# Patient Record
Sex: MALE | Race: WHITE
[De-identification: names, ages, dates, MRNs, and addresses within clinical notes are randomized per-mention and may not be internally consistent; named-entity substitution may affect disease eponyms.]

---

## 2017-08-03 ENCOUNTER — TRANSCRIPTION ENCOUNTER (OUTPATIENT)
Age: 77
End: 2017-08-03

## 2017-08-07 ENCOUNTER — TRANSCRIPTION ENCOUNTER (OUTPATIENT)
Age: 77
End: 2017-08-07

## 2017-10-02 ENCOUNTER — TRANSCRIPTION ENCOUNTER (OUTPATIENT)
Age: 77
End: 2017-10-02

## 2017-10-02 ENCOUNTER — INPATIENT (INPATIENT)
Facility: HOSPITAL | Age: 77
LOS: 0 days | Discharge: HOME | End: 2017-10-02
Attending: INTERNAL MEDICINE | Admitting: INTERNAL MEDICINE
Payer: MEDICARE

## 2017-10-02 VITALS — HEIGHT: 68 IN | WEIGHT: 169.98 LBS

## 2017-10-02 VITALS
TEMPERATURE: 98 F | OXYGEN SATURATION: 97 % | HEART RATE: 75 BPM | SYSTOLIC BLOOD PRESSURE: 140 MMHG | DIASTOLIC BLOOD PRESSURE: 76 MMHG | RESPIRATION RATE: 16 BRPM

## 2017-10-02 DIAGNOSIS — R10.13 EPIGASTRIC PAIN: ICD-10-CM

## 2017-10-02 DIAGNOSIS — Z90.49 ACQUIRED ABSENCE OF OTHER SPECIFIED PARTS OF DIGESTIVE TRACT: Chronic | ICD-10-CM

## 2017-10-02 LAB
ALBUMIN SERPL ELPH-MCNC: 4.2 G/DL — SIGNIFICANT CHANGE UP (ref 3.3–5)
ALP SERPL-CCNC: 65 U/L — SIGNIFICANT CHANGE UP (ref 40–120)
ALT FLD-CCNC: 47 U/L — SIGNIFICANT CHANGE UP (ref 12–78)
ANION GAP SERPL CALC-SCNC: 8 MMOL/L — SIGNIFICANT CHANGE UP (ref 5–17)
APPEARANCE UR: CLEAR — SIGNIFICANT CHANGE UP
APTT BLD: 27.3 SEC — LOW (ref 27.5–37.4)
AST SERPL-CCNC: 33 U/L — SIGNIFICANT CHANGE UP (ref 15–37)
BASOPHILS # BLD AUTO: 0 K/UL — SIGNIFICANT CHANGE UP (ref 0–0.2)
BASOPHILS NFR BLD AUTO: 0.4 % — SIGNIFICANT CHANGE UP (ref 0–2)
BILIRUB SERPL-MCNC: 0.8 MG/DL — SIGNIFICANT CHANGE UP (ref 0.2–1.2)
BILIRUB UR-MCNC: NEGATIVE — SIGNIFICANT CHANGE UP
BLD GP AB SCN SERPL QL: SIGNIFICANT CHANGE UP
BUN SERPL-MCNC: 15 MG/DL — SIGNIFICANT CHANGE UP (ref 7–23)
CALCIUM SERPL-MCNC: 9.3 MG/DL — SIGNIFICANT CHANGE UP (ref 8.5–10.1)
CHLORIDE SERPL-SCNC: 101 MMOL/L — SIGNIFICANT CHANGE UP (ref 96–108)
CO2 SERPL-SCNC: 26 MMOL/L — SIGNIFICANT CHANGE UP (ref 22–31)
COLOR SPEC: YELLOW — SIGNIFICANT CHANGE UP
CREAT SERPL-MCNC: 1.07 MG/DL — SIGNIFICANT CHANGE UP (ref 0.5–1.3)
DIFF PNL FLD: NEGATIVE — SIGNIFICANT CHANGE UP
EOSINOPHIL # BLD AUTO: 0 K/UL — SIGNIFICANT CHANGE UP (ref 0–0.5)
EOSINOPHIL NFR BLD AUTO: 0.1 % — SIGNIFICANT CHANGE UP (ref 0–6)
GLUCOSE SERPL-MCNC: 132 MG/DL — HIGH (ref 70–99)
GLUCOSE UR QL: NEGATIVE MG/DL — SIGNIFICANT CHANGE UP
HCT VFR BLD CALC: 44.8 % — SIGNIFICANT CHANGE UP (ref 39–50)
HGB BLD-MCNC: 14.8 G/DL — SIGNIFICANT CHANGE UP (ref 13–17)
INR BLD: 1.07 RATIO — SIGNIFICANT CHANGE UP (ref 0.88–1.16)
KETONES UR-MCNC: NEGATIVE — SIGNIFICANT CHANGE UP
LEUKOCYTE ESTERASE UR-ACNC: NEGATIVE — SIGNIFICANT CHANGE UP
LIDOCAIN IGE QN: 118 U/L — SIGNIFICANT CHANGE UP (ref 73–393)
LYMPHOCYTES # BLD AUTO: 1.2 K/UL — SIGNIFICANT CHANGE UP (ref 1–3.3)
LYMPHOCYTES # BLD AUTO: 11.7 % — LOW (ref 13–44)
MCHC RBC-ENTMCNC: 30 PG — SIGNIFICANT CHANGE UP (ref 27–34)
MCHC RBC-ENTMCNC: 33 GM/DL — SIGNIFICANT CHANGE UP (ref 32–36)
MCV RBC AUTO: 91 FL — SIGNIFICANT CHANGE UP (ref 80–100)
MONOCYTES # BLD AUTO: 0.9 K/UL — SIGNIFICANT CHANGE UP (ref 0–0.9)
MONOCYTES NFR BLD AUTO: 8.5 % — SIGNIFICANT CHANGE UP (ref 2–14)
NEUTROPHILS # BLD AUTO: 8.1 K/UL — HIGH (ref 1.8–7.4)
NEUTROPHILS NFR BLD AUTO: 79.2 % — HIGH (ref 43–77)
NITRITE UR-MCNC: NEGATIVE — SIGNIFICANT CHANGE UP
PH UR: 6 — SIGNIFICANT CHANGE UP (ref 5–8)
PLATELET # BLD AUTO: 278 K/UL — SIGNIFICANT CHANGE UP (ref 150–400)
POTASSIUM SERPL-MCNC: 3.8 MMOL/L — SIGNIFICANT CHANGE UP (ref 3.5–5.3)
POTASSIUM SERPL-SCNC: 3.8 MMOL/L — SIGNIFICANT CHANGE UP (ref 3.5–5.3)
PROT SERPL-MCNC: 8.8 GM/DL — HIGH (ref 6–8.3)
PROT UR-MCNC: NEGATIVE MG/DL — SIGNIFICANT CHANGE UP
PROTHROM AB SERPL-ACNC: 11.6 SEC — SIGNIFICANT CHANGE UP (ref 9.8–12.7)
RBC # BLD: 4.92 M/UL — SIGNIFICANT CHANGE UP (ref 4.2–5.8)
RBC # FLD: 13.4 % — SIGNIFICANT CHANGE UP (ref 10.3–14.5)
SODIUM SERPL-SCNC: 135 MMOL/L — SIGNIFICANT CHANGE UP (ref 135–145)
SP GR SPEC: 1.01 — SIGNIFICANT CHANGE UP (ref 1.01–1.02)
TYPE + AB SCN PNL BLD: SIGNIFICANT CHANGE UP
UROBILINOGEN FLD QL: NEGATIVE MG/DL — SIGNIFICANT CHANGE UP
WBC # BLD: 10.2 K/UL — SIGNIFICANT CHANGE UP (ref 3.8–10.5)
WBC # FLD AUTO: 10.2 K/UL — SIGNIFICANT CHANGE UP (ref 3.8–10.5)

## 2017-10-02 PROCEDURE — 76705 ECHO EXAM OF ABDOMEN: CPT | Mod: 26

## 2017-10-02 PROCEDURE — 93010 ELECTROCARDIOGRAM REPORT: CPT

## 2017-10-02 PROCEDURE — 71010: CPT | Mod: 26

## 2017-10-02 PROCEDURE — 99285 EMERGENCY DEPT VISIT HI MDM: CPT

## 2017-10-02 RX ORDER — SENNA PLUS 8.6 MG/1
2 TABLET ORAL AT BEDTIME
Qty: 0 | Refills: 0 | Status: DISCONTINUED | OUTPATIENT
Start: 2017-10-02 | End: 2017-10-02

## 2017-10-02 RX ORDER — ENOXAPARIN SODIUM 100 MG/ML
40 INJECTION SUBCUTANEOUS EVERY 24 HOURS
Qty: 0 | Refills: 0 | Status: DISCONTINUED | OUTPATIENT
Start: 2017-10-02 | End: 2017-10-02

## 2017-10-02 RX ORDER — MORPHINE SULFATE 50 MG/1
2 CAPSULE, EXTENDED RELEASE ORAL ONCE
Qty: 0 | Refills: 0 | Status: DISCONTINUED | OUTPATIENT
Start: 2017-10-02 | End: 2017-10-02

## 2017-10-02 RX ORDER — FAMOTIDINE 10 MG/ML
20 INJECTION INTRAVENOUS ONCE
Qty: 0 | Refills: 0 | Status: COMPLETED | OUTPATIENT
Start: 2017-10-02 | End: 2017-10-02

## 2017-10-02 RX ORDER — ISOSORBIDE MONONITRATE 60 MG/1
60 TABLET, EXTENDED RELEASE ORAL DAILY
Qty: 0 | Refills: 0 | Status: DISCONTINUED | OUTPATIENT
Start: 2017-10-02 | End: 2017-10-02

## 2017-10-02 RX ORDER — SODIUM CHLORIDE 9 MG/ML
1000 INJECTION INTRAMUSCULAR; INTRAVENOUS; SUBCUTANEOUS ONCE
Qty: 0 | Refills: 0 | Status: COMPLETED | OUTPATIENT
Start: 2017-10-02 | End: 2017-10-02

## 2017-10-02 RX ORDER — ACETAMINOPHEN 500 MG
650 TABLET ORAL EVERY 6 HOURS
Qty: 0 | Refills: 0 | Status: DISCONTINUED | OUTPATIENT
Start: 2017-10-02 | End: 2017-10-02

## 2017-10-02 RX ORDER — ATORVASTATIN CALCIUM 80 MG/1
10 TABLET, FILM COATED ORAL AT BEDTIME
Qty: 0 | Refills: 0 | Status: DISCONTINUED | OUTPATIENT
Start: 2017-10-02 | End: 2017-10-02

## 2017-10-02 RX ORDER — SODIUM CHLORIDE 9 MG/ML
3 INJECTION INTRAMUSCULAR; INTRAVENOUS; SUBCUTANEOUS ONCE
Qty: 0 | Refills: 0 | Status: COMPLETED | OUTPATIENT
Start: 2017-10-02 | End: 2017-10-02

## 2017-10-02 RX ORDER — PANTOPRAZOLE SODIUM 20 MG/1
40 TABLET, DELAYED RELEASE ORAL
Qty: 0 | Refills: 0 | Status: DISCONTINUED | OUTPATIENT
Start: 2017-10-02 | End: 2017-10-02

## 2017-10-02 RX ORDER — ONDANSETRON 8 MG/1
4 TABLET, FILM COATED ORAL ONCE
Qty: 0 | Refills: 0 | Status: COMPLETED | OUTPATIENT
Start: 2017-10-02 | End: 2017-10-02

## 2017-10-02 RX ORDER — CLOPIDOGREL BISULFATE 75 MG/1
75 TABLET, FILM COATED ORAL DAILY
Qty: 0 | Refills: 0 | Status: DISCONTINUED | OUTPATIENT
Start: 2017-10-02 | End: 2017-10-02

## 2017-10-02 RX ORDER — LABETALOL HCL 100 MG
400 TABLET ORAL
Qty: 0 | Refills: 0 | Status: DISCONTINUED | OUTPATIENT
Start: 2017-10-02 | End: 2017-10-02

## 2017-10-02 RX ORDER — MORPHINE SULFATE 50 MG/1
2 CAPSULE, EXTENDED RELEASE ORAL EVERY 4 HOURS
Qty: 0 | Refills: 0 | Status: DISCONTINUED | OUTPATIENT
Start: 2017-10-02 | End: 2017-10-02

## 2017-10-02 RX ORDER — CALCIUM CARBONATE 500(1250)
3 TABLET ORAL EVERY 6 HOURS
Qty: 0 | Refills: 0 | Status: DISCONTINUED | OUTPATIENT
Start: 2017-10-02 | End: 2017-10-02

## 2017-10-02 RX ORDER — ONDANSETRON 8 MG/1
4 TABLET, FILM COATED ORAL EVERY 6 HOURS
Qty: 0 | Refills: 0 | Status: DISCONTINUED | OUTPATIENT
Start: 2017-10-02 | End: 2017-10-02

## 2017-10-02 RX ORDER — SODIUM CHLORIDE 9 MG/ML
1000 INJECTION INTRAMUSCULAR; INTRAVENOUS; SUBCUTANEOUS
Qty: 0 | Refills: 0 | Status: DISCONTINUED | OUTPATIENT
Start: 2017-10-02 | End: 2017-10-02

## 2017-10-02 RX ADMIN — MORPHINE SULFATE 2 MILLIGRAM(S): 50 CAPSULE, EXTENDED RELEASE ORAL at 05:20

## 2017-10-02 RX ADMIN — PANTOPRAZOLE SODIUM 40 MILLIGRAM(S): 20 TABLET, DELAYED RELEASE ORAL at 11:39

## 2017-10-02 RX ADMIN — SODIUM CHLORIDE 3 MILLILITER(S): 9 INJECTION INTRAMUSCULAR; INTRAVENOUS; SUBCUTANEOUS at 02:21

## 2017-10-02 RX ADMIN — ISOSORBIDE MONONITRATE 60 MILLIGRAM(S): 60 TABLET, EXTENDED RELEASE ORAL at 11:23

## 2017-10-02 RX ADMIN — FAMOTIDINE 20 MILLIGRAM(S): 10 INJECTION INTRAVENOUS at 01:45

## 2017-10-02 RX ADMIN — Medication 30 MILLILITER(S): at 05:06

## 2017-10-02 RX ADMIN — SODIUM CHLORIDE 1000 MILLILITER(S): 9 INJECTION INTRAMUSCULAR; INTRAVENOUS; SUBCUTANEOUS at 01:25

## 2017-10-02 RX ADMIN — MORPHINE SULFATE 2 MILLIGRAM(S): 50 CAPSULE, EXTENDED RELEASE ORAL at 05:06

## 2017-10-02 RX ADMIN — SODIUM CHLORIDE 1000 MILLILITER(S): 9 INJECTION INTRAMUSCULAR; INTRAVENOUS; SUBCUTANEOUS at 05:06

## 2017-10-02 RX ADMIN — CLOPIDOGREL BISULFATE 75 MILLIGRAM(S): 75 TABLET, FILM COATED ORAL at 11:23

## 2017-10-02 RX ADMIN — Medication 1 TABLET(S): at 11:23

## 2017-10-02 RX ADMIN — ONDANSETRON 4 MILLIGRAM(S): 8 TABLET, FILM COATED ORAL at 01:45

## 2017-10-02 RX ADMIN — ONDANSETRON 4 MILLIGRAM(S): 8 TABLET, FILM COATED ORAL at 04:16

## 2017-10-02 NOTE — H&P ADULT - PMH
Coronary artery disease involving native heart without angina pectoris, unspecified vessel or lesion type    Diverticulitis    Essential hypertension    Gastritis

## 2017-10-02 NOTE — H&P ADULT - NSHPLABSRESULTS_GEN_ALL_CORE
14.8   10.2  )-----------( 278      ( 02 Oct 2017 01:35 )             44.8     10-02    135  |  101  |  15  ----------------------------<  132<H>  3.8   |  26  |  1.07    Ca    9.3      02 Oct 2017 01:35    TPro  8.8<H>  /  Alb  4.2  /  TBili  0.8  /  DBili  x   /  AST  33  /  ALT  47  /  AlkPhos  65  10-02    CAPILLARY BLOOD GLUCOSE        LIVER FUNCTIONS - ( 02 Oct 2017 01:35 )  Alb: 4.2 g/dL / Pro: 8.8 gm/dL / ALK PHOS: 65 U/L / ALT: 47 U/L / AST: 33 U/L / GGT: x           PT/INR - ( 02 Oct 2017 01:35 )   PT: 11.6 sec;   INR: 1.07 ratio         PTT - ( 02 Oct 2017 01:35 )  PTT:27.3 sec  Urinalysis Basic - ( 02 Oct 2017 04:02 )    Color: Yellow / Appearance: Clear / S.015 / pH: x  Gluc: x / Ketone: Negative  / Bili: Negative / Urobili: Negative mg/dL   Blood: x / Protein: Negative mg/dL / Nitrite: Negative   Leuk Esterase: Negative / RBC: x / WBC x   Sq Epi: x / Non Sq Epi: x / Bacteria: x      < from: US Abdomen Limited (10.02.17 @ 04:00) >    IMPRESSION:     Multiple small gallstones which appear mobile. No gallbladder wall   thickening. Sonographic Merchant sign was negative.    If further evaluation is clinically indicated, consider HIDA scan.    < end of copied text >

## 2017-10-02 NOTE — H&P ADULT - ASSESSMENT
77 year old man with abd pain    Abd pain, likely related to cholelithiasis   -abd pain resolved  -s/p IVFs and morphine for supportive care  -no leukocytosis  -HD stable  -ultrasound shows gallstones without inflammation  -hold off on HIDA given dye reaction in the past.  -GI and surgical consult pending  -c/w protonix    CAD s/p stent, HTN:  -c/w BB, imdur, statin, plavix    DVT ppx:  -lovenox

## 2017-10-02 NOTE — ED ADULT NURSE NOTE - OBJECTIVE STATEMENT
Pt c/o increasing abd pain x 1 day - pt c/o nausea, no vomiting - states normal BM today.  Pt with h/o gastritis

## 2017-10-02 NOTE — ED PROVIDER NOTE - MEDICAL DECISION MAKING DETAILS
Biliary colic from gallstones without evidence of obstruction or cholecystitis.  Pt. with persistent nausea.  Prefers to follow up in the office with his GI, Dr. Esquivel. Biliary colic from gallstones without evidence of obstruction or cholecystitis.  Pt. with persistent nausea.  Prefers to follow up in the office with his GI, Dr. Esquivel, but still unable to tolerate PO.  GI recommends HIDA scan and admission if pt. having persistent symptoms.

## 2017-10-02 NOTE — H&P ADULT - HISTORY OF PRESENT ILLNESS
77 year old man with HTN, CAD s/p stent, diverticulitis s/p colon resection, gastritis p/w abd pain. States he was [facility] and after eating a meal had pain in epigastric area, sharp, sudden.  Pt with also nausea but no vomiting. Pt was worried about dehydration.    In ER: Given pain meds. Ultrasound showed gallstones without cholecystitis.      At bedside pt completely asymptomatic and feeling well.

## 2017-10-02 NOTE — ED ADULT NURSE REASSESSMENT NOTE - COMFORT CARE
plan of care explained/wait time explained
side rails up/wait time explained/po fluids offered
warm blanket provided/darkened lights/wait time explained/side rails up
wait time explained

## 2017-10-02 NOTE — DISCHARGE NOTE ADULT - PATIENT PORTAL LINK FT
“You can access the [facility] Patient Portal, offered by [facility], by registering with the following website: [email or web address]”

## 2017-10-02 NOTE — ED ADULT NURSE REASSESSMENT NOTE - NS ED NURSE REASSESS COMMENT FT1
Received patient from ROHIT Machado @ 0715 am - Pt. is resting in bed- Pt. states to feel much better at this time. Pt. pending cinsult from hospitalist for admission to unit- Family at the bedside, will cont to monitor patient closely- Safety maintained
Patient remains as previously assessed. Denies pain/discomfort at this time. Safety & comfort measures in place. Will continue to monitor.
Patient status unchanged from previous assessment. VSS, denies pain/discomfort at this time. Awaiting discharge orders. IV removed. Safety & comfort measures in place.
Patient refusing morphine, denies pain/discomfort at this time. MD at bedside. Patient currently resting comfortably. Safety & comfort measures in place. Will continue to monitor.
Patient A&Ox4, VSS.  Patient reports intermittent abdominal pain, cramping in nature 7/10. Abdomen soft, nondistended, nontender to palpation. Denies nausea, no vomiting. BM this am, pt denies constipation, denies diarrhea. Safety & comfort measures maintained. Will continue to monitor.

## 2017-10-02 NOTE — ED PROVIDER NOTE - PROGRESS NOTE DETAILS
US showing gallstones, pt. with nausea and persistent pain.  GI Dr. Alvin short. CHENGG:  Spoke to Dr. Decker on call for GI who thinks HIDA should be ordered and surgery consulted if HIDA is abnormal.  Since pt. needing pain meds and nausea meds and may need to be prepped prior to procedure for reaction to IV dye during nuclear stress test, pt. will be admitted to medicine for biliary colic.

## 2017-10-02 NOTE — ED PROVIDER NOTE - OBJECTIVE STATEMENT
76 y/o male with PMHx of diverticulitis s/p colon resection, gastritis presents to the ED c/o pain in upper abdomen.  (+) VI, (+) nausea.   Denies fever.  BM 4 hours ago with mild constipation.  Came to ED today due to pain and worry of dehydration from reduced intake of fluids.   Gastroenterologist Dr. Esquivel.

## 2017-10-02 NOTE — DISCHARGE NOTE ADULT - CARE PLAN
Principal Discharge DX:	Calculus of gallbladder without cholecystitis without obstruction  Goal:	follow up with Dr. Esquivel as outpatient.  Instructions for follow-up, activity and diet:	follow up with Dr. Esquivel as outpatient.

## 2017-10-02 NOTE — ED ADULT NURSE REASSESSMENT NOTE - SYMPTOMS
Patient reports intermittent abdominal cramping/abdominal pain
denies pain at this time/none
none
none/denies abdominal pain at this time

## 2017-10-02 NOTE — DISCHARGE NOTE ADULT - MEDICATION SUMMARY - MEDICATIONS TO TAKE
I will START or STAY ON the medications listed below when I get home from the hospital:    isosorbide mononitrate 60 mg oral tablet, extended release  -- 1 tab(s) by mouth once a day (in the morning)  -- Indication: For Cad    Lipitor 10 mg oral tablet  -- 1 tab(s) by mouth once a day  -- Indication: For Cad    Plavix 75 mg oral tablet  -- 1 tab(s) by mouth once a day  -- Indication: For Cad    labetalol  -- 400 milligram(s) by mouth 2 times a day  -- Indication: For htn    Protonix 40 mg oral delayed release tablet  -- 1 tab(s) by mouth once a day  -- Indication: For Gerd    Multiple Vitamins oral tablet  -- 1 tab(s) by mouth once a day  -- Indication: For vitamins

## 2017-10-02 NOTE — DISCHARGE NOTE ADULT - CARE PROVIDER_API CALL
Irma Esquivel), Gastroenterology  [address]  [place] [address]  Phone: [phone]  Fax: [phone]

## 2017-10-02 NOTE — ED PROVIDER NOTE - CARE PLAN
Principal Discharge DX:	Nausea alone  Secondary Diagnosis:	Calculus of gallbladder without cholecystitis without obstruction Principal Discharge DX:	Biliary colic  Secondary Diagnosis:	Calculus of gallbladder without cholecystitis without obstruction  Secondary Diagnosis:	Nausea alone

## 2017-10-02 NOTE — H&P ADULT - NSHPPHYSICALEXAM_GEN_ALL_CORE
Vital Signs Last 24 Hrs  T(C): 37 (02 Oct 2017 08:48), Max: 37.3 (02 Oct 2017 00:56)  T(F): 98.6 (02 Oct 2017 08:48), Max: 99.1 (02 Oct 2017 00:56)  HR: 71 (02 Oct 2017 08:48) (68 - 79)  BP: 159/79 (02 Oct 2017 08:48) (126/77 - 159/79)  BP(mean): --  RR: 16 (02 Oct 2017 08:48) (16 - 18)  SpO2: 96% (02 Oct 2017 08:48) (96% - 100%)    PHYSICAL EXAM:    Constitutional: NAD, awake and alert, well-developed  HEENT: PERR, EOMI, Normal Hearing, MMM  Neck: Soft and supple  Respiratory: Breath sounds are clear bilaterally, No wheezing, rales or rhonchi  Cardiovascular: S1 and S2, regular rate and rhythm, no Murmurs, gallops or rubs  Gastrointestinal: Bowel Sounds present, soft, nontender, nondistended, no guarding, no rebound  Extremities: No peripheral edema  Neurological: A/O x 3, no focal deficits  Skin: No rashes

## 2017-10-02 NOTE — DISCHARGE NOTE ADULT - HOSPITAL COURSE
77 year old man presents with abdominal pain.  He was given supportive care with morphine and IVFs.  Labs were largely unremarkable with no leukocytosis.  Imaging revealed cholelithiasis without any inflammation or concern for infection or sepsis. Pt is HD stable and feeling well.  He can follow up as outpatient non-emergently with his GI doc or PCP for consideration of gallbladder removal surgery.  In meantime he can continue his current medical regimen.

## 2017-10-02 NOTE — ED ADULT NURSE REASSESSMENT NOTE - GENERAL PATIENT STATE
comfortable appearance/cooperative/smiling/interactive
comfortable appearance/smiling/interactive/cooperative
family/SO at bedside/smiling/interactive/comfortable appearance/cooperative
resting/sleeping/comfortable appearance/cooperative

## 2017-10-02 NOTE — CONSULT NOTE ADULT - SUBJECTIVE AND OBJECTIVE BOX
HPI:  77 year old man with HTN, CAD s/p stent, diverticulitis s/p colon resection, gastritis p/w abd pain. States he was [facility] and after eating a meal had pain in epigastric area, sharp, sudden.  Pt with also nausea but no vomiting. Pt was worried about dehydration.    In ER: Given pain meds. Ultrasound showed gallstones without cholecystitis.      At bedside pt completely asymptomatic and feeling well. (02 Oct 2017 09:28)      PAST MEDICAL & SURGICAL HISTORY:  Essential hypertension  Coronary artery disease involving native heart without angina pectoris, unspecified vessel or lesion type  Gastritis  Diverticulitis  S/P colon resection      MEDICATIONS  (STANDING):  enoxaparin Injectable 40 milliGRAM(s) SubCutaneous every 24 hours  isosorbide   mononitrate ER Tablet (IMDUR) 60 milliGRAM(s) Oral daily  clopidogrel Tablet 75 milliGRAM(s) Oral daily  labetalol 400 milliGRAM(s) Oral two times a day  pantoprazole    Tablet 40 milliGRAM(s) Oral before breakfast  multivitamin 1 Tablet(s) Oral daily  atorvastatin 10 milliGRAM(s) Oral at bedtime    MEDICATIONS  (PRN):  acetaminophen   Tablet. 650 milliGRAM(s) Oral every 6 hours PRN Mild Pain (1 - 3)  morphine  - Injectable 2 milliGRAM(s) IV Push every 4 hours PRN Severe Pain (7 - 10)  ondansetron Injectable 4 milliGRAM(s) IV Push every 6 hours PRN Nausea  calcium carbonate 500 mG (Tums) Chewable 3 Tablet(s) Chew every 6 hours PRN Dyspepsia  senna 2 Tablet(s) Oral at bedtime PRN Constipation      Allergies    contrast (Unknown)  No Known Drug Allergies  Originally Entered as [Unknown] reaction to [hair dye] (Unknown)    Intolerances        SOCIAL HISTORY:    FAMILY HISTORY:  No pertinent family history in first degree relatives   Non-contributory    REVIEW OF SYSTEMS      General:	    Respiratory and Thorax:  	  Cardiovascular:	    Gastrointestinal:	    Musculoskeletal:	   Vital Signs Last 24 Hrs  T(C): 37 (02 Oct 2017 11:18), Max: 37.3 (02 Oct 2017 00:56)  T(F): 98.6 (02 Oct 2017 11:18), Max: 99.1 (02 Oct 2017 00:56)  HR: 72 (02 Oct 2017 12:38) (68 - 79)  BP: 137/62 (02 Oct 2017 12:38) (126/77 - 162/81)  BP(mean): --  RR: 18 (02 Oct 2017 11:18) (16 - 18)  SpO2: 96% (02 Oct 2017 11:18) (96% - 100%)    HEENT :No Pallor.No icterus. EOMI,PERLAA  Chest : Clear to Auscultation  CVS : S1S2 Normal.No murmurs.  Abdomen: Soft.Non tender .Normal bowel sounds.No Organomegaly.  CNS: Alert.Oriented to Time,Place and Person.No focal deficit.  EXT: Normal Range of motion.No pitting edema.    LABS:                        14.8   10.2  )-----------( 278      ( 02 Oct 2017 01:35 )             44.8     10-02    135  |  101  |  15  ----------------------------<  132<H>  3.8   |  26  |  1.07    Ca    9.3      02 Oct 2017 01:35    TPro  8.8<H>  /  Alb  4.2  /  TBili  0.8  /  DBili  x   /  AST  33  /  ALT  47  /  AlkPhos  65  10-02    PT/INR - ( 02 Oct 2017 01:35 )   PT: 11.6 sec;   INR: 1.07 ratio         PTT - ( 02 Oct 2017 01:35 )  PTT:27.3 sec  LIVER FUNCTIONS - ( 02 Oct 2017 01:35 )  Alb: 4.2 g/dL / Pro: 8.8 gm/dL / ALK PHOS: 65 U/L / ALT: 47 U/L / AST: 33 U/L / GGT: x             RADIOLOGY & ADDITIONAL STUDIES:

## 2017-10-04 DIAGNOSIS — I25.10 ATHEROSCLEROTIC HEART DISEASE OF NATIVE CORONARY ARTERY WITHOUT ANGINA PECTORIS: ICD-10-CM

## 2017-10-04 DIAGNOSIS — K80.20 CALCULUS OF GALLBLADDER WITHOUT CHOLECYSTITIS WITHOUT OBSTRUCTION: ICD-10-CM

## 2017-10-04 DIAGNOSIS — I10 ESSENTIAL (PRIMARY) HYPERTENSION: ICD-10-CM

## 2017-10-07 ENCOUNTER — INPATIENT (INPATIENT)
Facility: HOSPITAL | Age: 77
LOS: 3 days | Discharge: HOME | End: 2017-10-11
Attending: SURGERY | Admitting: SURGERY
Payer: MEDICARE

## 2017-10-07 VITALS — WEIGHT: 171.96 LBS | HEIGHT: 60 IN

## 2017-10-07 DIAGNOSIS — I10 ESSENTIAL (PRIMARY) HYPERTENSION: ICD-10-CM

## 2017-10-07 DIAGNOSIS — Z90.49 ACQUIRED ABSENCE OF OTHER SPECIFIED PARTS OF DIGESTIVE TRACT: Chronic | ICD-10-CM

## 2017-10-07 DIAGNOSIS — K80.10 CALCULUS OF GALLBLADDER WITH CHRONIC CHOLECYSTITIS WITHOUT OBSTRUCTION: ICD-10-CM

## 2017-10-07 PROBLEM — K29.70 GASTRITIS, UNSPECIFIED, WITHOUT BLEEDING: Chronic | Status: ACTIVE | Noted: 2017-10-02

## 2017-10-07 PROBLEM — K57.92 DIVERTICULITIS OF INTESTINE, PART UNSPECIFIED, WITHOUT PERFORATION OR ABSCESS WITHOUT BLEEDING: Chronic | Status: ACTIVE | Noted: 2017-10-02

## 2017-10-07 LAB
ALBUMIN SERPL ELPH-MCNC: 4 G/DL — SIGNIFICANT CHANGE UP (ref 3.3–5)
ALP SERPL-CCNC: 67 U/L — SIGNIFICANT CHANGE UP (ref 40–120)
ALT FLD-CCNC: 41 U/L — SIGNIFICANT CHANGE UP (ref 12–78)
ANION GAP SERPL CALC-SCNC: 8 MMOL/L — SIGNIFICANT CHANGE UP (ref 5–17)
AST SERPL-CCNC: 28 U/L — SIGNIFICANT CHANGE UP (ref 15–37)
BASOPHILS # BLD AUTO: 0.1 K/UL — SIGNIFICANT CHANGE UP (ref 0–0.2)
BASOPHILS NFR BLD AUTO: 0.5 % — SIGNIFICANT CHANGE UP (ref 0–2)
BILIRUB SERPL-MCNC: 0.5 MG/DL — SIGNIFICANT CHANGE UP (ref 0.2–1.2)
BUN SERPL-MCNC: 18 MG/DL — SIGNIFICANT CHANGE UP (ref 7–23)
CALCIUM SERPL-MCNC: 9.2 MG/DL — SIGNIFICANT CHANGE UP (ref 8.5–10.1)
CHLORIDE SERPL-SCNC: 104 MMOL/L — SIGNIFICANT CHANGE UP (ref 96–108)
CO2 SERPL-SCNC: 25 MMOL/L — SIGNIFICANT CHANGE UP (ref 22–31)
CREAT SERPL-MCNC: 1.11 MG/DL — SIGNIFICANT CHANGE UP (ref 0.5–1.3)
EOSINOPHIL # BLD AUTO: 0.1 K/UL — SIGNIFICANT CHANGE UP (ref 0–0.5)
EOSINOPHIL NFR BLD AUTO: 0.5 % — SIGNIFICANT CHANGE UP (ref 0–6)
GLUCOSE SERPL-MCNC: 127 MG/DL — HIGH (ref 70–99)
HCT VFR BLD CALC: 43 % — SIGNIFICANT CHANGE UP (ref 39–50)
HGB BLD-MCNC: 14.5 G/DL — SIGNIFICANT CHANGE UP (ref 13–17)
LIDOCAIN IGE QN: 139 U/L — SIGNIFICANT CHANGE UP (ref 73–393)
LYMPHOCYTES # BLD AUTO: 1.1 K/UL — SIGNIFICANT CHANGE UP (ref 1–3.3)
LYMPHOCYTES # BLD AUTO: 10.8 % — LOW (ref 13–44)
MAGNESIUM SERPL-MCNC: 2.1 MG/DL — SIGNIFICANT CHANGE UP (ref 1.6–2.6)
MCHC RBC-ENTMCNC: 30.7 PG — SIGNIFICANT CHANGE UP (ref 27–34)
MCHC RBC-ENTMCNC: 33.8 GM/DL — SIGNIFICANT CHANGE UP (ref 32–36)
MCV RBC AUTO: 90.9 FL — SIGNIFICANT CHANGE UP (ref 80–100)
MONOCYTES # BLD AUTO: 0.9 K/UL — SIGNIFICANT CHANGE UP (ref 0–0.9)
MONOCYTES NFR BLD AUTO: 8.7 % — SIGNIFICANT CHANGE UP (ref 2–14)
NEUTROPHILS # BLD AUTO: 8 K/UL — HIGH (ref 1.8–7.4)
NEUTROPHILS NFR BLD AUTO: 79.4 % — HIGH (ref 43–77)
PLATELET # BLD AUTO: 264 K/UL — SIGNIFICANT CHANGE UP (ref 150–400)
POTASSIUM SERPL-MCNC: 3.7 MMOL/L — SIGNIFICANT CHANGE UP (ref 3.5–5.3)
POTASSIUM SERPL-SCNC: 3.7 MMOL/L — SIGNIFICANT CHANGE UP (ref 3.5–5.3)
PROT SERPL-MCNC: 8.2 GM/DL — SIGNIFICANT CHANGE UP (ref 6–8.3)
RBC # BLD: 4.72 M/UL — SIGNIFICANT CHANGE UP (ref 4.2–5.8)
RBC # FLD: 13.3 % — SIGNIFICANT CHANGE UP (ref 10.3–14.5)
SODIUM SERPL-SCNC: 137 MMOL/L — SIGNIFICANT CHANGE UP (ref 135–145)
WBC # BLD: 10 K/UL — SIGNIFICANT CHANGE UP (ref 3.8–10.5)
WBC # FLD AUTO: 10 K/UL — SIGNIFICANT CHANGE UP (ref 3.8–10.5)

## 2017-10-07 PROCEDURE — 76705 ECHO EXAM OF ABDOMEN: CPT | Mod: 26

## 2017-10-07 PROCEDURE — 99285 EMERGENCY DEPT VISIT HI MDM: CPT

## 2017-10-07 RX ORDER — MORPHINE SULFATE 50 MG/1
4 CAPSULE, EXTENDED RELEASE ORAL ONCE
Qty: 0 | Refills: 0 | Status: DISCONTINUED | OUTPATIENT
Start: 2017-10-07 | End: 2017-10-07

## 2017-10-07 RX ORDER — LABETALOL HCL 100 MG
400 TABLET ORAL EVERY 12 HOURS
Qty: 0 | Refills: 0 | Status: DISCONTINUED | OUTPATIENT
Start: 2017-10-07 | End: 2017-10-11

## 2017-10-07 RX ORDER — LANOLIN ALCOHOL/MO/W.PET/CERES
5 CREAM (GRAM) TOPICAL AT BEDTIME
Qty: 0 | Refills: 0 | Status: DISCONTINUED | OUTPATIENT
Start: 2017-10-07 | End: 2017-10-07

## 2017-10-07 RX ORDER — PANTOPRAZOLE SODIUM 20 MG/1
40 TABLET, DELAYED RELEASE ORAL DAILY
Qty: 0 | Refills: 0 | Status: DISCONTINUED | OUTPATIENT
Start: 2017-10-07 | End: 2017-10-08

## 2017-10-07 RX ORDER — SODIUM CHLORIDE 9 MG/ML
1000 INJECTION INTRAMUSCULAR; INTRAVENOUS; SUBCUTANEOUS ONCE
Qty: 0 | Refills: 0 | Status: COMPLETED | OUTPATIENT
Start: 2017-10-07 | End: 2017-10-07

## 2017-10-07 RX ORDER — LANOLIN ALCOHOL/MO/W.PET/CERES
3 CREAM (GRAM) TOPICAL AT BEDTIME
Qty: 0 | Refills: 0 | Status: DISCONTINUED | OUTPATIENT
Start: 2017-10-07 | End: 2017-10-11

## 2017-10-07 RX ORDER — ONDANSETRON 8 MG/1
4 TABLET, FILM COATED ORAL ONCE
Qty: 0 | Refills: 0 | Status: COMPLETED | OUTPATIENT
Start: 2017-10-07 | End: 2017-10-07

## 2017-10-07 RX ORDER — HEPARIN SODIUM 5000 [USP'U]/ML
5000 INJECTION INTRAVENOUS; SUBCUTANEOUS EVERY 12 HOURS
Qty: 0 | Refills: 0 | Status: DISCONTINUED | OUTPATIENT
Start: 2017-10-07 | End: 2017-10-11

## 2017-10-07 RX ORDER — ISOSORBIDE MONONITRATE 60 MG/1
60 TABLET, EXTENDED RELEASE ORAL DAILY
Qty: 0 | Refills: 0 | Status: DISCONTINUED | OUTPATIENT
Start: 2017-10-07 | End: 2017-10-11

## 2017-10-07 RX ORDER — HYDROMORPHONE HYDROCHLORIDE 2 MG/ML
1 INJECTION INTRAMUSCULAR; INTRAVENOUS; SUBCUTANEOUS ONCE
Qty: 0 | Refills: 0 | Status: DISCONTINUED | OUTPATIENT
Start: 2017-10-07 | End: 2017-10-07

## 2017-10-07 RX ORDER — DEXTROSE MONOHYDRATE, SODIUM CHLORIDE, AND POTASSIUM CHLORIDE 50; .745; 4.5 G/1000ML; G/1000ML; G/1000ML
1000 INJECTION, SOLUTION INTRAVENOUS
Qty: 0 | Refills: 0 | Status: DISCONTINUED | OUTPATIENT
Start: 2017-10-07 | End: 2017-10-08

## 2017-10-07 RX ADMIN — MORPHINE SULFATE 4 MILLIGRAM(S): 50 CAPSULE, EXTENDED RELEASE ORAL at 07:56

## 2017-10-07 RX ADMIN — ISOSORBIDE MONONITRATE 60 MILLIGRAM(S): 60 TABLET, EXTENDED RELEASE ORAL at 15:54

## 2017-10-07 RX ADMIN — Medication 400 MILLIGRAM(S): at 18:19

## 2017-10-07 RX ADMIN — ONDANSETRON 4 MILLIGRAM(S): 8 TABLET, FILM COATED ORAL at 07:30

## 2017-10-07 RX ADMIN — SODIUM CHLORIDE 1000 MILLILITER(S): 9 INJECTION INTRAMUSCULAR; INTRAVENOUS; SUBCUTANEOUS at 07:43

## 2017-10-07 RX ADMIN — PANTOPRAZOLE SODIUM 40 MILLIGRAM(S): 20 TABLET, DELAYED RELEASE ORAL at 11:23

## 2017-10-07 RX ADMIN — HYDROMORPHONE HYDROCHLORIDE 1 MILLIGRAM(S): 2 INJECTION INTRAMUSCULAR; INTRAVENOUS; SUBCUTANEOUS at 07:30

## 2017-10-07 RX ADMIN — HEPARIN SODIUM 5000 UNIT(S): 5000 INJECTION INTRAVENOUS; SUBCUTANEOUS at 18:16

## 2017-10-07 RX ADMIN — ONDANSETRON 4 MILLIGRAM(S): 8 TABLET, FILM COATED ORAL at 11:24

## 2017-10-07 NOTE — ED PROVIDER NOTE - OBJECTIVE STATEMENT
77 year old male with HTN, CAD s/p stent, diverticulitis s/p colon resection, gastritis p/w abd pain.  Pt was admitted on 10/2/17 for abdominal pain and found to have gallstones w/o e/o cholecystitis. 77 year old male with HTN, CAD s/p stent, diverticulitis s/p colon resection, gastritis p/w abd pain.  Pt was admitted on 10/2/17 for abdominal pain and found to have gallstones w/o e/o cholecystitis. He left within 24 hours when his pain resolved and had an outpatient HIDA scan 2 days ago at [facility] in [place]  via Dr. Esquivel but hasn't received the results.  His pain came back severely last night after eating fried chicken. No CP or SOB.  No melena or hematochezia.  No f/c/r.

## 2017-10-07 NOTE — H&P ADULT - NSHPSOCIALHISTORY_GEN_ALL_CORE
denies smoking history, occassional alcohol use (very infrequent), no illicit drug use denies smoking history, occasional alcohol use (very infrequent), no illicit drug use

## 2017-10-07 NOTE — H&P ADULT - PROBLEM SELECTOR PLAN 1
admit, NPO, IVF, plan for ? cholecystectomy, will obtain medical clearance pre-op admit, NPO, IVF, plan for cholecystectomy, will obtain medical clearance pre-op

## 2017-10-07 NOTE — ED ADULT TRIAGE NOTE - CHIEF COMPLAINT QUOTE
abd pain few days p/w pmd  awaiting test result but pain started  last night with nausea vomiting no fever possible gallbladder's problem

## 2017-10-07 NOTE — CONSULT NOTE ADULT - ASSESSMENT
PHYSICAL EXAM:      Constitutional:    Eyes:    ENMT:    Neck:    Breasts:    Back:    Respiratory:    Cardiovascular:    Gastrointestinal:    Genitourinary:    PHYSICAL EXAM:    GENERAL: NAD, well-groomed, well-developed  HEAD:  NC/AT  EYES: EOMI, PERRLA, no scleral icterus  HEENT: Moist mucous membranes,clear throat, ears intact ,no sinus tenderness  NECK: Supple, No JVD, no bruit, no thyroid nodule  CNS:  Alert & Oriented X3, Motor Strength 5/5 B/L upper and lower extremities; DTRs 2+ intact   LUNG: Clear to auscultation bilaterally; No rales, rhonchi, wheezing, or rubs  HEART: RRR; No murmurs, rubs, or gallops  ABDOMEN: +BS, ST/ND/NT  GENITOURINARY- Voiding, Bladder not distended  EXTREMITIES:  2+ Peripheral Pulses, No clubbing, cyanosis, or edema  MUSCULOSKELTAL- Joints normal ROM, no Muscle or joint tenderness  VAGINAL: deferred  RECTAL: deferred, not indicated  BREAST: deferred PHYSICAL EXAM:    GENERAL: NAD, well-groomed, well-developed  HEAD:  NC/AT  EYES: EOMI, PERRLA, no scleral icterus  HEENT: Moist mucous membranes,clear throat, ears intact ,no sinus tenderness  NECK: Supple, No JVD, no bruit, no thyroid nodule  CNS:  Alert & Oriented X3, Motor Strength 5/5 B/L upper and lower extremities; DTRs 2+ intact   LUNG: Clear to auscultation bilaterally; No rales, rhonchi, wheezing, or rubs  HEART: RRR; No murmurs, rubs, or gallops  ABDOMEN: +BS, ST/ND/NT infraumbilical L paramedian old surgical scar seen  GENITOURINARY- Voiding, Bladder not distended  EXTREMITIES:  2+ Peripheral Pulses, No clubbing, cyanosis, or edema  MUSCULOSKELETAL- Joints normal ROM, no Muscle or joint tenderness  BREAST, DETAILED / RECTAL: deferred, not indicated 1) RUQ pain and nausea c/w cholelithiasis  A) 1st episode after chinese food w biliary colic; resolved and he left   B) 2nd episode w marked nausea  C) pt reported outpt scan at [facility] showed GB did not contract  1. NPO except meds  2. management per surg for pain and nausea meds      2) CAD w stents and nl LVEF  A) unable to take aspirin due to extensive GI Bleed requiring transfusions  B) exercise tolerance:  walks 1 hour daily in mall; mows grass, builds decks, and maintains property  1. medically stable for cholcystectomy  able to exercise > 4 mets  is on nitrates  normal LVEF  stent placement was not recent  2. restart Plavix post op  3. restart statin  4. continue nitrate    3) HTN  1. labetalol ordered  2. monitor BP    4) VTE  sq heparin    We will follow w you

## 2017-10-07 NOTE — H&P ADULT - NSHPLABSRESULTS_GEN_ALL_CORE
14.5   10.0  )-----------( 264                  43.0     137  |  104  |  18  ----------------------------<  127<H>  3.7   |  25  |  1.11    Ca    9.2        Mg     2.1         TPro  8.2  /  Alb  4.0  /  TBili  0.5  /  DBili  x   /  AST  28  /  ALT  41  /  AlkPhos  67  10-07

## 2017-10-07 NOTE — H&P ADULT - NSHPPHYSICALEXAM_GEN_ALL_CORE
CONSTITUTIONAL: NAD, well-groomed, well-developed  HEAD:  Atraumatic, Normocephalic  EYES: EOMI, PERRLA, conjunctiva and sclera clear  ENMT: No tonsillar erythema, exudates, or enlargement; Moist mucous membranes, Good dentition, No lesions  NECK: Supple, No JVD, Normal thyroid  NERVOUS SYSTEM:  Alert & Oriented X3, Good concentration; Motor Strength 5/5 B/L upper and lower extremities; DTRs 2+ intact and symmetric  CHEST/LUNG: Clear to percussion bilaterally; No rales, rhonchi, wheezing, or rubs  HEART: Regular rate and rhythm; No murmurs, rubs, or gallops  ABDOMEN: midline scar (s/p ex lap, colectomy) +BS, Soft, Nondistended, no guarding or [place] sign (pt given pain meds), mild tenderness at the RUQ, no rebound  EXTREMITIES:  2+ Peripheral Pulses, No clubbing, cyanosis, or edema  LYMPH: No lymphadenopathy noted  SKIN: No rashes or lesions CONSTITUTIONAL: NAD, well-groomed, well-developed  HEAD:  Atraumatic, Normocephalic  EYES: EOMI, PERRLA, conjunctiva and sclera clear  ENMT: No tonsillar erythema, exudates, or enlargement; Moist mucous membranes, Good dentition, No lesions  NECK: Supple, No JVD, Normal thyroid  NERVOUS SYSTEM:  Alert & Oriented X3, Good concentration; Motor Strength 5/5 B/L upper and lower extremities; DTRs 2+ intact and symmetric  CHEST/LUNG: Clear to percussion bilaterally; No rales, rhonchi, wheezing, or rubs  HEART: Regular rate and rhythm; No murmurs, rubs, or gallops  ABDOMEN: midline scar (s/p ex lap, colectomy) +BS, Soft, Nondistended, no guarding or Merchant's sign (pt given pain meds), mild tenderness at the RUQ, no rebound  EXTREMITIES:  2+ Peripheral Pulses, No clubbing, cyanosis, or edema  LYMPH: No lymphadenopathy noted  SKIN: No rashes or lesions

## 2017-10-07 NOTE — H&P ADULT - HISTORY OF PRESENT ILLNESS
78yo male with PMH HTN, CAD s/p stents, diverticulitis s/p colectomy (10yrs ago) presents to the ED c/o RUQ pain for the past 12 hours with nausea and vomiting.  Pt explains that he was admitted to [facility] 5 days ago with similar symptoms however they subsided and he went home and was feeling fine.  Last night after dinner (chicken, mashed potatoes, vegetables) he has excruciating epigastric and RUQ pain.  He started to have vomiting and extreme nausea that only subsided when he came to the ED.  Pt denies diarrhea, constipation, fever, chills, SOB and/or palpitations.

## 2017-10-07 NOTE — CONSULT NOTE ADULT - ATTENDING COMMENTS
hx, exam and data reviewed w ANI Ibarra.    Patient interviewed and examined by me this am at 5:10 AM  hx, exam and A/P as outlined/edited    as per A/P

## 2017-10-07 NOTE — CONSULT NOTE ADULT - SUBJECTIVE AND OBJECTIVE BOX
76y/o m present in the  c/o 2 day h/o  RUQ pain nausea,  vomitting no radiation no fever or chills , as per the patient he was in the Ed few days ago with the same symptoms but  the pain resolved and was sent back home.  He states the pain was getting progressively worst after he went out to lunch  with his wife, later had chicken for dinner hrs later the pain became escrutiating.  In the ED the CT of abd/pelvic 76y/o m present in the  c/o 2 day h/o  RUQ pain nausea,  vomitting no radiation no fever or chills , as per the patient he was in the Ed few days ago with the same symptoms but  the pain resolved and was sent back home.  He states the pain was getting progressively worst after he went out to lunch  with his wife, later had chicken for dinner which include potatoes and veges, hrs later the pain became excruciating and  he decide  on  returning  to  the ED.  while in the  ER the CT of abd/pelvic  was performed  and it was noted has  acute  cholecystitis, surgery  called seen by Dr. Alberts and he is schedule for cholecystectomy.  The hospitalist severe called for medical mangement.    PMD/cardiology- Dr EFE Box  Gastroenterology- Dr Garcia   PMHX- htn, cad/stent x 2, osteoarthritis, h/o prostate ca 10yrs ago, h/o GI bleed 12/2016    PSHX- diverticulitis s/p colectomy 10yrs, seed implant 12yrs,     Vital Signs Last 24 Hrs  T(C): 36.7 (07 Oct 2017 17:13), Max: 36.8 (07 Oct 2017 11:24)  T(F): 98 (07 Oct 2017 17:13), Max: 98.2 (07 Oct 2017 11:24)  HR: 67 (07 Oct 2017 17:13) (64 - 77)  BP: 153/81 (07 Oct 2017 17:13) (150/96 - 167/87)  BP(mean): --  RR: 18 (07 Oct 2017 17:13) (16 - 18)  SpO2: 97% (07 Oct 2017 17:13) (97% - 99%) 78y/o m present in the  c/o 2 day h/o  RUQ pain nausea,  vomitting no radiation no fever or chills , as per the patient he was in the Ed few days ago with the same symptoms but  the pain resolved and was sent back home.  He states the pain was getting progressively worst after he went out to lunch  with his wife, later had chicken for dinner which include potatoes and veges, hrs later the pain became excruciating and  he decide  on  returning  to  the ED.  while in the  ER the CT of abd/pelvic  was performed  and it was noted has  acute  cholecystitis, surgery  called seen by Dr. Alberts and he is schedule for cholecystectomy.  The hospitalist severe called for medical mangement.    PMD/cardiology- Dr EFE Box  Gastroenterology- Dr Garcia   PMHX- htn, cad/stent x 2, osteoarthritis, h/o prostate ca 10yrs ago, h/o GI bleed 12/2016    PSHX- diverticulitis s/p colectomy 10yrs, seed implant 12yrs,     Vital Signs Last 24 Hrs  T(C): 36.7 (07 Oct 2017 17:13), Max: 36.8 (07 Oct 2017 11:24)  T(F): 98 (07 Oct 2017 17:13), Max: 98.2 (07 Oct 2017 11:24)  HR: 67 (07 Oct 2017 17:13) (64 - 77)  BP: 153/81 (07 Oct 2017 17:13) (150/96 - 167/87)  BP(mean): --  RR: 18 (07 Oct 2017 17:13) (16 - 18)  SpO2: 97% (07 Oct 2017 17:13) (97% - 99%)    REVIEW OF SYSTEMS:    CONSTITUTIONAL: No weakness, fevers or chills, fatigue  EYES/ENT: No visual changes;  No vertigo sore throat  , eye pain , sinus congestion , ,nose bleed  NECK: No pain or stiffness, no rigidity  RESPIRATORY: no cough, no  wheezing, no  hemoptysis; No shortness of breath  CARDIOVASCULAR: No chest pain or palpitations,  GASTROINTESTINAL: RUQ abdominal  pain with  epigastric pain. (+) Merchant sign, no RLQ pain  nausea, vomiting, no hematemesis; No diarrhea or constipation. No melena or hematochezia.  GENITOURINARY: No dysuria, frequency or hematuria  NEUROLOGICAL: No numbness or weakness, no paresthesi, no dizziness  SKIN: No itching, burning, rashes, or lesions   All other review of systems is negative unless indicated above.  PSYCHIATRY : no SI, no depression no anxiety, no delusion, no hallucination 78y/o m present in the  c/o 2 day h/o  RUQ pain nausea,  vomitting no radiation no fever or chills , as per the patient he was in the ED a few days ago with the same symptoms but  the pain resolved and was sent back home.  He states the pain was getting progressively worse after he went out to lunch  with his wife, later had chicken for dinner which include potatoes and veges, hrs later the pain became excruciating and  he decide  on  returning  to  the ED.  while in the  ER the CT of abd/pelvic  was performed  and it was noted has acute  cholecystitis, surgery  called seen by Dr. Alberts and he is scheduled for cholecystectomy.  The hospitalist severe called for medical mangement.    PMD/cardiology- Dr EFE Box  Gastroenterology- Dr Esquivel   PMHX- htn, cad/stent x 2, osteoarthritis, h/o prostate ca 10yrs ago, h/o GI bleed 12/2016    PSHX- diverticulitis s/p colectomy 10yrs ago at [facility]; seed implant 12yrs,     Vital Signs Last 24 Hrs  T(C): 36.7 (07 Oct 2017 17:13), Max: 36.8 (07 Oct 2017 11:24)  T(F): 98 (07 Oct 2017 17:13), Max: 98.2 (07 Oct 2017 11:24)  HR: 67 (07 Oct 2017 17:13) (64 - 77)  BP: 153/81 (07 Oct 2017 17:13) (150/96 - 167/87)  BP(mean): --  RR: 18 (07 Oct 2017 17:13) (16 - 18)  SpO2: 97% (07 Oct 2017 17:13) (97% - 99%)    REVIEW OF SYSTEMS:    CONSTITUTIONAL: No weakness, fevers or chills, fatigue  EYES/ENT: No visual changes;  No vertigo sore throat  , eye pain , sinus congestion , ,nose bleed  NECK: No pain or stiffness, no rigidity  RESPIRATORY: no cough, no  wheezing, no  hemoptysis; No shortness of breath  CARDIOVASCULAR: No chest pain or palpitations,  GASTROINTESTINAL: RUQ abdominal  pain with  epigastric pain. (+) Merchant sign, no RLQ pain  nausea, vomiting, no hematemesis; No diarrhea or constipation. No melena or hematochezia.  GENITOURINARY: No dysuria, frequency or hematuria  NEUROLOGICAL: No numbness or weakness, no paresthesia, no dizziness  SKIN: No itching, burning, rashes, or lesions   All other review of systems is negative unless indicated above.  PSYCHIATRY : no SI, no depression no anxiety, no delusion, no hallucination 76y/o m present in the  c/o 2 day h/o  RUQ pain nausea,  vomitting no radiation no fever or chills , as per the patient he was in the ED a few days ago with the same symptoms but  the pain resolved and was sent back home.  He states the pain was getting progressively worse after he went out to lunch  with his wife, later had chicken for dinner which include potatoes and veges, hrs later the pain became excruciating and  he decide  on  returning  to  the ED.  While in the  ER, CT of abd/pelvic  was performed  and it was noted has acute cholecystitis, surgery  called seen by Dr. Alberts and he is scheduled for cholecystectomy.  The hospitalist service called for medical management.       Patient reported having an outpt scan in [facility] that Dr. Esquivel sent him for ([facility]?)    PMD/cardiology- Dr EFE Box  Gastroenterology- Dr Esquivel   PMHX-   1) htn,   2) cad/stent x 2, denied angina; no limitations of exercise tolerance  cardiac cath  nl LVEF  LMCA normal;  patent stent to prox LAD; 1st diag old occlusion; LCx diffuse irreg; patent stent to mid RCA  3) osteoarthritis efrain spine and hands  4) sp h/o prostate ca 10yrs ago,   5) h/o GI bleed 12/2016 while on aspirin, required transfusion of 9 units    PSHX-   1) diverticulitis s/p colectomy 10yrs ago at [place]  2) seed implant 12yrs,  3) cardiac stents       REVIEW OF SYSTEMS:    CONSTITUTIONAL: No weakness, fevers or chills, fatigue  EYES/ENT: No visual changes;  No vertigo sore throat  , eye pain , sinus congestion , ,nose bleed  NECK: No pain or stiffness, no rigidity  RESPIRATORY: no cough, no  wheezing, no  hemoptysis; No shortness of breath  CARDIOVASCULAR: No chest pain or palpitations,  GASTROINTESTINAL: RUQ abdominal  pain with epigastric pain. (+) Merchant sign, no RLQ pain  nausea, vomiting, no hematemesis; No diarrhea or constipation. No melena or hematochezia.  GENITOURINARY: No dysuria, frequency or hematuria  NEUROLOGICAL: No numbness or weakness, no paresthesia, no dizziness  SKIN: No itching, burning, rashes, or lesions   All other review of systems is negative unless indicated above.  PSYCHIATRY : no SI, no depression no anxiety, no delusion, no hallucination      PHYSICAL EXAM:    Vital Signs Last 24 Hrs  T(C): 36.7 (07 Oct 2017 17:13), Max: 36.8 (07 Oct 2017 11:24)  T(F): 98 (07 Oct 2017 17:13), Max: 98.2 (07 Oct 2017 11:24)  HR: 67 (07 Oct 2017 17:13) (64 - 77)  BP: 153/81 (07 Oct 2017 17:13) (150/96 - 167/87)  BP(mean): --  RR: 18 (07 Oct 2017 17:13) (16 - 18)  SpO2: 97% (07 Oct 2017 17:13) (97% - 99%)    GENERAL: NAD, well-groomed, well-developed  HEAD:  NC/AT  EYES: EOMI, PERRLA, no scleral icterus  HEENT: Moist mucous membranes,clear throat, ears intact ,no sinus tenderness  NECK: Supple, No JVD, no bruit, no thyroid nodule  CNS:  Alert & Oriented X3, Motor Strength 5/5 B/L upper and lower extremities; DTRs 2+ intact   LUNG: Clear to auscultation bilaterally; No rales, rhonchi, wheezing, or rubs  HEART: RRR; No murmurs, rubs, or gallops  ABDOMEN: +BS, ST/ ND/ NT infraumbilical  vertical L paramedian old surgical scar seen  GENITOURINARY- Voiding, Bladder not distended  EXTREMITIES:  2+ Peripheral Pulses, No clubbing, cyanosis, or edema  MUSCULOSKELETAL- Joints normal ROM, no Muscle or joint tenderness  BREAST, DETAILED / RECTAL: deferred, not indicated          DATA                   14.5   10.0  )-----------( 264      ( 07 Oct 2017 07:23 )             43.0     07 Oct 2017 07:23    137    |  104    |  18     ----------------------------<  127    3.7     |  25     |  1.11     Ca    9.2        07 Oct 2017 07:23  Mg     2.1       07 Oct 2017 07:23    TPro  8.2    /  Alb  4.0    /  TBili  0.5    /  DBili  x      /  AST  28     /  ALT  41     /  AlkPhos  67     07 Oct 2017 07:23      CAPILLARY BLOOD GLUCOSE    LIVER FUNCTIONS - ( 07 Oct 2017 07:23 )  Alb: 4.0 g/dL / Pro: 8.2 gm/dL / ALK PHOS: 67 U/L / ALT: 41 U/L / AST: 28 U/L / GGT: x          US:  FINDINGS:   10/2/17 available for review.    The liver demonstrates echogenic echotexture without focal lesion.    Hepatic size is upper limits of normal and contours are maintained.    Hepatic and portal veins appear patent and are not displaced.  No   intrahepatic or ductal dilatation is found.  The common duct is not   dilated, measuring 0.3 cm.  The gallbladder is significant for multiple   mobile gallstones within the gallbladder. There is no wall thickening or   pericholecystic fluid. Trace perihepatic ascites.    The right kidney measures 11.8 cm in length.  It demonstrates no mass,   calculus or hydronephrosis.  The abdominal aorta and IVC appear intact.      IMPRESSION:   Cholelithiasis without evidence of cholecystitis. Fatty   liver.  Trace ascites.      EKG 10-   NSR 70 q III,, no sign change when compared to 12-

## 2017-10-08 LAB
ANION GAP SERPL CALC-SCNC: 5 MMOL/L — SIGNIFICANT CHANGE UP (ref 5–17)
BASOPHILS # BLD AUTO: 0 K/UL — SIGNIFICANT CHANGE UP (ref 0–0.2)
BASOPHILS NFR BLD AUTO: 0.7 % — SIGNIFICANT CHANGE UP (ref 0–2)
BUN SERPL-MCNC: 13 MG/DL — SIGNIFICANT CHANGE UP (ref 7–23)
CALCIUM SERPL-MCNC: 8.2 MG/DL — LOW (ref 8.5–10.1)
CHLORIDE SERPL-SCNC: 110 MMOL/L — HIGH (ref 96–108)
CO2 SERPL-SCNC: 26 MMOL/L — SIGNIFICANT CHANGE UP (ref 22–31)
CREAT SERPL-MCNC: 1.05 MG/DL — SIGNIFICANT CHANGE UP (ref 0.5–1.3)
EOSINOPHIL # BLD AUTO: 0.1 K/UL — SIGNIFICANT CHANGE UP (ref 0–0.5)
EOSINOPHIL NFR BLD AUTO: 1.4 % — SIGNIFICANT CHANGE UP (ref 0–6)
GLUCOSE SERPL-MCNC: 108 MG/DL — HIGH (ref 70–99)
HCT VFR BLD CALC: 36.1 % — LOW (ref 39–50)
HGB BLD-MCNC: 12.1 G/DL — LOW (ref 13–17)
LYMPHOCYTES # BLD AUTO: 1.5 K/UL — SIGNIFICANT CHANGE UP (ref 1–3.3)
LYMPHOCYTES # BLD AUTO: 30.1 % — SIGNIFICANT CHANGE UP (ref 13–44)
MCHC RBC-ENTMCNC: 30.6 PG — SIGNIFICANT CHANGE UP (ref 27–34)
MCHC RBC-ENTMCNC: 33.5 GM/DL — SIGNIFICANT CHANGE UP (ref 32–36)
MCV RBC AUTO: 91.5 FL — SIGNIFICANT CHANGE UP (ref 80–100)
MONOCYTES # BLD AUTO: 0.8 K/UL — SIGNIFICANT CHANGE UP (ref 0–0.9)
MONOCYTES NFR BLD AUTO: 15.6 % — HIGH (ref 2–14)
NEUTROPHILS # BLD AUTO: 2.5 K/UL — SIGNIFICANT CHANGE UP (ref 1.8–7.4)
NEUTROPHILS NFR BLD AUTO: 52.1 % — SIGNIFICANT CHANGE UP (ref 43–77)
PLATELET # BLD AUTO: 219 K/UL — SIGNIFICANT CHANGE UP (ref 150–400)
POTASSIUM SERPL-MCNC: 4.3 MMOL/L — SIGNIFICANT CHANGE UP (ref 3.5–5.3)
POTASSIUM SERPL-SCNC: 4.3 MMOL/L — SIGNIFICANT CHANGE UP (ref 3.5–5.3)
RBC # BLD: 3.95 M/UL — LOW (ref 4.2–5.8)
RBC # FLD: 13.6 % — SIGNIFICANT CHANGE UP (ref 10.3–14.5)
SODIUM SERPL-SCNC: 141 MMOL/L — SIGNIFICANT CHANGE UP (ref 135–145)
WBC # BLD: 4.9 K/UL — SIGNIFICANT CHANGE UP (ref 3.8–10.5)
WBC # FLD AUTO: 4.9 K/UL — SIGNIFICANT CHANGE UP (ref 3.8–10.5)

## 2017-10-08 PROCEDURE — 93010 ELECTROCARDIOGRAM REPORT: CPT

## 2017-10-08 RX ORDER — PANTOPRAZOLE SODIUM 20 MG/1
40 TABLET, DELAYED RELEASE ORAL
Qty: 0 | Refills: 0 | Status: DISCONTINUED | OUTPATIENT
Start: 2017-10-08 | End: 2017-10-11

## 2017-10-08 RX ADMIN — DEXTROSE MONOHYDRATE, SODIUM CHLORIDE, AND POTASSIUM CHLORIDE 125 MILLILITER(S): 50; .745; 4.5 INJECTION, SOLUTION INTRAVENOUS at 06:03

## 2017-10-08 RX ADMIN — PANTOPRAZOLE SODIUM 40 MILLIGRAM(S): 20 TABLET, DELAYED RELEASE ORAL at 13:08

## 2017-10-08 RX ADMIN — HEPARIN SODIUM 5000 UNIT(S): 5000 INJECTION INTRAVENOUS; SUBCUTANEOUS at 18:00

## 2017-10-08 RX ADMIN — HEPARIN SODIUM 5000 UNIT(S): 5000 INJECTION INTRAVENOUS; SUBCUTANEOUS at 06:02

## 2017-10-08 RX ADMIN — Medication 400 MILLIGRAM(S): at 18:00

## 2017-10-08 RX ADMIN — ISOSORBIDE MONONITRATE 60 MILLIGRAM(S): 60 TABLET, EXTENDED RELEASE ORAL at 13:09

## 2017-10-08 NOTE — PROGRESS NOTE ADULT - SUBJECTIVE AND OBJECTIVE BOX
MARLEY EDDY  MRN-[id number]  Male      HPI:  76yo male with PMH HTN, CAD s/p stents, diverticulitis s/p colectomy (10yrs ago) presents to the ED c/o RUQ pain for the past 12 hours with nausea and vomiting.  Pt explains that he was admitted to [facility] 5 days ago with similar symptoms however they subsided and he went home and was feeling fine.  Last night after dinner (chicken, mashed potatoes, vegetables) he has excruciating epigastric and RUQ pain.  He started to have vomiting and extreme nausea that only subsided when he came to the ED.  Pt denies diarrhea, constipation, fever, chills, SOB and/or palpitations. (07 Oct 2017 10:21)  No further abd pain      Daily     Daily Weight in k.5 (07 Oct 2017 12:40)    Physical Exam:    Pt is AAOx3  General: Well developed, in no acute distress.   Chest: Lungs clear, no rales, no rhonchi, no wheezes.   Heart: RR, no murmurs, no rubs, no gallops.   Abdomen: Soft, no tenderness, no masses, BS normal.    Back: Normal curvature, no tenderness.   Neuro: Physiological, no localizing findings.   Skin: Normal, no rashes, no lesions noted.   Extremities: Warm, well perfused, no edema, Pulses intact    I&O's Summary                            12.1   4.9   )-----------( 219      ( 08 Oct 2017 07:41 )             36.1       10-    141  |  110<H>  |  13  ----------------------------<  108<H>  4.3   |  26  |  1.05    Ca    8.2<L>      08 Oct 2017 07:41  Mg     2.1     10-    TPro  8.2  /  Alb  4.0  /  TBili  0.5  /  DBili  x   /  AST  28  /  ALT  41  /  AlkPhos  67  10-        HEALTH ISSUES - PROBLEM Dx:  Essential hypertension: Essential hypertension  Calculus of gallbladder with chronic cholecystitis without obstruction: Calculus of gallbladder with chronic cholecystitis without obstruction

## 2017-10-08 NOTE — PROGRESS NOTE ADULT - ASSESSMENT
Cholelithiasis and colic    Plan  Cardiology eval    Cholecystectomy laparoscopic poss open  All material risks and benefits to surgery and no surgery were discussed with patient.  These include, but not exclusive to,  infection, hematoma, bleeding, abscess; organ/nerve/vascular injury; scarring, deformity and/or death.  All questions were answered.

## 2017-10-09 RX ORDER — DEXTROSE MONOHYDRATE, SODIUM CHLORIDE, AND POTASSIUM CHLORIDE 50; .745; 4.5 G/1000ML; G/1000ML; G/1000ML
1000 INJECTION, SOLUTION INTRAVENOUS
Qty: 0 | Refills: 0 | Status: DISCONTINUED | OUTPATIENT
Start: 2017-10-10 | End: 2017-10-11

## 2017-10-09 RX ORDER — CLOPIDOGREL BISULFATE 75 MG/1
75 TABLET, FILM COATED ORAL DAILY
Qty: 0 | Refills: 0 | Status: DISCONTINUED | OUTPATIENT
Start: 2017-10-09 | End: 2017-10-10

## 2017-10-09 RX ADMIN — HEPARIN SODIUM 5000 UNIT(S): 5000 INJECTION INTRAVENOUS; SUBCUTANEOUS at 06:14

## 2017-10-09 RX ADMIN — PANTOPRAZOLE SODIUM 40 MILLIGRAM(S): 20 TABLET, DELAYED RELEASE ORAL at 06:14

## 2017-10-09 RX ADMIN — Medication 400 MILLIGRAM(S): at 06:15

## 2017-10-09 RX ADMIN — CLOPIDOGREL BISULFATE 75 MILLIGRAM(S): 75 TABLET, FILM COATED ORAL at 17:16

## 2017-10-09 RX ADMIN — HEPARIN SODIUM 5000 UNIT(S): 5000 INJECTION INTRAVENOUS; SUBCUTANEOUS at 17:15

## 2017-10-09 RX ADMIN — Medication 400 MILLIGRAM(S): at 17:14

## 2017-10-09 RX ADMIN — ISOSORBIDE MONONITRATE 60 MILLIGRAM(S): 60 TABLET, EXTENDED RELEASE ORAL at 12:26

## 2017-10-09 NOTE — PROGRESS NOTE ADULT - SUBJECTIVE AND OBJECTIVE BOX
78 y/o WM h/o Htn, CAD s/p PCI, s/p negative angiogram in the past 2yrs, Prostate CA, GI bleeding, OA,  presents to the  c/o 2 day of RUQ pain,  nausea and vomiting;  no radiation no fever or chills. As per the patient he was in the ED a few days ago with the same symptoms but  the pain resolved and was sent back home.  He states the pain was getting progressively worse after he went out to lunch  with his wife.    10.9: no distress, tolerated clears            REVIEW OF SYSTEMS:    CONSTITUTIONAL: No weakness, No fevers or chills  ENT: No ear ache, No sorethroat  NECK: No pain, No stiffness  RESPIRATORY: No cough, No wheezing, No hemoptysis; No dyspnea  CARDIOVASCULAR: No chest pain, No palpitations  GASTROINTESTINAL: No abd pain, No nausea, No vomiting, No hematemesis, No diarrhea or constipation. No melena, No hematochezia.  GENITOURINARY: No dysuria, No  hematuria  NEUROLOGICAL: No diplopia, No paresthesia, No motor dysfunction  MUSCULOSKELETAL: No arthralgia, No myalgia  SKIN: No rashes, or lesions   PSYCH: no anxiety, no suicidal ideation    All other review of systems is negative unless indicated above    Vital Signs Last 24 Hrs  T(C): 36.6 (09 Oct 2017 12:11), Max: 37.1 (08 Oct 2017 18:02)  T(F): 97.9 (09 Oct 2017 12:11), Max: 98.8 (08 Oct 2017 18:02)  HR: 68 (09 Oct 2017 12:11) (63 - 79)  BP: 152/68 (09 Oct 2017 12:11) (137/68 - 152/68)  RR: 18 (09 Oct 2017 12:11) (17 - 18)  SpO2: 97% (09 Oct 2017 12:11) (97% - 99%)    PHYSICAL EXAM:    GENERAL: NAD, Well nourished  HEENT:  NC/AT, EOMI, PERRLA, No scleral icterus, Moist mucous membranes  NECK: Supple, No JVD  CNS:  Alert & Oriented X3, Motor Strength 5/5 B/L upper and lower extremities; DTRs 2+ intact   LUNG: Normal Breath sounds, Clear to auscultation bilaterally, No rales, No rhonchi, No wheezing  HEART: RRR; No murmurs, No rubs  ABDOMEN: +BS, ST/ND/NT  GENITOURINARY: Voiding, Bladder not distended  EXTREMITIES:  2+ Peripheral Pulses, No clubbing, No cyanosis, No tibial edema  MUSCULOSKELTAL: Joints normal ROM, No TTP, No effusion  VAGINAL: deferred  SKIN: no rashes  RECTAL: deferred, not indicated  BREAST: deferred                          12.1   4.9   )-----------( 219      ( 08 Oct 2017 07:41 )             36.1     10-08    141  |  110<H>  |  13  ----------------------------<  108<H>  4.3   |  26  |  1.05    Ca    8.2<L>      08 Oct 2017 07:41      MEDICATIONS  (STANDING):  heparin  Injectable 5000 Unit(s) SubCutaneous every 12 hours  isosorbide   mononitrate ER Tablet (IMDUR) 60 milliGRAM(s) Oral daily  labetalol 400 milliGRAM(s) Oral every 12 hours  pantoprazole    Tablet 40 milliGRAM(s) Oral before breakfast    MEDICATIONS  (PRN):  melatonin 3 milliGRAM(s) Oral at bedtime PRN Insomnia      all labs reviewed  all imaging reviewed             US:  FINDINGS:   10/2/17 available for review.    The liver demonstrates echogenic echotexture without focal lesion.    Hepatic size is upper limits of normal and contours are maintained.    Hepatic and portal veins appear patent and are not displaced.  No   intrahepatic or ductal dilatation is found.  The common duct is not   dilated, measuring 0.3 cm.  The gallbladder is significant for multiple   mobile gallstones within the gallbladder. There is no wall thickening or   pericholecystic fluid. Trace perihepatic ascites.    The right kidney measures 11.8 cm in length.  It demonstrates no mass,   calculus or hydronephrosis.  The abdominal aorta and IVC appear intact.      IMPRESSION:   Cholelithiasis without evidence of cholecystitis. Fatty   liver.  Trace ascites.      EKG 10-   NSR 70 q III,, no sign change when compared to 12-      Assessment and Plan:    1. Biliary colic:   no evidence of cholecystitis on US  LFTs normal  HIDA abnormal pe GI  Patient tolerating diet; advance to low fat    Further management per surgical team    2. h/o CAD s/p PCI:  cw BBlockers   not on ASA due GI bleeding  restart Plavix  Cardiology evaluation requested by primary team 78 y/o WM h/o Htn, CAD s/p PCI, s/p negative angiogram in the past 2yrs, Prostate CA, GI bleeding, OA,  presents to the  c/o 2 day of RUQ pain,  nausea and vomiting;  no radiation no fever or chills. As per the patient he was in the ED a few days ago with the same symptoms but  the pain resolved and was sent back home.  He states the pain was getting progressively worse after he went out to lunch  with his wife.    10.9: no distress, tolerated clears            REVIEW OF SYSTEMS:    CONSTITUTIONAL: No weakness, No fevers or chills  ENT: No ear ache, No sorethroat  NECK: No pain, No stiffness  RESPIRATORY: No cough, No wheezing, No hemoptysis; No dyspnea  CARDIOVASCULAR: No chest pain, No palpitations  GASTROINTESTINAL: No abd pain, No nausea, No vomiting, No hematemesis, No diarrhea or constipation. No melena, No hematochezia.  GENITOURINARY: No dysuria, No  hematuria  NEUROLOGICAL: No diplopia, No paresthesia, No motor dysfunction  MUSCULOSKELETAL: No arthralgia, No myalgia  SKIN: No rashes, or lesions   PSYCH: no anxiety, no suicidal ideation    All other review of systems is negative unless indicated above    Vital Signs Last 24 Hrs  T(C): 36.6 (09 Oct 2017 12:11), Max: 37.1 (08 Oct 2017 18:02)  T(F): 97.9 (09 Oct 2017 12:11), Max: 98.8 (08 Oct 2017 18:02)  HR: 68 (09 Oct 2017 12:11) (63 - 79)  BP: 152/68 (09 Oct 2017 12:11) (137/68 - 152/68)  RR: 18 (09 Oct 2017 12:11) (17 - 18)  SpO2: 97% (09 Oct 2017 12:11) (97% - 99%)    PHYSICAL EXAM:    GENERAL: NAD, Well nourished  HEENT:  NC/AT, EOMI, PERRLA, No scleral icterus, Moist mucous membranes  NECK: Supple, No JVD  CNS:  Alert & Oriented X3, Motor Strength 5/5 B/L upper and lower extremities; DTRs 2+ intact   LUNG: Normal Breath sounds, Clear to auscultation bilaterally, No rales, No rhonchi, No wheezing  HEART: RRR; No murmurs, No rubs  ABDOMEN: +BS, ST/ND/NT  GENITOURINARY: Voiding, Bladder not distended  EXTREMITIES:  2+ Peripheral Pulses, No clubbing, No cyanosis, No tibial edema  MUSCULOSKELTAL: Joints normal ROM, No TTP, No effusion  VAGINAL: deferred  SKIN: no rashes  RECTAL: deferred, not indicated  BREAST: deferred                          12.1   4.9   )-----------( 219      ( 08 Oct 2017 07:41 )             36.1     10-08    141  |  110<H>  |  13  ----------------------------<  108<H>  4.3   |  26  |  1.05    Ca    8.2<L>      08 Oct 2017 07:41      MEDICATIONS  (STANDING):  heparin  Injectable 5000 Unit(s) SubCutaneous every 12 hours  isosorbide   mononitrate ER Tablet (IMDUR) 60 milliGRAM(s) Oral daily  labetalol 400 milliGRAM(s) Oral every 12 hours  pantoprazole    Tablet 40 milliGRAM(s) Oral before breakfast    MEDICATIONS  (PRN):  melatonin 3 milliGRAM(s) Oral at bedtime PRN Insomnia      all labs reviewed  all imaging reviewed             US:  FINDINGS:   10/2/17 available for review.    The liver demonstrates echogenic echotexture without focal lesion.    Hepatic size is upper limits of normal and contours are maintained.    Hepatic and portal veins appear patent and are not displaced.  No   intrahepatic or ductal dilatation is found.  The common duct is not   dilated, measuring 0.3 cm.  The gallbladder is significant for multiple   mobile gallstones within the gallbladder. There is no wall thickening or   pericholecystic fluid. Trace perihepatic ascites.    The right kidney measures 11.8 cm in length.  It demonstrates no mass,   calculus or hydronephrosis.  The abdominal aorta and IVC appear intact.      IMPRESSION:   Cholelithiasis without evidence of cholecystitis. Fatty   liver.  Trace ascites.      EKG 10-   NSR 70 q III,, no sign change when compared to 12-      Assessment and Plan:    1. Biliary colic:   no evidence of cholecystitis on US  LFTs normal  HIDA abnormal pe GI  Patient tolerating diet; advance to low fat    Further management per surgical team    2. h/o CAD s/p PCI:  cw BBlockers   not on ASA due GI bleeding  restart Plavix  Patient has good exercise tolerance; he reports a normal angiogram in 2016  Cardiology evaluation requested by primary team    No medical CI to surgery if needed

## 2017-10-09 NOTE — CONSULT NOTE ADULT - SUBJECTIVE AND OBJECTIVE BOX
HPI:  76yo male with PMH HTN, CAD s/p stents, diverticulitis s/p colectomy (10yrs ago) presents to the ED c/o RUQ pain for the past 12 hours with nausea and vomiting.  Pt explains that he was admitted to [facility] 5 days ago with similar symptoms however they subsided and he went home and was feeling fine.  Last night after dinner (chicken, mashed potatoes, vegetables) he has excruciating epigastric and RUQ pain.  He started to have vomiting and extreme nausea that only subsided when he came to the ED.  Pt denies diarrhea, constipation, fever, chills, SOB and/or palpitations. (07 Oct 2017 10:21)      PAST MEDICAL & SURGICAL HISTORY:  Essential hypertension  Coronary artery disease involving native heart without angina pectoris, unspecified vessel or lesion type  Gastritis  Diverticulitis  S/P colon resection      MEDICATIONS  (STANDING):  heparin  Injectable 5000 Unit(s) SubCutaneous every 12 hours  isosorbide   mononitrate ER Tablet (IMDUR) 60 milliGRAM(s) Oral daily  labetalol 400 milliGRAM(s) Oral every 12 hours  pantoprazole    Tablet 40 milliGRAM(s) Oral before breakfast    MEDICATIONS  (PRN):  melatonin 3 milliGRAM(s) Oral at bedtime PRN Insomnia      Allergies    contrast (Unknown)  No Known Drug Allergies  Originally Entered as [Unknown] reaction to [hair dye] (Unknown)    Intolerances        SOCIAL HISTORY:    FAMILY HISTORY:  No pertinent family history in first degree relatives   Non-contributory    REVIEW OF SYSTEMS      General:	    Respiratory and Thorax:  	  Cardiovascular:	    Gastrointestinal:	    Musculoskeletal:	   Vital Signs Last 24 Hrs  T(C): 36.4 (09 Oct 2017 05:07), Max: 37.1 (08 Oct 2017 18:02)  T(F): 97.5 (09 Oct 2017 05:07), Max: 98.8 (08 Oct 2017 18:02)  HR: 79 (09 Oct 2017 05:07) (63 - 79)  BP: 140/84 (09 Oct 2017 05:07) (134/80 - 140/84)  BP(mean): --  RR: 17 (09 Oct 2017 05:07) (17 - 18)  SpO2: 97% (09 Oct 2017 05:07) (97% - 100%)    HEENT :No Pallor.No icterus. EOMI,PERLAA  Chest : Clear to Auscultation  CVS : S1S2 Normal.No murmurs.  Abdomen: Soft.Non tender .Normal bowel sounds.No Organomegaly.  CNS: Alert.Oriented to Time,Place and Person.No focal deficit.  EXT: Normal Range of motion.No pitting edema.    LABS:                        12.1   4.9   )-----------( 219      ( 08 Oct 2017 07:41 )             36.1     10-08    141  |  110<H>  |  13  ----------------------------<  108<H>  4.3   |  26  |  1.05    Ca    8.2<L>      08 Oct 2017 07:41            RADIOLOGY & ADDITIONAL STUDIES:

## 2017-10-09 NOTE — PROGRESS NOTE ADULT - ASSESSMENT
Cholelithiasis and colic    Plan  Cardiology eval  Pt does not want surgery    will adv diet  if ok D/C home

## 2017-10-09 NOTE — PROGRESS NOTE ADULT - ASSESSMENT
1) RUQ pain and nausea c/w cholelithiasis  A) 1st episode after chinese food w biliary colic; resolved and he left   B) 2nd episode w marked nausea  C) pt reported outpt scan at [employer] [facility] showed GB did not contract  1. NPO except meds  2. management per surg for pain and nausea meds      2) CAD w stents and nl LVEF  A) unable to take aspirin due to extensive GI Bleed requiring transfusions  B) exercise tolerance:  walks 1 hour daily in mall; mows grass, builds decks, and maintains property  1. medically stable for cholcystectomy  able to exercise > 4 mets  is on nitrates  normal LVEF  stent placement was not recent  2. restart Plavix post op  3. restart statin  4. continue nitrate    3) HTN  1. labetalol ordered  2. monitor BP    4) VTE  sq heparin    We will follow w you

## 2017-10-09 NOTE — PROGRESS NOTE ADULT - SUBJECTIVE AND OBJECTIVE BOX
MARLEY EDDY  MRN-[id number]  Male      HPI:  76yo male with PMH HTN, CAD s/p stents, diverticulitis s/p colectomy (10yrs ago) presents to the ED c/o RUQ pain for the past 12 hours with nausea and vomiting.  Pt explains that he was admitted to [facility] 5 days ago with similar symptoms however they subsided and he went home and was feeling fine.  Last night after dinner (chicken, mashed potatoes, vegetables) he has excruciating epigastric and RUQ pain.  He started to have vomiting and extreme nausea that only subsided when he came to the ED.  Pt denies diarrhea, constipation, fever, chills, SOB and/or palpitations. (07 Oct 2017 10:21)  No further abd pain      Daily     Daily Weight in k.5 (07 Oct 2017 12:40)    Physical Exam:    Pt is AAOx3  General: Well developed, in no acute distress.   Chest: Lungs clear, no rales, no rhonchi, no wheezes.   Heart: RR, no murmurs, no rubs, no gallops.   Abdomen: Soft, no tenderness, no masses, BS normal.    Back: Normal curvature, no tenderness.   Neuro: Physiological, no localizing findings.   Skin: Normal, no rashes, no lesions noted.   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

## 2017-10-09 NOTE — CONSULT NOTE ADULT - ASSESSMENT
Imp:  1. Cholycystitis  2. CAD s/p stents, last 2011. Last cath 7/2016, no need for pci. at that time. patient has excellent functional capacity without symptoms. stable CAD  3. Mild AS  4. HTN good control    Plan: No contraindication to surgery. as per acc/aha guidelines no further cardiac testing required  will follow

## 2017-10-09 NOTE — CONSULT NOTE ADULT - SUBJECTIVE AND OBJECTIVE BOX
Patient is a 77y old  Male who presents with a chief complaint of Abdominal pain, nausea and vomiting. (07 Oct 2017 10:21)      HPI:  76yo male with PMH HTN, CAD s/p stents, diverticulitis s/p colectomy (10yrs ago) presents to the ED c/o RUQ pain for the past 12 hours with nausea and vomiting.  Pt explains that he was admitted to [facility] 5 days ago with similar symptoms however they subsided and he went home and was feeling fine.  Last night after dinner (chicken, mashed potatoes, vegetables) he has excruciating epigastric and RUQ pain.  He started to have vomiting and extreme nausea that only subsided when he came to the ED.  Pt denies diarrhea, constipation, fever, chills, SOB and/or palpitations. (07 Oct 2017 10:21)    10/9 patient admitted with cholecystitis, past history of CAD with stents in lad and rca. last cath 7/2016 showed patent stents, patent site of POBA in distal rca and occlusion of stent in diag. (no change in prior anatomy). Patient walks vigorously on a daily basis without angina. No recent chest pain, palps or dyspnea.   PAST MEDICAL & SURGICAL HISTORY:  Essential hypertension  Coronary artery disease involving native heart without angina pectoris, unspecified vessel or lesion type  Gastritis  Diverticulitis  S/P colon resection      PREVIOUS DIAGNOSTIC TESTING:      ECHO  FINDINGS:    STRESS  FINDINGS:    CATHETERIZATION  FINDINGS:    MEDICATIONS  (STANDING):  clopidogrel Tablet 75 milliGRAM(s) Oral daily  heparin  Injectable 5000 Unit(s) SubCutaneous every 12 hours  isosorbide   mononitrate ER Tablet (IMDUR) 60 milliGRAM(s) Oral daily  labetalol 400 milliGRAM(s) Oral every 12 hours  pantoprazole    Tablet 40 milliGRAM(s) Oral before breakfast    MEDICATIONS  (PRN):  melatonin 3 milliGRAM(s) Oral at bedtime PRN Insomnia      FAMILY HISTORY:  No pertinent family history in first degree relatives      SOCIAL HISTORY:  ***    REVIEW OF SYSTEM:  Pertinent items are noted in HPI.  Constitutional  Eyes:    Ears, nose, mouth,   throat, and face:    Neck:   Respiratory:   and wheezing  Cardiovascular:    Gastrointestinal:  Genitourinary:     Hematologic/lymphatic:   Musculoskeletal:   Neurological:   Behavioral/Psych:        Vital Signs Last 24 Hrs  T(C): 36.6 (09 Oct 2017 12:11), Max: 37.1 (08 Oct 2017 18:02)  T(F): 97.9 (09 Oct 2017 12:11), Max: 98.8 (08 Oct 2017 18:02)  HR: 68 (09 Oct 2017 12:11) (63 - 79)  BP: 152/68 (09 Oct 2017 12:11) (137/68 - 152/68)  BP(mean): --  RR: 18 (09 Oct 2017 12:11) (17 - 18)  SpO2: 97% (09 Oct 2017 12:11) (97% - 99%)    I&O's Summary    09 Oct 2017 07:01  -  09 Oct 2017 15:53  --------------------------------------------------------  IN: 360 mL / OUT: 0 mL / NET: 360 mL      PHYSICAL EXAM  General Appearance: cooperative, no acute distress,   HEENT: PERRL, conjunctiva clear, EOM's intact    Neck: Supple, , no adenopathy, thyroid: not enlarged, no carotid bruit or JVD  Back: Symmetric, no  tenderness,no soft tissue tenderness  Lungs: Clear to auscultation bilaterally,no adventitious breath sounds, normal   expiratory phase  Heart: Regular rate and rhythm, S1, S2 normal, 2/6 rivas base   Abdomen: Soft, non-tender, bowel sounds active , no hepatosplenomegaly  Extremities: no cyanosis or edema, no joint swelling  Skin: Skin color, texture normal, no rashes   Neurologic: Alert and oriented X3 , cranial nerves intact, sensory and motor normal,        INTERPRETATION OF TELEMETRY:    ECG:        LABS:                          12.1   4.9   )-----------( 219      ( 08 Oct 2017 07:41 )             36.1     10-08    141  |  110<H>  |  13  ----------------------------<  108<H>  4.3   |  26  |  1.05    Ca    8.2<L>      08 Oct 2017 07:41                        RADIOLOGY & ADDITIONAL STUDIES:    IMPRESSION:    PLAN:

## 2017-10-10 ENCOUNTER — RESULT REVIEW (OUTPATIENT)
Age: 77
End: 2017-10-10

## 2017-10-10 LAB
ANION GAP SERPL CALC-SCNC: 6 MMOL/L — SIGNIFICANT CHANGE UP (ref 5–17)
BUN SERPL-MCNC: 12 MG/DL — SIGNIFICANT CHANGE UP (ref 7–23)
CALCIUM SERPL-MCNC: 9.1 MG/DL — SIGNIFICANT CHANGE UP (ref 8.5–10.1)
CHLORIDE SERPL-SCNC: 108 MMOL/L — SIGNIFICANT CHANGE UP (ref 96–108)
CO2 SERPL-SCNC: 25 MMOL/L — SIGNIFICANT CHANGE UP (ref 22–31)
CREAT SERPL-MCNC: 1.12 MG/DL — SIGNIFICANT CHANGE UP (ref 0.5–1.3)
GLUCOSE SERPL-MCNC: 101 MG/DL — HIGH (ref 70–99)
HCT VFR BLD CALC: 38.6 % — LOW (ref 39–50)
HGB BLD-MCNC: 12.8 G/DL — LOW (ref 13–17)
MCHC RBC-ENTMCNC: 30.3 PG — SIGNIFICANT CHANGE UP (ref 27–34)
MCHC RBC-ENTMCNC: 33.2 GM/DL — SIGNIFICANT CHANGE UP (ref 32–36)
MCV RBC AUTO: 91.1 FL — SIGNIFICANT CHANGE UP (ref 80–100)
PLATELET # BLD AUTO: 250 K/UL — SIGNIFICANT CHANGE UP (ref 150–400)
POTASSIUM SERPL-MCNC: 4.3 MMOL/L — SIGNIFICANT CHANGE UP (ref 3.5–5.3)
POTASSIUM SERPL-SCNC: 4.3 MMOL/L — SIGNIFICANT CHANGE UP (ref 3.5–5.3)
RBC # BLD: 4.24 M/UL — SIGNIFICANT CHANGE UP (ref 4.2–5.8)
RBC # FLD: 13.5 % — SIGNIFICANT CHANGE UP (ref 10.3–14.5)
SODIUM SERPL-SCNC: 139 MMOL/L — SIGNIFICANT CHANGE UP (ref 135–145)
TYPE + AB SCN PNL BLD: SIGNIFICANT CHANGE UP
WBC # BLD: 5.4 K/UL — SIGNIFICANT CHANGE UP (ref 3.8–10.5)
WBC # FLD AUTO: 5.4 K/UL — SIGNIFICANT CHANGE UP (ref 3.8–10.5)

## 2017-10-10 PROCEDURE — 88304 TISSUE EXAM BY PATHOLOGIST: CPT | Mod: 26

## 2017-10-10 RX ORDER — SODIUM CHLORIDE 9 MG/ML
1000 INJECTION INTRAMUSCULAR; INTRAVENOUS; SUBCUTANEOUS
Qty: 0 | Refills: 0 | Status: DISCONTINUED | OUTPATIENT
Start: 2017-10-10 | End: 2017-10-11

## 2017-10-10 RX ORDER — FENTANYL CITRATE 50 UG/ML
50 INJECTION INTRAVENOUS
Qty: 0 | Refills: 0 | Status: DISCONTINUED | OUTPATIENT
Start: 2017-10-10 | End: 2017-10-10

## 2017-10-10 RX ORDER — MEPERIDINE HYDROCHLORIDE 50 MG/ML
12.5 INJECTION INTRAMUSCULAR; INTRAVENOUS; SUBCUTANEOUS
Qty: 0 | Refills: 0 | Status: DISCONTINUED | OUTPATIENT
Start: 2017-10-10 | End: 2017-10-10

## 2017-10-10 RX ORDER — MORPHINE SULFATE 50 MG/1
4 CAPSULE, EXTENDED RELEASE ORAL EVERY 4 HOURS
Qty: 0 | Refills: 0 | Status: DISCONTINUED | OUTPATIENT
Start: 2017-10-10 | End: 2017-10-11

## 2017-10-10 RX ORDER — ONDANSETRON 8 MG/1
4 TABLET, FILM COATED ORAL EVERY 6 HOURS
Qty: 0 | Refills: 0 | Status: DISCONTINUED | OUTPATIENT
Start: 2017-10-10 | End: 2017-10-11

## 2017-10-10 RX ORDER — ONDANSETRON 8 MG/1
4 TABLET, FILM COATED ORAL EVERY 6 HOURS
Qty: 0 | Refills: 0 | Status: DISCONTINUED | OUTPATIENT
Start: 2017-10-10 | End: 2017-10-10

## 2017-10-10 RX ORDER — OXYCODONE AND ACETAMINOPHEN 5; 325 MG/1; MG/1
1 TABLET ORAL EVERY 4 HOURS
Qty: 0 | Refills: 0 | Status: DISCONTINUED | OUTPATIENT
Start: 2017-10-10 | End: 2017-10-11

## 2017-10-10 RX ORDER — OXYCODONE HYDROCHLORIDE 5 MG/1
5 TABLET ORAL EVERY 4 HOURS
Qty: 0 | Refills: 0 | Status: DISCONTINUED | OUTPATIENT
Start: 2017-10-10 | End: 2017-10-10

## 2017-10-10 RX ORDER — SODIUM CHLORIDE 9 MG/ML
1000 INJECTION, SOLUTION INTRAVENOUS
Qty: 0 | Refills: 0 | Status: DISCONTINUED | OUTPATIENT
Start: 2017-10-10 | End: 2017-10-10

## 2017-10-10 RX ORDER — CLOPIDOGREL BISULFATE 75 MG/1
75 TABLET, FILM COATED ORAL DAILY
Qty: 0 | Refills: 0 | Status: DISCONTINUED | OUTPATIENT
Start: 2017-10-11 | End: 2017-10-11

## 2017-10-10 RX ADMIN — OXYCODONE AND ACETAMINOPHEN 1 TABLET(S): 5; 325 TABLET ORAL at 17:34

## 2017-10-10 RX ADMIN — OXYCODONE AND ACETAMINOPHEN 1 TABLET(S): 5; 325 TABLET ORAL at 23:08

## 2017-10-10 RX ADMIN — ISOSORBIDE MONONITRATE 60 MILLIGRAM(S): 60 TABLET, EXTENDED RELEASE ORAL at 12:01

## 2017-10-10 RX ADMIN — PANTOPRAZOLE SODIUM 40 MILLIGRAM(S): 20 TABLET, DELAYED RELEASE ORAL at 07:24

## 2017-10-10 RX ADMIN — FENTANYL CITRATE 50 MICROGRAM(S): 50 INJECTION INTRAVENOUS at 14:35

## 2017-10-10 RX ADMIN — DEXTROSE MONOHYDRATE, SODIUM CHLORIDE, AND POTASSIUM CHLORIDE 125 MILLILITER(S): 50; .745; 4.5 INJECTION, SOLUTION INTRAVENOUS at 01:34

## 2017-10-10 RX ADMIN — OXYCODONE HYDROCHLORIDE 5 MILLIGRAM(S): 5 TABLET ORAL at 14:36

## 2017-10-10 RX ADMIN — HEPARIN SODIUM 5000 UNIT(S): 5000 INJECTION INTRAVENOUS; SUBCUTANEOUS at 17:36

## 2017-10-10 RX ADMIN — Medication 400 MILLIGRAM(S): at 17:36

## 2017-10-10 RX ADMIN — SODIUM CHLORIDE 100 MILLILITER(S): 9 INJECTION INTRAMUSCULAR; INTRAVENOUS; SUBCUTANEOUS at 15:57

## 2017-10-10 RX ADMIN — Medication 400 MILLIGRAM(S): at 07:23

## 2017-10-10 NOTE — PROGRESS NOTE ADULT - ASSESSMENT
1. Cholycystitis  2. CAD s/p stents, last 2011. Last cath 7/2016, no need for pci. at that time. patient has excellent functional capacity without symptoms. stable CAD  3. Mild AS  4. HTN good control    Plan: No contraindication to surgery. as per acc/aha guidelines no further cardiac testing required

## 2017-10-10 NOTE — PROGRESS NOTE ADULT - SUBJECTIVE AND OBJECTIVE BOX
Patient is a 77y old  Male who presents with a chief complaint of Abdominal pain, nausea and vomiting. (07 Oct 2017 10:21)      HPI:  78yo male with PMH HTN, CAD s/p stents, diverticulitis s/p colectomy (10yrs ago) presents to the ED c/o RUQ pain for the past 12 hours with nausea and vomiting.  Pt explains that he was admitted to [facility] 5 days ago with similar symptoms however they subsided and he went home and was feeling fine.  Last night after dinner (chicken, mashed potatoes, vegetables) he has excruciating epigastric and RUQ pain.  He started to have vomiting and extreme nausea that only subsided when he came to the ED.  Pt denies diarrhea, constipation, fever, chills, SOB and/or palpitations. (07 Oct 2017 10:21)  10/9 patient admitted with cholecystitis, past history of CAD with stents in lad and rca. last cath 7/2016 showed patent stents, patent site of POBA in distal rca and occlusion of stent in diag. (no change in prior anatomy). Patient walks vigorously on a daily basis without angina. No recent chest pain, palps or dyspnea    10/10 no complaints    PAST MEDICAL & SURGICAL HISTORY:  Essential hypertension  Coronary artery disease involving native heart without angina pectoris, unspecified vessel or lesion type  Gastritis  Diverticulitis  S/P colon resection        MEDICATIONS  (STANDING):  clopidogrel Tablet 75 milliGRAM(s) Oral daily  dextrose 5% + sodium chloride 0.45% with potassium chloride 20 mEq/L 1000 milliLiter(s) (125 mL/Hr) IV Continuous <Continuous>  heparin  Injectable 5000 Unit(s) SubCutaneous every 12 hours  isosorbide   mononitrate ER Tablet (IMDUR) 60 milliGRAM(s) Oral daily  labetalol 400 milliGRAM(s) Oral every 12 hours  pantoprazole    Tablet 40 milliGRAM(s) Oral before breakfast    MEDICATIONS  (PRN):  melatonin 3 milliGRAM(s) Oral at bedtime PRN Insomnia          Vital Signs Last 24 Hrs  T(C): 37.1 (10 Oct 2017 05:18), Max: 37.1 (09 Oct 2017 17:02)  T(F): 98.7 (10 Oct 2017 05:18), Max: 98.7 (09 Oct 2017 17:02)  HR: 61 (10 Oct 2017 07:20) (61 - 73)  BP: 151/88 (10 Oct 2017 07:20) (133/63 - 152/68)  BP(mean): --  RR: 18 (10 Oct 2017 05:18) (18 - 18)  SpO2: 96% (10 Oct 2017 05:18) (96% - 99%)    I&O's Summary    09 Oct 2017 07:01  -  10 Oct 2017 07:00  --------------------------------------------------------  IN: 360 mL / OUT: 0 mL / NET: 360 mL        PHYSICAL EXAM  General Appearance: NAD  HEENT: NCAT  Neck:   Back:   Lungs: clear  Heart: RR s1s2 2/6 rivas base  Abdomen: soft nt  Extremities: no edema  Skin:   Neurologic:       INTERPRETATION OF TELEMETRY:    ECG:        LABS:                              RADIOLOGY & ADDITIONAL STUDIES:

## 2017-10-10 NOTE — PROGRESS NOTE ADULT - SUBJECTIVE AND OBJECTIVE BOX
Chief Complaint/Reason for Admission: Abdominal pain, nausea and vomiting.    78 y/o WM h/o Htn, CAD s/p PCI, s/p negative angiogram in the past 2yrs, Prostate CA, GI bleeding, OA,  presents to the  c/o 2 day of RUQ pain,  nausea and vomiting;  no radiation no fever or chills. As per the patient he was in the ED a few days ago with the same symptoms but  the pain resolved and was sent back home.  He states the pain was getting progressively worse after he went out to lunch  with his wife.  10.9: no distress, tolerated clears    10/10: Above reviewed.     REVIEW OF SYSTEMS:    CONSTITUTIONAL: No weakness, No fevers or chills  ENT: No ear ache, No sorethroat  NECK: No pain, No stiffness  RESPIRATORY: No cough, No wheezing, No hemoptysis; No dyspnea  CARDIOVASCULAR: No chest pain, No palpitations  GASTROINTESTINAL: No abd pain, No nausea, No vomiting, No hematemesis, No diarrhea or constipation. No melena, No hematochezia.  GENITOURINARY: No dysuria, No  hematuria  NEUROLOGICAL: No diplopia, No paresthesia, No motor dysfunction  MUSCULOSKELETAL: No arthralgia, No myalgia  SKIN: No rashes, or lesions   PSYCH: no anxiety, no suicidal ideation    All other review of systems is negative unless indicated above    Vital Signs Last 24 Hrs  T(C): 36.6 (09 Oct 2017 12:11), Max: 37.1 (08 Oct 2017 18:02)  T(F): 97.9 (09 Oct 2017 12:11), Max: 98.8 (08 Oct 2017 18:02)  HR: 68 (09 Oct 2017 12:11) (63 - 79)  BP: 152/68 (09 Oct 2017 12:11) (137/68 - 152/68)  RR: 18 (09 Oct 2017 12:11) (17 - 18)  SpO2: 97% (09 Oct 2017 12:11) (97% - 99%)    PHYSICAL EXAM:    GENERAL: NAD, Well nourished  HEENT:  NC/AT, EOMI, PERRLA, No scleral icterus, Moist mucous membranes  NECK: Supple, No JVD  CNS:  Alert & Oriented X3, Motor Strength 5/5 B/L upper and lower extremities; DTRs 2+ intact   LUNG: Normal Breath sounds, Clear to auscultation bilaterally, No rales, No rhonchi, No wheezing  HEART: RRR; No murmurs, No rubs  ABDOMEN: +BS, ST/ND/NT  GENITOURINARY: Voiding, Bladder not distended  EXTREMITIES:  2+ Peripheral Pulses, No clubbing, No cyanosis, No tibial edema  MUSCULOSKELTAL: Joints normal ROM, No TTP, No effusion  VAGINAL: deferred  SKIN: no rashes  RECTAL: deferred, not indicated  BREAST: deferred                          12.1   4.9   )-----------( 219      ( 08 Oct 2017 07:41 )             36.1     10-08    141  |  110<H>  |  13  ----------------------------<  108<H>  4.3   |  26  |  1.05    Ca    8.2<L>      08 Oct 2017 07:41      MEDICATIONS  (STANDING):  heparin  Injectable 5000 Unit(s) SubCutaneous every 12 hours  isosorbide   mononitrate ER Tablet (IMDUR) 60 milliGRAM(s) Oral daily  labetalol 400 milliGRAM(s) Oral every 12 hours  pantoprazole    Tablet 40 milliGRAM(s) Oral before breakfast    MEDICATIONS  (PRN):  melatonin 3 milliGRAM(s) Oral at bedtime PRN Insomnia      US:  FINDINGS:   10/2/17 available for review.    The liver demonstrates echogenic echotexture without focal lesion.    Hepatic size is upper limits of normal and contours are maintained.    Hepatic and portal veins appear patent and are not displaced.  No   intrahepatic or ductal dilatation is found.  The common duct is not   dilated, measuring 0.3 cm.  The gallbladder is significant for multiple   mobile gallstones within the gallbladder. There is no wall thickening or   pericholecystic fluid. Trace perihepatic ascites.    The right kidney measures 11.8 cm in length.  It demonstrates no mass,   calculus or hydronephrosis.  The abdominal aorta and IVC appear intact.      IMPRESSION:   Cholelithiasis without evidence of cholecystitis. Fatty   liver.  Trace ascites.      EKG 10-   NSR 70 q III,, no sign change when compared to 12-      < from: Xray Chest 1 View AP/PA. (10.02.17 @ 07:09) >      Impression:    No acute disease    No significant interval change as compared to  12/6/2016    < end of copied text > Chief Complaint/Reason for Admission: Abdominal pain, nausea and vomiting.    76 y/o WM h/o Htn, CAD s/p PCI, s/p negative angiogram in the past 2yrs, Prostate CA, GI bleeding, OA,  presents to the  c/o 2 day of RUQ pain,  nausea and vomiting;  no radiation no fever or chills. As per the patient he was in the ED a few days ago with the same symptoms but  the pain resolved and was sent back home.  He states the pain was getting progressively worse after he went out to lunch  with his wife.  10.9: no distress, tolerated clears    10/10: Above reviewed. patient going today for cholecystectomy. No abd pain, CP, DYspnea, N/V    REVIEW OF SYSTEMS:    CONSTITUTIONAL: No weakness, No fevers or chills  ENT: No ear ache, No sorethroat  NECK: No pain, No stiffness  RESPIRATORY: No cough, No wheezing, No hemoptysis; No dyspnea  CARDIOVASCULAR: No chest pain, No palpitations  GASTROINTESTINAL: No abd pain, No nausea, No vomiting, No hematemesis, No diarrhea or constipation. No melena, No hematochezia.  GENITOURINARY: No dysuria, No  hematuria  NEUROLOGICAL: No diplopia, No paresthesia, No motor dysfunction  MUSCULOSKELETAL: No arthralgia, No myalgia  SKIN: No rashes, or lesions   PSYCH: no anxiety, no suicidal ideation    All other review of systems is negative unless indicated above  Vital Signs Last 24 Hrs    T(C): 36.7 (10 Oct 2017 15:03), Max: 37.1 (09 Oct 2017 17:02)  T(F): 98.1 (10 Oct 2017 15:03), Max: 98.7 (09 Oct 2017 17:02)  HR: 53 (10 Oct 2017 15:03) (53 - 73)  BP: 134/67 (10 Oct 2017 15:03) (124/71 - 160/63)  BP(mean): --  RR: 14 (10 Oct 2017 15:03) (12 - 18)  SpO2: 100% (10 Oct 2017 15:03) (96% - 100%)  PHYSICAL EXAM:    GENERAL: NAD, Well nourished  HEENT:  NC/AT, EOMI, PERRLA, No scleral icterus, Moist mucous membranes  NECK: Supple, No JVD  CNS:  Alert & Oriented X3, Motor Strength 5/5 B/L upper and lower extremities; DTRs 2+ intact   LUNG: Normal Breath sounds, Clear to auscultation bilaterally, No rales, No rhonchi, No wheezing  HEART: RRR; No murmurs, No rubs  ABDOMEN: +BS, ST/ND/NT  GENITOURINARY: Voiding, Bladder not distended  EXTREMITIES:  2+ Peripheral Pulses, No clubbing, No cyanosis, No tibial edema  MUSCULOSKELTAL: Joints normal ROM, No TTP, No effusion  VAGINAL: deferred  SKIN: no rashes  RECTAL: deferred, not indicated  BREAST: deferred    MEDICATIONS  (STANDING):  heparin  Injectable 5000 Unit(s) SubCutaneous every 12 hours  isosorbide   mononitrate ER Tablet (IMDUR) 60 milliGRAM(s) Oral daily  labetalol 400 milliGRAM(s) Oral every 12 hours  pantoprazole    Tablet 40 milliGRAM(s) Oral before breakfast    MEDICATIONS  (PRN):  melatonin 3 milliGRAM(s) Oral at bedtime PRN Insomnia    Lab Results:  CBC  CBC Full  -  ( 10 Oct 2017 10:47 )  WBC Count : 5.4 K/uL  Hemoglobin : 12.8 g/dL  Hematocrit : 38.6 %  Platelet Count - Automated : 250 K/uL  Mean Cell Volume : 91.1 fl  Mean Cell Hemoglobin : 30.3 pg  Mean Cell Hemoglobin Concentration : 33.2 gm/dL  Auto Neutrophil # : x  Auto Lymphocyte # : x  Auto Monocyte # : x  Auto Eosinophil # : x  Auto Basophil # : x  Auto Neutrophil % : x  Auto Lymphocyte % : x  Auto Monocyte % : x  Auto Eosinophil % : x  Auto Basophil % : x    .		Differential:	[] Automated		[] Manual  Chemistry                        12.8   5.4   )-----------( 250      ( 10 Oct 2017 10:47 )             38.6     10-10    139  |  108  |  12  ----------------------------<  101<H>  4.3   |  25  |  1.12    Ca    9.1      10 Oct 2017 10:47    US:  FINDINGS:   10/2/17 available for review.    The liver demonstrates echogenic echotexture without focal lesion.    Hepatic size is upper limits of normal and contours are maintained.    Hepatic and portal veins appear patent and are not displaced.  No   intrahepatic or ductal dilatation is found.  The common duct is not   dilated, measuring 0.3 cm.  The gallbladder is significant for multiple   mobile gallstones within the gallbladder. There is no wall thickening or   pericholecystic fluid. Trace perihepatic ascites.    The right kidney measures 11.8 cm in length.  It demonstrates no mass,   calculus or hydronephrosis.  The abdominal aorta and IVC appear intact.      IMPRESSION:   Cholelithiasis without evidence of cholecystitis. Fatty   liver.  Trace ascites.      EKG 10-   NSR 70 q III,, no sign change when compared to 12-      < from: Xray Chest 1 View AP/PA. (10.02.17 @ 07:09) >      Impression:    No acute disease    No significant interval change as compared to  12/6/2016    < end of copied text >

## 2017-10-10 NOTE — PROGRESS NOTE ADULT - SUBJECTIVE AND OBJECTIVE BOX
Interval History:    MEDICATIONS  (STANDING):  clopidogrel Tablet 75 milliGRAM(s) Oral daily  dextrose 5% + sodium chloride 0.45% with potassium chloride 20 mEq/L 1000 milliLiter(s) (125 mL/Hr) IV Continuous <Continuous>  heparin  Injectable 5000 Unit(s) SubCutaneous every 12 hours  isosorbide   mononitrate ER Tablet (IMDUR) 60 milliGRAM(s) Oral daily  labetalol 400 milliGRAM(s) Oral every 12 hours  pantoprazole    Tablet 40 milliGRAM(s) Oral before breakfast    MEDICATIONS  (PRN):  melatonin 3 milliGRAM(s) Oral at bedtime PRN Insomnia      Daily     Daily   BMI: 173.8 (10-07 @ 06:50)  Change in Weight:  Vital Signs Last 24 Hrs  T(C): 37.1 (10 Oct 2017 05:18), Max: 37.1 (09 Oct 2017 17:02)  T(F): 98.7 (10 Oct 2017 05:18), Max: 98.7 (09 Oct 2017 17:02)  HR: 61 (10 Oct 2017 07:20) (61 - 73)  BP: 151/88 (10 Oct 2017 07:20) (133/63 - 152/68)  BP(mean): --  RR: 18 (10 Oct 2017 05:18) (18 - 18)  SpO2: 96% (10 Oct 2017 05:18) (96% - 99%)  I&O's Detail    09 Oct 2017 07:01  -  10 Oct 2017 07:00  --------------------------------------------------------  IN:    Oral Fluid: 360 mL  Total IN: 360 mL    OUT:  Total OUT: 0 mL    Total NET: 360 mL          PHYSICAL EXAM  General:  Well developed, well nourished, alert and active, no pallor, NAD.  HEENT:    Normal appearance of conjunctiva, ears, nose, lips, oropharynx, and oral mucosa, anicteric.  Neck:  No masses, no asymmetry.  Lymph Nodes:  No lymphadenopathy.   Cardiovascular:  RRR normal S1/S2, no murmur.  Respiratory:  CTA B/L, normal respiratory effort.   Abdominal:   soft, no masses or tenderness, normoactive BS, NT/ND, no HSM.  Extremities:   No clubbing or cyanosis, normal capillary refill, no edema.   Skin:   No rash, jaundice, lesions, eczema.   Musculoskeletal:  No joint swelling, erythema or tenderness.   Other:     Lab Results:                  Stool Results:          RADIOLOGY RESULTS:    SURGICAL PATHOLOGY:

## 2017-10-10 NOTE — PROGRESS NOTE ADULT - ASSESSMENT
Assessment and Plan:    1. Biliary colic 2ndry to Cholelithiasis :   Management as per surgery  GI consult appreciated  HIDA abnormal pe GI   no evidence of cholecystitis on US  LFTs normal  Patient tolerating diet; advance to low fat     2. h/o CAD s/p PCI:  cw BBlockers   not on ASA due to GI bleeding  restart Plavix  Patient has good exercise tolerance; he reports a normal angiogram in 2016  Cardiology consult appreciated - cleared for surgery  patient is medically optimized for surgery if needed. 1. Biliary colic 2ndry to Cholecystitis :   Management as per surgery   GI consult appreciated  HIDA abnormal per GI   no evidence of cholecystitis on US  LFTs normal  lap Cheryl today    2. h/o CAD s/p PCI:  cw BBlockers   not on ASA due to GI bleeding  restart Plavix  Patient has good exercise tolerance; he reports a normal angiogram in 2016  Cardiology consult appreciated - cleared for surgery  patient is medically optimized for surgery if needed.

## 2017-10-10 NOTE — BRIEF OPERATIVE NOTE - PROCEDURE
<<-----Click on this checkbox to enter Procedure Cholecystectomy, laparoscopic  10/10/2017    Active  JUAN

## 2017-10-11 ENCOUNTER — TRANSCRIPTION ENCOUNTER (OUTPATIENT)
Age: 77
End: 2017-10-11

## 2017-10-11 VITALS
HEART RATE: 68 BPM | SYSTOLIC BLOOD PRESSURE: 167 MMHG | TEMPERATURE: 98 F | RESPIRATION RATE: 16 BRPM | DIASTOLIC BLOOD PRESSURE: 84 MMHG | OXYGEN SATURATION: 97 %

## 2017-10-11 LAB
ANION GAP SERPL CALC-SCNC: 7 MMOL/L — SIGNIFICANT CHANGE UP (ref 5–17)
BASOPHILS # BLD AUTO: 0.1 K/UL — SIGNIFICANT CHANGE UP (ref 0–0.2)
BASOPHILS NFR BLD AUTO: 0.6 % — SIGNIFICANT CHANGE UP (ref 0–2)
BUN SERPL-MCNC: 12 MG/DL — SIGNIFICANT CHANGE UP (ref 7–23)
CALCIUM SERPL-MCNC: 9.1 MG/DL — SIGNIFICANT CHANGE UP (ref 8.5–10.1)
CHLORIDE SERPL-SCNC: 103 MMOL/L — SIGNIFICANT CHANGE UP (ref 96–108)
CO2 SERPL-SCNC: 27 MMOL/L — SIGNIFICANT CHANGE UP (ref 22–31)
CREAT SERPL-MCNC: 1.09 MG/DL — SIGNIFICANT CHANGE UP (ref 0.5–1.3)
EOSINOPHIL # BLD AUTO: 0 K/UL — SIGNIFICANT CHANGE UP (ref 0–0.5)
EOSINOPHIL NFR BLD AUTO: 0.1 % — SIGNIFICANT CHANGE UP (ref 0–6)
GLUCOSE SERPL-MCNC: 96 MG/DL — SIGNIFICANT CHANGE UP (ref 70–99)
HCT VFR BLD CALC: 39.5 % — SIGNIFICANT CHANGE UP (ref 39–50)
HGB BLD-MCNC: 13 G/DL — SIGNIFICANT CHANGE UP (ref 13–17)
LYMPHOCYTES # BLD AUTO: 1.6 K/UL — SIGNIFICANT CHANGE UP (ref 1–3.3)
LYMPHOCYTES # BLD AUTO: 19.1 % — SIGNIFICANT CHANGE UP (ref 13–44)
MCHC RBC-ENTMCNC: 30.1 PG — SIGNIFICANT CHANGE UP (ref 27–34)
MCHC RBC-ENTMCNC: 33 GM/DL — SIGNIFICANT CHANGE UP (ref 32–36)
MCV RBC AUTO: 91.2 FL — SIGNIFICANT CHANGE UP (ref 80–100)
MONOCYTES # BLD AUTO: 0.8 K/UL — SIGNIFICANT CHANGE UP (ref 0–0.9)
MONOCYTES NFR BLD AUTO: 9 % — SIGNIFICANT CHANGE UP (ref 2–14)
NEUTROPHILS # BLD AUTO: 6.1 K/UL — SIGNIFICANT CHANGE UP (ref 1.8–7.4)
NEUTROPHILS NFR BLD AUTO: 71.2 % — SIGNIFICANT CHANGE UP (ref 43–77)
PLATELET # BLD AUTO: 267 K/UL — SIGNIFICANT CHANGE UP (ref 150–400)
POTASSIUM SERPL-MCNC: 4.1 MMOL/L — SIGNIFICANT CHANGE UP (ref 3.5–5.3)
POTASSIUM SERPL-SCNC: 4.1 MMOL/L — SIGNIFICANT CHANGE UP (ref 3.5–5.3)
RBC # BLD: 4.33 M/UL — SIGNIFICANT CHANGE UP (ref 4.2–5.8)
RBC # FLD: 13.5 % — SIGNIFICANT CHANGE UP (ref 10.3–14.5)
SODIUM SERPL-SCNC: 137 MMOL/L — SIGNIFICANT CHANGE UP (ref 135–145)
WBC # BLD: 8.6 K/UL — SIGNIFICANT CHANGE UP (ref 3.8–10.5)
WBC # FLD AUTO: 8.6 K/UL — SIGNIFICANT CHANGE UP (ref 3.8–10.5)

## 2017-10-11 RX ADMIN — Medication 400 MILLIGRAM(S): at 06:37

## 2017-10-11 RX ADMIN — HEPARIN SODIUM 5000 UNIT(S): 5000 INJECTION INTRAVENOUS; SUBCUTANEOUS at 06:36

## 2017-10-11 RX ADMIN — PANTOPRAZOLE SODIUM 40 MILLIGRAM(S): 20 TABLET, DELAYED RELEASE ORAL at 06:36

## 2017-10-11 NOTE — PROGRESS NOTE ADULT - SUBJECTIVE AND OBJECTIVE BOX
Patient is a 77y old  Male who presents with a chief complaint of Abdominal pain, nausea and vomiting. (11 Oct 2017 09:09)      HPI:  76yo male with PMH HTN, CAD s/p stents, diverticulitis s/p colectomy (10yrs ago) presents to the ED c/o RUQ pain for the past 12 hours with nausea and vomiting.  Pt explains that he was admitted to [facility] 5 days ago with similar symptoms however they subsided and he went home and was feeling fine.  Last night after dinner (chicken, mashed potatoes, vegetables) he has excruciating epigastric and RUQ pain.  He started to have vomiting and extreme nausea that only subsided when he came to the ED.  Pt denies diarrhea, constipation, fever, chills, SOB and/or palpitations. (07 Oct 2017 10:21)    10/9 patient admitted with cholecystitis, past history of CAD with stents in lad and rca. last cath 7/2016 showed patent stents, patent site of POBA in distal rca and occlusion of stent in diag. (no change in prior anatomy). Patient walks vigorously on a daily basis without angina. No recent chest pain, palps or dyspnea    10/10 no complaints  10/11 no complaints  PAST MEDICAL & SURGICAL HISTORY:  Essential hypertension  Coronary artery disease involving native heart without angina pectoris, unspecified vessel or lesion type  Gastritis  Diverticulitis  S/P colon resection        MEDICATIONS  (STANDING):  clopidogrel Tablet 75 milliGRAM(s) Oral daily  dextrose 5% + sodium chloride 0.45% with potassium chloride 20 mEq/L 1000 milliLiter(s) (125 mL/Hr) IV Continuous <Continuous>  heparin  Injectable 5000 Unit(s) SubCutaneous every 12 hours  isosorbide   mononitrate ER Tablet (IMDUR) 60 milliGRAM(s) Oral daily  labetalol 400 milliGRAM(s) Oral every 12 hours  pantoprazole    Tablet 40 milliGRAM(s) Oral before breakfast  sodium chloride 0.9%. 1000 milliLiter(s) (100 mL/Hr) IV Continuous <Continuous>    MEDICATIONS  (PRN):  melatonin 3 milliGRAM(s) Oral at bedtime PRN Insomnia  morphine  - Injectable 4 milliGRAM(s) IV Push every 4 hours PRN Severe Pain  ondansetron Injectable 4 milliGRAM(s) IV Push every 6 hours PRN Nausea  oxyCODONE    5 mG/acetaminophen 325 mG 1 Tablet(s) Oral every 4 hours PRN Moderate Pain          Vital Signs Last 24 Hrs  T(C): 36.5 (11 Oct 2017 05:38), Max: 36.7 (10 Oct 2017 15:03)  T(F): 97.7 (11 Oct 2017 05:38), Max: 98.1 (10 Oct 2017 15:03)  HR: 68 (11 Oct 2017 05:38) (53 - 68)  BP: 167/84 (11 Oct 2017 05:38) (124/71 - 167/84)  BP(mean): --  RR: 16 (11 Oct 2017 05:38) (12 - 18)  SpO2: 97% (11 Oct 2017 05:38) (97% - 100%)    I&O's Summary    10 Oct 2017 07:01  -  11 Oct 2017 07:00  --------------------------------------------------------  IN: 575 mL / OUT: 1375 mL / NET: -800 mL        PHYSICAL EXAM  General Appearance: NAD  HEENT: NCAT  Neck:   Back:   Lungs: clear  Heart: RR s1s2 2/6 rivas base  Abdomen: soft nt  Extremities: no edema  Skin:   Neurologic: alet and oriented        INTERPRETATION OF TELEMETRY:    ECG:        LABS:                          13.0   8.6   )-----------( 267      ( 11 Oct 2017 07:42 )             39.5     10-11    137  |  103  |  12  ----------------------------<  96  4.1   |  27  |  1.09    Ca    9.1      11 Oct 2017 07:42                        RADIOLOGY & ADDITIONAL STUDIES:

## 2017-10-11 NOTE — DISCHARGE NOTE ADULT - PLAN OF CARE
pain control, wound healing Follow up with Dr. Alberts in one week.  No heavy lifting, bending or straining.  Please follow a low fat diet.

## 2017-10-11 NOTE — PROGRESS NOTE ADULT - ASSESSMENT
1. Cholycystitis s/p lap choly  2. CAD s/p stents, last 2011. Last cath 7/2016, no need for pci. at that time. patient has excellent functional capacity without symptoms. stable CAD  3. Mild AS  4. HTN labile but ok    Plan: ok to d./c follow up office

## 2017-10-11 NOTE — PROGRESS NOTE ADULT - SUBJECTIVE AND OBJECTIVE BOX
Chief Complaint/Reason for Admission: Abdominal pain, nausea and vomiting.    76 y/o WM h/o Htn, CAD s/p PCI, s/p negative angiogram in the past 2yrs, Prostate CA, GI bleeding, OA,  presents to the  c/o 2 day of RUQ pain,  nausea and vomiting;  no radiation no fever or chills. As per the patient he was in the ED a few days ago with the same symptoms but  the pain resolved and was sent back home.  He states the pain was getting progressively worse after he went out to lunch  with his wife.  10.9: no distress, tolerated clears    10/10: Above reviewed. patient going today for cholecystectomy. No abd pain, CP, DYspnea, N/V  10/11: Lap jessica yesterday. tolerated diet. Feels good and wants to go home.     REVIEW OF SYSTEMS:    CONSTITUTIONAL: No weakness, No fevers or chills  ENT: No ear ache, No sorethroat  NECK: No pain, No stiffness  RESPIRATORY: No cough, No wheezing, No hemoptysis; No dyspnea  CARDIOVASCULAR: No chest pain, No palpitations  GASTROINTESTINAL: No abd pain, No nausea, No vomiting, No hematemesis, No diarrhea or constipation. No melena, No hematochezia.  GENITOURINARY: No dysuria, No  hematuria  NEUROLOGICAL: No diplopia, No paresthesia, No motor dysfunction  MUSCULOSKELETAL: No arthralgia, No myalgia  SKIN: No rashes, or lesions   PSYCH: no anxiety, no suicidal ideation    All other review of systems is negative unless indicated above  Vital Signs Last 24 Hrs    T(C): 36.7 (10 Oct 2017 15:03), Max: 37.1 (09 Oct 2017 17:02)  T(F): 98.1 (10 Oct 2017 15:03), Max: 98.7 (09 Oct 2017 17:02)  HR: 53 (10 Oct 2017 15:03) (53 - 73)  BP: 134/67 (10 Oct 2017 15:03) (124/71 - 160/63)  BP(mean): --  RR: 14 (10 Oct 2017 15:03) (12 - 18)  SpO2: 100% (10 Oct 2017 15:03) (96% - 100%)  PHYSICAL EXAM:    GENERAL: NAD, Well nourished  HEENT:  NC/AT, EOMI, PERRLA, No scleral icterus, Moist mucous membranes  NECK: Supple, No JVD  CNS:  Alert & Oriented X3, Motor Strength 5/5 B/L upper and lower extremities; DTRs 2+ intact   LUNG: Normal Breath sounds, Clear to auscultation bilaterally, No rales, No rhonchi, No wheezing  HEART: RRR; No murmurs, No rubs  ABDOMEN: +BS, ST/ND/NT; Dressing intact  GENITOURINARY: Voiding, Bladder not distended  EXTREMITIES:  2+ Peripheral Pulses, No clubbing, No cyanosis, No tibial edema  MUSCULOSKELTAL: Joints normal ROM, No TTP, No effusion  VAGINAL: deferred  SKIN: no rashes  RECTAL: deferred, not indicated  BREAST: deferred    MEDICATIONS  (STANDING):  heparin  Injectable 5000 Unit(s) SubCutaneous every 12 hours  isosorbide   mononitrate ER Tablet (IMDUR) 60 milliGRAM(s) Oral daily  labetalol 400 milliGRAM(s) Oral every 12 hours  pantoprazole    Tablet 40 milliGRAM(s) Oral before breakfast    MEDICATIONS  (PRN):  melatonin 3 milliGRAM(s) Oral at bedtime PRN Insomnia    Lab Results:  CBC  CBC Full  -  ( 10 Oct 2017 10:47 )  WBC Count : 5.4 K/uL  Hemoglobin : 12.8 g/dL  Hematocrit : 38.6 %  Platelet Count - Automated : 250 K/uL  Mean Cell Volume : 91.1 fl  Mean Cell Hemoglobin : 30.3 pg  Mean Cell Hemoglobin Concentration : 33.2 gm/dL  Auto Neutrophil # : x  Auto Lymphocyte # : x  Auto Monocyte # : x  Auto Eosinophil # : x  Auto Basophil # : x  Auto Neutrophil % : x  Auto Lymphocyte % : x  Auto Monocyte % : x  Auto Eosinophil % : x  Auto Basophil % : x    .		Differential:	[] Automated		[] Manual  Chemistry                        12.8   5.4   )-----------( 250      ( 10 Oct 2017 10:47 )             38.6     10-10    139  |  108  |  12  ----------------------------<  101<H>  4.3   |  25  |  1.12    Ca    9.1      10 Oct 2017 10:47    US:  FINDINGS:   10/2/17 available for review.    The liver demonstrates echogenic echotexture without focal lesion.    Hepatic size is upper limits of normal and contours are maintained.    Hepatic and portal veins appear patent and are not displaced.  No   intrahepatic or ductal dilatation is found.  The common duct is not   dilated, measuring 0.3 cm.  The gallbladder is significant for multiple   mobile gallstones within the gallbladder. There is no wall thickening or   pericholecystic fluid. Trace perihepatic ascites.    The right kidney measures 11.8 cm in length.  It demonstrates no mass,   calculus or hydronephrosis.  The abdominal aorta and IVC appear intact.      IMPRESSION:   Cholelithiasis without evidence of cholecystitis. Fatty   liver.  Trace ascites.      EKG 10-   NSR 70 q III,, no sign change when compared to 12-      < from: Xray Chest 1 View AP/PA. (10.02.17 @ 07:09) >      Impression:    No acute disease    No significant interval change as compared to  12/6/2016    < end of copied text >

## 2017-10-11 NOTE — PROGRESS NOTE ADULT - ASSESSMENT
1. Biliary colic 2ndry to Cholecystitis :   Management as per surgery   GI consult appreciated  HIDA abnormal per GI   no evidence of cholecystitis on US  LFTs normal  S/P lap Cheryl 10/10  tolerated diet    2. h/o CAD s/p PCI:  cw BBlockers   not on ASA due to GI bleeding  restart Plavix  Patient has good exercise tolerance; he reports a normal angiogram in 2016  Cardiology consult appreciated - cleared for surgery  patient is medically optimized for surgery if needed.

## 2017-10-11 NOTE — PROGRESS NOTE ADULT - SUBJECTIVE AND OBJECTIVE BOX
Interval History:    MEDICATIONS  (STANDING):  clopidogrel Tablet 75 milliGRAM(s) Oral daily  dextrose 5% + sodium chloride 0.45% with potassium chloride 20 mEq/L 1000 milliLiter(s) (125 mL/Hr) IV Continuous <Continuous>  heparin  Injectable 5000 Unit(s) SubCutaneous every 12 hours  isosorbide   mononitrate ER Tablet (IMDUR) 60 milliGRAM(s) Oral daily  labetalol 400 milliGRAM(s) Oral every 12 hours  pantoprazole    Tablet 40 milliGRAM(s) Oral before breakfast  sodium chloride 0.9%. 1000 milliLiter(s) (100 mL/Hr) IV Continuous <Continuous>    MEDICATIONS  (PRN):  melatonin 3 milliGRAM(s) Oral at bedtime PRN Insomnia  morphine  - Injectable 4 milliGRAM(s) IV Push every 4 hours PRN Severe Pain  ondansetron Injectable 4 milliGRAM(s) IV Push every 6 hours PRN Nausea  oxyCODONE    5 mG/acetaminophen 325 mG 1 Tablet(s) Oral every 4 hours PRN Moderate Pain      Daily     Daily   BMI: 173.8 (10-07 @ 06:50)  Change in Weight:  Vital Signs Last 24 Hrs  T(C): 36.5 (11 Oct 2017 05:38), Max: 36.7 (10 Oct 2017 15:03)  T(F): 97.7 (11 Oct 2017 05:38), Max: 98.1 (10 Oct 2017 15:03)  HR: 68 (11 Oct 2017 05:38) (53 - 68)  BP: 167/84 (11 Oct 2017 05:38) (124/71 - 167/84)  BP(mean): --  RR: 16 (11 Oct 2017 05:38) (12 - 18)  SpO2: 97% (11 Oct 2017 05:38) (97% - 100%)  I&O's Detail    10 Oct 2017 07:01  -  11 Oct 2017 07:00  --------------------------------------------------------  IN:    lactated ringers.: 75 mL    Other: 500 mL  Total IN: 575 mL    OUT:    Voided: 1375 mL  Total OUT: 1375 mL    Total NET: -800 mL          PHYSICAL EXAM  General:  Well developed, well nourished, alert and active, no pallor, NAD.  HEENT:    Normal appearance of conjunctiva, ears, nose, lips, oropharynx, and oral mucosa, anicteric.  Neck:  No masses, no asymmetry.  Lymph Nodes:  No lymphadenopathy.   Cardiovascular:  RRR normal S1/S2, no murmur.  Respiratory:  CTA B/L, normal respiratory effort.   Abdominal:   soft, no masses or tenderness, normoactive BS, NT/ND, no HSM.  Extremities:   No clubbing or cyanosis, normal capillary refill, no edema.   Skin:   No rash, jaundice, lesions, eczema.   Musculoskeletal:  No joint swelling, erythema or tenderness.   Other:     Lab Results:                        13.0   8.6   )-----------( 267      ( 11 Oct 2017 07:42 )             39.5     10-11    137  |  103  |  12  ----------------------------<  96  4.1   |  27  |  1.09    Ca    9.1      11 Oct 2017 07:42              Stool Results:          RADIOLOGY RESULTS:    SURGICAL PATHOLOGY:

## 2017-10-11 NOTE — DISCHARGE NOTE ADULT - CARE PROVIDER_API CALL
Brendon Alberts), Surgery; Surgical Critical Care  [address] [place] [address]  Phone: [phone]  Fax: [phone]

## 2017-10-11 NOTE — DISCHARGE NOTE ADULT - HOSPITAL COURSE
76yo male admitted on 10/7 with PMH HTN, CAD s/p stents, diverticulitis s/p colectomy (10yrs ago) presents to the ED c/o RUQ pain for the past 12 hours with nausea and vomiting.  Pt explains that he was admitted to [facility] 5 days ago with similar symptoms however they subsided and he went home and was feeling fine.  Last night after dinner (chicken, mashed potatoes, vegetables) he has excruciating epigastric and RUQ pain.  He started to have vomiting and extreme nausea that only subsided when he came to the ED.  Pt denies diarrhea, constipation, fever, chills, SOB and/or palpitations.    Pt was seen by the medical service to medically optimized him for surgery.  Pt underwent a laparoscopic cholecystectomy on 10/10.  On post-operative day #1 patient was doing well and was discharged home and told to follow up with Dr. Alberts in one week.

## 2017-10-11 NOTE — DISCHARGE NOTE ADULT - CARE PLAN
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 Alberts in one week.  No heavy lifting, bending or straining.  Please follow a low fat diet.  Secondary Diagnosis:	Essential hypertension

## 2017-10-11 NOTE — DISCHARGE NOTE ADULT - PATIENT PORTAL LINK FT
“You can access the [employer] Patient Portal, offered by [facility], by registering with the following website: [email or web address]”

## 2017-10-11 NOTE — PROGRESS NOTE ADULT - SUBJECTIVE AND OBJECTIVE BOX
MARLEY EDDY  [id number]  Male      HPI:  P/O lap jessica  no complaints      Daily     Daily     Physical Exam:    Pt is AAOx3  General: Well developed, in no acute distress.   Chest: Lungs clear, no rales, no rhonchi, no wheezes.   Heart: RR, no murmurs, no rubs, no gallops.   Abdomen: Soft, no tenderness, no masses, BS normal.  Wounds clean  Back: Normal curvature, no tenderness.   Neuro: Physiological, no localizing findings.   Skin: Normal, no rashes, no lesions noted.   Extremities: Warm, well perfused, no edema, Pulses intact    I&O's Summary    10 Oct 2017 07:01  -  11 Oct 2017 07:00  --------------------------------------------------------  IN: 575 mL / OUT: 1375 mL / NET: -800 mL                              13.0   8.6   )-----------( 267      ( 11 Oct 2017 07:42 )             39.5       10-11    137  |  103  |  12  ----------------------------<  96  4.1   |  27  |  1.09    Ca    9.1      11 Oct 2017 07:42          HEALTH ISSUES - PROBLEM Dx:  Essential hypertension: Essential hypertension  Calculus of gallbladder with chronic cholecystitis without obstruction: Calculus of gallbladder with chronic cholecystitis without obstruction

## 2017-10-11 NOTE — DISCHARGE NOTE ADULT - MEDICATION SUMMARY - MEDICATIONS TO TAKE
I will START or STAY ON the medications listed below when I get home from the hospital:    isosorbide mononitrate 60 mg oral tablet, extended release  -- 1 tab(s) by mouth once a day (in the morning)  -- Indication: For Home medication    Lipitor 10 mg oral tablet  -- 1 tab(s) by mouth once a day  -- Indication: For Home medication    Plavix 75 mg oral tablet  -- 1 tab(s) by mouth once a day  -- Indication: For Home medication    labetalol  -- 400 milligram(s) by mouth 2 times a day  -- Indication: For Home medication    Protonix 40 mg oral delayed release tablet  -- 1 tab(s) by mouth once a day  -- Indication: For Home medication    Multiple Vitamins oral tablet  -- 1 tab(s) by mouth once a day  -- Indication: For Home medication

## 2017-10-12 LAB — SURGICAL PATHOLOGY FINAL REPORT - CH: SIGNIFICANT CHANGE UP

## 2017-10-13 DIAGNOSIS — I25.10 ATHEROSCLEROTIC HEART DISEASE OF NATIVE CORONARY ARTERY WITHOUT ANGINA PECTORIS: ICD-10-CM

## 2017-10-13 DIAGNOSIS — I10 ESSENTIAL (PRIMARY) HYPERTENSION: ICD-10-CM

## 2017-10-13 DIAGNOSIS — K80.10 CALCULUS OF GALLBLADDER WITH CHRONIC CHOLECYSTITIS WITHOUT OBSTRUCTION: ICD-10-CM

## 2017-10-13 DIAGNOSIS — Z95.5 PRESENCE OF CORONARY ANGIOPLASTY IMPLANT AND GRAFT: ICD-10-CM

## 2017-10-13 DIAGNOSIS — I35.0 NONRHEUMATIC AORTIC (VALVE) STENOSIS: ICD-10-CM

## 2017-10-13 DIAGNOSIS — M19.90 UNSPECIFIED OSTEOARTHRITIS, UNSPECIFIED SITE: ICD-10-CM

## 2017-10-13 DIAGNOSIS — Z85.46 PERSONAL HISTORY OF MALIGNANT NEOPLASM OF PROSTATE: ICD-10-CM

## 2018-09-25 NOTE — INPATIENT CERTIFICATION FOR MEDICARE PATIENTS - PHYSICIAN CONCUR
I concur with the Admission Order and I certify that services are provided in accordance with Section 42 CFR § 412.3 independent

## 2019-01-27 ENCOUNTER — INPATIENT (INPATIENT)
Facility: HOSPITAL | Age: 79
LOS: 2 days | Discharge: HOME | End: 2019-01-30
Attending: SURGERY | Admitting: SURGERY
Payer: MEDICARE

## 2019-01-27 VITALS
HEART RATE: 90 BPM | TEMPERATURE: 98 F | OXYGEN SATURATION: 98 % | HEIGHT: 67 IN | DIASTOLIC BLOOD PRESSURE: 103 MMHG | SYSTOLIC BLOOD PRESSURE: 173 MMHG | WEIGHT: 169.98 LBS | RESPIRATION RATE: 19 BRPM

## 2019-01-27 DIAGNOSIS — Z90.49 ACQUIRED ABSENCE OF OTHER SPECIFIED PARTS OF DIGESTIVE TRACT: Chronic | ICD-10-CM

## 2019-01-27 PROBLEM — I25.10 ATHEROSCLEROTIC HEART DISEASE OF NATIVE CORONARY ARTERY WITHOUT ANGINA PECTORIS: Chronic | Status: ACTIVE | Noted: 2017-10-02

## 2019-01-27 PROBLEM — I10 ESSENTIAL (PRIMARY) HYPERTENSION: Chronic | Status: ACTIVE | Noted: 2017-10-02

## 2019-01-27 LAB
ALBUMIN SERPL ELPH-MCNC: 4.3 G/DL — SIGNIFICANT CHANGE UP (ref 3.3–5)
ALP SERPL-CCNC: 74 U/L — SIGNIFICANT CHANGE UP (ref 40–120)
ALT FLD-CCNC: 48 U/L — SIGNIFICANT CHANGE UP (ref 12–78)
ANION GAP SERPL CALC-SCNC: 10 MMOL/L — SIGNIFICANT CHANGE UP (ref 5–17)
APTT BLD: 25 SEC — LOW (ref 27.5–36.3)
AST SERPL-CCNC: 42 U/L — HIGH (ref 15–37)
BASOPHILS # BLD AUTO: 0.03 K/UL — SIGNIFICANT CHANGE UP (ref 0–0.2)
BASOPHILS NFR BLD AUTO: 0.2 % — SIGNIFICANT CHANGE UP (ref 0–2)
BILIRUB SERPL-MCNC: 0.5 MG/DL — SIGNIFICANT CHANGE UP (ref 0.2–1.2)
BUN SERPL-MCNC: 17 MG/DL — SIGNIFICANT CHANGE UP (ref 7–23)
CALCIUM SERPL-MCNC: 9.8 MG/DL — SIGNIFICANT CHANGE UP (ref 8.5–10.1)
CHLORIDE SERPL-SCNC: 104 MMOL/L — SIGNIFICANT CHANGE UP (ref 96–108)
CO2 SERPL-SCNC: 23 MMOL/L — SIGNIFICANT CHANGE UP (ref 22–31)
CREAT SERPL-MCNC: 1.02 MG/DL — SIGNIFICANT CHANGE UP (ref 0.5–1.3)
EOSINOPHIL # BLD AUTO: 0.01 K/UL — SIGNIFICANT CHANGE UP (ref 0–0.5)
EOSINOPHIL NFR BLD AUTO: 0.1 % — SIGNIFICANT CHANGE UP (ref 0–6)
GLUCOSE SERPL-MCNC: 139 MG/DL — HIGH (ref 70–99)
HCT VFR BLD CALC: 44.2 % — SIGNIFICANT CHANGE UP (ref 39–50)
HGB BLD-MCNC: 14.7 G/DL — SIGNIFICANT CHANGE UP (ref 13–17)
IMM GRANULOCYTES NFR BLD AUTO: 0.6 % — SIGNIFICANT CHANGE UP (ref 0–1.5)
INR BLD: 1.03 RATIO — SIGNIFICANT CHANGE UP (ref 0.88–1.16)
LIDOCAIN IGE QN: 97 U/L — SIGNIFICANT CHANGE UP (ref 73–393)
LYMPHOCYTES # BLD AUTO: 1.25 K/UL — SIGNIFICANT CHANGE UP (ref 1–3.3)
LYMPHOCYTES # BLD AUTO: 6.7 % — LOW (ref 13–44)
MCHC RBC-ENTMCNC: 31.1 PG — SIGNIFICANT CHANGE UP (ref 27–34)
MCHC RBC-ENTMCNC: 33.3 GM/DL — SIGNIFICANT CHANGE UP (ref 32–36)
MCV RBC AUTO: 93.4 FL — SIGNIFICANT CHANGE UP (ref 80–100)
MONOCYTES # BLD AUTO: 1.5 K/UL — HIGH (ref 0–0.9)
MONOCYTES NFR BLD AUTO: 8.1 % — SIGNIFICANT CHANGE UP (ref 2–14)
NEUTROPHILS # BLD AUTO: 15.72 K/UL — HIGH (ref 1.8–7.4)
NEUTROPHILS NFR BLD AUTO: 84.3 % — HIGH (ref 43–77)
NRBC # BLD: 0 /100 WBCS — SIGNIFICANT CHANGE UP (ref 0–0)
PLATELET # BLD AUTO: 331 K/UL — SIGNIFICANT CHANGE UP (ref 150–400)
POTASSIUM SERPL-MCNC: 4.2 MMOL/L — SIGNIFICANT CHANGE UP (ref 3.5–5.3)
POTASSIUM SERPL-SCNC: 4.2 MMOL/L — SIGNIFICANT CHANGE UP (ref 3.5–5.3)
PROT SERPL-MCNC: 9.6 GM/DL — HIGH (ref 6–8.3)
PROTHROM AB SERPL-ACNC: 11.4 SEC — SIGNIFICANT CHANGE UP (ref 10–12.9)
RBC # BLD: 4.73 M/UL — SIGNIFICANT CHANGE UP (ref 4.2–5.8)
RBC # FLD: 12.9 % — SIGNIFICANT CHANGE UP (ref 10.3–14.5)
SODIUM SERPL-SCNC: 137 MMOL/L — SIGNIFICANT CHANGE UP (ref 135–145)
WBC # BLD: 18.63 K/UL — HIGH (ref 3.8–10.5)
WBC # FLD AUTO: 18.63 K/UL — HIGH (ref 3.8–10.5)

## 2019-01-27 PROCEDURE — 74177 CT ABD & PELVIS W/CONTRAST: CPT | Mod: 26

## 2019-01-27 PROCEDURE — 99285 EMERGENCY DEPT VISIT HI MDM: CPT

## 2019-01-27 PROCEDURE — 93010 ELECTROCARDIOGRAM REPORT: CPT

## 2019-01-27 RX ORDER — HYDROMORPHONE HYDROCHLORIDE 2 MG/ML
0.5 INJECTION INTRAMUSCULAR; INTRAVENOUS; SUBCUTANEOUS
Qty: 0 | Refills: 0 | Status: DISCONTINUED | OUTPATIENT
Start: 2019-01-27 | End: 2019-01-27

## 2019-01-27 RX ORDER — MORPHINE SULFATE 50 MG/1
4 CAPSULE, EXTENDED RELEASE ORAL ONCE
Qty: 0 | Refills: 0 | Status: DISCONTINUED | OUTPATIENT
Start: 2019-01-27 | End: 2019-01-27

## 2019-01-27 RX ORDER — HEPARIN SODIUM 5000 [USP'U]/ML
5000 INJECTION INTRAVENOUS; SUBCUTANEOUS EVERY 12 HOURS
Qty: 0 | Refills: 0 | Status: DISCONTINUED | OUTPATIENT
Start: 2019-01-27 | End: 2019-01-30

## 2019-01-27 RX ORDER — ISOSORBIDE MONONITRATE 60 MG/1
60 TABLET, EXTENDED RELEASE ORAL DAILY
Qty: 0 | Refills: 0 | Status: DISCONTINUED | OUTPATIENT
Start: 2019-01-27 | End: 2019-01-30

## 2019-01-27 RX ORDER — FAMOTIDINE 10 MG/ML
20 INJECTION INTRAVENOUS ONCE
Qty: 0 | Refills: 0 | Status: COMPLETED | OUTPATIENT
Start: 2019-01-27 | End: 2019-01-27

## 2019-01-27 RX ORDER — ONDANSETRON 8 MG/1
4 TABLET, FILM COATED ORAL ONCE
Qty: 0 | Refills: 0 | Status: COMPLETED | OUTPATIENT
Start: 2019-01-27 | End: 2019-01-27

## 2019-01-27 RX ORDER — ATORVASTATIN CALCIUM 80 MG/1
10 TABLET, FILM COATED ORAL AT BEDTIME
Qty: 0 | Refills: 0 | Status: DISCONTINUED | OUTPATIENT
Start: 2019-01-27 | End: 2019-01-30

## 2019-01-27 RX ORDER — SODIUM CHLORIDE 9 MG/ML
1000 INJECTION, SOLUTION INTRAVENOUS
Qty: 0 | Refills: 0 | Status: DISCONTINUED | OUTPATIENT
Start: 2019-01-27 | End: 2019-01-30

## 2019-01-27 RX ORDER — HYDRALAZINE HCL 50 MG
10 TABLET ORAL ONCE
Qty: 0 | Refills: 0 | Status: COMPLETED | OUTPATIENT
Start: 2019-01-27 | End: 2019-01-27

## 2019-01-27 RX ORDER — KETOROLAC TROMETHAMINE 30 MG/ML
15 SYRINGE (ML) INJECTION EVERY 4 HOURS
Qty: 0 | Refills: 0 | Status: DISCONTINUED | OUTPATIENT
Start: 2019-01-27 | End: 2019-01-30

## 2019-01-27 RX ORDER — LABETALOL HCL 100 MG
400 TABLET ORAL
Qty: 0 | Refills: 0 | Status: DISCONTINUED | OUTPATIENT
Start: 2019-01-27 | End: 2019-01-30

## 2019-01-27 RX ORDER — HYDROMORPHONE HYDROCHLORIDE 2 MG/ML
0.5 INJECTION INTRAMUSCULAR; INTRAVENOUS; SUBCUTANEOUS EVERY 4 HOURS
Qty: 0 | Refills: 0 | Status: DISCONTINUED | OUTPATIENT
Start: 2019-01-27 | End: 2019-01-30

## 2019-01-27 RX ORDER — KETOROLAC TROMETHAMINE 30 MG/ML
15 SYRINGE (ML) INJECTION EVERY 6 HOURS
Qty: 0 | Refills: 0 | Status: DISCONTINUED | OUTPATIENT
Start: 2019-01-27 | End: 2019-01-27

## 2019-01-27 RX ORDER — ONDANSETRON 8 MG/1
4 TABLET, FILM COATED ORAL EVERY 6 HOURS
Qty: 0 | Refills: 0 | Status: DISCONTINUED | OUTPATIENT
Start: 2019-01-27 | End: 2019-01-30

## 2019-01-27 RX ORDER — PANTOPRAZOLE SODIUM 20 MG/1
40 TABLET, DELAYED RELEASE ORAL
Qty: 0 | Refills: 0 | Status: DISCONTINUED | OUTPATIENT
Start: 2019-01-27 | End: 2019-01-30

## 2019-01-27 RX ADMIN — MORPHINE SULFATE 4 MILLIGRAM(S): 50 CAPSULE, EXTENDED RELEASE ORAL at 08:10

## 2019-01-27 RX ADMIN — Medication 15 MILLIGRAM(S): at 23:00

## 2019-01-27 RX ADMIN — FAMOTIDINE 20 MILLIGRAM(S): 10 INJECTION INTRAVENOUS at 11:00

## 2019-01-27 RX ADMIN — HEPARIN SODIUM 5000 UNIT(S): 5000 INJECTION INTRAVENOUS; SUBCUTANEOUS at 17:25

## 2019-01-27 RX ADMIN — HYDROMORPHONE HYDROCHLORIDE 0.5 MILLIGRAM(S): 2 INJECTION INTRAMUSCULAR; INTRAVENOUS; SUBCUTANEOUS at 16:10

## 2019-01-27 RX ADMIN — ONDANSETRON 4 MILLIGRAM(S): 8 TABLET, FILM COATED ORAL at 10:32

## 2019-01-27 RX ADMIN — SODIUM CHLORIDE 100 MILLILITER(S): 9 INJECTION, SOLUTION INTRAVENOUS at 15:50

## 2019-01-27 RX ADMIN — ONDANSETRON 4 MILLIGRAM(S): 8 TABLET, FILM COATED ORAL at 22:28

## 2019-01-27 RX ADMIN — ONDANSETRON 4 MILLIGRAM(S): 8 TABLET, FILM COATED ORAL at 15:43

## 2019-01-27 RX ADMIN — MORPHINE SULFATE 4 MILLIGRAM(S): 50 CAPSULE, EXTENDED RELEASE ORAL at 07:34

## 2019-01-27 RX ADMIN — ONDANSETRON 4 MILLIGRAM(S): 8 TABLET, FILM COATED ORAL at 07:34

## 2019-01-27 RX ADMIN — Medication 10 MILLIGRAM(S): at 16:28

## 2019-01-27 RX ADMIN — ISOSORBIDE MONONITRATE 60 MILLIGRAM(S): 60 TABLET, EXTENDED RELEASE ORAL at 16:15

## 2019-01-27 RX ADMIN — Medication 15 MILLIGRAM(S): at 22:29

## 2019-01-27 RX ADMIN — HYDROMORPHONE HYDROCHLORIDE 0.5 MILLIGRAM(S): 2 INJECTION INTRAMUSCULAR; INTRAVENOUS; SUBCUTANEOUS at 15:35

## 2019-01-27 NOTE — H&P ADULT - NSHPPHYSICALEXAM_GEN_ALL_CORE
Gen: NAD, cooperative   HEENT: supple, no JVD, EOMI  Lungs: CTA b/l, aerating well   CV: S1 and S2 present  Abd: TTP, mild distention, no RGR   Ext: FROM, pulse present, no cyanosis, No edema   Skin: warm, dry

## 2019-01-27 NOTE — ED ADULT NURSE NOTE - NSIMPLEMENTINTERV_GEN_ALL_ED
Implemented All Universal Safety Interventions:  [place] to call system. Call bell, personal items and telephone within reach. Instruct patient to call for assistance. Room bathroom lighting operational. Non-slip footwear when patient is off stretcher. Physically safe environment: no spills, clutter or unnecessary equipment. Stretcher in lowest position, wheels locked, appropriate side rails in place.

## 2019-01-27 NOTE — H&P ADULT - NSHPLABSRESULTS_GEN_ALL_CORE
14.7   18.63 )-----------( 331      ( 27 Jan 2019 06:50 )             44.2   01-27    137  |  104  |  17  ----------------------------<  139<H>  4.2   |  23  |  1.02    Ca    9.8      27 Jan 2019 06:50    TPro  9.6<H>  /  Alb  4.3  /  TBili  0.5  /  DBili  x   /  AST  42<H>  /  ALT  48  /  AlkPhos  74  01-27  PT/INR - ( 27 Jan 2019 06:50 )   PT: 11.4 sec;   INR: 1.03 ratio         PTT - ( 27 Jan 2019 06:50 )  PTT:25.0 sec    imaging; pending

## 2019-01-27 NOTE — ED PROVIDER NOTE - OBJECTIVE STATEMENT
78M hx CAD, COPD, HTN, prior colon resection for diverticulosis here with acute onset upper abdominal pain, nausea and vomiting since ~1:30am today.  No fever/chills.  Last BM yesterday.  Unable to hold anything down.  No other complaints.

## 2019-01-27 NOTE — H&P ADULT - ASSESSMENT
79 y/o m presents with nausea and vomiting x1day. Found to have high grade SBO.       Plan:   monitor vitals   pain control   nausea control   Diet: NPO  IVF hydration   encourage IS use  encourage OOB/A  DVT/GI ppx   cardiology and medical evaluation pending  will review imaging but preliminary results state high grade SBO. Will likely need surgical intervention for high grade SBO.      Plan discussed with attending, Dr. Alberts

## 2019-01-27 NOTE — ED ADULT NURSE REASSESSMENT NOTE - NS ED NURSE REASSESS COMMENT FT1
Patient A&Ox4, resting in bed. VSS, denies pain. Reports acid reflux, pepcid administered by ER RN. NPO for possible OR, unable to initiate IVF as LR not available, receiving RN will administer when patient arrives on unit. No s/s of distress present. Hand-off report to ROHIT Rodriguez, transport in progress to 5E. Safety & comfort measures in place. Purposeful active rounding completed.

## 2019-01-27 NOTE — CONSULT NOTE ADULT - SUBJECTIVE AND OBJECTIVE BOX
HPI: "77 y/o m presents with nausea and vomiting x 1 day. Reports diffuse abdominal pain which worsened overnight with associated with bilious non bloody emesis. patient reports last Bm and flatus yesterday. Patient denies fever, chills, diarrhea, SOb and CP.   of note patient had similar SBO episode last year in [place]. Resolved with conservative management. "    PMH: CAD, GERD, diverticulitis, HLD, HTN, COPD   PSH: PCI, sigmoidectomy   Meds: labetalol, protonix, isosorbide mononitrate, lipitor, plavix 75mg   Allx; NKDA     Home Medications:   * No Current Medications as of 11-Oct-2017 09:10 documented in Structured Notes  · 	labetalol: 400 milligram(s) orally 2 times a day  · 	Protonix 40 mg oral delayed release tablet: 1 tab(s) orally once a day  · 	isosorbide mononitrate 60 mg oral tablet, extended release: 1 tab(s) orally once a day (in the morning)  · 	Lipitor 10 mg oral tablet: 1 tab(s) orally once a day  · 	Multiple Vitamins oral tablet: 1 tab(s) orally once a day  · 	Plavix 75 mg oral tablet: 1 tab(s) orally once a day    .     Past Medical History:  Coronary artery disease involving native heart without angina pectoris, unspecified vessel or lesion type    Diverticulitis    Essential hypertension    Gastritis.     Past Surgical History:  S/P colon resection.    Family History:  No pertinent family history in first degree relatives.     Tobacco Screening:  · Core Measure Site	Yes  · Has the patient used tobacco in the past 30 days?	No      REVIEW OF SYSTEMS:    CONSTITUTIONAL: No weakness, fevers or chills  EYES/ENT: No visual changes;  No vertigo or throat pain   NECK: No pain or stiffness  RESPIRATORY: No cough, wheezing, hemoptysis; No shortness of breath  CARDIOVASCULAR: No chest pain or palpitations  GASTROINTESTINAL: No abdominal or epigastric pain. No nausea, vomiting, or hematemesis; No diarrhea or constipation. No melena or hematochezia.  GENITOURINARY: No dysuria, frequency or hematuria  NEUROLOGICAL: No numbness or weakness  SKIN: No itching, burning, rashes, or lesions   All other review of systems is negative unless indicated above    Vital Signs Last 24 Hrs  T(C): 36.8 (27 Jan 2019 12:49), Max: 36.8 (27 Jan 2019 12:49)  T(F): 98.3 (27 Jan 2019 12:49), Max: 98.3 (27 Jan 2019 12:49)  HR: 89 (27 Jan 2019 12:49) (82 - 90)  BP: 143/83 (27 Jan 2019 12:49) (143/83 - 173/103)  BP(mean): --  RR: 17 (27 Jan 2019 12:49) (14 - 19)  SpO2: 95% (27 Jan 2019 12:49) (95% - 99%)    I&O's Summary      CAPILLARY BLOOD GLUCOSE          PHYSICAL EXAM:    Constitutional: NAD, awake and alert, well-developed  HEENT: PERR, EOMI, Normal Hearing, MMM  Neck: Soft and supple, No LAD, No JVD  Respiratory: Breath sounds are clear bilaterally, No wheezing, rales or rhonchi  Cardiovascular: S1 and S2, regular rate and rhythm, no Murmurs, gallops or rubs  Gastrointestinal: Bowel Sounds present, soft, nontender, nondistended, no guarding, no rebound  Extremities: No peripheral edema  Vascular: 2+ peripheral pulses  Neurological: A/O x 3, no focal deficits  Musculoskeletal: 5/5 strength b/l upper and lower extremities  Skin: No rashes    MEDICATIONS:  MEDICATIONS  (STANDING):  atorvastatin Oral Tab/Cap - Peds 10 milliGRAM(s) Oral at bedtime  heparin  Injectable 5000 Unit(s) SubCutaneous every 12 hours  isosorbide   mononitrate ER Tablet (IMDUR) 60 milliGRAM(s) Oral daily  labetalol 400 milliGRAM(s) Oral two times a day  lactated ringers. 1000 milliLiter(s) (100 mL/Hr) IV Continuous <Continuous>  multivitamin 1 Tablet(s) Oral daily  pantoprazole    Tablet 40 milliGRAM(s) Oral before breakfast      LABS: All Labs Reviewed:                        14.7   18.63 )-----------( 331      ( 27 Jan 2019 06:50 )             44.2     01-27    137  |  104  |  17  ----------------------------<  139<H>  4.2   |  23  |  1.02    Ca    9.8      27 Jan 2019 06:50    TPro  9.6<H>  /  Alb  4.3  /  TBili  0.5  /  DBili  x   /  AST  42<H>  /  ALT  48  /  AlkPhos  74  01-27    PT/INR - ( 27 Jan 2019 06:50 )   PT: 11.4 sec;   INR: 1.03 ratio         PTT - ( 27 Jan 2019 06:50 )  PTT:25.0 sec HPI: "79 y/o m presents with nausea and vomiting x 1 day. Reports diffuse abdominal pain which worsened overnight with associated with bilious non bloody emesis. patient reports last Bm and flatus yesterday. Patient denies fever, chills, diarrhea, SOb and CP.   of note patient had similar SBO episode last year in [place]. Resolved with conservative management. "    PMH: CAD, GERD, diverticulitis, HLD, HTN, COPD   PSH: PCI, sigmoidectomy   Meds: labetalol, protonix, isosorbide mononitrate, lipitor, plavix 75mg   Allx; NKDA     Home Medications:   * No Current Medications as of 11-Oct-2017 09:10 documented in Structured Notes  · 	labetalol: 400 milligram(s) orally 2 times a day  · 	Protonix 40 mg oral delayed release tablet: 1 tab(s) orally once a day  · 	isosorbide mononitrate 60 mg oral tablet, extended release: 1 tab(s) orally once a day (in the morning)  · 	Lipitor 10 mg oral tablet: 1 tab(s) orally once a day  · 	Multiple Vitamins oral tablet: 1 tab(s) orally once a day  · 	Plavix 75 mg oral tablet: 1 tab(s) orally once a day    .     Past Medical History:  Coronary artery disease involving native heart without angina pectoris, unspecified vessel or lesion type    Diverticulitis    Essential hypertension    Gastritis.     Past Surgical History:  S/P colon resection.    Family History:  No pertinent family history in first degree relatives.     Tobacco Screening:  · Core Measure Site	Yes  · Has the patient used tobacco in the past 30 days?	No      REVIEW OF SYSTEMS:    CONSTITUTIONAL: No weakness, fevers or chills  EYES/ENT: No visual changes;  No vertigo or throat pain   NECK: No pain or stiffness  RESPIRATORY: No cough, wheezing, hemoptysis; No shortness of breath  CARDIOVASCULAR: No chest pain or palpitations  GASTROINTESTINAL: No abdominal or epigastric pain. No nausea, vomiting, or hematemesis; No diarrhea or constipation. No melena or hematochezia.  GENITOURINARY: No dysuria, frequency or hematuria  NEUROLOGICAL: No numbness or weakness  SKIN: No itching, burning, rashes, or lesions   All other review of systems is negative unless indicated above    Vital Signs Last 24 Hrs  T(C): 36.8 (27 Jan 2019 12:49), Max: 36.8 (27 Jan 2019 12:49)  T(F): 98.3 (27 Jan 2019 12:49), Max: 98.3 (27 Jan 2019 12:49)  HR: 89 (27 Jan 2019 12:49) (82 - 90)  BP: 143/83 (27 Jan 2019 12:49) (143/83 - 173/103)  BP(mean): --  RR: 17 (27 Jan 2019 12:49) (14 - 19)  SpO2: 95% (27 Jan 2019 12:49) (95% - 99%)    I&O's Summary      CAPILLARY BLOOD GLUCOSE          PHYSICAL EXAM:    Constitutional: NAD, awake and alert, well-developed  HEENT: PERR, EOMI, Normal Hearing, MMM  Neck: Soft and supple, No LAD, No JVD  Respiratory: Breath sounds are clear bilaterally, No wheezing, rales or rhonchi  Cardiovascular: S1 and S2, regular rate and rhythm, no Murmurs, gallops or rubs  Gastrointestinal: Bowel Sounds present, soft, nontender, nondistended, no guarding, no rebound  Extremities: No peripheral edema  Vascular: 2+ peripheral pulses  Neurological: A/O x 3, no focal deficits  Musculoskeletal: 5/5 strength b/l upper and lower extremities  Skin: No rashes    MEDICATIONS:  MEDICATIONS  (STANDING):  atorvastatin Oral Tab/Cap - Peds 10 milliGRAM(s) Oral at bedtime  heparin  Injectable 5000 Unit(s) SubCutaneous every 12 hours  isosorbide   mononitrate ER Tablet (IMDUR) 60 milliGRAM(s) Oral daily  labetalol 400 milliGRAM(s) Oral two times a day  lactated ringers. 1000 milliLiter(s) (100 mL/Hr) IV Continuous <Continuous>  multivitamin 1 Tablet(s) Oral daily  pantoprazole    Tablet 40 milliGRAM(s) Oral before breakfast      LABS: All Labs Reviewed:                        14.7   18.63 )-----------( 331      ( 27 Jan 2019 06:50 )             44.2     01-27    137  |  104  |  17  ----------------------------<  139<H>  4.2   |  23  |  1.02    Ca    9.8      27 Jan 2019 06:50    TPro  9.6<H>  /  Alb  4.3  /  TBili  0.5  /  DBili  x   /  AST  42<H>  /  ALT  48  /  AlkPhos  74  01-27    PT/INR - ( 27 Jan 2019 06:50 )   PT: 11.4 sec;   INR: 1.03 ratio         PTT - ( 27 Jan 2019 06:50 )  PTT:25.0 sec        Assessment and Plan:    78 year male w/ PMH of CAD s/p 2 stents last one aprox 5 years ago, Diverticulitis , Essential hypertension , Gastritis, presents under surgical service  for high grade SBO HPI: "77 y/o m presents with nausea and vomiting x 1 day. Reports diffuse abdominal pain which worsened overnight with associated with bilious non bloody emesis. patient reports last Bm and flatus yesterday. Patient denies fever, chills, diarrhea, SOb and CP.   of note patient had similar SBO episode last year in [place]. Resolved with conservative management. "    PMH: CAD, GERD, diverticulitis, HLD, HTN, COPD   PSH: PCI, sigmoidectomy   Meds: labetalol, protonix, isosorbide mononitrate, lipitor, plavix 75mg   Allx; NKDA     Home Medications:   * No Current Medications as of 11-Oct-2017 09:10 documented in Structured Notes  · 	labetalol: 400 milligram(s) orally 2 times a day  · 	Protonix 40 mg oral delayed release tablet: 1 tab(s) orally once a day  · 	isosorbide mononitrate 60 mg oral tablet, extended release: 1 tab(s) orally once a day (in the morning)  · 	Lipitor 10 mg oral tablet: 1 tab(s) orally once a day  · 	Multiple Vitamins oral tablet: 1 tab(s) orally once a day  · 	Plavix 75 mg oral tablet: 1 tab(s) orally once a day    .     Past Medical History:  Coronary artery disease involving native heart without angina pectoris, unspecified vessel or lesion type    Diverticulitis    Essential hypertension    Gastritis.     Past Surgical History:  S/P colon resection.    Family History:  No pertinent family history in first degree relatives.     Tobacco Screening:  · Core Measure Site	Yes  · Has the patient used tobacco in the past 30 days?	No      REVIEW OF SYSTEMS:    CONSTITUTIONAL: No weakness, fevers or chills  EYES/ENT: No visual changes;  No vertigo or throat pain   NECK: No pain or stiffness  RESPIRATORY: No cough, wheezing, hemoptysis; No shortness of breath  CARDIOVASCULAR: No chest pain or palpitations  GASTROINTESTINAL: No abdominal or epigastric pain. No nausea, vomiting, or hematemesis; No diarrhea or constipation. No melena or hematochezia.  GENITOURINARY: No dysuria, frequency or hematuria  NEUROLOGICAL: No numbness or weakness  SKIN: No itching, burning, rashes, or lesions   All other review of systems is negative unless indicated above    Vital Signs Last 24 Hrs  T(C): 36.8 (27 Jan 2019 12:49), Max: 36.8 (27 Jan 2019 12:49)  T(F): 98.3 (27 Jan 2019 12:49), Max: 98.3 (27 Jan 2019 12:49)  HR: 89 (27 Jan 2019 12:49) (82 - 90)  BP: 143/83 (27 Jan 2019 12:49) (143/83 - 173/103)  BP(mean): --  RR: 17 (27 Jan 2019 12:49) (14 - 19)  SpO2: 95% (27 Jan 2019 12:49) (95% - 99%)    I&O's Summary      CAPILLARY BLOOD GLUCOSE          PHYSICAL EXAM:    Constitutional: NAD, awake and alert, well-developed  HEENT: PERR, EOMI, Normal Hearing, MMM  Neck: Soft and supple, No LAD, No JVD  Respiratory: Breath sounds are clear bilaterally, No wheezing, rales or rhonchi  Cardiovascular: S1 and S2, regular rate and rhythm, no Murmurs, gallops or rubs  Gastrointestinal: Bowel Sounds present, soft, nontender, nondistended, no guarding, no rebound  Extremities: No peripheral edema  Vascular: 2+ peripheral pulses  Neurological: A/O x 3, no focal deficits  Musculoskeletal: 5/5 strength b/l upper and lower extremities  Skin: No rashes    MEDICATIONS:  MEDICATIONS  (STANDING):  atorvastatin Oral Tab/Cap - Peds 10 milliGRAM(s) Oral at bedtime  heparin  Injectable 5000 Unit(s) SubCutaneous every 12 hours  isosorbide   mononitrate ER Tablet (IMDUR) 60 milliGRAM(s) Oral daily  labetalol 400 milliGRAM(s) Oral two times a day  lactated ringers. 1000 milliLiter(s) (100 mL/Hr) IV Continuous <Continuous>  multivitamin 1 Tablet(s) Oral daily  pantoprazole    Tablet 40 milliGRAM(s) Oral before breakfast      LABS: All Labs Reviewed:                        14.7   18.63 )-----------( 331      ( 27 Jan 2019 06:50 )             44.2     01-27    137  |  104  |  17  ----------------------------<  139<H>  4.2   |  23  |  1.02    Ca    9.8      27 Jan 2019 06:50    TPro  9.6<H>  /  Alb  4.3  /  TBili  0.5  /  DBili  x   /  AST  42<H>  /  ALT  48  /  AlkPhos  74  01-27    PT/INR - ( 27 Jan 2019 06:50 )   PT: 11.4 sec;   INR: 1.03 ratio         PTT - ( 27 Jan 2019 06:50 )  PTT:25.0 sec        Assessment and Plan:    78 year male w/ PMH of CAD s/p 2 stents last one aprox 5 years ago, Diverticulitis , Essential hypertension , Gastritis, presents under surgical service  for high grade SBO      1-high grade SBO  -patient likely to go for OR  -currently c/o pain >> start dilaudid 0.5mg q3h prn  -medical clearance for OR : Patient has multiple co morbididties, however none are in active state. Abraham has history of CAD w/ stents, and has been symptom free for many years. He has good METS for his age, and is very functional. Says him and his wife walk everyday. He deneis any chest pain, sob with exertion. He has no hx of AS, and there is no murmur apprecaited.   According to the CHERY index for intraoperative cardiac morbidity and mortality patient has a <1% risk of intraoperative event and puts abraham at low risk for this moderate risk procedure. Patient is cleared for OR as low risk.     2-CAD- c/w statin , plavix , may hold temp for OR    3-dvt proph- sc heparin- hold for or

## 2019-01-27 NOTE — ED ADULT TRIAGE NOTE - CHIEF COMPLAINT QUOTE
Patient reports vomiting and sharp, severe, mid-epigastric abdominal pain since 0130. History of diverticulitis.

## 2019-01-27 NOTE — H&P ADULT - HISTORY OF PRESENT ILLNESS
79 y/o m presents with nausea and vomiting x 1 day. Reports diffuse abdominal pain which worsened overnight with associated with bilious non bloody emesis. patient reports last Bm and flatus yesterday. Patient denies fever, chills, diarrhea, SOb and CP.   of note patient had similar SBO episode last year in [place]. Resolved with conservative management.     PMH: CAD, GERD, diverticulitis, HLD, HTN, COPD   PSH: PCI, sigmoidectomy   Meds: labetalol, protonix, isosorbide mononitrate, lipitor, plavix 75mg   Allx; NKDA

## 2019-01-27 NOTE — ED PROVIDER NOTE - MEDICAL DECISION MAKING DETAILS
Abd pain - ?early/partial SBO, ?pancreatitis, ?infectious  Plan: fluids, analgesics/antiemetics, labs, CT

## 2019-01-28 DIAGNOSIS — K56.609 UNSPECIFIED INTESTINAL OBSTRUCTION, UNSPECIFIED AS TO PARTIAL VERSUS COMPLETE OBSTRUCTION: ICD-10-CM

## 2019-01-28 LAB
ANION GAP SERPL CALC-SCNC: 7 MMOL/L — SIGNIFICANT CHANGE UP (ref 5–17)
BUN SERPL-MCNC: 28 MG/DL — HIGH (ref 7–23)
CALCIUM SERPL-MCNC: 8.8 MG/DL — SIGNIFICANT CHANGE UP (ref 8.5–10.1)
CHLORIDE SERPL-SCNC: 107 MMOL/L — SIGNIFICANT CHANGE UP (ref 96–108)
CO2 SERPL-SCNC: 27 MMOL/L — SIGNIFICANT CHANGE UP (ref 22–31)
CREAT SERPL-MCNC: 1.14 MG/DL — SIGNIFICANT CHANGE UP (ref 0.5–1.3)
GLUCOSE SERPL-MCNC: 98 MG/DL — SIGNIFICANT CHANGE UP (ref 70–99)
HCT VFR BLD CALC: 41.4 % — SIGNIFICANT CHANGE UP (ref 39–50)
HGB BLD-MCNC: 13.2 G/DL — SIGNIFICANT CHANGE UP (ref 13–17)
MAGNESIUM SERPL-MCNC: 2.2 MG/DL — SIGNIFICANT CHANGE UP (ref 1.6–2.6)
MCHC RBC-ENTMCNC: 30.4 PG — SIGNIFICANT CHANGE UP (ref 27–34)
MCHC RBC-ENTMCNC: 31.9 GM/DL — LOW (ref 32–36)
MCV RBC AUTO: 95.4 FL — SIGNIFICANT CHANGE UP (ref 80–100)
NRBC # BLD: 0 /100 WBCS — SIGNIFICANT CHANGE UP (ref 0–0)
PHOSPHATE SERPL-MCNC: 3.9 MG/DL — SIGNIFICANT CHANGE UP (ref 2.5–4.5)
PLATELET # BLD AUTO: 279 K/UL — SIGNIFICANT CHANGE UP (ref 150–400)
POTASSIUM SERPL-MCNC: 3.9 MMOL/L — SIGNIFICANT CHANGE UP (ref 3.5–5.3)
POTASSIUM SERPL-SCNC: 3.9 MMOL/L — SIGNIFICANT CHANGE UP (ref 3.5–5.3)
RBC # BLD: 4.34 M/UL — SIGNIFICANT CHANGE UP (ref 4.2–5.8)
RBC # FLD: 13.2 % — SIGNIFICANT CHANGE UP (ref 10.3–14.5)
SODIUM SERPL-SCNC: 141 MMOL/L — SIGNIFICANT CHANGE UP (ref 135–145)
WBC # BLD: 10.11 K/UL — SIGNIFICANT CHANGE UP (ref 3.8–10.5)
WBC # FLD AUTO: 10.11 K/UL — SIGNIFICANT CHANGE UP (ref 3.8–10.5)

## 2019-01-28 PROCEDURE — 74018 RADEX ABDOMEN 1 VIEW: CPT | Mod: 26

## 2019-01-28 RX ADMIN — Medication 15 MILLIGRAM(S): at 20:22

## 2019-01-28 RX ADMIN — PANTOPRAZOLE SODIUM 40 MILLIGRAM(S): 20 TABLET, DELAYED RELEASE ORAL at 07:58

## 2019-01-28 RX ADMIN — Medication 400 MILLIGRAM(S): at 06:37

## 2019-01-28 RX ADMIN — Medication 400 MILLIGRAM(S): at 17:44

## 2019-01-28 RX ADMIN — HEPARIN SODIUM 5000 UNIT(S): 5000 INJECTION INTRAVENOUS; SUBCUTANEOUS at 06:36

## 2019-01-28 RX ADMIN — Medication 15 MILLIGRAM(S): at 19:52

## 2019-01-28 RX ADMIN — ATORVASTATIN CALCIUM 10 MILLIGRAM(S): 80 TABLET, FILM COATED ORAL at 21:17

## 2019-01-28 RX ADMIN — HEPARIN SODIUM 5000 UNIT(S): 5000 INJECTION INTRAVENOUS; SUBCUTANEOUS at 17:44

## 2019-01-28 RX ADMIN — SODIUM CHLORIDE 100 MILLILITER(S): 9 INJECTION, SOLUTION INTRAVENOUS at 02:10

## 2019-01-28 RX ADMIN — Medication 15 MILLIGRAM(S): at 13:51

## 2019-01-28 RX ADMIN — Medication 1 TABLET(S): at 11:47

## 2019-01-28 RX ADMIN — ISOSORBIDE MONONITRATE 60 MILLIGRAM(S): 60 TABLET, EXTENDED RELEASE ORAL at 11:47

## 2019-01-28 RX ADMIN — Medication 15 MILLIGRAM(S): at 14:21

## 2019-01-28 NOTE — CONSULT NOTE ADULT - SUBJECTIVE AND OBJECTIVE BOX
Patient is a 78y old  Male who presents with a chief complaint of bowel obstruction (27 Jan 2019 14:45)      HPI:  1/28/19- Cardiology Consultation. Patient admitted with nausea and vomiting x 1 day and found to have SBO. treated with IV fluids and kept NPO. He feels improved today and denies abdominal pain or further  nausea or vomiting. Underwent lap cholcystectomy 10/2017. Prior to that underwent sigmoid colon resection for diverticulitis.  Had SBO 1/2018 and was treated conservatively at that time at a hospital in [place]. Ha known stable CAD without angina pectoris, heart failure or arrhythmia. Had stent to the LAD and RCA 2011. Most recent cath 7/2016 showed stable anatomy. 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 Is treated for CAD, HLD, HBP, GERD. He does have known mild AS.     79 y/o m presents with nausea and vomiting x 1 day. Reports diffuse abdominal pain which worsened overnight with associated with bilious non bloody emesis. patient reports last Bm and flatus yesterday. Patient denies fever, chills, diarrhea, SOb and CP.   of note patient had similar SBO episode last year in [place]. Resolved with conservative management.     PMH: CAD, GERD, diverticulitis, HLD, HTN, COPD   PSH: PCI, sigmoidectomy lap cholecystectomy.  Meds: labetalol, protonix, isosorbide mononitrate, lipitor, plavix 75mg   Allx; NKDA (27 Jan 2019 12:19)      PAST MEDICAL & SURGICAL HISTORY:  Essential hypertension  Coronary artery disease involving native heart without angina pectoris, unspecified vessel or lesion type  Gastritis  Diverticulitis  S/P colon resection  lap cholecystectomy 10/2017  SBO 1/2018.        MEDICATIONS  (STANDING):  atorvastatin Oral Tab/Cap - Peds 10 milliGRAM(s) Oral at bedtime  heparin  Injectable 5000 Unit(s) SubCutaneous every 12 hours  isosorbide   mononitrate ER Tablet (IMDUR) 60 milliGRAM(s) Oral daily  labetalol 400 milliGRAM(s) Oral two times a day  lactated ringers. 1000 milliLiter(s) (100 mL/Hr) IV Continuous <Continuous>  multivitamin 1 Tablet(s) Oral daily  pantoprazole    Tablet 40 milliGRAM(s) Oral before breakfast    MEDICATIONS  (PRN):  HYDROmorphone  Injectable 0.5 milliGRAM(s) IV Push every 4 hours PRN Severe Pain (7 - 10)  ketorolac   Injectable 15 milliGRAM(s) IV Push every 4 hours PRN Moderate Pain (4 - 6)  ondansetron Injectable 4 milliGRAM(s) IV Push every 6 hours PRN Nausea      FAMILY HISTORY:  No pertinent family history in first degree relatives      SOCIAL HISTORY:  Tobacco-   No        Alcohol-              Illicit drugs-              Occupation-              Marital  status-  REVIEW OF SYSTEM:  Pertinent items are noted in HPI.    Vital Signs Last 24 Hrs  T(C): 36.6 (28 Jan 2019 05:14), Max: 37.5 (27 Jan 2019 17:52)  T(F): 97.9 (28 Jan 2019 05:14), Max: 99.5 (27 Jan 2019 17:52)  HR: 83 (28 Jan 2019 06:00) (78 - 101)  BP: 148/65 (28 Jan 2019 06:00) (138/74 - 160/78)  BP(mean): 87 (28 Jan 2019 06:00) (83 - 95)  RR: 17 (28 Jan 2019 06:00) (13 - 20)  SpO2: 95% (28 Jan 2019 06:00) (94% - 99%)    I&O's Summary    27 Jan 2019 07:01  -  28 Jan 2019 07:00  --------------------------------------------------------  IN: 2200 mL / OUT: 375 mL / NET: 1825 mL      PHYSICAL EXAM  General Appearance: comfortable  HEENT: PERRL, conjunctiva clear, EOM's intact, non injected pharynx, no exudate, TM   normal  Neck: Supple, , no adenopathy, thyroid: not enlarged, no carotid bruit or JVD  Back: Symmetric, no  tenderness,no soft tissue tenderness  Lungs: Clear to auscultation bilaterally,no adventitious breath sounds, normal   expiratory phase  Heart: Regular rate and rhythm, S1, S2 normal, 2/6 NAHID LSB and base  Abdomen: distended, nontender, no masses or organomegaly, hypoactive BS.   Extremities: no cyanosis or edema, no joint swelling  Skin: Skin color, texture normal, no rashes   Neurologic: Alert and oriented X3 , cranial nerves intact, sensory and motor normal,        INTERPRETATION OF TELEMETRY: RSR    ECG: sinus rhythm 89 BPM, possible anterior and inferior MI age undetermined. No acute findings.         LABS:                          13.2   10.11 )-----------( 279      ( 28 Jan 2019 05:38 )             41.4     01-28    141  |  107  |  28<H>  ----------------------------<  98  3.9   |  27  |  1.14    Ca    8.8      28 Jan 2019 05:38  Phos  3.9     01-28  Mg     2.2     01-28    TPro  9.6<H>  /  Alb  4.3  /  TBili  0.5  /  DBili  x   /  AST  42<H>  /  ALT  48  /  AlkPhos  74  01-27            PT/INR - ( 27 Jan 2019 06:50 )   PT: 11.4 sec;   INR: 1.03 ratio         PTT - ( 27 Jan 2019 06:50 )  PTT:25.0 sec          RADIOLOGY & ADDITIONAL STUDIES:    IMPRESSION:  1. Small bowel obstruction.  Clinically improved.  2. CAD. S/P stents 2011. Clinically stable. No angina and excellent functional capacity.  3. AS, mild.  4.HBP. Labile but satisfactory.  PLAN:  Continue labetalol, atorvastatin, isosorbide. He is stable for surgery if need be without additional cardiac testing. Plavix may be restarted if surgery is not required. Will follow.

## 2019-01-28 NOTE — CONSULT NOTE ADULT - SUBJECTIVE AND OBJECTIVE BOX
HPI:  77 y/o m presents with nausea and vomiting x 1 day. Reports diffuse abdominal pain which worsened overnight with associated with bilious non bloody emesis. patient reports last Bm and flatus yesterday. Patient denies fever, chills, diarrhea, SOb and CP.   of note patient had similar SBO episode last year in [place]. Resolved with conservative management.     PMH: CAD, GERD, diverticulitis, HLD, HTN, COPD   PSH: PCI, sigmoidectomy   Meds: labetalol, protonix, isosorbide mononitrate, lipitor, plavix 75mg   Allx; NKDA (27 Jan 2019 12:19)      PAST MEDICAL & SURGICAL HISTORY:  Essential hypertension  Coronary artery disease involving native heart without angina pectoris, unspecified vessel or lesion type  Gastritis  Diverticulitis  S/P colon resection      MEDICATIONS  (STANDING):  atorvastatin Oral Tab/Cap - Peds 10 milliGRAM(s) Oral at bedtime  heparin  Injectable 5000 Unit(s) SubCutaneous every 12 hours  isosorbide   mononitrate ER Tablet (IMDUR) 60 milliGRAM(s) Oral daily  labetalol 400 milliGRAM(s) Oral two times a day  lactated ringers. 1000 milliLiter(s) (100 mL/Hr) IV Continuous <Continuous>  multivitamin 1 Tablet(s) Oral daily  pantoprazole    Tablet 40 milliGRAM(s) Oral before breakfast    MEDICATIONS  (PRN):  HYDROmorphone  Injectable 0.5 milliGRAM(s) IV Push every 4 hours PRN Severe Pain (7 - 10)  ketorolac   Injectable 15 milliGRAM(s) IV Push every 4 hours PRN Moderate Pain (4 - 6)  ondansetron Injectable 4 milliGRAM(s) IV Push every 6 hours PRN Nausea      Allergies    contrast (Unknown)  No Known Drug Allergies  Originally Entered as [Unknown] reaction to [hair dye] (Unknown)    Intolerances        SOCIAL HISTORY:    FAMILY HISTORY:  No pertinent family history in first degree relatives   Non-contributory    REVIEW OF SYSTEMS      General:	    Respiratory and Thorax:  	  Cardiovascular:	    Gastrointestinal:	    Musculoskeletal:	   Vital Signs Last 24 Hrs  T(C): 36.6 (28 Jan 2019 05:14), Max: 37.5 (27 Jan 2019 17:52)  T(F): 97.9 (28 Jan 2019 05:14), Max: 99.5 (27 Jan 2019 17:52)  HR: 72 (28 Jan 2019 10:00) (72 - 101)  BP: 128/66 (28 Jan 2019 10:00) (122/62 - 160/78)  BP(mean): 81 (28 Jan 2019 10:00) (77 - 95)  RR: 18 (28 Jan 2019 10:00) (13 - 20)  SpO2: 96% (28 Jan 2019 10:00) (94% - 97%)    HEENT :No Pallor.No icterus. EOMI,PERLAA  Chest : Clear to Auscultation  CVS : S1S2 Normal.No murmurs.  Abdomen: Soft.Non tender .Normal bowel sounds.No Organomegaly.  CNS: Alert.Oriented to Time,Place and Person.No focal deficit.  EXT: Normal Range of motion.No pitting edema.    LABS:                        13.2   10.11 )-----------( 279      ( 28 Jan 2019 05:38 )             41.4     01-28    141  |  107  |  28<H>  ----------------------------<  98  3.9   |  27  |  1.14    Ca    8.8      28 Jan 2019 05:38  Phos  3.9     01-28  Mg     2.2     01-28    TPro  9.6<H>  /  Alb  4.3  /  TBili  0.5  /  DBili  x   /  AST  42<H>  /  ALT  48  /  AlkPhos  74  01-27    PT/INR - ( 27 Jan 2019 06:50 )   PT: 11.4 sec;   INR: 1.03 ratio         PTT - ( 27 Jan 2019 06:50 )  PTT:25.0 sec  LIVER FUNCTIONS - ( 27 Jan 2019 06:50 )  Alb: 4.3 g/dL / Pro: 9.6 gm/dL / ALK PHOS: 74 U/L / ALT: 48 U/L / AST: 42 U/L / GGT: x             RADIOLOGY & ADDITIONAL STUDIES:

## 2019-01-28 NOTE — PROGRESS NOTE ADULT - ASSESSMENT
79 y/o m presents with nausea and vomiting x1day. Found to have high grade SBO. Currently reports some flatus       Plan:   monitor vitals   pain control   nausea control   Diet: NPO  IVF hydration   serial abdominal exams  monitor bowel function   encourage IS use  encourage OOB/A  DVT/GI ppx   cardiology and medical following appreciate recommendations       Plan discussed with attending, Dr. Alberts

## 2019-01-29 LAB
MAGNESIUM SERPL-MCNC: 2.2 MG/DL — SIGNIFICANT CHANGE UP (ref 1.6–2.6)
PHOSPHATE SERPL-MCNC: 2.9 MG/DL — SIGNIFICANT CHANGE UP (ref 2.5–4.5)

## 2019-01-29 PROCEDURE — 74019 RADEX ABDOMEN 2 VIEWS: CPT | Mod: 26

## 2019-01-29 RX ADMIN — SODIUM CHLORIDE 100 MILLILITER(S): 9 INJECTION, SOLUTION INTRAVENOUS at 15:37

## 2019-01-29 RX ADMIN — Medication 15 MILLIGRAM(S): at 21:47

## 2019-01-29 RX ADMIN — Medication 1 TABLET(S): at 11:26

## 2019-01-29 RX ADMIN — HEPARIN SODIUM 5000 UNIT(S): 5000 INJECTION INTRAVENOUS; SUBCUTANEOUS at 18:54

## 2019-01-29 RX ADMIN — ISOSORBIDE MONONITRATE 60 MILLIGRAM(S): 60 TABLET, EXTENDED RELEASE ORAL at 11:26

## 2019-01-29 RX ADMIN — PANTOPRAZOLE SODIUM 40 MILLIGRAM(S): 20 TABLET, DELAYED RELEASE ORAL at 05:26

## 2019-01-29 RX ADMIN — ATORVASTATIN CALCIUM 10 MILLIGRAM(S): 80 TABLET, FILM COATED ORAL at 21:47

## 2019-01-29 RX ADMIN — Medication 15 MILLIGRAM(S): at 11:25

## 2019-01-29 RX ADMIN — Medication 400 MILLIGRAM(S): at 05:25

## 2019-01-29 RX ADMIN — HEPARIN SODIUM 5000 UNIT(S): 5000 INJECTION INTRAVENOUS; SUBCUTANEOUS at 05:25

## 2019-01-29 RX ADMIN — Medication 400 MILLIGRAM(S): at 21:46

## 2019-01-29 NOTE — PROGRESS NOTE ADULT - ASSESSMENT
77 y/o m presents with nausea and vomiting x1day. Found to have high grade SBO. Currently reports some flatus       Plan:   monitor vitals   pain control   nausea control   Diet: clear liquid diet   IVF hydration   serial abdominal exams  monitor bowel function   now passing flatus and multiple BM   encourage IS use  encourage OOB/A  DVT/GI ppx   cardiology and medical following appreciate recommendations       Plan discussed with attending, Dr. Alberts 79 y/o m presents with nausea and vomiting x1day. Found to have high grade SBO. Currently reports some flatus       Plan:   monitor vitals   pain control   nausea control   Diet: clear liquid diet, adv as tolerated   IVF hydration   serial abdominal exams  monitor bowel function   now passing flatus and multiple BM   encourage IS use  encourage OOB/A  DVT/GI ppx   cardiology and medical following appreciate recommendations       Plan discussed with attending, Dr. Alberts

## 2019-01-30 ENCOUNTER — TRANSCRIPTION ENCOUNTER (OUTPATIENT)
Age: 79
End: 2019-01-30

## 2019-01-30 VITALS
SYSTOLIC BLOOD PRESSURE: 136 MMHG | HEART RATE: 75 BPM | OXYGEN SATURATION: 97 % | RESPIRATION RATE: 17 BRPM | TEMPERATURE: 99 F | DIASTOLIC BLOOD PRESSURE: 61 MMHG

## 2019-01-30 LAB
ANION GAP SERPL CALC-SCNC: 7 MMOL/L — SIGNIFICANT CHANGE UP (ref 5–17)
BUN SERPL-MCNC: 21 MG/DL — SIGNIFICANT CHANGE UP (ref 7–23)
CALCIUM SERPL-MCNC: 8.2 MG/DL — LOW (ref 8.5–10.1)
CHLORIDE SERPL-SCNC: 109 MMOL/L — HIGH (ref 96–108)
CO2 SERPL-SCNC: 24 MMOL/L — SIGNIFICANT CHANGE UP (ref 22–31)
CREAT SERPL-MCNC: 1.03 MG/DL — SIGNIFICANT CHANGE UP (ref 0.5–1.3)
GLUCOSE SERPL-MCNC: 108 MG/DL — HIGH (ref 70–99)
HCT VFR BLD CALC: 36.2 % — LOW (ref 39–50)
HGB BLD-MCNC: 11.7 G/DL — LOW (ref 13–17)
MCHC RBC-ENTMCNC: 31 PG — SIGNIFICANT CHANGE UP (ref 27–34)
MCHC RBC-ENTMCNC: 32.3 GM/DL — SIGNIFICANT CHANGE UP (ref 32–36)
MCV RBC AUTO: 96 FL — SIGNIFICANT CHANGE UP (ref 80–100)
NRBC # BLD: 0 /100 WBCS — SIGNIFICANT CHANGE UP (ref 0–0)
PLATELET # BLD AUTO: 187 K/UL — SIGNIFICANT CHANGE UP (ref 150–400)
POTASSIUM SERPL-MCNC: 3.8 MMOL/L — SIGNIFICANT CHANGE UP (ref 3.5–5.3)
POTASSIUM SERPL-SCNC: 3.8 MMOL/L — SIGNIFICANT CHANGE UP (ref 3.5–5.3)
RBC # BLD: 3.77 M/UL — LOW (ref 4.2–5.8)
RBC # FLD: 12.9 % — SIGNIFICANT CHANGE UP (ref 10.3–14.5)
SODIUM SERPL-SCNC: 140 MMOL/L — SIGNIFICANT CHANGE UP (ref 135–145)
WBC # BLD: 4.45 K/UL — SIGNIFICANT CHANGE UP (ref 3.8–10.5)
WBC # FLD AUTO: 4.45 K/UL — SIGNIFICANT CHANGE UP (ref 3.8–10.5)

## 2019-01-30 RX ORDER — CLOPIDOGREL BISULFATE 75 MG/1
75 TABLET, FILM COATED ORAL DAILY
Qty: 0 | Refills: 0 | Status: DISCONTINUED | OUTPATIENT
Start: 2019-01-30 | End: 2019-01-30

## 2019-01-30 RX ADMIN — SODIUM CHLORIDE 100 MILLILITER(S): 9 INJECTION, SOLUTION INTRAVENOUS at 02:27

## 2019-01-30 RX ADMIN — Medication 15 MILLIGRAM(S): at 06:04

## 2019-01-30 RX ADMIN — CLOPIDOGREL BISULFATE 75 MILLIGRAM(S): 75 TABLET, FILM COATED ORAL at 11:39

## 2019-01-30 RX ADMIN — Medication 400 MILLIGRAM(S): at 06:01

## 2019-01-30 RX ADMIN — HEPARIN SODIUM 5000 UNIT(S): 5000 INJECTION INTRAVENOUS; SUBCUTANEOUS at 06:02

## 2019-01-30 RX ADMIN — Medication 15 MILLIGRAM(S): at 11:38

## 2019-01-30 RX ADMIN — ISOSORBIDE MONONITRATE 60 MILLIGRAM(S): 60 TABLET, EXTENDED RELEASE ORAL at 11:38

## 2019-01-30 RX ADMIN — PANTOPRAZOLE SODIUM 40 MILLIGRAM(S): 20 TABLET, DELAYED RELEASE ORAL at 06:02

## 2019-01-30 NOTE — DISCHARGE NOTE ADULT - HOSPITAL COURSE
78 y.o M presented with nausea and vomiting found to have high grade SBO on CT scan. Patient was treated conservatively with bowel rest and IVF hydration. Once patient had return of bowel function and tolerating diet he was stable for DC home.

## 2019-01-30 NOTE — DISCHARGE NOTE ADULT - MEDICATION SUMMARY - MEDICATIONS TO TAKE
I will START or STAY ON the medications listed below when I get home from the hospital:    isosorbide mononitrate 60 mg oral tablet, extended release  -- 1 tab(s) by mouth once a day (in the morning)  -- Indication: For Small bowel obstruction    Lipitor 10 mg oral tablet  -- 1 tab(s) by mouth once a day  -- Indication: For Small bowel obstruction    Plavix 75 mg oral tablet  -- 1 tab(s) by mouth once a day  -- Indication: For Small bowel obstruction    labetalol  -- 400 milligram(s) by mouth 2 times a day  -- Indication: For Small bowel obstruction    Protonix 40 mg oral delayed release tablet  -- 1 tab(s) by mouth once a day  -- Indication: For Small bowel obstruction    Multiple Vitamins oral tablet  -- 1 tab(s) by mouth once a day  -- Indication: For Small bowel obstruction

## 2019-01-30 NOTE — DISCHARGE NOTE ADULT - PATIENT PORTAL LINK FT
You can access the [facility] Patient Portal, offered by [facility], by registering with the following website: [email or web address]

## 2019-01-30 NOTE — PROGRESS NOTE ADULT - REASON FOR ADMISSION
bowel obstruction

## 2019-01-30 NOTE — PROGRESS NOTE ADULT - ASSESSMENT
79 y/o m presents with nausea and vomiting x1day. Found to have high grade SBO. Currently reports some flatus and BM       Plan:   monitor vitals   pain control   nausea control   Diet: low fiber   DC IVF   serial abdominal exams  monitor bowel function   encourage IS use  encourage OOB/A  DVT/GI ppx     Case seen and discussed with Dr Alberts 79 y/o m presents with nausea and vomiting x1day. Found to have high grade SBO. Currently reports some flatus and BM       Plan:   monitor vitals   pain control   nausea control   Diet: low fiber   DC IVF   serial abdominal exams  monitor bowel function   encourage IS use  encourage OOB/A  DVT/GI ppx     Case seen and discussed with Dr Aristeo Chen d/c today

## 2019-01-30 NOTE — DISCHARGE NOTE ADULT - CARE PROVIDER_API CALL
Brendon Alberts)  Surgery; Surgical Critical Care  [address] [place] [address]  Phone: [phone]  Fax: [phone]

## 2019-01-30 NOTE — DISCHARGE NOTE ADULT - CARE PLAN
Principal Discharge DX:	Small bowel obstruction  Goal:	resolution of obstruction  Assessment and plan of treatment:	continue to ensure daily BMs and flatus

## 2019-01-30 NOTE — PROGRESS NOTE ADULT - SUBJECTIVE AND OBJECTIVE BOX
Interval History:    MEDICATIONS  (STANDING):  atorvastatin Oral Tab/Cap - Peds 10 milliGRAM(s) Oral at bedtime  heparin  Injectable 5000 Unit(s) SubCutaneous every 12 hours  isosorbide   mononitrate ER Tablet (IMDUR) 60 milliGRAM(s) Oral daily  labetalol 400 milliGRAM(s) Oral two times a day  lactated ringers. 1000 milliLiter(s) (100 mL/Hr) IV Continuous <Continuous>  multivitamin 1 Tablet(s) Oral daily  pantoprazole    Tablet 40 milliGRAM(s) Oral before breakfast    MEDICATIONS  (PRN):  HYDROmorphone  Injectable 0.5 milliGRAM(s) IV Push every 4 hours PRN Severe Pain (7 - 10)  ketorolac   Injectable 15 milliGRAM(s) IV Push every 4 hours PRN Moderate Pain (4 - 6)  ondansetron Injectable 4 milliGRAM(s) IV Push every 6 hours PRN Nausea      Daily     Daily   BMI: 26.6 (01-27 @ 06:27)  Change in Weight:  Vital Signs Last 24 Hrs  T(C): 36.2 (29 Jan 2019 05:22), Max: 37.2 (28 Jan 2019 21:16)  T(F): 97.2 (29 Jan 2019 05:22), Max: 98.9 (28 Jan 2019 21:16)  HR: 72 (28 Jan 2019 23:00) (72 - 80)  BP: 128/51 (29 Jan 2019 05:28) (102/54 - 128/66)  BP(mean): 71 (29 Jan 2019 05:28) (60 - 83)  RR: 18 (28 Jan 2019 23:00) (14 - 18)  SpO2: 96% (28 Jan 2019 10:00) (96% - 96%)  I&O's Detail    28 Jan 2019 07:01  -  29 Jan 2019 07:00  --------------------------------------------------------  IN:    lactated ringers.: 1100 mL  Total IN: 1100 mL    OUT:  Total OUT: 0 mL    Total NET: 1100 mL          PHYSICAL EXAM  General:  Well developed, well nourished, alert and active, no pallor, NAD.  HEENT:    Normal appearance of conjunctiva, ears, nose, lips, oropharynx, and oral mucosa, anicteric.  Neck:  No masses, no asymmetry.  Lymph Nodes:  No lymphadenopathy.   Cardiovascular:  RRR normal S1/S2, no murmur.  Respiratory:  CTA B/L, normal respiratory effort.   Abdominal:   soft, no masses or tenderness, normoactive BS, NT/ND, no HSM.  Extremities:   No clubbing or cyanosis, normal capillary refill, no edema.   Skin:   No rash, jaundice, lesions, eczema.   Musculoskeletal:  No joint swelling, erythema or tenderness.   Other:     Lab Results:                        13.2   10.11 )-----------( 279      ( 28 Jan 2019 05:38 )             41.4     01-28    141  |  107  |  28<H>  ----------------------------<  98  3.9   |  27  |  1.14    Ca    8.8      28 Jan 2019 05:38  Phos  3.9     01-28  Mg     2.2     01-28              Stool Results:          RADIOLOGY RESULTS:    SURGICAL PATHOLOGY:
CHIEF COMPLAINT/Diagnosis: SBO    SUBJECTIVE: passing flatus, had BM    REVIEW OF SYSTEMS:    CONSTITUTIONAL: No weakness, fevers or chills  EYES/ENT: No visual changes;  No vertigo or throat pain   NECK: No pain or stiffness  RESPIRATORY: No cough, wheezing, hemoptysis; No shortness of breath  CARDIOVASCULAR: No chest pain or palpitations  GASTROINTESTINAL: No abdominal or epigastric pain. No nausea, vomiting, or hematemesis; No diarrhea or constipation. No melena or hematochezia.  GENITOURINARY: No dysuria, frequency or hematuria  NEUROLOGICAL: No numbness or weakness  SKIN: No itching, burning, rashes, or lesions   All other review of systems is negative unless indicated above    Vital Signs Last 24 Hrs  T(C): 36.4 (29 Jan 2019 17:12), Max: 37.2 (28 Jan 2019 21:16)  T(F): 97.6 (29 Jan 2019 17:12), Max: 98.9 (28 Jan 2019 21:16)  HR: 76 (29 Jan 2019 17:12) (70 - 78)  BP: 143/69 (29 Jan 2019 17:12) (102/54 - 143/69)  BP(mean): 77 (29 Jan 2019 12:00) (60 - 77)  RR: 16 (29 Jan 2019 17:12) (14 - 18)  SpO2: 95% (29 Jan 2019 17:12) (95% - 98%)    I&O's Summary    28 Jan 2019 07:01  -  29 Jan 2019 07:00  --------------------------------------------------------  IN: 1100 mL / OUT: 0 mL / NET: 1100 mL        CAPILLARY BLOOD GLUCOSE          PHYSICAL EXAM:    Constitutional: NAD, awake and alert, well-developed  HEENT: PERR, EOMI, Normal Hearing, MMM  Neck: Soft and supple, No LAD, No JVD  Respiratory: Breath sounds are clear bilaterally, No wheezing, rales or rhonchi  Cardiovascular: S1 and S2, regular rate and rhythm, no Murmurs, gallops or rubs  Gastrointestinal: Bowel Sounds present, soft, nontender, nondistended, no guarding, no rebound  Extremities: No peripheral edema  Vascular: 2+ peripheral pulses  Neurological: A/O x 3, no focal deficits  Musculoskeletal: 5/5 strength b/l upper and lower extremities  Skin: No rashes    MEDICATIONS:  MEDICATIONS  (STANDING):  atorvastatin Oral Tab/Cap - Peds 10 milliGRAM(s) Oral at bedtime  heparin  Injectable 5000 Unit(s) SubCutaneous every 12 hours  isosorbide   mononitrate ER Tablet (IMDUR) 60 milliGRAM(s) Oral daily  labetalol 400 milliGRAM(s) Oral two times a day  lactated ringers. 1000 milliLiter(s) (100 mL/Hr) IV Continuous <Continuous>  multivitamin 1 Tablet(s) Oral daily  pantoprazole    Tablet 40 milliGRAM(s) Oral before breakfast      LABS: All Labs Reviewed:                        13.2   10.11 )-----------( 279      ( 28 Jan 2019 05:38 )             41.4     01-28    141  |  107  |  28<H>  ----------------------------<  98  3.9   |  27  |  1.14    Ca    8.8      28 Jan 2019 05:38  Phos  2.9     01-29  Mg     2.2     01-29          Assessment and Plan:    78 year male w/ PMH of CAD s/p 2 stents last one aprox 5 years ago, Diverticulitis , Essential hypertension , Gastritis, presents under surgical service  for high grade SBO      1-high grade SBO  -patient improving; pass flatus  -c/w pain control  -medical clearance for OR : Patient has multiple co morbididties, however none are in active state. Ekta has history of CAD w/ stents, and has been symptom free for many years. He has good METS for his age, and is very functional. Says him and his wife walk everyday. He deneis any chest pain, sob with exertion. He has no hx of AS, and there is no murmur apprecaited.   According to the CHERY index for intraoperative cardiac morbidity and mortality patient has a <1% risk of intraoperative event and puts patient at low risk for this moderate risk procedure. Patient is cleared for OR as low risk.   -patient is doing much better today. passing gas and had BM  -diet advanced today to clears.    2-CAD- c/w statin , plavix , may hold temp for OR    3-dvt proph- sc heparin- hold for or
CHIEF COMPLAINT/diagnosis: SBO    SUBJECTIVE: no complaints    REVIEW OF SYSTEMS:    CONSTITUTIONAL: No weakness, fevers or chills  EYES/ENT: No visual changes;  No vertigo or throat pain   NECK: No pain or stiffness  RESPIRATORY: No cough, wheezing, hemoptysis; No shortness of breath  CARDIOVASCULAR: No chest pain or palpitations  GASTROINTESTINAL: No abdominal or epigastric pain. No nausea, vomiting, or hematemesis; No diarrhea or constipation. No melena or hematochezia.  GENITOURINARY: No dysuria, frequency or hematuria  NEUROLOGICAL: No numbness or weakness  SKIN: No itching, burning, rashes, or lesions   All other review of systems is negative unless indicated above    Vital Signs Last 24 Hrs  T(C): 37.1 (30 Jan 2019 05:36), Max: 37.1 (29 Jan 2019 21:51)  T(F): 98.7 (30 Jan 2019 05:36), Max: 98.8 (29 Jan 2019 21:51)  HR: 95 (30 Jan 2019 05:36) (70 - 95)  BP: 152/67 (30 Jan 2019 05:36) (122/63 - 152/67)  BP(mean): 77 (29 Jan 2019 12:00) (77 - 77)  RR: 17 (29 Jan 2019 21:51) (16 - 17)  SpO2: 96% (30 Jan 2019 05:36) (95% - 98%)    I&O's Summary      CAPILLARY BLOOD GLUCOSE          PHYSICAL EXAM:    Constitutional: NAD, awake and alert, well-developed  HEENT: PERR, EOMI, Normal Hearing, MMM  Neck: Soft and supple, No LAD, No JVD  Respiratory: Breath sounds are clear bilaterally, No wheezing, rales or rhonchi  Cardiovascular: S1 and S2, regular rate and rhythm, no Murmurs, gallops or rubs  Gastrointestinal: Bowel Sounds present, soft, nontender, nondistended, no guarding, no rebound  Extremities: No peripheral edema  Vascular: 2+ peripheral pulses  Neurological: A/O x 3, no focal deficits  Musculoskeletal: 5/5 strength b/l upper and lower extremities  Skin: No rashes    MEDICATIONS:  MEDICATIONS  (STANDING):  atorvastatin Oral Tab/Cap - Peds 10 milliGRAM(s) Oral at bedtime  clopidogrel Tablet 75 milliGRAM(s) Oral daily  heparin  Injectable 5000 Unit(s) SubCutaneous every 12 hours  isosorbide   mononitrate ER Tablet (IMDUR) 60 milliGRAM(s) Oral daily  labetalol 400 milliGRAM(s) Oral two times a day  lactated ringers. 1000 milliLiter(s) (100 mL/Hr) IV Continuous <Continuous>  multivitamin 1 Tablet(s) Oral daily  pantoprazole    Tablet 40 milliGRAM(s) Oral before breakfast      LABS: All Labs Reviewed:                        11.7   4.45  )-----------( 187      ( 30 Jan 2019 07:03 )             36.2     01-30    140  |  109<H>  |  21  ----------------------------<  108<H>  3.8   |  24  |  1.03    Ca    8.2<L>      30 Jan 2019 07:03  Phos  2.9     01-29  Mg     2.2     01-29            Assessment and Plan:    78 year male w/ PMH of CAD s/p 2 stents last one aprox 5 years ago, Diverticulitis , Essential hypertension , Gastritis, presents under surgical service  for high grade SBO      1-high grade SBO  -patient improving; pass flatus  -c/w pain control  -medical clearance for OR : Patient has multiple co morbididties, however none are in active state. Ekta has history of CAD w/ stents, and has been symptom free for many years. He has good METS for his age, and is very functional. Says him and his wife walk everyday. He deneis any chest pain, sob with exertion. He has no hx of AS, and there is no murmur apprecaited.   According to the CHERY index for intraoperative cardiac morbidity and mortality patient has a <1% risk of intraoperative event and puts patient at low risk for this moderate risk procedure. Patient is cleared for OR as low risk.   -patient is doing much better today. passing gas and had BM  -diet advanced today to regular low fiber diet    2-CAD- c/w statin , plavix , may hold temp for OR    3-dvt proph- sc heparin-
CHIEF COMPLAINT/diagnosis: SBO    SUBJECTIVE: no complaints; states feels better; passing francois; currently npo; ambulating in hallway    REVIEW OF SYSTEMS:    CONSTITUTIONAL: No weakness, fevers or chills  EYES/ENT: No visual changes;  No vertigo or throat pain   NECK: No pain or stiffness  RESPIRATORY: No cough, wheezing, hemoptysis; No shortness of breath  CARDIOVASCULAR: No chest pain or palpitations  GASTROINTESTINAL: No abdominal or epigastric pain. No nausea, vomiting, or hematemesis; No diarrhea or constipation. No melena or hematochezia.  GENITOURINARY: No dysuria, frequency or hematuria  NEUROLOGICAL: No numbness or weakness  SKIN: No itching, burning, rashes, or lesions   All other review of systems is negative unless indicated above    Vital Signs Last 24 Hrs  T(C): 36.9 (28 Jan 2019 17:14), Max: 37.4 (27 Jan 2019 21:46)  T(F): 98.5 (28 Jan 2019 17:14), Max: 99.3 (27 Jan 2019 21:46)  HR: 80 (28 Jan 2019 13:00) (72 - 101)  BP: 106/64 (28 Jan 2019 13:00) (106/64 - 154/78)  BP(mean): 74 (28 Jan 2019 13:00) (74 - 95)  RR: 18 (28 Jan 2019 10:00) (13 - 20)  SpO2: 96% (28 Jan 2019 10:00) (94% - 97%)    I&O's Summary    27 Jan 2019 07:01  -  28 Jan 2019 07:00  --------------------------------------------------------  IN: 2200 mL / OUT: 375 mL / NET: 1825 mL        CAPILLARY BLOOD GLUCOSE          PHYSICAL EXAM:    Constitutional: NAD, awake and alert, well-developed  HEENT: PERR, EOMI, Normal Hearing, MMM  Neck: Soft and supple, No LAD, No JVD  Respiratory: Breath sounds are clear bilaterally, No wheezing, rales or rhonchi  Cardiovascular: S1 and S2, regular rate and rhythm, no Murmurs, gallops or rubs  Gastrointestinal: Bowel Sounds present, soft, nontender, nondistended, no guarding, no rebound  Extremities: No peripheral edema  Vascular: 2+ peripheral pulses  Neurological: A/O x 3, no focal deficits  Musculoskeletal: 5/5 strength b/l upper and lower extremities  Skin: No rashes    MEDICATIONS:  MEDICATIONS  (STANDING):  atorvastatin Oral Tab/Cap - Peds 10 milliGRAM(s) Oral at bedtime  heparin  Injectable 5000 Unit(s) SubCutaneous every 12 hours  isosorbide   mononitrate ER Tablet (IMDUR) 60 milliGRAM(s) Oral daily  labetalol 400 milliGRAM(s) Oral two times a day  lactated ringers. 1000 milliLiter(s) (100 mL/Hr) IV Continuous <Continuous>  multivitamin 1 Tablet(s) Oral daily  pantoprazole    Tablet 40 milliGRAM(s) Oral before breakfast      LABS: All Labs Reviewed:                        13.2   10.11 )-----------( 279      ( 28 Jan 2019 05:38 )             41.4     01-28    141  |  107  |  28<H>  ----------------------------<  98  3.9   |  27  |  1.14    Ca    8.8      28 Jan 2019 05:38  Phos  3.9     01-28  Mg     2.2     01-28    TPro  9.6<H>  /  Alb  4.3  /  TBili  0.5  /  DBili  x   /  AST  42<H>  /  ALT  48  /  AlkPhos  74  01-27    PT/INR - ( 27 Jan 2019 06:50 )   PT: 11.4 sec;   INR: 1.03 ratio         PTT - ( 27 Jan 2019 06:50 )  PTT:25.0 sec      Assessment and Plan:    78 year male w/ PMH of CAD s/p 2 stents last one aprox 5 years ago, Diverticulitis , Essential hypertension , Gastritis, presents under surgical service  for high grade SBO      1-high grade SBO  -patient improving; pass flatus  -c/w pain control  -medical clearance for OR : Patient has multiple co morbididties, however none are in active state. Patietn has history of CAD w/ stents, and has been symptom free for many years. He has good METS for his age, and is very functional. Says him and his wife walk everyday. He deneis any chest pain, sob with exertion. He has no hx of AS, and there is no murmur apprecaited.   According to the CHERY index for intraoperative cardiac morbidity and mortality patient has a <1% risk of intraoperative event and puts patietn at low risk for this moderate risk procedure. Patient is cleared for OR as low risk.     2-CAD- c/w statin , plavix , may hold temp for OR    3-dvt proph- sc heparin- hold for or
Patient is a 78y old  Male who presents with a chief complaint of bowel obstruction (28 Jan 2019 18:15)      HPI:  77 y/o m presents with nausea and vomiting x 1 day. Reports diffuse abdominal pain which worsened overnight with associated with bilious non bloody emesis. patient reports last Bm and flatus yesterday. Patient denies fever, chills, diarrhea, SOb and CP.   of note patient had similar SBO episode last year in [place]. Resolved with conservative management.     PMH: CAD, GERD, diverticulitis, HLD, HTN, COPD   PSH: PCI, sigmoidectomy   Meds: labetalol, protonix, isosorbide mononitrate, lipitor, plavix 75mg   Allx; NKDA (27 Jan 2019 12:19)  11/29- had 3 bowel movemnets 1/28    PAST MEDICAL & SURGICAL HISTORY:  Essential hypertension  Coronary artery disease involving native heart without angina pectoris, unspecified vessel or lesion type  Gastritis  Diverticulitis  S/P colon resection        MEDICATIONS  (STANDING):  atorvastatin Oral Tab/Cap - Peds 10 milliGRAM(s) Oral at bedtime  heparin  Injectable 5000 Unit(s) SubCutaneous every 12 hours  isosorbide   mononitrate ER Tablet (IMDUR) 60 milliGRAM(s) Oral daily  labetalol 400 milliGRAM(s) Oral two times a day  lactated ringers. 1000 milliLiter(s) (100 mL/Hr) IV Continuous <Continuous>  multivitamin 1 Tablet(s) Oral daily  pantoprazole    Tablet 40 milliGRAM(s) Oral before breakfast    MEDICATIONS  (PRN):  HYDROmorphone  Injectable 0.5 milliGRAM(s) IV Push every 4 hours PRN Severe Pain (7 - 10)  ketorolac   Injectable 15 milliGRAM(s) IV Push every 4 hours PRN Moderate Pain (4 - 6)  ondansetron Injectable 4 milliGRAM(s) IV Push every 6 hours PRN Nausea          Vital Signs Last 24 Hrs  T(C): 36.2 (29 Jan 2019 05:22), Max: 37.2 (28 Jan 2019 21:16)  T(F): 97.2 (29 Jan 2019 05:22), Max: 98.9 (28 Jan 2019 21:16)  HR: 72 (28 Jan 2019 23:00) (72 - 80)  BP: 128/51 (29 Jan 2019 05:28) (102/54 - 128/66)  BP(mean): 71 (29 Jan 2019 05:28) (60 - 83)  RR: 18 (28 Jan 2019 23:00) (14 - 18)  SpO2: 96% (28 Jan 2019 10:00) (94% - 96%)    I&O's Summary    28 Jan 2019 07:01  -  29 Jan 2019 07:00  --------------------------------------------------------  IN: 1100 mL / OUT: 0 mL / NET: 1100 mL        PHYSICAL EXAM  General Appearance: comfortable  HEENT: PERRL, conjunctiva clear, EOM's intact, non injected pharynx, no exudate, TM   normal  Neck: Supple, , no adenopathy, thyroid: not enlarged, no carotid bruit or JVD  Back: Symmetric, no  tenderness,no soft tissue tenderness  Lungs: Clear to auscultation bilaterally,no adventitious breath sounds, normal   expiratory phase  Heart: Regular rate and rhythm, S1, S2 normal, 2/6 NAHID LSB and base  Abdomen: distended, nontender, no masses or organomegaly, hypoactive BS.   Extremities: no cyanosis or edema, no joint swelling  Skin: Skin color, texture normal, no rashes   Neurologic: Alert and oriented X3 , cranial nerves intact, sensory and motor normal,    INTERPRETATION OF TELEMETRY:    ECG:        LABS:                          13.2   10.11 )-----------( 279      ( 28 Jan 2019 05:38 )             41.4     01-28    141  |  107  |  28<H>  ----------------------------<  98  3.9   |  27  |  1.14    Ca    8.8      28 Jan 2019 05:38  Phos  3.9     01-28  Mg     2.2     01-28              IMPRESSION:  1. Small bowel obstruction.  Clinically improved.  2. CAD. S/P stents 2011. Clinically stable. No angina and excellent functional capacity.  3. AS, mild.  4.HBP. Labile but satisfactory.  PLAN:  Continue labetalol, atorvastatin, isosorbide. He is stable for surgery if need be without additional cardiac testing.  If no surgery, we will restart clopidogrel
Patient is a 78y old  Male who presents with a chief complaint of bowel obstruction (29 Jan 2019 17:33)      HPI:  79 y/o m presents with nausea and vomiting x 1 day. Reports diffuse abdominal pain which worsened overnight with associated with bilious non bloody emesis. patient reports last Bm and flatus yesterday. Patient denies fever, chills, diarrhea, SOb and CP.   of note patient had similar SBO episode last year in [place]. Resolved with conservative management.     PMH: CAD, GERD, diverticulitis, HLD, HTN, COPD   PSH: PCI, sigmoidectomy   Meds: labetalol, protonix, isosorbide mononitrate, lipitor, plavix 75mg   Allx; NKDA (27 Jan 2019 12:19)  1/31- ate solid food for dinner.  No nausea or abd pain    PAST MEDICAL & SURGICAL HISTORY:  Essential hypertension  Coronary artery disease involving native heart without angina pectoris, unspecified vessel or lesion type  Gastritis  Diverticulitis  S/P colon resection        MEDICATIONS  (STANDING):  atorvastatin Oral Tab/Cap - Peds 10 milliGRAM(s) Oral at bedtime  heparin  Injectable 5000 Unit(s) SubCutaneous every 12 hours  isosorbide   mononitrate ER Tablet (IMDUR) 60 milliGRAM(s) Oral daily  labetalol 400 milliGRAM(s) Oral two times a day  lactated ringers. 1000 milliLiter(s) (100 mL/Hr) IV Continuous <Continuous>  multivitamin 1 Tablet(s) Oral daily  pantoprazole    Tablet 40 milliGRAM(s) Oral before breakfast    MEDICATIONS  (PRN):  HYDROmorphone  Injectable 0.5 milliGRAM(s) IV Push every 4 hours PRN Severe Pain (7 - 10)  ketorolac   Injectable 15 milliGRAM(s) IV Push every 4 hours PRN Moderate Pain (4 - 6)  ondansetron Injectable 4 milliGRAM(s) IV Push every 6 hours PRN Nausea          Vital Signs Last 24 Hrs  T(C): 37.1 (30 Jan 2019 05:36), Max: 37.1 (29 Jan 2019 21:51)  T(F): 98.7 (30 Jan 2019 05:36), Max: 98.8 (29 Jan 2019 21:51)  HR: 95 (30 Jan 2019 05:36) (70 - 95)  BP: 152/67 (30 Jan 2019 05:36) (122/63 - 152/67)  BP(mean): 77 (29 Jan 2019 12:00) (77 - 77)  RR: 17 (29 Jan 2019 21:51) (16 - 17)  SpO2: 96% (30 Jan 2019 05:36) (95% - 98%)    PHYSICAL EXAM  General Appearance: comfortable  HEENT: PERRL, conjunctiva clear, EOM's intact, non injected pharynx, no exudate, TM   normal  Neck: Supple, , no adenopathy, thyroid: not enlarged, no carotid bruit or JVD  Back: Symmetric, no  tenderness,no soft tissue tenderness  Lungs: Clear to auscultation bilaterally,no adventitious breath sounds, normal   expiratory phase  Heart: Regular rate and rhythm, S1, S2 normal, 2/6 NAHID LLSB and base  Abdomen: distended, nontender, no masses or organomegaly, hypoactive BS.   Extremities: no cyanosis or edema, no joint swelling  Skin: Skin color, texture normal, no rashes   Neurologic: Alert and oriented X3 , cranial nerves intact, sensory and motor normal,  I&O's Summary    INTERPRETATION OF TELEMETRY:    ECG:        LABS:        Phos  2.9     01-29  Mg     2.2     01-29                  IMPRESSION:  1. Small bowel obstruction.  Clinically improved.  2. CAD. S/P stents 2011. Clinically stable. No angina and excellent functional capacity.  3. AS, mild.  4.HBP. now in better control     Plan : Continue labetalol, atorvastatin, isosorbide. restart clopidogrel. Dr. Alberts to decide on discharge
Subjective:   Patient seen and examined this am, Patient reports flatus and multiple BM yesterday and this morning.  patient denies fever. chills, vomiting, diarrhea, SOb and CP.  Admits to mild cramping     Objective:     Vital Signs Last 24 Hrs  T(C): 36.2 (29 Jan 2019 05:22), Max: 37.2 (28 Jan 2019 21:16)  T(F): 97.2 (29 Jan 2019 05:22), Max: 98.9 (28 Jan 2019 21:16)  HR: 72 (28 Jan 2019 23:00) (72 - 80)  BP: 128/51 (29 Jan 2019 05:28) (102/54 - 128/66)  BP(mean): 71 (29 Jan 2019 05:28) (60 - 83)  RR: 18 (28 Jan 2019 23:00) (14 - 18)  SpO2: 96% (28 Jan 2019 10:00) (96% - 96%)      Gen: NAD, cooperative   HEENT: supple, no JVD, EOMI  Lungs: CTA b/l, aerating well   CV: S1 and S2 present  Abd: hyperactive bowel soyunds, NT,ND, no RGR   Ext: FROM, pulse present, no cyanosis No edema   Skin: warm, dry     Labs:                         13.2   10.11 )-----------( 279      ( 28 Jan 2019 05:38 )             41.4     01-28    141  |  107  |  28<H>  ----------------------------<  98  3.9   |  27  |  1.14    Ca    8.8      28 Jan 2019 05:38  Phos  2.9     01-29  Mg     2.2     01-29
Subjective:   Patient seen and examined this am, Patient reports flatus but no BM. patient reports nausea but no emesis. patient denies fever. chills, vomiting, diarrhea, SOb and CP.     Objective:   ICU Vital Signs Last 24 Hrs  T(C): 36.6 (28 Jan 2019 05:14), Max: 37.5 (27 Jan 2019 17:52)  T(F): 97.9 (28 Jan 2019 05:14), Max: 99.5 (27 Jan 2019 17:52)  HR: 72 (28 Jan 2019 10:00) (72 - 101)  BP: 128/66 (28 Jan 2019 10:00) (122/62 - 160/78)  BP(mean): 81 (28 Jan 2019 10:00) (77 - 95)  ABP: --  ABP(mean): --  RR: 18 (28 Jan 2019 10:00) (13 - 20)  SpO2: 96% (28 Jan 2019 10:00) (94% - 97%)    Gen: NAD, cooperative   HEENT: supple, no JVD, EOMI  Lungs: CTA b/l, aerating well   CV: S1 and S2 present  Abd: hyperactive bowel soyunds, NT,ND, no RGR   Ext: FROM, pulse present, no cyanosis No edema   Skin: warm, dry     Labs:                           13.2   10.11 )-----------( 279      ( 28 Jan 2019 05:38 )             41.4   01-28    141  |  107  |  28<H>  ----------------------------<  98  3.9   |  27  |  1.14    Ca    8.8      28 Jan 2019 05:38  Phos  3.9     01-28  Mg     2.2     01-28    TPro  9.6<H>  /  Alb  4.3  /  TBili  0.5  /  DBili  x   /  AST  42<H>  /  ALT  48  /  AlkPhos  74  01-27  PT/INR - ( 27 Jan 2019 06:50 )   PT: 11.4 sec;   INR: 1.03 ratio         PTT - ( 27 Jan 2019 06:50 )  PTT:25.0 sec
Subjective:   Patient seen and examined this am. Patient reports flatus and multiple BM yesterday and tolerating diet. Patient denies fever. chills, vomiting, diarrhea, SOb and CP.      Objective:   Vital Signs Last 24 Hrs  T(C): 37.1 (30 Jan 2019 05:36), Max: 37.1 (29 Jan 2019 21:51)  T(F): 98.7 (30 Jan 2019 05:36), Max: 98.8 (29 Jan 2019 21:51)  HR: 95 (30 Jan 2019 05:36) (70 - 95)  BP: 152/67 (30 Jan 2019 05:36) (122/63 - 152/67)  BP(mean): 77 (29 Jan 2019 12:00) (77 - 77)  RR: 17 (29 Jan 2019 21:51) (16 - 17)  SpO2: 96% (30 Jan 2019 05:36) (95% - 98%)      Gen: NAD, cooperative   HEENT: supple, no JVD, EOMI  Lungs: CTA b/l, aerating well   CV: S1 and S2 present  Abd: BS present, NT,ND, no RGR   Ext: FROM, pulse present, no cyanosis No edema   Skin: warm, dry     Labs:                                     11.7   4.45  )-----------( 187      ( 30 Jan 2019 07:03 )             36.2   01-30    140  |  109<H>  |  21  ----------------------------<  108<H>  3.8   |  24  |  1.03    Ca    8.2<L>      30 Jan 2019 07:03  Phos  2.9     01-29  Mg     2.2     01-29

## 2019-02-01 DIAGNOSIS — Z95.5 PRESENCE OF CORONARY ANGIOPLASTY IMPLANT AND GRAFT: ICD-10-CM

## 2019-02-01 DIAGNOSIS — I10 ESSENTIAL (PRIMARY) HYPERTENSION: ICD-10-CM

## 2019-02-01 DIAGNOSIS — Z91.041 RADIOGRAPHIC DYE ALLERGY STATUS: ICD-10-CM

## 2019-02-01 DIAGNOSIS — I35.0 NONRHEUMATIC AORTIC (VALVE) STENOSIS: ICD-10-CM

## 2019-02-01 DIAGNOSIS — K56.609 UNSPECIFIED INTESTINAL OBSTRUCTION, UNSPECIFIED AS TO PARTIAL VERSUS COMPLETE OBSTRUCTION: ICD-10-CM

## 2019-02-01 DIAGNOSIS — Z79.899 OTHER LONG TERM (CURRENT) DRUG THERAPY: ICD-10-CM

## 2019-02-01 DIAGNOSIS — I25.10 ATHEROSCLEROTIC HEART DISEASE OF NATIVE CORONARY ARTERY WITHOUT ANGINA PECTORIS: ICD-10-CM

## 2019-05-21 NOTE — H&P ADULT - ASSESSMENT
This is a 79 y.o male with PMHx of chronic anemia, hypercholesterolemia, OA, CAD, s/p PCI (LAD/ RCA) presented to cardiologist office with c/o worsening chest discomfort with back pain. Pt reported to have chest tightness with walking, which worsening over the last few weeks. Pt referred for cardiac cath with possible PCI.   # CAD  - plan for cardiac cath with possible PCI   - risks and benefits of procedure reviewed, all questions answered   - informed consent obtained, pt verbalized understanding. This is a 79 y.o male with PMHx of chronic anemia, hypercholesterolemia, OA, CAD, s/p PCI (LAD/ RCA) presented to cardiologist office with c/o worsening chest discomfort with back pain. Pt reported to have chest tightness with walking, which worsening over the last few weeks. Pt referred for cardiac cath with possible PCI.   # CAD  - plan for cardiac cath with possible PCI   - risks and benefits of procedure reviewed, all questions answered   - informed consent obtained, pt verbalized understanding.   - bleeding risk: 1.4%

## 2019-05-21 NOTE — H&P ADULT - NSICDXFAMILYHX_GEN_ALL_CORE_FT
FAMILY HISTORY:  FH: coronary artery disease, father FAMILY HISTORY:  Family history of diabetes mellitus (DM), father  FH: coronary artery disease, father

## 2019-05-21 NOTE — H&P ADULT - HISTORY OF PRESENT ILLNESS
This is a 79 y.o male with PMHx of chronic anemia, hypercholesterolemia, OA, CAD, s/p PCI (LAD/ RCA) presented to cardiologist office with c/o worsening chest discomfort with back pain. Pt reported to have chest tightness with walking, which worsening over the last few weeks. Pt referred for cardiac cath with possible PCI. This is a 79 y.o male with PMHx of chronic anemia, hypercholesterolemia, OA, CAD, s/p PCI (LAD/ RCA) presented to cardiologist office with c/o worsening chest discomfort with back pain secondary to OA on spinal. Pt reported to have chest tightness with walking, which worsening over the last few weeks. Pt referred for cardiac cath with possible PCI.

## 2019-05-21 NOTE — H&P ADULT - NSHPLABSRESULTS_GEN_ALL_CORE
EKG (1/27/19): SR at 89 bpm   TTE (1/30/18): EF 65%, mild septal hypertrophy EKG (5/22/19): SR with 1st AVB (JASON:210ms)   TTE (1/30/18): EF 65%, mild septal hypertrophy

## 2019-05-21 NOTE — H&P ADULT - NSICDXPASTMEDICALHX_GEN_ALL_CORE_FT
PAST MEDICAL HISTORY:  Coronary artery disease involving native heart without angina pectoris, unspecified vessel or lesion type     Diverticulitis     Essential hypertension     Gastritis

## 2019-05-21 NOTE — H&P ADULT - NSHPREVIEWOFSYSTEMS_GEN_ALL_CORE
General: Pt denies recent weight loss/fever/chills    Neurological: denies numbness or  sensation loss    Cardiovascular: denies chest pain/palpitations/leg edema    Respiratory and Thorax: denies SOB/cough/wheezing    Gastrointestinal: denies abdominal pain/diarrhea/constipation/bloody stool    Genitourinary: denies urinary frequency/urgency/ dysuria    Musculoskeletal: denies joint pain or swelling, denies restricted motion    Hematologic: denies abnormal bleeding General: Pt denies recent weight loss/fever/chills    Neurological: denies numbness or  sensation loss    Cardiovascular: + chest pain with exertion, denies  palpitations/leg edema    Respiratory and Thorax: denies SOB/cough/wheezing    Gastrointestinal: + GI bleeding few years ago, denies abdominal pain/diarrhea/constipation/bloody stool    Genitourinary: denies urinary frequency/urgency/ dysuria    Musculoskeletal: +back pain/ shoulder pain     Hematologic: denies abnormal bleeding

## 2019-05-21 NOTE — H&P ADULT - NSHPPHYSICALEXAM_GEN_ALL_CORE
Vital Signs : BP        HR       RR        BT             Constitutional: well developed, well nourished, no deformities and no acute distress    Neurological: Alert & Oriented x 3, ARREOLA, no focal deficits    HEENT: NC/AT, PERRLA, EOMI,  Neck supple.    Respiratory: CTA B/L, No wheezing/crackles/rhonchi    Cardiovascular: (+) S1 & S2, RRR, No m/r/g    Gastrointestinal: soft, NT, nondistended, (+) BS    Genitourinary: non distended bladder, voiding freely    Extremities: No pedal edema, No clubbing, No cyanosis    Skin:  normal skin color and pigmentation, no skin lesions Constitutional: well developed, well nourished, no deformities and no acute distress    Neurological: Alert & Oriented x 3, ARREOLA, no focal deficits    HEENT: NC/AT, PERRLA, EOMI,  Neck supple.    Respiratory: CTA B/L, No wheezing/crackles/rhonchi    Cardiovascular: (+) S1 & S2, RRR, No m/r/g    Gastrointestinal: soft, NT, nondistended, (+) BS    Genitourinary: non distended bladder, voiding freely    Extremities: No pedal edema, No clubbing, No cyanosis    Skin:  normal skin color and pigmentation, no skin lesions

## 2019-05-22 ENCOUNTER — OUTPATIENT (OUTPATIENT)
Dept: OUTPATIENT SERVICES | Facility: HOSPITAL | Age: 79
LOS: 1 days | Discharge: HOME | End: 2019-05-22
Payer: MEDICARE

## 2019-05-22 VITALS
RESPIRATION RATE: 16 BRPM | HEIGHT: 67 IN | OXYGEN SATURATION: 100 % | HEART RATE: 66 BPM | DIASTOLIC BLOOD PRESSURE: 70 MMHG | WEIGHT: 169.98 LBS | SYSTOLIC BLOOD PRESSURE: 124 MMHG

## 2019-05-22 DIAGNOSIS — Z90.49 ACQUIRED ABSENCE OF OTHER SPECIFIED PARTS OF DIGESTIVE TRACT: Chronic | ICD-10-CM

## 2019-05-22 DIAGNOSIS — Z95.5 PRESENCE OF CORONARY ANGIOPLASTY IMPLANT AND GRAFT: Chronic | ICD-10-CM

## 2019-05-22 LAB
ANION GAP SERPL CALC-SCNC: 5 MMOL/L — SIGNIFICANT CHANGE UP (ref 5–17)
BLD GP AB SCN SERPL QL: SIGNIFICANT CHANGE UP
BUN SERPL-MCNC: 19 MG/DL — SIGNIFICANT CHANGE UP (ref 7–23)
CALCIUM SERPL-MCNC: 8.7 MG/DL — SIGNIFICANT CHANGE UP (ref 8.5–10.1)
CHLORIDE SERPL-SCNC: 108 MMOL/L — SIGNIFICANT CHANGE UP (ref 96–108)
CHOLEST SERPL-MCNC: 124 MG/DL — SIGNIFICANT CHANGE UP (ref 10–199)
CO2 SERPL-SCNC: 26 MMOL/L — SIGNIFICANT CHANGE UP (ref 22–31)
CREAT SERPL-MCNC: 1.04 MG/DL — SIGNIFICANT CHANGE UP (ref 0.5–1.3)
GLUCOSE SERPL-MCNC: 105 MG/DL — HIGH (ref 70–99)
HCT VFR BLD CALC: 35.2 % — LOW (ref 39–50)
HDLC SERPL-MCNC: 44 MG/DL — SIGNIFICANT CHANGE UP
HGB BLD-MCNC: 11.5 G/DL — LOW (ref 13–17)
LIPID PNL WITH DIRECT LDL SERPL: 56 MG/DL — SIGNIFICANT CHANGE UP
MCHC RBC-ENTMCNC: 31.3 PG — SIGNIFICANT CHANGE UP (ref 27–34)
MCHC RBC-ENTMCNC: 32.7 GM/DL — SIGNIFICANT CHANGE UP (ref 32–36)
MCV RBC AUTO: 95.9 FL — SIGNIFICANT CHANGE UP (ref 80–100)
PLATELET # BLD AUTO: 238 K/UL — SIGNIFICANT CHANGE UP (ref 150–400)
POTASSIUM SERPL-MCNC: 4 MMOL/L — SIGNIFICANT CHANGE UP (ref 3.5–5.3)
POTASSIUM SERPL-SCNC: 4 MMOL/L — SIGNIFICANT CHANGE UP (ref 3.5–5.3)
RBC # BLD: 3.67 M/UL — LOW (ref 4.2–5.8)
RBC # FLD: 14.4 % — SIGNIFICANT CHANGE UP (ref 10.3–14.5)
SODIUM SERPL-SCNC: 139 MMOL/L — SIGNIFICANT CHANGE UP (ref 135–145)
TOTAL CHOLESTEROL/HDL RATIO MEASUREMENT: 2.8 RATIO — LOW (ref 3.4–9.6)
TRIGL SERPL-MCNC: 121 MG/DL — SIGNIFICANT CHANGE UP (ref 10–149)
TYPE + AB SCN PNL BLD: SIGNIFICANT CHANGE UP
WBC # BLD: 6.39 K/UL — SIGNIFICANT CHANGE UP (ref 3.8–10.5)
WBC # FLD AUTO: 6.39 K/UL — SIGNIFICANT CHANGE UP (ref 3.8–10.5)

## 2019-05-22 PROCEDURE — 93010 ELECTROCARDIOGRAM REPORT: CPT

## 2019-05-22 RX ORDER — ALPRAZOLAM 0.25 MG
0.5 TABLET ORAL ONCE
Refills: 0 | Status: DISCONTINUED | OUTPATIENT
Start: 2019-05-22 | End: 2019-05-22

## 2019-05-22 RX ORDER — ISOSORBIDE MONONITRATE 60 MG/1
60 TABLET, EXTENDED RELEASE ORAL DAILY
Refills: 0 | Status: DISCONTINUED | OUTPATIENT
Start: 2019-05-23 | End: 2019-05-23

## 2019-05-22 RX ORDER — CLOPIDOGREL BISULFATE 75 MG/1
75 TABLET, FILM COATED ORAL DAILY
Refills: 0 | Status: DISCONTINUED | OUTPATIENT
Start: 2019-05-23 | End: 2019-05-23

## 2019-05-22 RX ORDER — SODIUM CHLORIDE 9 MG/ML
1000 INJECTION INTRAMUSCULAR; INTRAVENOUS; SUBCUTANEOUS
Refills: 0 | Status: DISCONTINUED | OUTPATIENT
Start: 2019-05-22 | End: 2019-05-23

## 2019-05-22 RX ORDER — ASPIRIN/CALCIUM CARB/MAGNESIUM 324 MG
81 TABLET ORAL DAILY
Refills: 0 | Status: DISCONTINUED | OUTPATIENT
Start: 2019-05-23 | End: 2019-05-23

## 2019-05-22 RX ORDER — ATORVASTATIN CALCIUM 80 MG/1
10 TABLET, FILM COATED ORAL AT BEDTIME
Refills: 0 | Status: DISCONTINUED | OUTPATIENT
Start: 2019-05-22 | End: 2019-05-23

## 2019-05-22 RX ORDER — ACETAMINOPHEN 500 MG
650 TABLET ORAL EVERY 6 HOURS
Refills: 0 | Status: DISCONTINUED | OUTPATIENT
Start: 2019-05-22 | End: 2019-05-23

## 2019-05-22 RX ORDER — LABETALOL HCL 100 MG
400 TABLET ORAL
Refills: 0 | Status: DISCONTINUED | OUTPATIENT
Start: 2019-05-22 | End: 2019-05-23

## 2019-05-22 RX ORDER — PANTOPRAZOLE SODIUM 20 MG/1
40 TABLET, DELAYED RELEASE ORAL
Refills: 0 | Status: DISCONTINUED | OUTPATIENT
Start: 2019-05-22 | End: 2019-05-23

## 2019-05-22 RX ADMIN — SODIUM CHLORIDE 100 MILLILITER(S): 9 INJECTION INTRAMUSCULAR; INTRAVENOUS; SUBCUTANEOUS at 14:00

## 2019-05-22 RX ADMIN — Medication 400 MILLIGRAM(S): at 17:19

## 2019-05-22 RX ADMIN — Medication 0.5 MILLIGRAM(S): at 21:25

## 2019-05-22 RX ADMIN — ATORVASTATIN CALCIUM 10 MILLIGRAM(S): 80 TABLET, FILM COATED ORAL at 21:25

## 2019-05-22 RX ADMIN — Medication 0.5 MILLIGRAM(S): at 11:38

## 2019-05-22 NOTE — CHART NOTE - NSCHARTNOTEFT_GEN_A_CORE
Nurse Practitioner Progress note:     HPI:  This is a 79 y.o male with PMHx of chronic anemia, hypercholesterolemia, OA, CAD, s/p PCI (LAD/ RCA) presented to cardiologist office with c/o worsening chest discomfort with back pain secondary to OA on spinal. Pt reported to have chest tightness with walking, which worsening over the last few weeks. Pt referred for cardiac cath with possible PCI. (21 May 2019 17:27)      T(C): 36.7 (05-22-19 @ 15:11), Max: 36.7 (05-22-19 @ 15:11)  HR: 65 (05-22-19 @ 15:00) (60 - 66)  BP: 119/51 (05-22-19 @ 15:00) (109/63 - 143/69)  RR: 19 (05-22-19 @ 15:00) (16 - 19)  SpO2: 99% (05-22-19 @ 15:00) (97% - 100%)  Wt(kg): --    PHYSICAL EXAM:  Neurologic: Non-focal, AxOx3.  No neuro deficits  Vascular: Peripheral pulses palpable 2+ bilaterally  Procedure Site: Rt. groin mynxx closure device failed manual pressure applied x20 minutes site benign soft no bleeding no hematoma +1PP        LABS:	 	                        11.5   6.39  )-----------( 238      ( 22 May 2019 07:45 )             35.2   05-22    139  |  108  |  19  ----------------------------<  105<H>  4.0   |  26  |  1.04    Ca    8.7      22 May 2019 07:45          PROCEDURE RESULTS:  < from: Cardiac Cath Lab - Adult (05.22.19 @ 09:59) >     Diagnostic Conclusions   Patent stent in LAD. Non obstructive disease in RCA. Total occlusion   stent   in diagonal.   Severe stenosis of OM1   Recommendations   Percutaneous coronary intervention (PCI) is recommended.        ASSESSMENT/PLAN:   This is a 79 y.o male with PMHx of chronic anemia, hypercholesterolemia, OA, CAD, s/p PCI (LAD/ RCA) presented to cardiologist office with c/o worsening chest discomfort with back pain secondary to OA on spinal. Pt reported to have chest tightness with walking, which worsening over the last few weeks. S/P [facility] S/P ROSLYN    -Admit to CICU  -VS, labs, diet, activity as per PCI orders  -IV hydration  -Encourage PO fluids  -ASA 81mg  -Plavix 75mg  -Labetalol 400mg  -Lipitor 10mg   -Plan of care D/W pt. and MD  -Discussed therapeutic lifestyle changes to reduce risk factors such as following a cardiac diet, weight loss, maintaining a healthy weight, exercise, smoking cessation, medication compliance, and regular follow-up  with MD to know our numbers (BP, cholesterol, weight, and glucose  -If pt. remains stable overnight possible D/C in AM  - Follow-up AM labs/EKG/site check  -Follow-up with attending

## 2019-05-22 NOTE — ASU PREOP CHECKLIST - ALLERGY BAND ON
pt sts medication given during a nuclear stress test lowered Blood pressure, unknown drug. MD prince aware. no further orders.

## 2019-05-22 NOTE — PACU DISCHARGE NOTE - COMMENTS
patient discharged to CICU. Right Groin site benign, soft, ecchymotic, dressing clean/dry/intact. IVL site benign. Patient without any complaints. Vital signs stable. post procedure instructions given and understood by patient and spouse via teach back. Will continue to monitor patient status. Report given to ROHIT Clayton.

## 2019-05-23 ENCOUNTER — TRANSCRIPTION ENCOUNTER (OUTPATIENT)
Age: 79
End: 2019-05-23

## 2019-05-23 VITALS — WEIGHT: 166.01 LBS

## 2019-05-23 LAB
ALBUMIN SERPL ELPH-MCNC: 3.6 G/DL — SIGNIFICANT CHANGE UP (ref 3.3–5)
ALP SERPL-CCNC: 62 U/L — SIGNIFICANT CHANGE UP (ref 40–120)
ALT FLD-CCNC: 38 U/L — SIGNIFICANT CHANGE UP (ref 12–78)
ANION GAP SERPL CALC-SCNC: 7 MMOL/L — SIGNIFICANT CHANGE UP (ref 5–17)
AST SERPL-CCNC: 23 U/L — SIGNIFICANT CHANGE UP (ref 15–37)
BASOPHILS # BLD AUTO: 0.02 K/UL — SIGNIFICANT CHANGE UP (ref 0–0.2)
BASOPHILS NFR BLD AUTO: 0.3 % — SIGNIFICANT CHANGE UP (ref 0–2)
BILIRUB SERPL-MCNC: 0.4 MG/DL — SIGNIFICANT CHANGE UP (ref 0.2–1.2)
BUN SERPL-MCNC: 15 MG/DL — SIGNIFICANT CHANGE UP (ref 7–23)
CALCIUM SERPL-MCNC: 8.7 MG/DL — SIGNIFICANT CHANGE UP (ref 8.5–10.1)
CHLORIDE SERPL-SCNC: 113 MMOL/L — HIGH (ref 96–108)
CO2 SERPL-SCNC: 23 MMOL/L — SIGNIFICANT CHANGE UP (ref 22–31)
CREAT SERPL-MCNC: 0.85 MG/DL — SIGNIFICANT CHANGE UP (ref 0.5–1.3)
EOSINOPHIL # BLD AUTO: 0.05 K/UL — SIGNIFICANT CHANGE UP (ref 0–0.5)
EOSINOPHIL NFR BLD AUTO: 0.8 % — SIGNIFICANT CHANGE UP (ref 0–6)
GLUCOSE SERPL-MCNC: 89 MG/DL — SIGNIFICANT CHANGE UP (ref 70–99)
HCT VFR BLD CALC: 31.9 % — LOW (ref 39–50)
HGB BLD-MCNC: 10.2 G/DL — LOW (ref 13–17)
IMM GRANULOCYTES NFR BLD AUTO: 0.3 % — SIGNIFICANT CHANGE UP (ref 0–1.5)
LYMPHOCYTES # BLD AUTO: 1.51 K/UL — SIGNIFICANT CHANGE UP (ref 1–3.3)
LYMPHOCYTES # BLD AUTO: 23.3 % — SIGNIFICANT CHANGE UP (ref 13–44)
MCHC RBC-ENTMCNC: 31.2 PG — SIGNIFICANT CHANGE UP (ref 27–34)
MCHC RBC-ENTMCNC: 32 GM/DL — SIGNIFICANT CHANGE UP (ref 32–36)
MCV RBC AUTO: 97.6 FL — SIGNIFICANT CHANGE UP (ref 80–100)
MONOCYTES # BLD AUTO: 0.64 K/UL — SIGNIFICANT CHANGE UP (ref 0–0.9)
MONOCYTES NFR BLD AUTO: 9.9 % — SIGNIFICANT CHANGE UP (ref 2–14)
NEUTROPHILS # BLD AUTO: 4.23 K/UL — SIGNIFICANT CHANGE UP (ref 1.8–7.4)
NEUTROPHILS NFR BLD AUTO: 65.4 % — SIGNIFICANT CHANGE UP (ref 43–77)
PLATELET # BLD AUTO: 199 K/UL — SIGNIFICANT CHANGE UP (ref 150–400)
POTASSIUM SERPL-MCNC: 3.6 MMOL/L — SIGNIFICANT CHANGE UP (ref 3.5–5.3)
POTASSIUM SERPL-SCNC: 3.6 MMOL/L — SIGNIFICANT CHANGE UP (ref 3.5–5.3)
PROT SERPL-MCNC: 7.6 GM/DL — SIGNIFICANT CHANGE UP (ref 6–8.3)
RBC # BLD: 3.27 M/UL — LOW (ref 4.2–5.8)
RBC # FLD: 14.6 % — HIGH (ref 10.3–14.5)
SODIUM SERPL-SCNC: 143 MMOL/L — SIGNIFICANT CHANGE UP (ref 135–145)
WBC # BLD: 6.47 K/UL — SIGNIFICANT CHANGE UP (ref 3.8–10.5)
WBC # FLD AUTO: 6.47 K/UL — SIGNIFICANT CHANGE UP (ref 3.8–10.5)

## 2019-05-23 PROCEDURE — 93010 ELECTROCARDIOGRAM REPORT: CPT

## 2019-05-23 RX ORDER — ASPIRIN/CALCIUM CARB/MAGNESIUM 324 MG
1 TABLET ORAL
Qty: 0 | Refills: 0 | DISCHARGE
Start: 2019-05-23

## 2019-05-23 RX ADMIN — PANTOPRAZOLE SODIUM 40 MILLIGRAM(S): 20 TABLET, DELAYED RELEASE ORAL at 06:16

## 2019-05-23 RX ADMIN — Medication 81 MILLIGRAM(S): at 08:12

## 2019-05-23 RX ADMIN — Medication 400 MILLIGRAM(S): at 06:16

## 2019-05-23 RX ADMIN — CLOPIDOGREL BISULFATE 75 MILLIGRAM(S): 75 TABLET, FILM COATED ORAL at 08:12

## 2019-05-23 NOTE — DISCHARGE NOTE PROVIDER - HOSPITAL COURSE
HPI:    This is a 79 y.o male with PM Hx of chronic anemia, hypercholesterolemia, OA, CAD, s/p PCI (LAD/ RCA) presented to cardiologist office with c/o worsening chest discomfort with back pain secondary to OA on spinal. Pt reported to have chest tightness with walking, which worsening over the last few weeks. Pt referred for cardiac cath with possible PCI. (21 May 2019 17:27)        s/p PCI with ROSLYN to the OM1

## 2019-05-23 NOTE — DISCHARGE NOTE NURSING/CASE MANAGEMENT/SOCIAL WORK - NSDCDPATPORTLINK_GEN_ALL_CORE
You can access the [email or web address], offered by [facility], by registering with the following website: [email or web address]

## 2019-05-23 NOTE — DISCHARGE NOTE PROVIDER - CARE PROVIDER_API CALL
Faraz Almendarez)  Cardiovascular Disease; Internal Medicine  [address] [place] [address]  Phone: [phone]  Fax: [phone]  Follow Up Time:

## 2019-05-23 NOTE — PROGRESS NOTE ADULT - SUBJECTIVE AND OBJECTIVE BOX
Cardiology NP     Patient is a 79y old  Male who presents with a chief complaint of chest pain (23 May 2019 06:39)      HPI:  This is a 79 y.o male with PMHx of chronic anemia, hypercholesterolemia, OA, CAD, s/p PCI (LAD/ RCA) presented to cardiologist office with c/o worsening chest discomfort with back pain secondary to OA on spinal. Pt reported to have chest tightness with walking, which worsening over the last few weeks. Pt referred for cardiac cath with possible PCI. (21 May 2019 17:27)      PAST MEDICAL & SURGICAL HISTORY:  Essential hypertension  Coronary artery disease involving native heart without angina pectoris, unspecified vessel or lesion type  Gastritis  Diverticulitis  S/P coronary artery stent placement  S/P colon resection      MEDICATIONS  (STANDING):  aspirin  chewable 81 milliGRAM(s) Chew daily  atorvastatin 10 milliGRAM(s) Oral at bedtime  clopidogrel Tablet 75 milliGRAM(s) Oral daily  isosorbide   mononitrate ER Tablet (IMDUR) 60 milliGRAM(s) Oral daily  labetalol 400 milliGRAM(s) Oral two times a day  pantoprazole    Tablet 40 milliGRAM(s) Oral before breakfast  sodium chloride 0.9%. 1000 milliLiter(s) (100 mL/Hr) IV Continuous <Continuous>    MEDICATIONS  (PRN):  acetaminophen   Tablet .. 650 milliGRAM(s) Oral every 6 hours PRN Temp greater or equal to 38C (100.4F), Moderate Pain (4 - 6)  aluminum hydroxide/magnesium hydroxide/simethicone Suspension 30 milliLiter(s) Oral every 6 hours PRN Dyspepsia      Allergies    contrast (Unknown)  No Known Drug Allergies  Originally Entered as [Unknown] reaction to [hair dye] (Unknown)    Intolerances        REVIEW OF SYSTEMS: As mentioned in HPI all others Negative     Vital Signs Last 24 Hrs  T(C): 36.5 (22 May 2019 20:00), Max: 36.7 (22 May 2019 15:11)  T(F): 97.7 (22 May 2019 20:00), Max: 98 (22 May 2019 15:11)  HR: 66 (23 May 2019 06:00) (60 - 78)  BP: 131/59 (23 May 2019 06:00) (106/79 - 143/69)  BP(mean): 78 (23 May 2019 06:00) (67 - 89)  RR: 10 (22 May 2019 16:00) (10 - 19)  SpO2: 97% (23 May 2019 06:00) (94% - 100%)    PHYSICAL EXAM:  NERVOUS SYSTEM:  Alert & Oriented X3, Good concentration  CHEST/LUNG: Clear to auscultation bilaterally; No rales, rhonchi, wheezing, or rubs  HEART: Regular rate and rhythm; No murmurs, rubs, or gallops  ABDOMEN: Soft, Nontender, Nondistended; Bowel sounds present  EXTREMITIES:  2+ Peripheral Pulses, No clubbing, cyanosis, or edema  SKIN: right femoral cath site without bleeding or hematoma    LABS:                        10.2   6.47  )-----------( 199      ( 23 May 2019 05:06 )             31.9     05-23    143  |  113<H>  |  15  ----------------------------<  89  3.6   |  23  |  0.85    Ca    8.7      23 May 2019 05:06    TPro  7.6  /  Alb  3.6  /  TBili  0.4  /  DBili  x   /  AST  23  /  ALT  38  /  AlkPhos  62  05-23

## 2019-05-23 NOTE — PROGRESS NOTE ADULT - ASSESSMENT
HPI:  This is a 79 y.o male with PMHx of chronic anemia, hypercholesterolemia, OA, CAD, s/p PCI (LAD/ RCA) presented to cardiologist office with c/o worsening chest discomfort with back pain secondary to OA on spinal. Pt reported to have chest tightness with walking, which worsening over the last few weeks. Pt referred for cardiac cath with possible PCI. (21 May 2019 17:27)    Patient now s/p angiogram  with PCI and ROSLYN to the OM1     - AM labs and EKG reviewed   - procedure, outcome and f/u care reviewed with patient  - continue ASA/ Plavix  - continue statin/ BB  - DC home today  - f/u with Dr Almendarez in 4-7 days of discharge

## 2019-05-29 DIAGNOSIS — I25.118 ATHEROSCLEROTIC HEART DISEASE OF NATIVE CORONARY ARTERY WITH OTHER FORMS OF ANGINA PECTORIS: ICD-10-CM

## 2019-05-29 DIAGNOSIS — M47.9 SPONDYLOSIS, UNSPECIFIED: ICD-10-CM

## 2019-05-29 DIAGNOSIS — Z91.041 RADIOGRAPHIC DYE ALLERGY STATUS: ICD-10-CM

## 2019-05-29 DIAGNOSIS — I20.8 OTHER FORMS OF ANGINA PECTORIS: ICD-10-CM

## 2019-05-29 DIAGNOSIS — I10 ESSENTIAL (PRIMARY) HYPERTENSION: ICD-10-CM

## 2019-05-29 DIAGNOSIS — Z82.49 FAMILY HISTORY OF ISCHEMIC HEART DISEASE AND OTHER DISEASES OF THE CIRCULATORY SYSTEM: ICD-10-CM

## 2019-05-29 DIAGNOSIS — Z79.02 LONG TERM (CURRENT) USE OF ANTITHROMBOTICS/ANTIPLATELETS: ICD-10-CM

## 2019-05-29 DIAGNOSIS — Z79.82 LONG TERM (CURRENT) USE OF ASPIRIN: ICD-10-CM

## 2019-05-29 DIAGNOSIS — Z72.0 TOBACCO USE: ICD-10-CM

## 2019-05-29 DIAGNOSIS — Z95.5 PRESENCE OF CORONARY ANGIOPLASTY IMPLANT AND GRAFT: ICD-10-CM

## 2019-05-29 DIAGNOSIS — D64.9 ANEMIA, UNSPECIFIED: ICD-10-CM

## 2019-05-29 DIAGNOSIS — R07.89 OTHER CHEST PAIN: ICD-10-CM

## 2019-05-29 DIAGNOSIS — E78.00 PURE HYPERCHOLESTEROLEMIA, UNSPECIFIED: ICD-10-CM

## 2019-08-29 ENCOUNTER — INPATIENT (INPATIENT)
Facility: HOSPITAL | Age: 79
LOS: 2 days | Discharge: HOME | DRG: 378 | End: 2019-09-01
Attending: INTERNAL MEDICINE | Admitting: INTERNAL MEDICINE
Payer: MEDICARE

## 2019-08-29 VITALS — WEIGHT: 169.98 LBS | HEIGHT: 67 IN

## 2019-08-29 DIAGNOSIS — Z90.49 ACQUIRED ABSENCE OF OTHER SPECIFIED PARTS OF DIGESTIVE TRACT: Chronic | ICD-10-CM

## 2019-08-29 DIAGNOSIS — K92.2 GASTROINTESTINAL HEMORRHAGE, UNSPECIFIED: ICD-10-CM

## 2019-08-29 DIAGNOSIS — Z95.5 PRESENCE OF CORONARY ANGIOPLASTY IMPLANT AND GRAFT: Chronic | ICD-10-CM

## 2019-08-29 LAB
ALBUMIN SERPL ELPH-MCNC: 3.9 G/DL — SIGNIFICANT CHANGE UP (ref 3.3–5)
ALP SERPL-CCNC: 56 U/L — SIGNIFICANT CHANGE UP (ref 40–120)
ALT FLD-CCNC: 61 U/L — SIGNIFICANT CHANGE UP (ref 12–78)
ANION GAP SERPL CALC-SCNC: 6 MMOL/L — SIGNIFICANT CHANGE UP (ref 5–17)
AST SERPL-CCNC: 42 U/L — HIGH (ref 15–37)
BASOPHILS # BLD AUTO: 0.04 K/UL — SIGNIFICANT CHANGE UP (ref 0–0.2)
BASOPHILS NFR BLD AUTO: 0.6 % — SIGNIFICANT CHANGE UP (ref 0–2)
BILIRUB SERPL-MCNC: 0.5 MG/DL — SIGNIFICANT CHANGE UP (ref 0.2–1.2)
BUN SERPL-MCNC: 22 MG/DL — SIGNIFICANT CHANGE UP (ref 7–23)
CALCIUM SERPL-MCNC: 8.9 MG/DL — SIGNIFICANT CHANGE UP (ref 8.5–10.1)
CHLORIDE SERPL-SCNC: 107 MMOL/L — SIGNIFICANT CHANGE UP (ref 96–108)
CO2 SERPL-SCNC: 27 MMOL/L — SIGNIFICANT CHANGE UP (ref 22–31)
CREAT SERPL-MCNC: 1.06 MG/DL — SIGNIFICANT CHANGE UP (ref 0.5–1.3)
EOSINOPHIL # BLD AUTO: 0.05 K/UL — SIGNIFICANT CHANGE UP (ref 0–0.5)
EOSINOPHIL NFR BLD AUTO: 0.7 % — SIGNIFICANT CHANGE UP (ref 0–6)
GLUCOSE SERPL-MCNC: 96 MG/DL — SIGNIFICANT CHANGE UP (ref 70–99)
HCT VFR BLD CALC: 29.6 % — LOW (ref 39–50)
HGB BLD-MCNC: 9.4 G/DL — LOW (ref 13–17)
IMM GRANULOCYTES NFR BLD AUTO: 0.4 % — SIGNIFICANT CHANGE UP (ref 0–1.5)
INR BLD: 1.07 RATIO — SIGNIFICANT CHANGE UP (ref 0.88–1.16)
LIDOCAIN IGE QN: 99 U/L — SIGNIFICANT CHANGE UP (ref 73–393)
LYMPHOCYTES # BLD AUTO: 1.67 K/UL — SIGNIFICANT CHANGE UP (ref 1–3.3)
LYMPHOCYTES # BLD AUTO: 24.1 % — SIGNIFICANT CHANGE UP (ref 13–44)
MCHC RBC-ENTMCNC: 30.5 PG — SIGNIFICANT CHANGE UP (ref 27–34)
MCHC RBC-ENTMCNC: 31.8 GM/DL — LOW (ref 32–36)
MCV RBC AUTO: 96.1 FL — SIGNIFICANT CHANGE UP (ref 80–100)
MONOCYTES # BLD AUTO: 0.84 K/UL — SIGNIFICANT CHANGE UP (ref 0–0.9)
MONOCYTES NFR BLD AUTO: 12.1 % — SIGNIFICANT CHANGE UP (ref 2–14)
NEUTROPHILS # BLD AUTO: 4.29 K/UL — SIGNIFICANT CHANGE UP (ref 1.8–7.4)
NEUTROPHILS NFR BLD AUTO: 62.1 % — SIGNIFICANT CHANGE UP (ref 43–77)
PLATELET # BLD AUTO: 254 K/UL — SIGNIFICANT CHANGE UP (ref 150–400)
POTASSIUM SERPL-MCNC: 3.8 MMOL/L — SIGNIFICANT CHANGE UP (ref 3.5–5.3)
POTASSIUM SERPL-SCNC: 3.8 MMOL/L — SIGNIFICANT CHANGE UP (ref 3.5–5.3)
PROT SERPL-MCNC: 7.7 GM/DL — SIGNIFICANT CHANGE UP (ref 6–8.3)
PROTHROM AB SERPL-ACNC: 11.9 SEC — SIGNIFICANT CHANGE UP (ref 10–12.9)
RBC # BLD: 3.08 M/UL — LOW (ref 4.2–5.8)
RBC # FLD: 14.7 % — HIGH (ref 10.3–14.5)
SODIUM SERPL-SCNC: 140 MMOL/L — SIGNIFICANT CHANGE UP (ref 135–145)
WBC # BLD: 6.92 K/UL — SIGNIFICANT CHANGE UP (ref 3.8–10.5)
WBC # FLD AUTO: 6.92 K/UL — SIGNIFICANT CHANGE UP (ref 3.8–10.5)

## 2019-08-29 PROCEDURE — 36415 COLL VENOUS BLD VENIPUNCTURE: CPT

## 2019-08-29 PROCEDURE — 86923 COMPATIBILITY TEST ELECTRIC: CPT

## 2019-08-29 PROCEDURE — P9016: CPT

## 2019-08-29 PROCEDURE — 93010 ELECTROCARDIOGRAM REPORT: CPT

## 2019-08-29 PROCEDURE — 36430 TRANSFUSION BLD/BLD COMPNT: CPT

## 2019-08-29 PROCEDURE — 85027 COMPLETE CBC AUTOMATED: CPT

## 2019-08-29 PROCEDURE — 71045 X-RAY EXAM CHEST 1 VIEW: CPT | Mod: 26

## 2019-08-29 RX ORDER — ONDANSETRON 8 MG/1
4 TABLET, FILM COATED ORAL ONCE
Refills: 0 | Status: COMPLETED | OUTPATIENT
Start: 2019-08-29 | End: 2019-08-29

## 2019-08-29 RX ORDER — LABETALOL HCL 100 MG
400 TABLET ORAL
Qty: 0 | Refills: 0 | DISCHARGE

## 2019-08-29 RX ORDER — LABETALOL HCL 100 MG
400 TABLET ORAL ONCE
Refills: 0 | Status: COMPLETED | OUTPATIENT
Start: 2019-08-29 | End: 2019-08-29

## 2019-08-29 RX ORDER — SOD SULF/SODIUM/NAHCO3/KCL/PEG
1000 SOLUTION, RECONSTITUTED, ORAL ORAL ONCE
Refills: 0 | Status: COMPLETED | OUTPATIENT
Start: 2019-08-30 | End: 2019-08-30

## 2019-08-29 RX ORDER — SODIUM CHLORIDE 9 MG/ML
500 INJECTION INTRAMUSCULAR; INTRAVENOUS; SUBCUTANEOUS
Refills: 0 | Status: DISCONTINUED | OUTPATIENT
Start: 2019-08-29 | End: 2019-08-30

## 2019-08-29 RX ORDER — MORPHINE SULFATE 50 MG/1
2 CAPSULE, EXTENDED RELEASE ORAL ONCE
Refills: 0 | Status: DISCONTINUED | OUTPATIENT
Start: 2019-08-29 | End: 2019-08-29

## 2019-08-29 RX ORDER — MORPHINE SULFATE 50 MG/1
2 CAPSULE, EXTENDED RELEASE ORAL ONCE
Refills: 0 | Status: DISCONTINUED | OUTPATIENT
Start: 2019-08-29 | End: 2019-09-01

## 2019-08-29 RX ORDER — SOD SULF/SODIUM/NAHCO3/KCL/PEG
1000 SOLUTION, RECONSTITUTED, ORAL ORAL ONCE
Refills: 0 | Status: COMPLETED | OUTPATIENT
Start: 2019-08-29 | End: 2019-08-29

## 2019-08-29 RX ADMIN — Medication 1000 MILLILITER(S): at 22:39

## 2019-08-29 RX ADMIN — MORPHINE SULFATE 2 MILLIGRAM(S): 50 CAPSULE, EXTENDED RELEASE ORAL at 19:35

## 2019-08-29 RX ADMIN — Medication 400 MILLIGRAM(S): at 23:25

## 2019-08-29 RX ADMIN — ONDANSETRON 4 MILLIGRAM(S): 8 TABLET, FILM COATED ORAL at 23:46

## 2019-08-29 NOTE — ED PROVIDER NOTE - MUSCULOSKELETAL, MLM
Spine appears normal, range of motion is not limited, no muscle or joint tenderness. ARREOLA x4, no focal swelling tenderness.

## 2019-08-29 NOTE — ED PROVIDER NOTE - ENMT, MLM
Oropharynx clear. Airway patent, Nasal mildly dry clear. Mouth with normal mucosa. Throat has no vesicles, no oropharyngeal exudates and uvula is midline.

## 2019-08-29 NOTE — ED PROVIDER NOTE - OBJECTIVE STATEMENT
8 y/o male with a PMHx of diverticulitis s/p colon resection, HTN, presents to the ED c/o rectal bleeding x3 days, described as maroon. +lightheadedness. Hemoglobin=10. Pt states his hemoglobin is usually around 12. Pt was sent in for admission by GI- Dr. Esquivel for colonoscopy tomorrow. Pt had endoscopy today. Denies n/v/d, abd pain. On Plavix. Cardio- Dr. Almendarez. No other complaints at this time.

## 2019-08-29 NOTE — ED PROVIDER NOTE - CONSTITUTIONAL, MLM
normal... Male adult, well appearing, well nourished, awake, alert, oriented to person, place, time/situation and in no apparent distress.

## 2019-08-29 NOTE — ED ADULT NURSE NOTE - ED STAT RN HANDOFF DETAILS
Received report. Received pt awake AAOx3 sitting erect in bed. Pt was told to get admitted to the hospital for a colonoscopy tomorrow at 3PM with Dr. Squires. Had endoscopy performed today. TBA by Dr. Dumont. VSS. Pt offered PO fluids and sandwich prior to bowel prep and NPO after midnight. Explained to pt, he verbalized understanding. No acute distress noted. Safety/fall precautions. NSR on tele. Will ctm.

## 2019-08-29 NOTE — ED PROVIDER NOTE - CLINICAL SUMMARY MEDICAL DECISION MAKING FREE TEXT BOX
78 y/o male presents with rectal bleeding x3 days, maroon in color. Pt had upper endoscopy today, resulting negative. Pt sent by Dr. Esquivel for admission and colonoscopy tomorrow. +KING. +lightheaded. Plan: preop, NPO after midnight.

## 2019-08-29 NOTE — ED ADULT TRIAGE NOTE - CHIEF COMPLAINT QUOTE
Patient sent by Dr Esquivel for GIB, colonoscopy scheduled for tomorrow.  Patient denies SOB or CP.  Slightly lightheadedness.  HBG 10 yesterday

## 2019-08-29 NOTE — ED PROVIDER NOTE - ATTENDING CONTRIBUTION TO CARE
Dr. Dumont: I have personally performed a face to face bedside history and physical examination of this patient. I have discussed the history, examination, review of systems, assessment and plan of management with the resident. I have reviewed the electronic medical record and amended it to reflect my history, review of systems, physical exam, assessment and plan.

## 2019-08-29 NOTE — ED STATDOCS - PROGRESS NOTE DETAILS
Kamila MA for ED attending, Doctor Sandi. 78 y/o male with a PMHx of diverticulitis, HTN, colon resection presents to the ED c/o rectal bleeding x3 days, described as maroon. +dizziness described as lightheadedness. Hemoglobin=10, pt states his hemoglobin is usually around 12. Pt was sent in for admission by GI- Dr. Esquivel for colonoscopy tomorrow. Pt had endoscopy today. States his Denies n/v/d, abd pain.  On  Plavix. Cardio- Dr. Russ. Will send pt to main ED for further evaluation.

## 2019-08-30 LAB
HCT VFR BLD CALC: 26.9 % — LOW (ref 39–50)
HCT VFR BLD CALC: 27.4 % — LOW (ref 39–50)
HCT VFR BLD CALC: 28.3 % — LOW (ref 39–50)
HCT VFR BLD CALC: 28.6 % — LOW (ref 39–50)
HGB BLD-MCNC: 8.4 G/DL — LOW (ref 13–17)
HGB BLD-MCNC: 8.7 G/DL — LOW (ref 13–17)
HGB BLD-MCNC: 9 G/DL — LOW (ref 13–17)
HGB BLD-MCNC: 9 G/DL — LOW (ref 13–17)
MCHC RBC-ENTMCNC: 30.7 PG — SIGNIFICANT CHANGE UP (ref 27–34)
MCHC RBC-ENTMCNC: 30.7 PG — SIGNIFICANT CHANGE UP (ref 27–34)
MCHC RBC-ENTMCNC: 30.8 PG — SIGNIFICANT CHANGE UP (ref 27–34)
MCHC RBC-ENTMCNC: 31 PG — SIGNIFICANT CHANGE UP (ref 27–34)
MCHC RBC-ENTMCNC: 31.2 GM/DL — LOW (ref 32–36)
MCHC RBC-ENTMCNC: 31.5 GM/DL — LOW (ref 32–36)
MCHC RBC-ENTMCNC: 31.8 GM/DL — LOW (ref 32–36)
MCHC RBC-ENTMCNC: 31.8 GM/DL — LOW (ref 32–36)
MCV RBC AUTO: 96.8 FL — SIGNIFICANT CHANGE UP (ref 80–100)
MCV RBC AUTO: 96.9 FL — SIGNIFICANT CHANGE UP (ref 80–100)
MCV RBC AUTO: 97.6 FL — SIGNIFICANT CHANGE UP (ref 80–100)
MCV RBC AUTO: 99.3 FL — SIGNIFICANT CHANGE UP (ref 80–100)
PLATELET # BLD AUTO: 218 K/UL — SIGNIFICANT CHANGE UP (ref 150–400)
PLATELET # BLD AUTO: 225 K/UL — SIGNIFICANT CHANGE UP (ref 150–400)
PLATELET # BLD AUTO: 229 K/UL — SIGNIFICANT CHANGE UP (ref 150–400)
PLATELET # BLD AUTO: 231 K/UL — SIGNIFICANT CHANGE UP (ref 150–400)
RBC # BLD: 2.71 M/UL — LOW (ref 4.2–5.8)
RBC # BLD: 2.83 M/UL — LOW (ref 4.2–5.8)
RBC # BLD: 2.92 M/UL — LOW (ref 4.2–5.8)
RBC # BLD: 2.93 M/UL — LOW (ref 4.2–5.8)
RBC # FLD: 14.8 % — HIGH (ref 10.3–14.5)
RBC # FLD: 14.9 % — HIGH (ref 10.3–14.5)
RBC # FLD: 15 % — HIGH (ref 10.3–14.5)
RBC # FLD: 15.1 % — HIGH (ref 10.3–14.5)
WBC # BLD: 5.16 K/UL — SIGNIFICANT CHANGE UP (ref 3.8–10.5)
WBC # BLD: 5.8 K/UL — SIGNIFICANT CHANGE UP (ref 3.8–10.5)
WBC # BLD: 7.31 K/UL — SIGNIFICANT CHANGE UP (ref 3.8–10.5)
WBC # BLD: 7.96 K/UL — SIGNIFICANT CHANGE UP (ref 3.8–10.5)
WBC # FLD AUTO: 5.16 K/UL — SIGNIFICANT CHANGE UP (ref 3.8–10.5)
WBC # FLD AUTO: 5.8 K/UL — SIGNIFICANT CHANGE UP (ref 3.8–10.5)
WBC # FLD AUTO: 7.31 K/UL — SIGNIFICANT CHANGE UP (ref 3.8–10.5)
WBC # FLD AUTO: 7.96 K/UL — SIGNIFICANT CHANGE UP (ref 3.8–10.5)

## 2019-08-30 RX ORDER — PANTOPRAZOLE SODIUM 20 MG/1
40 TABLET, DELAYED RELEASE ORAL
Refills: 0 | Status: DISCONTINUED | OUTPATIENT
Start: 2019-08-30 | End: 2019-08-30

## 2019-08-30 RX ORDER — ATORVASTATIN CALCIUM 80 MG/1
10 TABLET, FILM COATED ORAL AT BEDTIME
Refills: 0 | Status: DISCONTINUED | OUTPATIENT
Start: 2019-08-30 | End: 2019-08-30

## 2019-08-30 RX ORDER — DIAZEPAM 5 MG
5 TABLET ORAL THREE TIMES A DAY
Refills: 0 | Status: DISCONTINUED | OUTPATIENT
Start: 2019-08-30 | End: 2019-09-01

## 2019-08-30 RX ORDER — LABETALOL HCL 100 MG
400 TABLET ORAL
Refills: 0 | Status: DISCONTINUED | OUTPATIENT
Start: 2019-08-30 | End: 2019-09-01

## 2019-08-30 RX ORDER — LABETALOL HCL 100 MG
400 TABLET ORAL
Refills: 0 | Status: DISCONTINUED | OUTPATIENT
Start: 2019-08-30 | End: 2019-08-30

## 2019-08-30 RX ORDER — ATORVASTATIN CALCIUM 80 MG/1
10 TABLET, FILM COATED ORAL AT BEDTIME
Refills: 0 | Status: DISCONTINUED | OUTPATIENT
Start: 2019-08-30 | End: 2019-09-01

## 2019-08-30 RX ORDER — DIAZEPAM 5 MG
5 TABLET ORAL THREE TIMES A DAY
Refills: 0 | Status: DISCONTINUED | OUTPATIENT
Start: 2019-08-30 | End: 2019-08-30

## 2019-08-30 RX ORDER — PANTOPRAZOLE SODIUM 20 MG/1
40 TABLET, DELAYED RELEASE ORAL
Refills: 0 | Status: DISCONTINUED | OUTPATIENT
Start: 2019-08-30 | End: 2019-09-01

## 2019-08-30 RX ORDER — CLOPIDOGREL BISULFATE 75 MG/1
75 TABLET, FILM COATED ORAL DAILY
Refills: 0 | Status: DISCONTINUED | OUTPATIENT
Start: 2019-08-30 | End: 2019-08-30

## 2019-08-30 RX ORDER — ONDANSETRON 8 MG/1
4 TABLET, FILM COATED ORAL EVERY 6 HOURS
Refills: 0 | Status: DISCONTINUED | OUTPATIENT
Start: 2019-08-30 | End: 2019-09-01

## 2019-08-30 RX ORDER — SODIUM CHLORIDE 9 MG/ML
1000 INJECTION, SOLUTION INTRAVENOUS
Refills: 0 | Status: DISCONTINUED | OUTPATIENT
Start: 2019-08-30 | End: 2019-08-30

## 2019-08-30 RX ORDER — CLOPIDOGREL BISULFATE 75 MG/1
75 TABLET, FILM COATED ORAL DAILY
Refills: 0 | Status: DISCONTINUED | OUTPATIENT
Start: 2019-08-30 | End: 2019-09-01

## 2019-08-30 RX ADMIN — Medication 5 MILLIGRAM(S): at 22:03

## 2019-08-30 RX ADMIN — Medication 1000 MILLILITER(S): at 05:39

## 2019-08-30 RX ADMIN — SODIUM CHLORIDE 100 MILLILITER(S): 9 INJECTION INTRAMUSCULAR; INTRAVENOUS; SUBCUTANEOUS at 00:49

## 2019-08-30 RX ADMIN — Medication 400 MILLIGRAM(S): at 16:43

## 2019-08-30 RX ADMIN — ATORVASTATIN CALCIUM 10 MILLIGRAM(S): 80 TABLET, FILM COATED ORAL at 21:03

## 2019-08-30 NOTE — CONSULT NOTE ADULT - SUBJECTIVE AND OBJECTIVE BOX
HPI:  79/M with  PMHx of CAD s/p stents last ROSLYN was done on 5/22/19 on asa and plavix, GI bleed, diverticulitis s/p colon resection, HTN, was sent to the ED c/o rectal bleeding x 3 days. Pt states that there was purple colored blood in stool; moderate amount. Pt states his hemoglobin is usually around 12. Pt was sent in for admission by GI- Dr. Esquivel for colonoscopy . Pt had EGD on 8/29 with Dr. Esquivel. Denies n/v/d, abd pain. Hb 8.7. No c/o chest pain/sob (30 Aug 2019 08:46)      PAST MEDICAL & SURGICAL HISTORY:  Essential hypertension  Coronary artery disease involving native heart without angina pectoris, unspecified vessel or lesion type  Gastritis  Diverticulitis  S/P coronary artery stent placement  S/P colon resection      MEDICATIONS  (STANDING):  atorvastatin 10 milliGRAM(s) Oral at bedtime  clopidogrel Tablet 75 milliGRAM(s) Oral daily  dextrose 5% + sodium chloride 0.9%. 1000 milliLiter(s) (75 mL/Hr) IV Continuous <Continuous>  labetalol 400 milliGRAM(s) Oral two times a day  pantoprazole    Tablet 40 milliGRAM(s) Oral before breakfast  sodium chloride 0.9%. 500 milliLiter(s) (100 mL/Hr) IV Continuous <Continuous>    MEDICATIONS  (PRN):  diazepam    Tablet 5 milliGRAM(s) Oral three times a day PRN anxiety  morphine  - Injectable 2 milliGRAM(s) IV Push once PRN Severe Pain (7 - 10)  ondansetron Injectable 4 milliGRAM(s) IV Push every 6 hours PRN Nausea      Allergies    contrast (Unknown)  No Known Drug Allergies  Originally Entered as [Unknown] reaction to [hair dye] (Unknown)    Intolerances        SOCIAL HISTORY:    FAMILY HISTORY:  Family history of diabetes mellitus (DM): father  FH: coronary artery disease: father   Non-contributory    REVIEW OF SYSTEMS      General:	    Respiratory and Thorax:  	  Cardiovascular:	    Gastrointestinal:	    Musculoskeletal:	   Vital Signs Last 24 Hrs  T(C): 37 (30 Aug 2019 05:18), Max: 37 (30 Aug 2019 05:18)  T(F): 98.6 (30 Aug 2019 05:18), Max: 98.6 (30 Aug 2019 05:18)  HR: 66 (30 Aug 2019 05:18) (66 - 96)  BP: 136/60 (30 Aug 2019 05:18) (136/60 - 153/76)  BP(mean): --  RR: 18 (30 Aug 2019 05:18) (17 - 18)  SpO2: 97% (30 Aug 2019 05:18) (96% - 98%)    HEENT :No Pallor.No icterus. EOMI,PERLAA  Chest : Clear to Auscultation  CVS : S1S2 Normal.No murmurs.  Abdomen: Soft.Non tender .Normal bowel sounds.No Organomegaly.  CNS: Alert.Oriented to Time,Place and Person.No focal deficit.  EXT: Normal Range of motion.No pitting edema.    LABS:                        9.0    5.16  )-----------( 229      ( 30 Aug 2019 08:17 )             28.6     08-29    140  |  107  |  22  ----------------------------<  96  3.8   |  27  |  1.06    Ca    8.9      29 Aug 2019 19:11    TPro  7.7  /  Alb  3.9  /  TBili  0.5  /  DBili  x   /  AST  42<H>  /  ALT  61  /  AlkPhos  56  08-29    PT/INR - ( 29 Aug 2019 19:11 )   PT: 11.9 sec;   INR: 1.07 ratio           LIVER FUNCTIONS - ( 29 Aug 2019 19:11 )  Alb: 3.9 g/dL / Pro: 7.7 gm/dL / ALK PHOS: 56 U/L / ALT: 61 U/L / AST: 42 U/L / GGT: x             RADIOLOGY & ADDITIONAL STUDIES:
Patient is a 79y old  Male who presents with a chief complaint of Rectal Bleed (30 Aug 2019 09:44)      HPI:  79/M with  PMHx of CAD s/p stents last ROSLYN was done on 5/22/19 on asa and plavix, GI bleed, diverticulitis s/p colon resection, HTN, was sent to the ED c/o rectal bleeding x 3 days. Pt states that there was purple colored blood in stool; moderate amount. Pt states his hemoglobin is usually around 12. Pt was sent in for admission by GI- Dr. Esquivel for colonoscopy . Pt had EGD on 8/29 with Dr. Esquivel. Denies n/v/d, abd pain. Hb 8.7. No c/o chest pain/sob (30 Aug 2019 08:46)  8/30 Cardiology, 79 year old male well known to me. several prior pci in past, last 5/2019. Has been feeling fatigued and having some rectal bleeding. egd reported ok. colon today sig for non bleeding diverticula and avm which was cauterized. no recent chest pain or dyspnea. patient also has htn and mild AS. patient has chronic lft  elevations.    PAST MEDICAL & SURGICAL HISTORY:  Essential hypertension  Coronary artery disease involving native heart without angina pectoris, unspecified vessel or lesion type  Gastritis  Diverticulitis  S/P coronary artery stent placement  S/P colon resection      PREVIOUS DIAGNOSTIC TESTING:      ECHO  FINDINGS:    STRESS  FINDINGS:    CATHETERIZATION  FINDINGS:    MEDICATIONS  (STANDING):  atorvastatin 10 milliGRAM(s) Oral at bedtime  clopidogrel Tablet 75 milliGRAM(s) Oral daily  labetalol 400 milliGRAM(s) Oral two times a day  pantoprazole    Tablet 40 milliGRAM(s) Oral before breakfast    MEDICATIONS  (PRN):  diazepam    Tablet 5 milliGRAM(s) Oral three times a day PRN anxiety  morphine  - Injectable 2 milliGRAM(s) IV Push once PRN Severe Pain (7 - 10)  ondansetron Injectable 4 milliGRAM(s) IV Push every 6 hours PRN Nausea      FAMILY HISTORY:  Family history of diabetes mellitus (DM): father  FH: coronary artery disease: father      SOCIAL HISTORY:  ***    REVIEW OF SYSTEM:  Pertinent items are noted in HPI.  Constitutional  Eyes:    Ears, nose, mouth,   throat, and face:    Neck:   Respiratory:   and wheezing  Cardiovascular:    Gastrointestinal:  Genitourinary:     Hematologic/lymphatic:   Musculoskeletal:   Neurological:   Behavioral/Psych:        Vital Signs Last 24 Hrs  T(C): 36.3 (30 Aug 2019 17:00), Max: 37 (30 Aug 2019 05:18)  T(F): 97.3 (30 Aug 2019 17:00), Max: 98.6 (30 Aug 2019 05:18)  HR: 73 (30 Aug 2019 17:00) (66 - 96)  BP: 155/66 (30 Aug 2019 17:00) (136/60 - 155/66)  BP(mean): --  RR: 18 (30 Aug 2019 17:00) (17 - 18)  SpO2: 98% (30 Aug 2019 17:00) (96% - 98%)    I&O's Summary    PHYSICAL EXAM  General Appearance: cooperative, no acute distress,   HEENT: PERRL, conjunctiva clear, EOM's intact, non injected pharynx, no exudate    Neck: Supple, , no adenopathy, thyroid: not enlarged, no carotid bruit or JVD  Back: Symmetric, no  tenderness,no soft tissue tenderness  Lungs: Clear to auscultation bilaterally,no adventitious breath sounds, normal   expiratory phase  Heart: Regular rate and rhythm, S1, S2 normal, no murmur,   Abdomen: Soft, non-tender, bowel sounds active , no hepatosplenomegaly  Extremities: no cyanosis or edema, no joint swelling  Skin: Skin color, texture normal, no rashes   Neurologic: Alert and oriented X3 , cranial nerves intact, sensory and motor normal,        INTERPRETATION OF TELEMETRY:    ECG:        LABS:                          9.0    5.80  )-----------( 231      ( 30 Aug 2019 12:48 )             28.3     08-29    140  |  107  |  22  ----------------------------<  96  3.8   |  27  |  1.06    Ca    8.9      29 Aug 2019 19:11    TPro  7.7  /  Alb  3.9  /  TBili  0.5  /  DBili  x   /  AST  42<H>  /  ALT  61  /  AlkPhos  56  08-29            PT/INR - ( 29 Aug 2019 19:11 )   PT: 11.9 sec;   INR: 1.07 ratio                   RADIOLOGY & ADDITIONAL STUDIES:    IMPRESSION:    PLAN:

## 2019-08-30 NOTE — H&P ADULT - HISTORY OF PRESENT ILLNESS
79/M with  PMHx of CAD s/p stents last ROSLYN was done on 5/22/19 on asa and plavix, GI bleed, diverticulitis s/p colon resection, HTN, was sent to the ED c/o rectal bleeding x 3 days. Pt states that there was purple colored blood in stool; moderate amount. Pt states his hemoglobin is usually around 12. Pt was sent in for admission by GI- Dr. Esquivel for colonoscopy . Pt had EGD on 8/29 with Dr. Esquivel. Denies n/v/d, abd pain. Hb 8.7. No c/o chest pain/sob

## 2019-08-30 NOTE — CONSULT NOTE ADULT - ASSESSMENT
Anemia  Lower GI bleed  EGD 8/29/2019 Inconclusive  Plan  Colonoscopy today
IMP:  1. anemia due to gi bleed possible avm. no active bleeding on colon and egd  2. CAD most recent pci 5/19  3. htn  4. mild AS    Plan: cardiology stable without angina. prefer asa and plavix for another 2 months if no active bleeding and ok with gi, then can discontinue asa  cont labetolol, lipitor  will follow prn only  thank you

## 2019-08-30 NOTE — DIETITIAN INITIAL EVALUATION ADULT. - OTHER INFO
79/M with  PMHx of CAD s/p stents last ROSLYN was done on 5/22/19 on asa and plavix, GI bleed, diverticulitis s/p colon resection, HTN, was sent to the ED c/o rectal bleeding x 3 days. Pt states that there was purple colored blood in stool; moderate amount. Pt states his hemoglobin is usually around 12. Pt was sent in for admission by GI- Dr. Esquivel for colonoscopy . Pt had EGD on 8/29 with Dr. Esquivel. Denies n/v/d, abd pain. Hb 8.7. No c/o chest pain/sob. 79/M with  PMHx of CAD s/p stents last ROSLYN was done on 5/22/19 on asa and plavix, GI bleed, diverticulitis s/p colon resection, HTN, was sent to the ED c/o rectal bleeding x 3 days. Pt states that there was purple colored blood in stool; moderate amount. Pt states his hemoglobin is usually around 12. Pt was sent in for admission by GI- Dr. Esquivel for colonoscopy . Pt had EGD on 8/29 with Dr. Esquivel. Denies n/v/d, abd pain. Hb 8.7. No c/o chest pain/sob.  Pt reports no wt loss.  No loss of appetite.  No issues chew/swallow.  No GI issues. Pt is hungry.

## 2019-08-30 NOTE — H&P ADULT - NSHPPHYSICALEXAM_GEN_ALL_CORE
PHYSICAL EXAM:    Daily Height in cm: 170.18 (29 Aug 2019 17:10)    Daily     ICU Vital Signs Last 24 Hrs  T(C): 37 (30 Aug 2019 05:18), Max: 37 (30 Aug 2019 05:18)  T(F): 98.6 (30 Aug 2019 05:18), Max: 98.6 (30 Aug 2019 05:18)  HR: 66 (30 Aug 2019 05:18) (66 - 96)  BP: 136/60 (30 Aug 2019 05:18) (136/60 - 153/76)  BP(mean): --  ABP: --  ABP(mean): --  RR: 18 (30 Aug 2019 05:18) (17 - 18)  SpO2: 97% (30 Aug 2019 05:18) (96% - 98%)      Constitutional: Well appearing  HEENT: Atraumatic, JOSÉ, Normal, No congestion  Respiratory: Breath Sounds normal, no rhonchi/wheeze  Cardiovascular: N S1S2; NAHID present  Gastrointestinal: Abdomen soft, non tender, Bowel Sounds present  Extremities: No edema, peripheral pulses present  Neurological: AAO x 3, no gross focal motor deficits  Skin: Non cellulitic, no rash, ulcers  Lymph Nodes: No lymphadenopathy noted  Back: No CVA tenderness   Musculoskeletal: non tender  Breasts: Deferred  Genitourinary: deferred  Rectal: Deferred

## 2019-08-30 NOTE — H&P ADULT - NSICDXFAMILYHX_GEN_ALL_CORE_FT
FAMILY HISTORY:  Family history of diabetes mellitus (DM), father  FH: coronary artery disease, father

## 2019-08-30 NOTE — DIETITIAN INITIAL EVALUATION ADULT. - ENERGY NEEDS
Ht.    67  "        Wt.  79      kg               BMI    27.4              IBW  67     kg               Pt is at 118   %  IBW

## 2019-08-30 NOTE — H&P ADULT - ASSESSMENT
79/M with  PMHx of CAD s/p stents last ROSLYN was done on 5/22/19 on asa and plavix, GI bleed, diverticulitis s/p colon resection, HTN, admitted with     1) GI bleed: rectal bleed  admit  npo except meds for colonoscopy  Hb 9.0; would transfuse if drops or pt becomes symptomatic  Hold asa but continue Plavix as has ROSLYN done on 5/22/19  cardio consult   GI f/u  check Hb q 12 hrs  po protonix    2) CAD with ROSLYN done on 5/22/19: continue plavix  cardio consult  cont labetalol, statin    3) HTN: cont labetalol    4) DVT PPX: venodynes    poc discussed with pt, team

## 2019-08-30 NOTE — DIETITIAN INITIAL EVALUATION ADULT. - PERTINENT MEDS FT
MEDICATIONS  (STANDING):  atorvastatin 10 milliGRAM(s) Oral at bedtime  clopidogrel Tablet 75 milliGRAM(s) Oral daily  dextrose 5% + sodium chloride 0.9%. 1000 milliLiter(s) (75 mL/Hr) IV Continuous <Continuous>  labetalol 400 milliGRAM(s) Oral two times a day  pantoprazole    Tablet 40 milliGRAM(s) Oral before breakfast  sodium chloride 0.9%. 500 milliLiter(s) (100 mL/Hr) IV Continuous <Continuous>    MEDICATIONS  (PRN):  diazepam    Tablet 5 milliGRAM(s) Oral three times a day PRN anxiety  morphine  - Injectable 2 milliGRAM(s) IV Push once PRN Severe Pain (7 - 10)  ondansetron Injectable 4 milliGRAM(s) IV Push every 6 hours PRN Nausea

## 2019-08-30 NOTE — DIETITIAN INITIAL EVALUATION ADULT. - PERTINENT LABORATORY DATA
08-29 Na140 mmol/L Glu 96 mg/dL K+ 3.8 mmol/L Cr  1.06 mg/dL BUN 22 mg/dL Phos n/a   Alb 3.9 g/dL PAB n/a

## 2019-08-31 LAB
HCT VFR BLD CALC: 26 % — LOW (ref 39–50)
HGB BLD-MCNC: 8.1 G/DL — LOW (ref 13–17)
MCHC RBC-ENTMCNC: 30.7 PG — SIGNIFICANT CHANGE UP (ref 27–34)
MCHC RBC-ENTMCNC: 31.2 GM/DL — LOW (ref 32–36)
MCV RBC AUTO: 98.5 FL — SIGNIFICANT CHANGE UP (ref 80–100)
PLATELET # BLD AUTO: 207 K/UL — SIGNIFICANT CHANGE UP (ref 150–400)
RBC # BLD: 2.64 M/UL — LOW (ref 4.2–5.8)
RBC # FLD: 14.9 % — HIGH (ref 10.3–14.5)
WBC # BLD: 5.08 K/UL — SIGNIFICANT CHANGE UP (ref 3.8–10.5)
WBC # FLD AUTO: 5.08 K/UL — SIGNIFICANT CHANGE UP (ref 3.8–10.5)

## 2019-08-31 RX ADMIN — Medication 400 MILLIGRAM(S): at 05:09

## 2019-08-31 RX ADMIN — PANTOPRAZOLE SODIUM 40 MILLIGRAM(S): 20 TABLET, DELAYED RELEASE ORAL at 05:09

## 2019-08-31 RX ADMIN — Medication 400 MILLIGRAM(S): at 17:30

## 2019-08-31 RX ADMIN — ATORVASTATIN CALCIUM 10 MILLIGRAM(S): 80 TABLET, FILM COATED ORAL at 21:03

## 2019-08-31 RX ADMIN — CLOPIDOGREL BISULFATE 75 MILLIGRAM(S): 75 TABLET, FILM COATED ORAL at 11:19

## 2019-08-31 NOTE — PROGRESS NOTE ADULT - ASSESSMENT
79/M with  PMHx of CAD s/p stents last ROSLYN was done on 5/22/19 on asa and plavix, GI bleed, diverticulitis s/p colon resection, HTN, admitted with     1) GI bleed: rectal bleed  admit  s/p colonoscopy on 8/30; non bleeding colonic angioectasia  Hb dropped to 8.1, transfuse 1 unit prbc  Hold asa but continue Plavix as has ROSLYN done on 5/22/19  cardio consult  appreciated  GI f/u  po protonix  cbc in am  if OK with GI would also start asa 81    2) CAD with ROSLYN done on 5/22/19: continue plavix  cardio consult appreciated  cont labetalol, statin  if OK with GI would also start asa 81    3) HTN: cont labetalol    4) DVT PPX: venodynes    poc discussed with pt, team

## 2019-08-31 NOTE — CDI QUERY NOTE - NSCDIOTHERTXTBX_GEN_ALL_CORE_HH
Dear Dr. Perez                                                                        08-31-19 @ 12:11    Clinical documentation indicates that this patient has _____Anemia_due to GI bleed______.    Please include more specific documentation, either known or suspected, of the acuity,  of this condition in your Progress Note and/or Discharge Summary.  CLINICAL INDICATORS:  Pt states his Hgn is usually 12.   p/w hgb 9.4 decreased to 8.1    A. acute blood loss anemia  B. acute on chronic blood loss anemia  C. other/not clinically significant    Present on Admission:  Was the severity of the condition present on admission?  If so, please document in the chart that “(the condition) was present on admission.”    In responding to this request, please exercise your independent professional judgment.  The fact that a question is asked does not imply that any particular answer is desired or expected.    Documentation clarification is required for compliance, accuracy in coding and billing, and reporting severity of illness, quality data   and risk of mortality.

## 2019-08-31 NOTE — PROGRESS NOTE ADULT - SUBJECTIVE AND OBJECTIVE BOX
HPI: 79/M with  PMHx of CAD s/p stents last ROSLYN was done on 5/22/19 on asa and plavix, GI bleed, diverticulitis s/p colon resection, HTN, was sent to the ED c/o rectal bleeding x 3 days. Pt states that there was purple colored blood in stool; moderate amount. Pt states his hemoglobin is usually around 12. Pt was sent in for admission by GI- Dr. Esquivel for colonoscopy . Pt had EGD on 8/29 with Dr. Esquivel. Denies n/v/d, abd pain. Hb 8.7. No c/o chest pain/sob    8/31: s/p colonoscopy on 8/30; non bleeding colonic angioectasia  Hb 8.1  no active bleed, had BM today and yesterday      PHYSICAL EXAM:    Daily     Daily     ICU Vital Signs Last 24 Hrs  T(C): 36.7 (31 Aug 2019 11:03), Max: 36.8 (30 Aug 2019 21:12)  T(F): 98.1 (31 Aug 2019 11:03), Max: 98.2 (30 Aug 2019 21:12)  HR: 68 (31 Aug 2019 11:03) (66 - 73)  BP: 131/62 (31 Aug 2019 11:03) (124/58 - 155/66)  BP(mean): --  ABP: --  ABP(mean): --  RR: 16 (31 Aug 2019 11:03) (16 - 18)  SpO2: 97% (31 Aug 2019 11:03) (95% - 98%)      Constitutional: Well appearing  HEENT: Atraumatic, JOSÉ, Normal, No congestion  Respiratory: Breath Sounds normal, no rhonchi/wheeze  Cardiovascular: N S1S2  Gastrointestinal: Abdomen soft, non tender, Bowel Sounds present  Extremities: No edema, peripheral pulses present  Neurological: AAO x 3, no gross focal motor deficits  Skin: Non cellulitic, no rash, ulcers  Lymph Nodes: No lymphadenopathy noted  Back: No CVA tenderness   Musculoskeletal: non tender  Breasts: Deferred  Genitourinary: deferred  Rectal: Deferred                          8.1    5.08  )-----------( 207      ( 31 Aug 2019 08:01 )             26.0       CBC Full  -  ( 31 Aug 2019 08:01 )  WBC Count : 5.08 K/uL  RBC Count : 2.64 M/uL  Hemoglobin : 8.1 g/dL  Hematocrit : 26.0 %  Platelet Count - Automated : 207 K/uL  Mean Cell Volume : 98.5 fl  Mean Cell Hemoglobin : 30.7 pg  Mean Cell Hemoglobin Concentration : 31.2 gm/dL  Auto Neutrophil # : x  Auto Lymphocyte # : x  Auto Monocyte # : x  Auto Eosinophil # : x  Auto Basophil # : x  Auto Neutrophil % : x  Auto Lymphocyte % : x  Auto Monocyte % : x  Auto Eosinophil % : x  Auto Basophil % : x      08-29    140  |  107  |  22  ----------------------------<  96  3.8   |  27  |  1.06    Ca    8.9      29 Aug 2019 19:11    TPro  7.7  /  Alb  3.9  /  TBili  0.5  /  DBili  x   /  AST  42<H>  /  ALT  61  /  AlkPhos  56  08-29      LIVER FUNCTIONS - ( 29 Aug 2019 19:11 )  Alb: 3.9 g/dL / Pro: 7.7 gm/dL / ALK PHOS: 56 U/L / ALT: 61 U/L / AST: 42 U/L / GGT: x             PT/INR - ( 29 Aug 2019 19:11 )   PT: 11.9 sec;   INR: 1.07 ratio                           MEDICATIONS  (STANDING):  atorvastatin 10 milliGRAM(s) Oral at bedtime  clopidogrel Tablet 75 milliGRAM(s) Oral daily  labetalol 400 milliGRAM(s) Oral two times a day  pantoprazole    Tablet 40 milliGRAM(s) Oral before breakfast

## 2019-09-01 ENCOUNTER — TRANSCRIPTION ENCOUNTER (OUTPATIENT)
Age: 79
End: 2019-09-01

## 2019-09-01 VITALS
HEART RATE: 70 BPM | OXYGEN SATURATION: 97 % | RESPIRATION RATE: 16 BRPM | DIASTOLIC BLOOD PRESSURE: 64 MMHG | TEMPERATURE: 98 F | SYSTOLIC BLOOD PRESSURE: 141 MMHG

## 2019-09-01 LAB
HCT VFR BLD CALC: 28.7 % — LOW (ref 39–50)
HGB BLD-MCNC: 9.3 G/DL — LOW (ref 13–17)
MCHC RBC-ENTMCNC: 30.7 PG — SIGNIFICANT CHANGE UP (ref 27–34)
MCHC RBC-ENTMCNC: 32.4 GM/DL — SIGNIFICANT CHANGE UP (ref 32–36)
MCV RBC AUTO: 94.7 FL — SIGNIFICANT CHANGE UP (ref 80–100)
PLATELET # BLD AUTO: 212 K/UL — SIGNIFICANT CHANGE UP (ref 150–400)
RBC # BLD: 3.03 M/UL — LOW (ref 4.2–5.8)
RBC # FLD: 14.9 % — HIGH (ref 10.3–14.5)
WBC # BLD: 5.42 K/UL — SIGNIFICANT CHANGE UP (ref 3.8–10.5)
WBC # FLD AUTO: 5.42 K/UL — SIGNIFICANT CHANGE UP (ref 3.8–10.5)

## 2019-09-01 RX ADMIN — PANTOPRAZOLE SODIUM 40 MILLIGRAM(S): 20 TABLET, DELAYED RELEASE ORAL at 05:14

## 2019-09-01 RX ADMIN — CLOPIDOGREL BISULFATE 75 MILLIGRAM(S): 75 TABLET, FILM COATED ORAL at 10:12

## 2019-09-01 RX ADMIN — Medication 400 MILLIGRAM(S): at 05:15

## 2019-09-01 RX ADMIN — Medication 5 MILLIGRAM(S): at 00:36

## 2019-09-01 NOTE — DISCHARGE NOTE PROVIDER - HOSPITAL COURSE
PHYSICAL EXAM:        Daily       Daily         ICU Vital Signs Last 24 Hrs    T(C): 36.4 (01 Sep 2019 04:38), Max: 36.9 (31 Aug 2019 16:55)    T(F): 97.5 (01 Sep 2019 04:38), Max: 98.5 (31 Aug 2019 16:55)    HR: 71 (01 Sep 2019 04:38) (66 - 74)    BP: 149/68 (01 Sep 2019 04:38) (131/62 - 166/67)    BP(mean): --    ABP: --    ABP(mean): --    RR: 18 (01 Sep 2019 04:38) (16 - 18)    SpO2: 98% (01 Sep 2019 04:38) (96% - 100%)            Constitutional: Well appearing    HEENT: Atraumatic, JOSÉ, Normal, No congestion    Respiratory: Breath Sounds normal, no rhonchi/wheeze    Cardiovascular: N S1S2;     Gastrointestinal: Abdomen soft, non tender, Bowel Sounds present    Extremities: No edema, peripheral pulses present    Neurological: AAO x 3, no gross focal motor deficits    Skin: Non cellulitic, no rash, ulcers    Lymph Nodes: No lymphadenopathy noted    Back: No CVA tenderness     Musculoskeletal: non tender    Breasts: Deferred    Genitourinary: deferred    Rectal: Deferred        79/M with  PMHx of CAD s/p stents last ROSLYN was done on 5/22/19 on asa and plavix, GI bleed, diverticulitis s/p colon resection, HTN, admitted with         1) GI bleed: rectal bleed    s/p colonoscopy on 8/30; non bleeding colonic angioectasia    Hb dropped to 8.1, transfused1 unit prbc, 9.3 today    Hold asa but continue Plavix as has ROSLYN done on 5/22/19    cardio consult  appreciated    GI f/u as out pt    po protonix    cbc in 2-3 days with pcp/GI    when OK with GI , also start asa 81        2) CAD with ROSLYN done on 5/22/19: continue plavix    cardio consult appreciated    cont labetalol, statin    if OK with GI would also start asa 81        3) HTN: cont labetalol        d/c home today; pt hemodynamically stable    check CBC in 2-3 days    f/u with Dr. Esquivel and Dr. Almendarez in 2-3 days        total time spent 35 min

## 2019-09-01 NOTE — DISCHARGE NOTE PROVIDER - CARE PROVIDER_API CALL
Faraz Almendarez)  Cardiovascular Disease; Internal Medicine  [address] [place] [address]  Phone: [phone]  Fax: [phone]  Follow Up Time:     Irma Esquivel)  Gastroenterology  [address] [place] [address]  Phone: [phone]  Fax: [phone]  Follow Up Time:

## 2019-09-01 NOTE — DISCHARGE NOTE NURSING/CASE MANAGEMENT/SOCIAL WORK - PATIENT PORTAL LINK FT
You can access the FollowMyHealth Patient Portal offered by [facility] by registering at the following website: [email or web address]. By joining [facility]’s FollowMyHealth portal, you will also be able to view your health information using other applications (apps) compatible with our system.

## 2019-09-13 DIAGNOSIS — Z91.041 RADIOGRAPHIC DYE ALLERGY STATUS: ICD-10-CM

## 2019-09-13 DIAGNOSIS — D62 ACUTE POSTHEMORRHAGIC ANEMIA: ICD-10-CM

## 2019-09-13 DIAGNOSIS — K57.90 DIVERTICULOSIS OF INTESTINE, PART UNSPECIFIED, WITHOUT PERFORATION OR ABSCESS WITHOUT BLEEDING: ICD-10-CM

## 2019-09-13 DIAGNOSIS — Z79.02 LONG TERM (CURRENT) USE OF ANTITHROMBOTICS/ANTIPLATELETS: ICD-10-CM

## 2019-09-13 DIAGNOSIS — Z90.49 ACQUIRED ABSENCE OF OTHER SPECIFIED PARTS OF DIGESTIVE TRACT: ICD-10-CM

## 2019-09-13 DIAGNOSIS — Z95.5 PRESENCE OF CORONARY ANGIOPLASTY IMPLANT AND GRAFT: ICD-10-CM

## 2019-09-13 DIAGNOSIS — I10 ESSENTIAL (PRIMARY) HYPERTENSION: ICD-10-CM

## 2019-09-13 DIAGNOSIS — Z79.82 LONG TERM (CURRENT) USE OF ASPIRIN: ICD-10-CM

## 2019-09-13 DIAGNOSIS — I25.10 ATHEROSCLEROTIC HEART DISEASE OF NATIVE CORONARY ARTERY WITHOUT ANGINA PECTORIS: ICD-10-CM

## 2019-09-13 DIAGNOSIS — K55.21 ANGIODYSPLASIA OF COLON WITH HEMORRHAGE: ICD-10-CM

## 2019-09-13 DIAGNOSIS — I35.0 NONRHEUMATIC AORTIC (VALVE) STENOSIS: ICD-10-CM

## 2019-10-05 ENCOUNTER — INPATIENT (INPATIENT)
Facility: HOSPITAL | Age: 79
LOS: 1 days | Discharge: HOME | DRG: 386 | End: 2019-10-07
Attending: SURGERY | Admitting: SURGERY
Payer: MEDICARE

## 2019-10-05 VITALS — WEIGHT: 179.9 LBS | HEIGHT: 66 IN

## 2019-10-05 DIAGNOSIS — K56.600 PARTIAL INTESTINAL OBSTRUCTION, UNSPECIFIED AS TO CAUSE: ICD-10-CM

## 2019-10-05 DIAGNOSIS — Z90.49 ACQUIRED ABSENCE OF OTHER SPECIFIED PARTS OF DIGESTIVE TRACT: Chronic | ICD-10-CM

## 2019-10-05 DIAGNOSIS — Z95.5 PRESENCE OF CORONARY ANGIOPLASTY IMPLANT AND GRAFT: Chronic | ICD-10-CM

## 2019-10-05 LAB
ADD ON TEST-SPECIMEN IN LAB: SIGNIFICANT CHANGE UP
ALBUMIN SERPL ELPH-MCNC: 4.2 G/DL — SIGNIFICANT CHANGE UP (ref 3.3–5)
ALP SERPL-CCNC: 67 U/L — SIGNIFICANT CHANGE UP (ref 40–120)
ALT FLD-CCNC: 48 U/L — SIGNIFICANT CHANGE UP (ref 12–78)
ANION GAP SERPL CALC-SCNC: 9 MMOL/L — SIGNIFICANT CHANGE UP (ref 5–17)
APPEARANCE UR: CLEAR — SIGNIFICANT CHANGE UP
AST SERPL-CCNC: 39 U/L — HIGH (ref 15–37)
BASOPHILS # BLD AUTO: 0.03 K/UL — SIGNIFICANT CHANGE UP (ref 0–0.2)
BASOPHILS NFR BLD AUTO: 0.3 % — SIGNIFICANT CHANGE UP (ref 0–2)
BILIRUB SERPL-MCNC: 0.6 MG/DL — SIGNIFICANT CHANGE UP (ref 0.2–1.2)
BILIRUB UR-MCNC: NEGATIVE — SIGNIFICANT CHANGE UP
BUN SERPL-MCNC: 20 MG/DL — SIGNIFICANT CHANGE UP (ref 7–23)
CALCIUM SERPL-MCNC: 9.1 MG/DL — SIGNIFICANT CHANGE UP (ref 8.5–10.1)
CHLORIDE SERPL-SCNC: 104 MMOL/L — SIGNIFICANT CHANGE UP (ref 96–108)
CO2 SERPL-SCNC: 24 MMOL/L — SIGNIFICANT CHANGE UP (ref 22–31)
COLOR SPEC: YELLOW — SIGNIFICANT CHANGE UP
CREAT SERPL-MCNC: 1 MG/DL — SIGNIFICANT CHANGE UP (ref 0.5–1.3)
DIFF PNL FLD: ABNORMAL
EOSINOPHIL # BLD AUTO: 0.02 K/UL — SIGNIFICANT CHANGE UP (ref 0–0.5)
EOSINOPHIL NFR BLD AUTO: 0.2 % — SIGNIFICANT CHANGE UP (ref 0–6)
GLUCOSE SERPL-MCNC: 140 MG/DL — HIGH (ref 70–99)
GLUCOSE UR QL: NEGATIVE MG/DL — SIGNIFICANT CHANGE UP
HCT VFR BLD CALC: 37.4 % — LOW (ref 39–50)
HGB BLD-MCNC: 12.2 G/DL — LOW (ref 13–17)
IMM GRANULOCYTES NFR BLD AUTO: 0.3 % — SIGNIFICANT CHANGE UP (ref 0–1.5)
KETONES UR-MCNC: NEGATIVE — SIGNIFICANT CHANGE UP
LEUKOCYTE ESTERASE UR-ACNC: NEGATIVE — SIGNIFICANT CHANGE UP
LIDOCAIN IGE QN: 105 U/L — SIGNIFICANT CHANGE UP (ref 73–393)
LYMPHOCYTES # BLD AUTO: 0.87 K/UL — LOW (ref 1–3.3)
LYMPHOCYTES # BLD AUTO: 7.9 % — LOW (ref 13–44)
MAGNESIUM SERPL-MCNC: 2.2 MG/DL — SIGNIFICANT CHANGE UP (ref 1.6–2.6)
MCHC RBC-ENTMCNC: 30.7 PG — SIGNIFICANT CHANGE UP (ref 27–34)
MCHC RBC-ENTMCNC: 32.6 GM/DL — SIGNIFICANT CHANGE UP (ref 32–36)
MCV RBC AUTO: 94 FL — SIGNIFICANT CHANGE UP (ref 80–100)
MONOCYTES # BLD AUTO: 0.68 K/UL — SIGNIFICANT CHANGE UP (ref 0–0.9)
MONOCYTES NFR BLD AUTO: 6.2 % — SIGNIFICANT CHANGE UP (ref 2–14)
NEUTROPHILS # BLD AUTO: 9.39 K/UL — HIGH (ref 1.8–7.4)
NEUTROPHILS NFR BLD AUTO: 85.1 % — HIGH (ref 43–77)
NITRITE UR-MCNC: NEGATIVE — SIGNIFICANT CHANGE UP
PH UR: 5 — SIGNIFICANT CHANGE UP (ref 5–8)
PLATELET # BLD AUTO: 308 K/UL — SIGNIFICANT CHANGE UP (ref 150–400)
POTASSIUM SERPL-MCNC: 4.2 MMOL/L — SIGNIFICANT CHANGE UP (ref 3.5–5.3)
POTASSIUM SERPL-SCNC: 4.2 MMOL/L — SIGNIFICANT CHANGE UP (ref 3.5–5.3)
PROT SERPL-MCNC: 9.1 GM/DL — HIGH (ref 6–8.3)
PROT UR-MCNC: 30 MG/DL
RBC # BLD: 3.98 M/UL — LOW (ref 4.2–5.8)
RBC # FLD: 15.4 % — HIGH (ref 10.3–14.5)
SODIUM SERPL-SCNC: 137 MMOL/L — SIGNIFICANT CHANGE UP (ref 135–145)
SP GR SPEC: 1.01 — SIGNIFICANT CHANGE UP (ref 1.01–1.02)
UROBILINOGEN FLD QL: NEGATIVE MG/DL — SIGNIFICANT CHANGE UP
WBC # BLD: 11.02 K/UL — HIGH (ref 3.8–10.5)
WBC # FLD AUTO: 11.02 K/UL — HIGH (ref 3.8–10.5)

## 2019-10-05 PROCEDURE — 85027 COMPLETE CBC AUTOMATED: CPT

## 2019-10-05 PROCEDURE — 74177 CT ABD & PELVIS W/CONTRAST: CPT | Mod: 26

## 2019-10-05 PROCEDURE — 83735 ASSAY OF MAGNESIUM: CPT

## 2019-10-05 PROCEDURE — C9113: CPT

## 2019-10-05 PROCEDURE — 93010 ELECTROCARDIOGRAM REPORT: CPT

## 2019-10-05 PROCEDURE — 71045 X-RAY EXAM CHEST 1 VIEW: CPT | Mod: 26

## 2019-10-05 PROCEDURE — 80048 BASIC METABOLIC PNL TOTAL CA: CPT

## 2019-10-05 PROCEDURE — 36415 COLL VENOUS BLD VENIPUNCTURE: CPT

## 2019-10-05 PROCEDURE — 84100 ASSAY OF PHOSPHORUS: CPT

## 2019-10-05 RX ORDER — SODIUM CHLORIDE 9 MG/ML
1000 INJECTION INTRAMUSCULAR; INTRAVENOUS; SUBCUTANEOUS ONCE
Refills: 0 | Status: COMPLETED | OUTPATIENT
Start: 2019-10-05 | End: 2019-10-05

## 2019-10-05 RX ORDER — MORPHINE SULFATE 50 MG/1
4 CAPSULE, EXTENDED RELEASE ORAL ONCE
Refills: 0 | Status: DISCONTINUED | OUTPATIENT
Start: 2019-10-05 | End: 2019-10-05

## 2019-10-05 RX ORDER — ONDANSETRON 8 MG/1
4 TABLET, FILM COATED ORAL EVERY 6 HOURS
Refills: 0 | Status: DISCONTINUED | OUTPATIENT
Start: 2019-10-05 | End: 2019-10-07

## 2019-10-05 RX ORDER — PANTOPRAZOLE SODIUM 20 MG/1
40 TABLET, DELAYED RELEASE ORAL ONCE
Refills: 0 | Status: COMPLETED | OUTPATIENT
Start: 2019-10-05 | End: 2019-10-05

## 2019-10-05 RX ORDER — PANTOPRAZOLE SODIUM 20 MG/1
40 TABLET, DELAYED RELEASE ORAL DAILY
Refills: 0 | Status: DISCONTINUED | OUTPATIENT
Start: 2019-10-05 | End: 2019-10-07

## 2019-10-05 RX ORDER — INFLUENZA VIRUS VACCINE 15; 15; 15; 15 UG/.5ML; UG/.5ML; UG/.5ML; UG/.5ML
0.5 SUSPENSION INTRAMUSCULAR ONCE
Refills: 0 | Status: DISCONTINUED | OUTPATIENT
Start: 2019-10-05 | End: 2019-10-07

## 2019-10-05 RX ORDER — HEPARIN SODIUM 5000 [USP'U]/ML
5000 INJECTION INTRAVENOUS; SUBCUTANEOUS EVERY 8 HOURS
Refills: 0 | Status: DISCONTINUED | OUTPATIENT
Start: 2019-10-05 | End: 2019-10-07

## 2019-10-05 RX ORDER — ONDANSETRON 8 MG/1
4 TABLET, FILM COATED ORAL ONCE
Refills: 0 | Status: COMPLETED | OUTPATIENT
Start: 2019-10-05 | End: 2019-10-05

## 2019-10-05 RX ORDER — METOCLOPRAMIDE HCL 10 MG
10 TABLET ORAL ONCE
Refills: 0 | Status: COMPLETED | OUTPATIENT
Start: 2019-10-05 | End: 2019-10-05

## 2019-10-05 RX ORDER — METOCLOPRAMIDE HCL 10 MG
10 TABLET ORAL ONCE
Refills: 0 | Status: DISCONTINUED | OUTPATIENT
Start: 2019-10-05 | End: 2019-10-05

## 2019-10-05 RX ORDER — PIPERACILLIN AND TAZOBACTAM 4; .5 G/20ML; G/20ML
3.38 INJECTION, POWDER, LYOPHILIZED, FOR SOLUTION INTRAVENOUS EVERY 8 HOURS
Refills: 0 | Status: DISCONTINUED | OUTPATIENT
Start: 2019-10-05 | End: 2019-10-07

## 2019-10-05 RX ORDER — SODIUM CHLORIDE 9 MG/ML
1000 INJECTION INTRAMUSCULAR; INTRAVENOUS; SUBCUTANEOUS
Refills: 0 | Status: DISCONTINUED | OUTPATIENT
Start: 2019-10-05 | End: 2019-10-07

## 2019-10-05 RX ORDER — KETOROLAC TROMETHAMINE 30 MG/ML
15 SYRINGE (ML) INJECTION EVERY 6 HOURS
Refills: 0 | Status: DISCONTINUED | OUTPATIENT
Start: 2019-10-05 | End: 2019-10-07

## 2019-10-05 RX ADMIN — PIPERACILLIN AND TAZOBACTAM 25 GRAM(S): 4; .5 INJECTION, POWDER, LYOPHILIZED, FOR SOLUTION INTRAVENOUS at 18:05

## 2019-10-05 RX ADMIN — ONDANSETRON 4 MILLIGRAM(S): 8 TABLET, FILM COATED ORAL at 11:19

## 2019-10-05 RX ADMIN — MORPHINE SULFATE 4 MILLIGRAM(S): 50 CAPSULE, EXTENDED RELEASE ORAL at 11:39

## 2019-10-05 RX ADMIN — MORPHINE SULFATE 4 MILLIGRAM(S): 50 CAPSULE, EXTENDED RELEASE ORAL at 11:19

## 2019-10-05 RX ADMIN — SODIUM CHLORIDE 1000 MILLILITER(S): 9 INJECTION INTRAMUSCULAR; INTRAVENOUS; SUBCUTANEOUS at 12:22

## 2019-10-05 RX ADMIN — ONDANSETRON 4 MILLIGRAM(S): 8 TABLET, FILM COATED ORAL at 20:31

## 2019-10-05 RX ADMIN — MORPHINE SULFATE 4 MILLIGRAM(S): 50 CAPSULE, EXTENDED RELEASE ORAL at 14:07

## 2019-10-05 RX ADMIN — SODIUM CHLORIDE 1000 MILLILITER(S): 9 INJECTION INTRAMUSCULAR; INTRAVENOUS; SUBCUTANEOUS at 14:41

## 2019-10-05 RX ADMIN — MORPHINE SULFATE 4 MILLIGRAM(S): 50 CAPSULE, EXTENDED RELEASE ORAL at 13:27

## 2019-10-05 RX ADMIN — SODIUM CHLORIDE 2000 MILLILITER(S): 9 INJECTION INTRAMUSCULAR; INTRAVENOUS; SUBCUTANEOUS at 11:20

## 2019-10-05 RX ADMIN — PANTOPRAZOLE SODIUM 40 MILLIGRAM(S): 20 TABLET, DELAYED RELEASE ORAL at 12:06

## 2019-10-05 RX ADMIN — ONDANSETRON 4 MILLIGRAM(S): 8 TABLET, FILM COATED ORAL at 14:07

## 2019-10-05 RX ADMIN — SODIUM CHLORIDE 100 MILLILITER(S): 9 INJECTION INTRAMUSCULAR; INTRAVENOUS; SUBCUTANEOUS at 17:36

## 2019-10-05 RX ADMIN — HEPARIN SODIUM 5000 UNIT(S): 5000 INJECTION INTRAVENOUS; SUBCUTANEOUS at 21:15

## 2019-10-05 RX ADMIN — SODIUM CHLORIDE 1000 MILLILITER(S): 9 INJECTION INTRAMUSCULAR; INTRAVENOUS; SUBCUTANEOUS at 15:41

## 2019-10-05 RX ADMIN — Medication 15 MILLIGRAM(S): at 17:10

## 2019-10-05 RX ADMIN — Medication 10 MILLIGRAM(S): at 14:41

## 2019-10-05 NOTE — H&P ADULT - HISTORY OF PRESENT ILLNESS
80 y/o male with a PMHx of CAD s/p coronary stents x3 (Last stent this year), HTN, GI bleed, gastritis, diverticulitis s/p colon resection presents to the ED c/o onset of abd pain, n/v this morning. Recent evaluation for GI bleed at  requiring blood transfusion, source of bleeding was not found. Wife at bedside notes pt was told of Crohn's disease Dx last night after capsule imaging 3 weeks ago with GI specialist Dr. Esquivel. Pt was well last night when he ate unseasoned butternut squash and rotisserie chicken for dinner. Wife states pt was going to have PPD test today before starting Prednisone. Denies hematochezia, coffee ground emesis, melena. At time of exam, pt notes episodes of vomiting were minimal, until he came to ED. +abd pain. Denies diarrhea. Notes today's symptoms are not similar to when he was seen for GI bleed. NKDA. No other complaints at this time.      Patient has hx of colectomy in 2007 for diverticulitis with Dr Dowling, along with Lap Cholecystectomy in 2017 with Dr Gong. Patient requesting Dr Gong. Last flatus several days ago, last BM this morning small in caliber, patient feels constipated.       ICU Vital Signs Last 24 Hrs  T(C): 36.9 (05 Oct 2019 15:09), Max: 37 (05 Oct 2019 10:23)  T(F): 98.5 (05 Oct 2019 15:09), Max: 98.6 (05 Oct 2019 10:23)  HR: 81 (05 Oct 2019 15:09) (75 - 81)  BP: 143/82 (05 Oct 2019 15:09) (143/82 - 191/96)  BP(mean): --  ABP: --  ABP(mean): --  RR: 17 (05 Oct 2019 15:09) (17 - 18)  SpO2: 97% (05 Oct 2019 15:09) (97% - 98%)

## 2019-10-05 NOTE — ED PROVIDER NOTE - CLINICAL SUMMARY MEDICAL DECISION MAKING FREE TEXT BOX
Pt with acute onset of abd pain and b/v. Abd exam benign. EKG with flipped T wave. Labs, IV fluids, CT planned.

## 2019-10-05 NOTE — ED PROVIDER NOTE - OBJECTIVE STATEMENT
80 y/o male with a PMHx of CAD s/p coronary stents x3 (Last stent this year), HTN, GI bleed, gastritis, diverticulitis s/p colon resection presents to the ED c/o onset of abd pain, n/v this morning. Recent evaluation for GI bleed at  requiring blood transfusion, source of bleeding was not found. Wife at bedside notes pt was told of Crohn's disease Dx last night after capsule imaging 3 weeks ago with GI specialist Dr. Esquivel. Pt was well last night when he ate unseasoned butternut squash and rotisserie chicken for dinner. Wife states pt was going to have PPD test today before starting Prednisone. Denies hematochezia, coffee ground emesis, melena. At time of exam, pt notes episodes of vomiting were minimal, until he came to ED. +abd pain. Denies diarrhea. Notes today's symptoms are not similar to when he was seen for GI bleed. NKDA. No other complaints at this time.

## 2019-10-05 NOTE — ED STATDOCS - PROGRESS NOTE DETAILS
signed Vicenta Beatty PA-C Pt seen initially in intake by Dr Hoskins.   79M c/o vomiting and epigastric pain this morning. Pt had capsule indoscopy 3 weeks ago, results from yesterday showed Crohns. PMH GI bleed requiring transfusion, CAD s/p stents, HTN, HLD. Pt alert, in obvious discomfort. Denies dark or bloody stools. PMD TG Mason. Plan labs, CT, analgesia. Pt agrees with plan of  care. signed Vicenta Beatty PA-C   Pt with severe pain and abnormal EKG, will send to the main ED.

## 2019-10-05 NOTE — PATIENT PROFILE ADULT - FALL HARM RISK CONCLUSION
Lisa Huerta, Med Ass't  Nida Stauffer, R.N.             Was this patient advised to bring in the order from the ordering physician?      Called pt and LM advising him to please bring the order with him on 3/12.    Fall Risk

## 2019-10-05 NOTE — ED ADULT NURSE NOTE - NSIMPLEMENTINTERV_GEN_ALL_ED
Implemented All Universal Safety Interventions:  [place] to call system. Call bell, personal items and telephone within reach. Instruct patient to call for assistance. Room bathroom lighting operational. Non-slip footwear when patient is off stretcher. Physically safe environment: no spills, clutter or unnecessary equipment. Stretcher in lowest position, wheels locked, appropriate side rails in place.

## 2019-10-05 NOTE — PROVIDER CONTACT NOTE (OTHER) - SITUATION
Spoke to MD Cash. MD wanted consult with Erickson not a hospitalist.   Called Dr. Almendarez office, spoke to Rashad. MD will see the patient.

## 2019-10-05 NOTE — ED STATDOCS - OBJECTIVE STATEMENT
80 y/o male with a PMHx of CAD s/p coronary stents x3 (Last stent this year), HTN, GI bleed, gastritis, diverticulitis s/p colon resection presents to the ED c/o onset of abd pain, N/V this morning. Recent evaluation for GI bleed at  requiring blood transfusion, source of bleeding was not found. Wife at bedside notes pt was told of Crohn's disease Dx last night after capsule imaging 3 weeks ago with GI specialist Dr. Esquivel. Pt was well last night when he ate unseasoned butternut squash and rotisserie chicken for dinner. Wife states pt was going to have PPD test today before starting Prednisone. Denies hematochezia, coffee ground emesis, melena. At time of exam pt notes episodes of vomiting were minimal, until he came to ED. +Abd pain. Pain does not radiate, is described as nagging, and is rated 10/10 in severity. NKDA.

## 2019-10-05 NOTE — PROVIDER CONTACT NOTE (OTHER) - SITUATION
Called MDs phone. Left a message with  regarding consult. Called MDs phone. Left a message with  regarding consult.  Spoke to MD. He will see the patient.

## 2019-10-05 NOTE — H&P ADULT - ASSESSMENT
A/P 79M with hx of diverticulitis, s/p bowel resection? in 2007, now with SBO    -admit to Dr Scott  -NPO  -IVF  -IV ABX  -GI Consult  -Hospitalist Consult  -GI/DVT PPX  -Serial abdominal exams  -will monitor closely, and observe for worsening abdominal pain, requiring operative intervention       d/w DR SCOTT

## 2019-10-05 NOTE — ED ADULT NURSE NOTE - OBJECTIVE STATEMENT
Pt dx with Crohns disease yesterday at dr benson office, c/o worsening abdominal cramping, n/v.  afebrile.

## 2019-10-05 NOTE — H&P ADULT - NSHPLABSRESULTS_GEN_ALL_CORE
12.2   11.02 )-----------( 308      ( 05 Oct 2019 11:12 )             37.4   10-05    137  |  104  |  20  ----------------------------<  140<H>  4.2   |  24  |  1.00    Ca    9.1      05 Oct 2019 11:12  Mg     2.2     10-05    TPro  9.1<H>  /  Alb  4.2  /  TBili  0.6  /  DBili  x   /  AST  39<H>  /  ALT  48  /  AlkPhos  67  10-05    < from: CT Abdomen and Pelvis w/ IV Cont (10.05.19 @ 13:12) >      EXAM:  CT ABDOMEN AND PELVIS IC                            PROCEDURE DATE:  10/05/2019          INTERPRETATION:  CLINICAL INFORMATION: Epigastric pain, nausea and   vomiting.    COMPARISON: CT abdomen pelvis 1/27/2019.    PROCEDURE:   CT of the Abdomen and Pelvis was performed with intravenous contrast.   Intravenous contrast: 90 ml Omnipaque 350. 10 ml discarded.  Oral contrast: None.  Sagittal and coronal reformats were performed.    FINDINGS:    LOWER CHEST: There are no pleural or pericardial effusions. The heart   size is mildly enlarged. There is heavy mitral annular calcification.   There is aortic valvular calcification. There is coronary artery   calcification. The imaged lung bases demonstrate posterior dependent   atelectasis.    LIVER: Within normal limits.  BILE DUCTS: Normal caliber.  GALLBLADDER: Status postcholecystectomy.  SPLEEN: Within normal limits.  PANCREAS: Within normal limits.  ADRENALS: Within normal limits.  KIDNEYS/URETERS: Symmetric enhancement. Lobulated contour. Small   bilateral cysts and subcentimeter hypodensities, too small to further   characterize. Bilateral nonobstructing renal calculi, the largest   measures 4 mm at the midpole of the right kidney.    BLADDER: Within normal limits.  REPRODUCTIVE ORGANS: Radiation seed implants within the prostate.    BOWEL: There are fluid-filled small bowel loops measuring up to 2.7 cm in   diameter to the level of enteroenteric anastomosis in the right lower   quadrant. Small bowel loops distal to the anastomotic site are collapsed.   There is stool-like content within the small bowel loops proximal to the   anastomotic site. This appearance is similar to 1/27/2019 and likely   represents chronic partial small bowel obstruction at the anastomotic   site. Appendix is within normal limits. There is no bowel wall   pneumatosis, mesenteric or portal venous gas.  PERITONEUM: No ascites.  VESSELS: The aorta and IVC are normal in caliber and patent. There is   heavy atherosclerosis of the abdominal aortaand iliac arteries.  RETROPERITONEUM/LYMPH NODES: No lymphadenopathy.    ABDOMINAL WALL: Within normal limits.  BONES: Multilevel degenerative change of the thoracic spine with   osteophytes, degenerative disc disease and facet joint arthropathy.    IMPRESSION:     Compared to 1/27/2019:    Similar appearance of fluid-filled small bowel loops in the abdomen to   the level of enteroenteric anastomosis. Stool-like content in the small   bowel proximal to this anastomosis is again noted.     These findings are most compatible with partial small bowel obstruction   with transition point at the site of anastomosis.                    CARMEN HOOKS M.D. ATTENDING RADIOLOGIST  This document has been electronically signed. Oct  5 2019  1:36PM                < end of copied text >

## 2019-10-05 NOTE — ED STATDOCS - CLINICAL SUMMARY MEDICAL DECISION MAKING FREE TEXT BOX
Plan: Blood-work, pain control, antiemetics, CT abd/pelvis with IV contrast, EKG. Will D/W Dr. Esquivel. Will get Troponin pending result of EKG.

## 2019-10-05 NOTE — PATIENT PROFILE ADULT - ABILITY TO HEAR (WITH HEARING AID OR HEARING APPLIANCE IF NORMALLY USED):
Mildly to Moderately Impaired: difficulty hearing in some environments or speaker may need to increase volume or speak distinctly/pt wears b/c hearing aids but does not have them with him

## 2019-10-05 NOTE — H&P ADULT - NSHPPHYSICALEXAM_GEN_ALL_CORE
Gen aaxo3 nad  cardiac s1 s2w rr  lungs clear  abd soft ttp in epigastrum/midline, non distended, non peritoneal  ext no edema b/l

## 2019-10-05 NOTE — ED STATDOCS - ATTENDING CONTRIBUTION TO CARE
I, Roberto Hoskins, performed the initial face to face bedside interview with this patient regarding history of present illness, review of symptoms and relevant past medical, social and family history.  I completed an independent physical examination.  I was the initial provider who evaluated this patient. I have signed out the follow up of any pending tests (i.e. labs, radiological studies) to the ACP.  I have communicated the patient’s plan of care and disposition with the ACP.  The history, relevant review of systems, past medical and surgical history, medical decision making, and physical examination was documented by the scribe in my presence and I attest to the accuracy of the documentation.

## 2019-10-05 NOTE — ED PROVIDER NOTE - PROGRESS NOTE DETAILS
Surgery resident contacted for consult as CT showing partial SBO. Patients wife requests Dr. Alberts for surgical admission instead of doctor on call. Surgical resident states to admit to Dr. Alberts.  No NG tube recommended at this time.

## 2019-10-06 LAB
ANION GAP SERPL CALC-SCNC: 5 MMOL/L — SIGNIFICANT CHANGE UP (ref 5–17)
BUN SERPL-MCNC: 17 MG/DL — SIGNIFICANT CHANGE UP (ref 7–23)
CALCIUM SERPL-MCNC: 7.8 MG/DL — LOW (ref 8.5–10.1)
CHLORIDE SERPL-SCNC: 112 MMOL/L — HIGH (ref 96–108)
CO2 SERPL-SCNC: 24 MMOL/L — SIGNIFICANT CHANGE UP (ref 22–31)
CREAT SERPL-MCNC: 0.94 MG/DL — SIGNIFICANT CHANGE UP (ref 0.5–1.3)
GLUCOSE SERPL-MCNC: 101 MG/DL — HIGH (ref 70–99)
HCT VFR BLD CALC: 33.6 % — LOW (ref 39–50)
HGB BLD-MCNC: 10.7 G/DL — LOW (ref 13–17)
MCHC RBC-ENTMCNC: 30.4 PG — SIGNIFICANT CHANGE UP (ref 27–34)
MCHC RBC-ENTMCNC: 31.8 GM/DL — LOW (ref 32–36)
MCV RBC AUTO: 95.5 FL — SIGNIFICANT CHANGE UP (ref 80–100)
PLATELET # BLD AUTO: 250 K/UL — SIGNIFICANT CHANGE UP (ref 150–400)
POTASSIUM SERPL-MCNC: 3.5 MMOL/L — SIGNIFICANT CHANGE UP (ref 3.5–5.3)
POTASSIUM SERPL-SCNC: 3.5 MMOL/L — SIGNIFICANT CHANGE UP (ref 3.5–5.3)
RBC # BLD: 3.52 M/UL — LOW (ref 4.2–5.8)
RBC # FLD: 15.5 % — HIGH (ref 10.3–14.5)
SODIUM SERPL-SCNC: 141 MMOL/L — SIGNIFICANT CHANGE UP (ref 135–145)
WBC # BLD: 6.38 K/UL — SIGNIFICANT CHANGE UP (ref 3.8–10.5)
WBC # FLD AUTO: 6.38 K/UL — SIGNIFICANT CHANGE UP (ref 3.8–10.5)

## 2019-10-06 RX ORDER — ATORVASTATIN CALCIUM 80 MG/1
10 TABLET, FILM COATED ORAL AT BEDTIME
Refills: 0 | Status: DISCONTINUED | OUTPATIENT
Start: 2019-10-06 | End: 2019-10-07

## 2019-10-06 RX ORDER — LABETALOL HCL 100 MG
200 TABLET ORAL
Refills: 0 | Status: DISCONTINUED | OUTPATIENT
Start: 2019-10-06 | End: 2019-10-07

## 2019-10-06 RX ORDER — BUDESONIDE, MICRONIZED 100 %
9 POWDER (GRAM) MISCELLANEOUS DAILY
Refills: 0 | Status: DISCONTINUED | OUTPATIENT
Start: 2019-10-06 | End: 2019-10-07

## 2019-10-06 RX ADMIN — PIPERACILLIN AND TAZOBACTAM 25 GRAM(S): 4; .5 INJECTION, POWDER, LYOPHILIZED, FOR SOLUTION INTRAVENOUS at 22:10

## 2019-10-06 RX ADMIN — PIPERACILLIN AND TAZOBACTAM 25 GRAM(S): 4; .5 INJECTION, POWDER, LYOPHILIZED, FOR SOLUTION INTRAVENOUS at 13:44

## 2019-10-06 RX ADMIN — Medication 15 MILLIGRAM(S): at 22:30

## 2019-10-06 RX ADMIN — Medication 9 MILLIGRAM(S): at 18:38

## 2019-10-06 RX ADMIN — PANTOPRAZOLE SODIUM 40 MILLIGRAM(S): 20 TABLET, DELAYED RELEASE ORAL at 12:12

## 2019-10-06 RX ADMIN — PIPERACILLIN AND TAZOBACTAM 25 GRAM(S): 4; .5 INJECTION, POWDER, LYOPHILIZED, FOR SOLUTION INTRAVENOUS at 05:21

## 2019-10-06 RX ADMIN — HEPARIN SODIUM 5000 UNIT(S): 5000 INJECTION INTRAVENOUS; SUBCUTANEOUS at 05:20

## 2019-10-06 RX ADMIN — HEPARIN SODIUM 5000 UNIT(S): 5000 INJECTION INTRAVENOUS; SUBCUTANEOUS at 22:09

## 2019-10-06 RX ADMIN — Medication 200 MILLIGRAM(S): at 18:40

## 2019-10-06 RX ADMIN — SODIUM CHLORIDE 100 MILLILITER(S): 9 INJECTION INTRAMUSCULAR; INTRAVENOUS; SUBCUTANEOUS at 22:09

## 2019-10-06 RX ADMIN — HEPARIN SODIUM 5000 UNIT(S): 5000 INJECTION INTRAVENOUS; SUBCUTANEOUS at 13:44

## 2019-10-06 RX ADMIN — Medication 15 MILLIGRAM(S): at 22:13

## 2019-10-06 RX ADMIN — ATORVASTATIN CALCIUM 10 MILLIGRAM(S): 80 TABLET, FILM COATED ORAL at 22:09

## 2019-10-06 NOTE — CONSULT NOTE ADULT - ASSESSMENT
Crohns ileitis with multiple  ulcerations/Chronic Diarrhea/Recurrent GIbleed -recently Dx on a video capsule endoscopy  REC  Start Budesonide 9 mg QD  Start Humira after PPD results  as Outpatient    Glutenfree diet

## 2019-10-06 NOTE — PROGRESS NOTE ADULT - SUBJECTIVE AND OBJECTIVE BOX
Pt was seen and examined at bedside this morning with surgery team. No acute overnight events reported by nursing staff. Pt offers no new complaints at this time. Denies fever/cp/sob/n/v/d/c. Patient had 4 bM last night and flatus. Feels better    T(C): 36.9 (10-06-19 @ 05:18), Max: 37.1 (10-05-19 @ 17:29)  HR: 94 (10-06-19 @ 05:18) (75 - 94)  BP: 120/57 (10-06-19 @ 05:18) (120/57 - 191/96)  RR: 18 (10-06-19 @ 05:18) (16 - 18)  SpO2: 96% (10-06-19 @ 05:18) (96% - 99%)    PHYSICAL EXAM:  Constitutional: NAD, GCS: 15/15  AOX3  Eyes:  WNL  ENMT:  WNL  Neck:  WNL, non tender  Back: Non tender  Respiratory: CTABL  Cardiovascular:  S1+S2+0  Gastrointestinal: Soft, ND , NT  Genitourinary:  WNL  Extremities: NV intact  Vascular:  Intact  Neurological: No focal neurological deficit,  CN, motor and sensory system grossly intact.  Skin: WNL  Musculoskeletal: WNL  Psychiatric: Grossly WNL                          12.2   11.02 )-----------( 308      ( 05 Oct 2019 11:12 )             37.4     10-05    137  |  104  |  20  ----------------------------<  140<H>  4.2   |  24  |  1.00    Ca    9.1      05 Oct 2019 11:12  Mg     2.2     10-05    TPro  9.1<H>  /  Alb  4.2  /  TBili  0.6  /  DBili  x   /  AST  39<H>  /  ALT  48  /  AlkPhos  67  10-05          Pt is a     -Monitor vitals  -Diet:  -Pain control prn  -Anti emetic prn  -Continue IVF  -Continue IVAbx  -Serial abd exams  -Monitor daily labs   -Encourage oob/ambulation  -Encourage IS use  -DVT/GI ppx    Will discuss with , surgery attedning

## 2019-10-06 NOTE — PROGRESS NOTE ADULT - ASSESSMENT
A/P 79M with PSBO now resolving    -having BM  -Trial of CLD today  -Pain control avoid narcotics  -GI/Hospitalist input appreciated  -Serial abdominal exams  -will observe closely      d/w Dr Alberts

## 2019-10-07 ENCOUNTER — TRANSCRIPTION ENCOUNTER (OUTPATIENT)
Age: 79
End: 2019-10-07

## 2019-10-07 VITALS
TEMPERATURE: 98 F | RESPIRATION RATE: 17 BRPM | OXYGEN SATURATION: 100 % | HEART RATE: 79 BPM | SYSTOLIC BLOOD PRESSURE: 141 MMHG | DIASTOLIC BLOOD PRESSURE: 74 MMHG

## 2019-10-07 RX ORDER — ASPIRIN/CALCIUM CARB/MAGNESIUM 324 MG
81 TABLET ORAL DAILY
Refills: 0 | Status: DISCONTINUED | OUTPATIENT
Start: 2019-10-07 | End: 2019-10-07

## 2019-10-07 RX ORDER — CLOPIDOGREL BISULFATE 75 MG/1
75 TABLET, FILM COATED ORAL DAILY
Refills: 0 | Status: DISCONTINUED | OUTPATIENT
Start: 2019-10-07 | End: 2019-10-07

## 2019-10-07 RX ADMIN — Medication 9 MILLIGRAM(S): at 06:01

## 2019-10-07 RX ADMIN — CLOPIDOGREL BISULFATE 75 MILLIGRAM(S): 75 TABLET, FILM COATED ORAL at 11:26

## 2019-10-07 RX ADMIN — Medication 81 MILLIGRAM(S): at 11:26

## 2019-10-07 RX ADMIN — SODIUM CHLORIDE 100 MILLILITER(S): 9 INJECTION INTRAMUSCULAR; INTRAVENOUS; SUBCUTANEOUS at 07:24

## 2019-10-07 RX ADMIN — PANTOPRAZOLE SODIUM 40 MILLIGRAM(S): 20 TABLET, DELAYED RELEASE ORAL at 11:26

## 2019-10-07 RX ADMIN — HEPARIN SODIUM 5000 UNIT(S): 5000 INJECTION INTRAVENOUS; SUBCUTANEOUS at 06:02

## 2019-10-07 RX ADMIN — PIPERACILLIN AND TAZOBACTAM 25 GRAM(S): 4; .5 INJECTION, POWDER, LYOPHILIZED, FOR SOLUTION INTRAVENOUS at 07:21

## 2019-10-07 RX ADMIN — Medication 200 MILLIGRAM(S): at 06:01

## 2019-10-07 NOTE — DISCHARGE NOTE PROVIDER - NSDCCPCAREPLAN_GEN_ALL_CORE_FT
PRINCIPAL DISCHARGE DIAGNOSIS  Diagnosis: Partial small bowel obstruction  Assessment and Plan of Treatment:

## 2019-10-07 NOTE — DISCHARGE NOTE PROVIDER - CARE PROVIDER_API CALL
Brendon Alberts)  Surgery; Surgical Critical Care  [address] [place] [address]  Phone: [phone]  Fax: [phone]  Follow Up Time:

## 2019-10-07 NOTE — PROGRESS NOTE ADULT - SUBJECTIVE AND OBJECTIVE BOX
Pt was seen and examined at bedside this morning with surgery team. No acute overnight events reported by nursing staff. Pt offers no new complaints at this time. Denies fever/cp/sob/n/v/d/c. Patient now having flatus and bowel movements, tolerated clear liquid diets. Afebrile    Vital Signs Last 24 Hrs  T(C): 36.5 (07 Oct 2019 04:47), Max: 36.9 (06 Oct 2019 10:39)  T(F): 97.7 (07 Oct 2019 04:47), Max: 98.4 (06 Oct 2019 10:39)  HR: 75 (07 Oct 2019 04:47) (75 - 91)  BP: 125/60 (07 Oct 2019 04:47) (124/56 - 146/47)  BP(mean): --  RR: 18 (06 Oct 2019 22:07) (17 - 18)  SpO2: 98% (07 Oct 2019 04:47) (97% - 99%)      PHYSICAL EXAM:  Constitutional: NAD, GCS: 15/15  AOX3  Eyes:  WNL  ENMT:  WNL  Neck:  WNL, non tender  Back: Non tender  Respiratory: CTABL  Cardiovascular:  S1+S2+0  Gastrointestinal: Soft, ND , NT  Genitourinary:  WNL  Extremities: NV intact  Vascular:  Intact  Neurological: No focal neurological deficit,  CN, motor and sensory system grossly intact.  Skin: WNL  Musculoskeletal: WNL  Psychiatric: Grossly WNL

## 2019-10-07 NOTE — DISCHARGE NOTE NURSING/CASE MANAGEMENT/SOCIAL WORK - PATIENT PORTAL LINK FT
You can access the FollowMyHealth Patient Portal offered by [facility] by registering at the following website: [email or web address]. By joining [email or web address] [email or web address] portal, you will also be able to view your health information using other applications (apps) compatible with our system.

## 2019-10-07 NOTE — PROGRESS NOTE ADULT - ASSESSMENT
A/P 79M with PSBO now resolved    Advance to regular diet  DC home today once tolerating regular diet  Pain control avoid narcotics  GI/Hospitalist input appreciated  Serial abdominal exams      d/w Dr Alberts

## 2019-10-07 NOTE — PROGRESS NOTE ADULT - SUBJECTIVE AND OBJECTIVE BOX
Patient is a 79y old  Male who presents with a chief complaint of Abdominal PAin (07 Oct 2019 08:32)  HPI:  10/6/2019- Cardiology Consultation: Patient seen for cardiology evaluation in the setting of partial small bowel obstruction. Admitted with abdominal pain, nausea and vomiting and CT imaging consistent with SBO. He was kept NPO on admission and has begun having bowel movements and the abdominal pain and nausea and vomiting are resolved. Recently diagnosed with Crohn's disease after a pill cam study by Dr. Esquivel but not yet started on therapy. In 2019 admitted with GI bleed requiring 1 unit of PRBCs with etiology not determined. Is S/p partial colectomy  apparently for diverticular disease. States he has a small bowel obstruction that resolved spontaneously a few years ago. Cardiac history significant for HBP, HLD, CAD with multiple PCIs most recent ROSLYN 2019, mild aortic stenosis. He currently denies chest pain or dyspnea.     Followup HPI:  10/7- No complaints. Tolerated liquid diet. Surgery and GI notes appreciated.    PAST MEDICAL & SURGICAL HISTORY:  Essential hypertension  Coronary artery disease involving native heart without angina pectoris, unspecified vessel or lesion type  Gastritis  Diverticulitis  S/P coronary artery stent placement  S/P colon resection      Review of symptoms:  Negative except for as noted in today's HPI.      MEDICATIONS  (STANDING):  atorvastatin 10 milliGRAM(s) Oral at bedtime  buDESOnide    EC Capsule 9 milliGRAM(s) Oral daily  heparin  Injectable 5000 Unit(s) SubCutaneous every 8 hours  influenza   Vaccine 0.5 milliLiter(s) IntraMuscular once  labetalol 200 milliGRAM(s) Oral two times a day  pantoprazole  Injectable 40 milliGRAM(s) IV Push daily  piperacillin/tazobactam IVPB.. 3.375 Gram(s) IV Intermittent every 8 hours  sodium chloride 0.9%. 1000 milliLiter(s) (100 mL/Hr) IV Continuous <Continuous>    MEDICATIONS  (PRN):  ketorolac   Injectable 15 milliGRAM(s) IV Push every 6 hours PRN Moderate Pain (4 - 6)  ondansetron Injectable 4 milliGRAM(s) IV Push every 6 hours PRN Nausea and/or Vomiting          Vital Signs Last 24 Hrs  T(C): 36.5 (07 Oct 2019 04:47), Max: 36.9 (06 Oct 2019 10:39)  T(F): 97.7 (07 Oct 2019 04:47), Max: 98.4 (06 Oct 2019 10:39)  HR: 75 (07 Oct 2019 04:47) (75 - 91)  BP: 125/60 (07 Oct 2019 04:47) (124/56 - 146/47)  BP(mean): --  RR: 18 (06 Oct 2019 22:07) (17 - 18)  SpO2: 98% (07 Oct 2019 04:47) (97% - 99%)    I&O's Summary      PHYSICAL EXAM  General Appearance:comfortable  HEENT:   Neck:   Lungs: clesar  Heart: 2/6 NAHID base  Abdomen: soft and nontender  Extremities: no edema  Neurologic:       INTERPRETATION OF TELEMETRY:    ECG:    LABS:                          10.7   6.38  )-----------( 250      ( 06 Oct 2019 08:00 )             33.6     10-06    141  |  112<H>  |  17  ----------------------------<  101<H>  3.5   |  24  |  0.94    Ca    7.8<L>      06 Oct 2019 08:00  Phos  2.8     10-06  Mg     1.9     10-06    TPro  9.1<H>  /  Alb  4.2  /  TBili  0.6  /  DBili  x   /  AST  39<H>  /  ALT  48  /  AlkPhos  67  10-05    CARDIAC MARKERS ( 05 Oct 2019 11:12 )  <0.015 ng/mL / x     / 474 U/L / x     / x                Urinalysis Basic - ( 05 Oct 2019 11:05 )    Color: Yellow / Appearance: Clear / S.015 / pH: x  Gluc: x / Ketone: Negative  / Bili: Negative / Urobili: Negative mg/dL   Blood: x / Protein: 30 mg/dL / Nitrite: Negative   Leuk Esterase: Negative / RBC: Negative /HPF / WBC Negative   Sq Epi: x / Non Sq Epi: Negative / Bacteria: Occasional            RADIOLOGY & ADDITIONAL STUDIES:    IMPRESSION:  1.Partial SBO, resolved.  2..CAD, S/P multiple PCIs, last ROSLYN 2019. Stable.   3.Mild AS, stable.  4.HBP, stable.   5.Crohn's disease.  PLAN:  Restart ASA and Plavix. Continue labetalol and atorvastatin. Advised cardiology f/u with Dr Faraz Almendarez in the office.

## 2019-10-07 NOTE — DISCHARGE NOTE NURSING/CASE MANAGEMENT/SOCIAL WORK - NSDCPETBCESMAN_GEN_ALL_CORE
If you are a smoker, it is important for your health to stop smoking. Please be aware that second hand smoke is also harmful.
Former smoker

## 2019-10-11 DIAGNOSIS — I10 ESSENTIAL (PRIMARY) HYPERTENSION: ICD-10-CM

## 2019-10-11 DIAGNOSIS — I25.10 ATHEROSCLEROTIC HEART DISEASE OF NATIVE CORONARY ARTERY WITHOUT ANGINA PECTORIS: ICD-10-CM

## 2019-10-11 DIAGNOSIS — I35.0 NONRHEUMATIC AORTIC (VALVE) STENOSIS: ICD-10-CM

## 2019-10-11 DIAGNOSIS — Z95.5 PRESENCE OF CORONARY ANGIOPLASTY IMPLANT AND GRAFT: ICD-10-CM

## 2019-10-11 DIAGNOSIS — K50.018 CROHN'S DISEASE OF SMALL INTESTINE WITH OTHER COMPLICATION: ICD-10-CM

## 2019-10-11 DIAGNOSIS — K56.600 PARTIAL INTESTINAL OBSTRUCTION, UNSPECIFIED AS TO CAUSE: ICD-10-CM

## 2019-10-11 DIAGNOSIS — K63.3 ULCER OF INTESTINE: ICD-10-CM

## 2019-10-11 DIAGNOSIS — K92.2 GASTROINTESTINAL HEMORRHAGE, UNSPECIFIED: ICD-10-CM

## 2019-10-11 DIAGNOSIS — E78.5 HYPERLIPIDEMIA, UNSPECIFIED: ICD-10-CM

## 2019-11-08 NOTE — DISCHARGE NOTE ADULT - LAUNCH MEDICATION RECONCILIATION
Quality 137: Melanoma: Continuity Of Care - Recall System: Patient information entered into a recall system that includes: target date for the next exam specified AND a process to follow up with patients regarding missed or unscheduled appointments Detail Level: Detailed Quality 110: Preventive Care And Screening: Influenza Immunization: Influenza Immunization Administered during Influenza season <<-----Click here for Discharge Medication Review

## 2020-03-08 ENCOUNTER — TRANSCRIPTION ENCOUNTER (OUTPATIENT)
Age: 80
End: 2020-03-08

## 2020-03-10 ENCOUNTER — EMERGENCY (EMERGENCY)
Facility: HOSPITAL | Age: 80
LOS: 0 days | Discharge: HOME | End: 2020-03-10
Attending: EMERGENCY MEDICINE
Payer: MEDICARE

## 2020-03-10 VITALS
RESPIRATION RATE: 18 BRPM | OXYGEN SATURATION: 97 % | DIASTOLIC BLOOD PRESSURE: 89 MMHG | HEIGHT: 66 IN | HEART RATE: 105 BPM | TEMPERATURE: 100 F | SYSTOLIC BLOOD PRESSURE: 173 MMHG | WEIGHT: 169.98 LBS

## 2020-03-10 VITALS — TEMPERATURE: 100 F

## 2020-03-10 DIAGNOSIS — J02.9 ACUTE PHARYNGITIS, UNSPECIFIED: ICD-10-CM

## 2020-03-10 DIAGNOSIS — I25.10 ATHEROSCLEROTIC HEART DISEASE OF NATIVE CORONARY ARTERY WITHOUT ANGINA PECTORIS: ICD-10-CM

## 2020-03-10 DIAGNOSIS — Z79.02 LONG TERM (CURRENT) USE OF ANTITHROMBOTICS/ANTIPLATELETS: ICD-10-CM

## 2020-03-10 DIAGNOSIS — K50.90 CROHN'S DISEASE, UNSPECIFIED, WITHOUT COMPLICATIONS: ICD-10-CM

## 2020-03-10 DIAGNOSIS — Z95.5 PRESENCE OF CORONARY ANGIOPLASTY IMPLANT AND GRAFT: Chronic | ICD-10-CM

## 2020-03-10 DIAGNOSIS — Z91.040 LATEX ALLERGY STATUS: ICD-10-CM

## 2020-03-10 DIAGNOSIS — I10 ESSENTIAL (PRIMARY) HYPERTENSION: ICD-10-CM

## 2020-03-10 DIAGNOSIS — B37.0 CANDIDAL STOMATITIS: ICD-10-CM

## 2020-03-10 DIAGNOSIS — Z95.5 PRESENCE OF CORONARY ANGIOPLASTY IMPLANT AND GRAFT: ICD-10-CM

## 2020-03-10 DIAGNOSIS — B37.81 CANDIDAL ESOPHAGITIS: ICD-10-CM

## 2020-03-10 DIAGNOSIS — Z90.49 ACQUIRED ABSENCE OF OTHER SPECIFIED PARTS OF DIGESTIVE TRACT: Chronic | ICD-10-CM

## 2020-03-10 PROCEDURE — 99283 EMERGENCY DEPT VISIT LOW MDM: CPT

## 2020-03-10 RX ORDER — NYSTATIN 500MM UNIT
5 POWDER (EA) MISCELLANEOUS
Qty: 200 | Refills: 0
Start: 2020-03-10 | End: 2020-03-19

## 2020-03-10 RX ORDER — NYSTATIN 500MM UNIT
500000 POWDER (EA) MISCELLANEOUS ONCE
Refills: 0 | Status: COMPLETED | OUTPATIENT
Start: 2020-03-10 | End: 2020-03-10

## 2020-03-10 RX ORDER — LIDOCAINE 4 G/100G
10 CREAM TOPICAL ONCE
Refills: 0 | Status: COMPLETED | OUTPATIENT
Start: 2020-03-10 | End: 2020-03-10

## 2020-03-10 RX ADMIN — Medication 500000 UNIT(S): at 07:33

## 2020-03-10 RX ADMIN — LIDOCAINE 10 MILLILITER(S): 4 CREAM TOPICAL at 07:33

## 2020-03-10 NOTE — ED PROVIDER NOTE - PHYSICAL EXAMINATION
Constitutional: NAD AAOx3  Eyes: PERRLA EOMI  Head: Normocephalic atraumatic  Mouth: MMM  ENT: posterior pharynx with numerus white patchy leasions satellites consistent with thrush full rom of neck no meningismus  Cardiac: regular rate   Resp: Lungs CTAB  GI: Abd s/nt/nd  Neuro: CN2-12 intact  Skin: No rashes

## 2020-03-10 NOTE — ED ADULT TRIAGE NOTE - CHIEF COMPLAINT QUOTE
pt c/o worsening sore throat, painful swallowing that began on friday. came back from [facility] on saturday and went to urgent care, was prescribed cefdinir. Test for flu and strep at urgent care, have not received results on strep, flu (-). denies cough/fever/SOB.

## 2020-03-10 NOTE — ED ADULT NURSE NOTE - CHIEF COMPLAINT QUOTE
pt c/o worsening sore throat, painful swallowing that began on friday. came back from [facility] on saturday and went to urgent care, was prescribed cefdinir. Test for flu and strep at urgent care, have not received results on strep, flu (-). denies cough/fever/SOB.

## 2020-03-10 NOTE — ED PROVIDER NOTE - NS ED ROS FT
Nausea, vomiting flank pain Given Methadone 110mg this AM
Constitutional: No fever or chills  Eyes: No visual changes  HEENT: + throat pain  CV: No chest pain  Resp: No SOB no cough  GI: No abd pain, nausea or vomiting  : No dysuria  MSK: No musculoskeletal pain  Skin: No rash  Neuro: No headache

## 2020-03-10 NOTE — ED PROVIDER NOTE - OBJECTIVE STATEMENT
80M hx htn cad s/p stents Chron's on humera presents to the ED with sore throat. symtpoms started this past Friday[place] getting worse. able to swallow. no headache fever neck pain skin rash. no drooling trismus or stridor. able to drink tea and ice pops. went to urgent care had negative flu and strep but was rx cefdinir. now noticing white patches on throat so came in for eval.

## 2020-03-10 NOTE — ED ADULT NURSE NOTE - OBJECTIVE STATEMENT
Patient presents to the ED with sore throat. Patient endorses symptoms started this past Friday. Patient states he was in the [place]. getting worse. able to swallow. no headache fever neck pain skin rash. no drooling trismus or stridor. able to drink tea and ice pops. went to urgent care had negative flu and strep but was rx cefdinir. now noticing white patches on throat so came in for eval.

## 2020-03-10 NOTE — ED PROVIDER NOTE - CLINICAL SUMMARY MEDICAL DECISION MAKING FREE TEXT BOX
80M hx htn cad s/p stents Chron's on humera presents to the ED with sore throat. symtpoms started this past Friday. [place] getting worse. able to swallow. no headache fever neck pain skin rash. no drooling trismus or stridor. able to drink tea and ice pops. went to urgent care had negative flu and strep but was rx cefdinir. now noticing white patches on throat so came in for eval. exam with posterior pharynx with numerus white patchy leasions satellites consistent with thrush full rom of neck no meningismus. likely thrush 2/2 humera. Will place pt on nystatin - counseled on hydration signs and symptoms  to look out for -pt to follow up with his GI. Faraz Hogue M.D., Attending Physician

## 2020-03-10 NOTE — ED PROVIDER NOTE - PATIENT PORTAL LINK FT
You can access the FollowMyHealth Patient Portal offered by [facility] by registering at the following website: [email or web address]. By joining [employer]’s FollowMyHealth portal, you will also be able to view your health information using other applications (apps) compatible with our system.

## 2020-07-28 ENCOUNTER — INPATIENT (INPATIENT)
Facility: HOSPITAL | Age: 80
LOS: 8 days | Discharge: HOME | DRG: 329 | End: 2020-08-06
Attending: SURGERY | Admitting: SURGERY
Payer: MEDICARE

## 2020-07-28 VITALS — WEIGHT: 169.98 LBS

## 2020-07-28 DIAGNOSIS — K56.609 UNSPECIFIED INTESTINAL OBSTRUCTION, UNSPECIFIED AS TO PARTIAL VERSUS COMPLETE OBSTRUCTION: ICD-10-CM

## 2020-07-28 DIAGNOSIS — Z90.49 ACQUIRED ABSENCE OF OTHER SPECIFIED PARTS OF DIGESTIVE TRACT: Chronic | ICD-10-CM

## 2020-07-28 DIAGNOSIS — Z95.5 PRESENCE OF CORONARY ANGIOPLASTY IMPLANT AND GRAFT: Chronic | ICD-10-CM

## 2020-07-28 LAB
ALBUMIN SERPL ELPH-MCNC: 3.5 G/DL — SIGNIFICANT CHANGE UP (ref 3.3–5)
ALBUMIN SERPL ELPH-MCNC: 4 G/DL — SIGNIFICANT CHANGE UP (ref 3.3–5)
ALP SERPL-CCNC: 60 U/L — SIGNIFICANT CHANGE UP (ref 40–120)
ALP SERPL-CCNC: 64 U/L — SIGNIFICANT CHANGE UP (ref 40–120)
ALT FLD-CCNC: 42 U/L — SIGNIFICANT CHANGE UP (ref 12–78)
ALT FLD-CCNC: 47 U/L — SIGNIFICANT CHANGE UP (ref 12–78)
ANION GAP SERPL CALC-SCNC: 8 MMOL/L — SIGNIFICANT CHANGE UP (ref 5–17)
ANION GAP SERPL CALC-SCNC: 8 MMOL/L — SIGNIFICANT CHANGE UP (ref 5–17)
APPEARANCE UR: CLEAR — SIGNIFICANT CHANGE UP
APTT BLD: 28.7 SEC — SIGNIFICANT CHANGE UP (ref 27.5–35.5)
AST SERPL-CCNC: 25 U/L — SIGNIFICANT CHANGE UP (ref 15–37)
AST SERPL-CCNC: 31 U/L — SIGNIFICANT CHANGE UP (ref 15–37)
BASOPHILS # BLD AUTO: 0.05 K/UL — SIGNIFICANT CHANGE UP (ref 0–0.2)
BASOPHILS NFR BLD AUTO: 0.3 % — SIGNIFICANT CHANGE UP (ref 0–2)
BILIRUB SERPL-MCNC: 0.6 MG/DL — SIGNIFICANT CHANGE UP (ref 0.2–1.2)
BILIRUB SERPL-MCNC: 0.8 MG/DL — SIGNIFICANT CHANGE UP (ref 0.2–1.2)
BILIRUB UR-MCNC: NEGATIVE — SIGNIFICANT CHANGE UP
BUN SERPL-MCNC: 19 MG/DL — SIGNIFICANT CHANGE UP (ref 7–23)
BUN SERPL-MCNC: 21 MG/DL — SIGNIFICANT CHANGE UP (ref 7–23)
CALCIUM SERPL-MCNC: 8.4 MG/DL — LOW (ref 8.5–10.1)
CALCIUM SERPL-MCNC: 9.3 MG/DL — SIGNIFICANT CHANGE UP (ref 8.5–10.1)
CHLORIDE SERPL-SCNC: 107 MMOL/L — SIGNIFICANT CHANGE UP (ref 96–108)
CHLORIDE SERPL-SCNC: 108 MMOL/L — SIGNIFICANT CHANGE UP (ref 96–108)
CO2 SERPL-SCNC: 24 MMOL/L — SIGNIFICANT CHANGE UP (ref 22–31)
CO2 SERPL-SCNC: 24 MMOL/L — SIGNIFICANT CHANGE UP (ref 22–31)
COLOR SPEC: YELLOW — SIGNIFICANT CHANGE UP
CREAT SERPL-MCNC: 0.96 MG/DL — SIGNIFICANT CHANGE UP (ref 0.5–1.3)
CREAT SERPL-MCNC: 1.02 MG/DL — SIGNIFICANT CHANGE UP (ref 0.5–1.3)
DIFF PNL FLD: ABNORMAL
EOSINOPHIL # BLD AUTO: 0.02 K/UL — SIGNIFICANT CHANGE UP (ref 0–0.5)
EOSINOPHIL NFR BLD AUTO: 0.1 % — SIGNIFICANT CHANGE UP (ref 0–6)
GLUCOSE SERPL-MCNC: 140 MG/DL — HIGH (ref 70–99)
GLUCOSE SERPL-MCNC: 149 MG/DL — HIGH (ref 70–99)
GLUCOSE UR QL: NEGATIVE MG/DL — SIGNIFICANT CHANGE UP
HCT VFR BLD CALC: 41.7 % — SIGNIFICANT CHANGE UP (ref 39–50)
HCT VFR BLD CALC: 42.1 % — SIGNIFICANT CHANGE UP (ref 39–50)
HGB BLD-MCNC: 13.8 G/DL — SIGNIFICANT CHANGE UP (ref 13–17)
HGB BLD-MCNC: 13.9 G/DL — SIGNIFICANT CHANGE UP (ref 13–17)
IMM GRANULOCYTES NFR BLD AUTO: 0.4 % — SIGNIFICANT CHANGE UP (ref 0–1.5)
INR BLD: 1.05 RATIO — SIGNIFICANT CHANGE UP (ref 0.88–1.16)
KETONES UR-MCNC: NEGATIVE — SIGNIFICANT CHANGE UP
LACTATE SERPL-SCNC: 2.4 MMOL/L — HIGH (ref 0.7–2)
LACTATE SERPL-SCNC: 3.1 MMOL/L — HIGH (ref 0.7–2)
LACTATE SERPL-SCNC: 3.4 MMOL/L — HIGH (ref 0.7–2)
LEUKOCYTE ESTERASE UR-ACNC: NEGATIVE — SIGNIFICANT CHANGE UP
LIDOCAIN IGE QN: 115 U/L — SIGNIFICANT CHANGE UP (ref 73–393)
LYMPHOCYTES # BLD AUTO: 1.2 K/UL — SIGNIFICANT CHANGE UP (ref 1–3.3)
LYMPHOCYTES # BLD AUTO: 6.3 % — LOW (ref 13–44)
MCHC RBC-ENTMCNC: 30.7 PG — SIGNIFICANT CHANGE UP (ref 27–34)
MCHC RBC-ENTMCNC: 30.8 PG — SIGNIFICANT CHANGE UP (ref 27–34)
MCHC RBC-ENTMCNC: 32.8 GM/DL — SIGNIFICANT CHANGE UP (ref 32–36)
MCHC RBC-ENTMCNC: 33.3 GM/DL — SIGNIFICANT CHANGE UP (ref 32–36)
MCV RBC AUTO: 92.5 FL — SIGNIFICANT CHANGE UP (ref 80–100)
MCV RBC AUTO: 93.8 FL — SIGNIFICANT CHANGE UP (ref 80–100)
MONOCYTES # BLD AUTO: 1.26 K/UL — HIGH (ref 0–0.9)
MONOCYTES NFR BLD AUTO: 6.6 % — SIGNIFICANT CHANGE UP (ref 2–14)
NEUTROPHILS # BLD AUTO: 16.47 K/UL — HIGH (ref 1.8–7.4)
NEUTROPHILS NFR BLD AUTO: 86.3 % — HIGH (ref 43–77)
NITRITE UR-MCNC: NEGATIVE — SIGNIFICANT CHANGE UP
PH UR: 5 — SIGNIFICANT CHANGE UP (ref 5–8)
PLATELET # BLD AUTO: 259 K/UL — SIGNIFICANT CHANGE UP (ref 150–400)
PLATELET # BLD AUTO: 270 K/UL — SIGNIFICANT CHANGE UP (ref 150–400)
POTASSIUM SERPL-MCNC: 3.5 MMOL/L — SIGNIFICANT CHANGE UP (ref 3.5–5.3)
POTASSIUM SERPL-MCNC: 3.6 MMOL/L — SIGNIFICANT CHANGE UP (ref 3.5–5.3)
POTASSIUM SERPL-SCNC: 3.5 MMOL/L — SIGNIFICANT CHANGE UP (ref 3.5–5.3)
POTASSIUM SERPL-SCNC: 3.6 MMOL/L — SIGNIFICANT CHANGE UP (ref 3.5–5.3)
PROT SERPL-MCNC: 8.7 GM/DL — HIGH (ref 6–8.3)
PROT SERPL-MCNC: 9.6 GM/DL — HIGH (ref 6–8.3)
PROT UR-MCNC: 100 MG/DL
PROTHROM AB SERPL-ACNC: 12.1 SEC — SIGNIFICANT CHANGE UP (ref 10.6–13.6)
RBC # BLD: 4.49 M/UL — SIGNIFICANT CHANGE UP (ref 4.2–5.8)
RBC # BLD: 4.51 M/UL — SIGNIFICANT CHANGE UP (ref 4.2–5.8)
RBC # FLD: 14.6 % — HIGH (ref 10.3–14.5)
RBC # FLD: 14.9 % — HIGH (ref 10.3–14.5)
SODIUM SERPL-SCNC: 139 MMOL/L — SIGNIFICANT CHANGE UP (ref 135–145)
SODIUM SERPL-SCNC: 140 MMOL/L — SIGNIFICANT CHANGE UP (ref 135–145)
SP GR SPEC: 1.01 — SIGNIFICANT CHANGE UP (ref 1.01–1.02)
UROBILINOGEN FLD QL: NEGATIVE MG/DL — SIGNIFICANT CHANGE UP
WBC # BLD: 14.09 K/UL — HIGH (ref 3.8–10.5)
WBC # BLD: 19.07 K/UL — HIGH (ref 3.8–10.5)
WBC # FLD AUTO: 14.09 K/UL — HIGH (ref 3.8–10.5)
WBC # FLD AUTO: 19.07 K/UL — HIGH (ref 3.8–10.5)

## 2020-07-28 PROCEDURE — C9113: CPT

## 2020-07-28 PROCEDURE — 86901 BLOOD TYPING SEROLOGIC RH(D): CPT

## 2020-07-28 PROCEDURE — 87493 C DIFF AMPLIFIED PROBE: CPT

## 2020-07-28 PROCEDURE — 36415 COLL VENOUS BLD VENIPUNCTURE: CPT

## 2020-07-28 PROCEDURE — 74018 RADEX ABDOMEN 1 VIEW: CPT

## 2020-07-28 PROCEDURE — 80048 BASIC METABOLIC PNL TOTAL CA: CPT

## 2020-07-28 PROCEDURE — 97116 GAIT TRAINING THERAPY: CPT | Mod: GP

## 2020-07-28 PROCEDURE — 71045 X-RAY EXAM CHEST 1 VIEW: CPT

## 2020-07-28 PROCEDURE — 97162 PT EVAL MOD COMPLEX 30 MIN: CPT | Mod: GP

## 2020-07-28 PROCEDURE — 93005 ELECTROCARDIOGRAM TRACING: CPT

## 2020-07-28 PROCEDURE — U0003: CPT

## 2020-07-28 PROCEDURE — 88307 TISSUE EXAM BY PATHOLOGIST: CPT

## 2020-07-28 PROCEDURE — 85027 COMPLETE CBC AUTOMATED: CPT

## 2020-07-28 PROCEDURE — 83735 ASSAY OF MAGNESIUM: CPT

## 2020-07-28 PROCEDURE — 82962 GLUCOSE BLOOD TEST: CPT

## 2020-07-28 PROCEDURE — 87507 IADNA-DNA/RNA PROBE TQ 12-25: CPT

## 2020-07-28 PROCEDURE — 80053 COMPREHEN METABOLIC PANEL: CPT

## 2020-07-28 PROCEDURE — 71045 X-RAY EXAM CHEST 1 VIEW: CPT | Mod: 26

## 2020-07-28 PROCEDURE — 85610 PROTHROMBIN TIME: CPT

## 2020-07-28 PROCEDURE — 85025 COMPLETE CBC W/AUTO DIFF WBC: CPT

## 2020-07-28 PROCEDURE — 83036 HEMOGLOBIN GLYCOSYLATED A1C: CPT

## 2020-07-28 PROCEDURE — 74177 CT ABD & PELVIS W/CONTRAST: CPT | Mod: 26

## 2020-07-28 PROCEDURE — 86850 RBC ANTIBODY SCREEN: CPT

## 2020-07-28 PROCEDURE — 86769 SARS-COV-2 COVID-19 ANTIBODY: CPT

## 2020-07-28 PROCEDURE — 97530 THERAPEUTIC ACTIVITIES: CPT | Mod: GP

## 2020-07-28 PROCEDURE — 84478 ASSAY OF TRIGLYCERIDES: CPT

## 2020-07-28 PROCEDURE — 83605 ASSAY OF LACTIC ACID: CPT

## 2020-07-28 PROCEDURE — 86900 BLOOD TYPING SEROLOGIC ABO: CPT

## 2020-07-28 PROCEDURE — 93306 TTE W/DOPPLER COMPLETE: CPT

## 2020-07-28 PROCEDURE — C1889: CPT

## 2020-07-28 PROCEDURE — 85730 THROMBOPLASTIN TIME PARTIAL: CPT

## 2020-07-28 PROCEDURE — 84100 ASSAY OF PHOSPHORUS: CPT

## 2020-07-28 RX ORDER — ATORVASTATIN CALCIUM 80 MG/1
10 TABLET, FILM COATED ORAL DAILY
Refills: 0 | Status: DISCONTINUED | OUTPATIENT
Start: 2020-07-28 | End: 2020-07-29

## 2020-07-28 RX ORDER — SODIUM CHLORIDE 9 MG/ML
1000 INJECTION, SOLUTION INTRAVENOUS ONCE
Refills: 0 | Status: COMPLETED | OUTPATIENT
Start: 2020-07-28 | End: 2020-07-28

## 2020-07-28 RX ORDER — SODIUM CHLORIDE 9 MG/ML
1000 INJECTION INTRAMUSCULAR; INTRAVENOUS; SUBCUTANEOUS ONCE
Refills: 0 | Status: COMPLETED | OUTPATIENT
Start: 2020-07-28 | End: 2020-07-28

## 2020-07-28 RX ORDER — ONDANSETRON 8 MG/1
4 TABLET, FILM COATED ORAL ONCE
Refills: 0 | Status: COMPLETED | OUTPATIENT
Start: 2020-07-28 | End: 2020-07-28

## 2020-07-28 RX ORDER — CLOPIDOGREL BISULFATE 75 MG/1
75 TABLET, FILM COATED ORAL DAILY
Refills: 0 | Status: DISCONTINUED | OUTPATIENT
Start: 2020-07-28 | End: 2020-07-28

## 2020-07-28 RX ORDER — CEFEPIME 1 G/1
1000 INJECTION, POWDER, FOR SOLUTION INTRAMUSCULAR; INTRAVENOUS ONCE
Refills: 0 | Status: COMPLETED | OUTPATIENT
Start: 2020-07-28 | End: 2020-07-28

## 2020-07-28 RX ORDER — MORPHINE SULFATE 50 MG/1
4 CAPSULE, EXTENDED RELEASE ORAL ONCE
Refills: 0 | Status: DISCONTINUED | OUTPATIENT
Start: 2020-07-28 | End: 2020-07-28

## 2020-07-28 RX ORDER — CEFEPIME 1 G/1
1000 INJECTION, POWDER, FOR SOLUTION INTRAMUSCULAR; INTRAVENOUS ONCE
Refills: 0 | Status: DISCONTINUED | OUTPATIENT
Start: 2020-07-28 | End: 2020-07-28

## 2020-07-28 RX ORDER — ONDANSETRON 8 MG/1
4 TABLET, FILM COATED ORAL EVERY 6 HOURS
Refills: 0 | Status: DISCONTINUED | OUTPATIENT
Start: 2020-07-28 | End: 2020-07-28

## 2020-07-28 RX ORDER — SODIUM CHLORIDE 9 MG/ML
1000 INJECTION, SOLUTION INTRAVENOUS
Refills: 0 | Status: DISCONTINUED | OUTPATIENT
Start: 2020-07-28 | End: 2020-07-29

## 2020-07-28 RX ORDER — PANTOPRAZOLE SODIUM 20 MG/1
40 TABLET, DELAYED RELEASE ORAL DAILY
Refills: 0 | Status: DISCONTINUED | OUTPATIENT
Start: 2020-07-28 | End: 2020-07-29

## 2020-07-28 RX ORDER — SODIUM CHLORIDE 9 MG/ML
1400 INJECTION INTRAMUSCULAR; INTRAVENOUS; SUBCUTANEOUS ONCE
Refills: 0 | Status: COMPLETED | OUTPATIENT
Start: 2020-07-28 | End: 2020-07-28

## 2020-07-28 RX ORDER — DIAZEPAM 5 MG
5 TABLET ORAL THREE TIMES A DAY
Refills: 0 | Status: DISCONTINUED | OUTPATIENT
Start: 2020-07-28 | End: 2020-07-29

## 2020-07-28 RX ORDER — MORPHINE SULFATE 50 MG/1
1 CAPSULE, EXTENDED RELEASE ORAL EVERY 4 HOURS
Refills: 0 | Status: DISCONTINUED | OUTPATIENT
Start: 2020-07-28 | End: 2020-07-29

## 2020-07-28 RX ORDER — HEPARIN SODIUM 5000 [USP'U]/ML
5000 INJECTION INTRAVENOUS; SUBCUTANEOUS EVERY 8 HOURS
Refills: 0 | Status: DISCONTINUED | OUTPATIENT
Start: 2020-07-28 | End: 2020-07-29

## 2020-07-28 RX ORDER — ACETAMINOPHEN 500 MG
650 TABLET ORAL EVERY 6 HOURS
Refills: 0 | Status: DISCONTINUED | OUTPATIENT
Start: 2020-07-28 | End: 2020-07-29

## 2020-07-28 RX ORDER — PIPERACILLIN AND TAZOBACTAM 4; .5 G/20ML; G/20ML
3.38 INJECTION, POWDER, LYOPHILIZED, FOR SOLUTION INTRAVENOUS ONCE
Refills: 0 | Status: COMPLETED | OUTPATIENT
Start: 2020-07-28 | End: 2020-07-28

## 2020-07-28 RX ORDER — DIPHENHYDRAMINE HCL 50 MG
25 CAPSULE ORAL ONCE
Refills: 0 | Status: COMPLETED | OUTPATIENT
Start: 2020-07-28 | End: 2020-07-28

## 2020-07-28 RX ORDER — ONDANSETRON 8 MG/1
4 TABLET, FILM COATED ORAL EVERY 6 HOURS
Refills: 0 | Status: DISCONTINUED | OUTPATIENT
Start: 2020-07-28 | End: 2020-07-29

## 2020-07-28 RX ORDER — PIPERACILLIN AND TAZOBACTAM 4; .5 G/20ML; G/20ML
3.38 INJECTION, POWDER, LYOPHILIZED, FOR SOLUTION INTRAVENOUS EVERY 8 HOURS
Refills: 0 | Status: DISCONTINUED | OUTPATIENT
Start: 2020-07-28 | End: 2020-07-29

## 2020-07-28 RX ORDER — LABETALOL HCL 100 MG
200 TABLET ORAL
Refills: 0 | Status: DISCONTINUED | OUTPATIENT
Start: 2020-07-28 | End: 2020-07-29

## 2020-07-28 RX ADMIN — MORPHINE SULFATE 1 MILLIGRAM(S): 50 CAPSULE, EXTENDED RELEASE ORAL at 15:00

## 2020-07-28 RX ADMIN — SODIUM CHLORIDE 2800 MILLILITER(S): 9 INJECTION INTRAMUSCULAR; INTRAVENOUS; SUBCUTANEOUS at 12:50

## 2020-07-28 RX ADMIN — MORPHINE SULFATE 4 MILLIGRAM(S): 50 CAPSULE, EXTENDED RELEASE ORAL at 12:58

## 2020-07-28 RX ADMIN — MORPHINE SULFATE 4 MILLIGRAM(S): 50 CAPSULE, EXTENDED RELEASE ORAL at 12:05

## 2020-07-28 RX ADMIN — ONDANSETRON 4 MILLIGRAM(S): 8 TABLET, FILM COATED ORAL at 12:04

## 2020-07-28 RX ADMIN — ATORVASTATIN CALCIUM 10 MILLIGRAM(S): 80 TABLET, FILM COATED ORAL at 22:37

## 2020-07-28 RX ADMIN — SODIUM CHLORIDE 125 MILLILITER(S): 9 INJECTION, SOLUTION INTRAVENOUS at 22:39

## 2020-07-28 RX ADMIN — HEPARIN SODIUM 5000 UNIT(S): 5000 INJECTION INTRAVENOUS; SUBCUTANEOUS at 22:37

## 2020-07-28 RX ADMIN — SODIUM CHLORIDE 1000 MILLILITER(S): 9 INJECTION, SOLUTION INTRAVENOUS at 20:40

## 2020-07-28 RX ADMIN — CEFEPIME 1000 MILLIGRAM(S): 1 INJECTION, POWDER, FOR SOLUTION INTRAMUSCULAR; INTRAVENOUS at 13:44

## 2020-07-28 RX ADMIN — MORPHINE SULFATE 4 MILLIGRAM(S): 50 CAPSULE, EXTENDED RELEASE ORAL at 13:45

## 2020-07-28 RX ADMIN — Medication 200 MILLIGRAM(S): at 22:37

## 2020-07-28 RX ADMIN — ONDANSETRON 4 MILLIGRAM(S): 8 TABLET, FILM COATED ORAL at 18:36

## 2020-07-28 RX ADMIN — SODIUM CHLORIDE 2000 MILLILITER(S): 9 INJECTION INTRAMUSCULAR; INTRAVENOUS; SUBCUTANEOUS at 12:05

## 2020-07-28 RX ADMIN — SODIUM CHLORIDE 4000 MILLILITER(S): 9 INJECTION INTRAMUSCULAR; INTRAVENOUS; SUBCUTANEOUS at 21:59

## 2020-07-28 RX ADMIN — SODIUM CHLORIDE 4000 MILLILITER(S): 9 INJECTION INTRAMUSCULAR; INTRAVENOUS; SUBCUTANEOUS at 23:16

## 2020-07-28 RX ADMIN — SODIUM CHLORIDE 100 MILLILITER(S): 9 INJECTION, SOLUTION INTRAVENOUS at 18:36

## 2020-07-28 RX ADMIN — ONDANSETRON 4 MILLIGRAM(S): 8 TABLET, FILM COATED ORAL at 13:06

## 2020-07-28 RX ADMIN — SODIUM CHLORIDE 1000 MILLILITER(S): 9 INJECTION INTRAMUSCULAR; INTRAVENOUS; SUBCUTANEOUS at 12:46

## 2020-07-28 NOTE — ED ADULT NURSE REASSESSMENT NOTE - NS ED NURSE REASSESS COMMENT FT1
report received from RN bassam garrett. pt c/o reflux and upon exam, pt was belching repeatedly. pt given PRN morphine prior to NGT insertion. 12Fr NGT inserted to left nare. Ascultated the epigastrum for placement. pt tolerated procedure well. pt in no acute distress. resting comfortably in stretcher at this time. will ctm.

## 2020-07-28 NOTE — H&P ADULT - NSHPLABSRESULTS_GEN_ALL_CORE
Vitals:  T(C): 36.9 (07-28 @ 15:03), Max: 36.9 (07-28 @ 11:21)  HR: 89 (07-28 @ 15:03) (89 - 90)  BP: 178/78 (07-28 @ 15:03) (178/78 - 180/84)  RR: 17 (07-28 @ 15:03) (17 - 18)  SpO2: 96% (07-28 @ 15:03) (96% - 97%)        07-28 @ 11:53                    13.9  CBC: 19.07>)-------(<270                     41.7                 139 | 107 | 19    CMP:  ----------------------< 149               3.6 | 24 | 1.02                      Ca:9.3  Phos:-  Mg:-               0.6|      |31        LFTs:  ------|64|-----             -|      |-      Current Inpatient Medications:  acetaminophen   Tablet .. 650 milliGRAM(s) Oral every 6 hours PRN  atorvastatin Oral Tab/Cap - Peds 10 milliGRAM(s) Oral daily  clopidogrel Tablet 75 milliGRAM(s) Oral daily  diazepam    Tablet 5 milliGRAM(s) Oral three times a day PRN  heparin   Injectable 5000 Unit(s) SubCutaneous every 8 hours  labetalol  Oral Tab/Cap - Peds 200 milliGRAM(s) Oral two times a day  lactated ringers. 1000 milliLiter(s) (100 mL/Hr) IV Continuous <Continuous>  morphine  - Injectable 1 milliGRAM(s) IV Push every 4 hours PRN  ondansetron Injectable 4 milliGRAM(s) IV Push every 6 hours PRN  pantoprazole  Injectable 40 milliGRAM(s) IV Push daily

## 2020-07-28 NOTE — ED ADULT NURSE NOTE - OBJECTIVE STATEMENT
Pt comes to the ED complaining of abdominal pain and nausea that started last night after eating dinner. + nausea, denies vomiting and states that he feels that he is constipated. Pt states that he had a small bowel movement approx 4 hours ago.

## 2020-07-28 NOTE — H&P ADULT - HISTORY OF PRESENT ILLNESS
79 y/o male with a PMHx of CAD with stents, Crohns, diverticulitis s/p open Sigmoidectomy, essential HTN, gastritis, 2 previous episodes of SBO, cholecystectomy presents to the ED c/o abd pain since last night after eating. +n/v. Denies diarrhea, admits to 2 normal BMs this AM but denies any other Flatus. No recent travel. No known sick contacts. No other complaints at this time

## 2020-07-28 NOTE — ED STATDOCS - OBJECTIVE STATEMENT
81 y/o male with a PMHx of CAD with stents, Crohns, diverticulitis, essential HTN, gastritis, h/o cholecystectomy presents to the ED c/o abd pain since last night after eating. +n/v. Denies diarrhea. No recent travel. No known sick contacts. No other complaints at this time. GI: Dr. Esquivel. PCP: Dr. Almendarez.

## 2020-07-28 NOTE — PROGRESS NOTE ADULT - SUBJECTIVE AND OBJECTIVE BOX
Patient being followed closely by surgery team.     Abdominal exam remains benign; soft, non tender, non distended  Will continue serial abdominal labs  Continue IV fluid resuscitation  Trending lactic acid and urine output  Monitor Vitals    Plan discussed with Dr. Alberts

## 2020-07-28 NOTE — H&P ADULT - ASSESSMENT
79 y/o male with a PMHx of CAD with stents, Crohns, diverticulitis, essential HTN, gastritis, h/o cholecystectomy presents with repeat SBO    Plan:  Admit to surgery service under Dr. Schneider  Place NGT  NPO  Monitor bowel function  Pain/nausea control prn  Monitor vitals  AM Labs  Restart home medications  DVT/GI ppx      Plan discussed with Dr. Alberts 79 y/o male with a PMHx of CAD with stents, Crohns, diverticulitis, essential HTN, gastritis, h/o cholecystectomy presents with repeat SBO    Plan:  Admit to surgery service under Dr. Schneider  Place NGT  NPO  IVF  Monitor bowel function  Pain/nausea control prn  Monitor vitals  AM Labs  Restart home medications  DVT/GI ppx      Plan discussed with Dr. Alberts

## 2020-07-28 NOTE — H&P ADULT - CLICK TO LAUNCH ORM
Patient presents to ED with mother for concerns of cough, crying and fussiness for four days. Mother reports \"normal\" appetite.
.

## 2020-07-28 NOTE — ED STATDOCS - PROGRESS NOTE DETAILS
Patient seen and evaluated, updated on lab results and need for blood cultures and IV abx.  CT results pending.  Still reporting pain and nausea, more medications ordered -Wen Gagnon PA-C Attending Serrano, pt updated on results of tests and plan for ng tube and admission to surgery. Kamila Acosta for attending Dr. Serrano: Discussed with Dr. Alberts. Pt to be admitted to his service. Agrees with NG tube. Residents will come see pt.

## 2020-07-28 NOTE — H&P ADULT - ATTENDING COMMENTS
Patient was seen and examined, modifications and corrections made  I agree with findings and plan      Follow up lactate

## 2020-07-28 NOTE — ED ADULT NURSE NOTE - CHPI ED NUR SYMPTOMS NEG
no vomiting/no chills/no burning urination/no blood in stool/no fever/no abdominal distension/no hematuria

## 2020-07-28 NOTE — PROVIDER CONTACT NOTE (CRITICAL VALUE NOTIFICATION) - ASSESSMENT
Anesthesia POST Procedure Evaluation    Patient: Rene Domínguez   MRN:     [id number] Gender:   male   Age:    19 year old :      1999        Preoperative Diagnosis: Anterior Cruciate Ligament Tear   Procedure(s):  Examination Under Anesthesia, Left Knee Anterior Cruciate Ligament Reconstruction, Bone Tendon Bone Autograft  Lateral Meniscus Repair   Postop Comments: No value filed.       Anesthesia Type:  General, Regional    Reportable Event: NO     PAIN: Uncomplicated   Sign Out status: Comfortable, Well controlled pain     PONV: No PONV   Sign Out status:  No Nausea or Vomiting     Neuro/Psych: Uneventful perioperative course   Sign Out Status: Preoperative baseline; Age appropriate mentation     Airway/Resp.: Uneventful perioperative course   Sign Out Status: Non labored breathing, age appropriate RR; Resp. Status within EXPECTED Parameters     CV: Uneventful perioperative course   Sign Out status: Appropriate BP and perfusion indices; Appropriate HR/Rhythm     Disposition:   Sign Out in:  PACU  Disposition:  Phase II; Home  Recovery Course: Uneventful  Follow-Up: Not required           Last Anesthesia Record Vitals:  CRNA VITALS  1/15/2019 1254 - 1/15/2019 1354      1/15/2019             Pulse:  93    SpO2:  97 %    Resp Rate (observed):  5  (Abnormal)           Last PACU/Preop Vitals:  Vitals:    01/15/19 1415 01/15/19 1420 01/15/19 1430   BP: 132/82 111/69 144/87   Pulse:      Resp: 16 16 18   Temp:  36.7  C (98  F)    SpO2: 96%  98%         Electronically Signed By: Aysha Edge MD, January 15, 2019, 2:51 PM   MD anglin.

## 2020-07-28 NOTE — PROVIDER CONTACT NOTE (OTHER) - SITUATION
Please evaluate patient known to your service, presenting to ED with reccurent SBO  Service derrek - Roselyn

## 2020-07-28 NOTE — H&P ADULT - NSHPPHYSICALEXAM_GEN_ALL_CORE
Physical Exam:  Pt is AAOx3  General: Well developed, in no acute distress.   Chest: Lungs clear, no rales, no rhonchi, no wheezes.   Heart: RR, no murmurs, no rubs, no gallops.   Abdomen: Distended, Soft, non tender.    Back: Normal curvature, no tenderness.   Neuro: Physiological, no localizing findings.   Skin: Normal, no rashes, no lesions noted.   Extremities: Warm, well perfused, no edema, Pulses intact

## 2020-07-29 ENCOUNTER — RESULT REVIEW (OUTPATIENT)
Age: 80
End: 2020-07-29

## 2020-07-29 LAB
ALBUMIN SERPL ELPH-MCNC: 2.9 G/DL — LOW (ref 3.3–5)
ALP SERPL-CCNC: 45 U/L — SIGNIFICANT CHANGE UP (ref 40–120)
ALT FLD-CCNC: 31 U/L — SIGNIFICANT CHANGE UP (ref 12–78)
ANION GAP SERPL CALC-SCNC: 6 MMOL/L — SIGNIFICANT CHANGE UP (ref 5–17)
AST SERPL-CCNC: 22 U/L — SIGNIFICANT CHANGE UP (ref 15–37)
BILIRUB SERPL-MCNC: 0.7 MG/DL — SIGNIFICANT CHANGE UP (ref 0.2–1.2)
BUN SERPL-MCNC: 18 MG/DL — SIGNIFICANT CHANGE UP (ref 7–23)
CALCIUM SERPL-MCNC: 7.7 MG/DL — LOW (ref 8.5–10.1)
CHLORIDE SERPL-SCNC: 109 MMOL/L — HIGH (ref 96–108)
CO2 SERPL-SCNC: 28 MMOL/L — SIGNIFICANT CHANGE UP (ref 22–31)
CREAT SERPL-MCNC: 0.97 MG/DL — SIGNIFICANT CHANGE UP (ref 0.5–1.3)
CULTURE RESULTS: SIGNIFICANT CHANGE UP
GLUCOSE SERPL-MCNC: 118 MG/DL — HIGH (ref 70–99)
HCT VFR BLD CALC: 35 % — LOW (ref 39–50)
HGB BLD-MCNC: 11.4 G/DL — LOW (ref 13–17)
LACTATE SERPL-SCNC: 2 MMOL/L — SIGNIFICANT CHANGE UP (ref 0.7–2)
LACTATE SERPL-SCNC: 3.3 MMOL/L — HIGH (ref 0.7–2)
MAGNESIUM SERPL-MCNC: 1.8 MG/DL — SIGNIFICANT CHANGE UP (ref 1.6–2.6)
MCHC RBC-ENTMCNC: 30.6 PG — SIGNIFICANT CHANGE UP (ref 27–34)
MCHC RBC-ENTMCNC: 32.6 GM/DL — SIGNIFICANT CHANGE UP (ref 32–36)
MCV RBC AUTO: 93.8 FL — SIGNIFICANT CHANGE UP (ref 80–100)
PHOSPHATE SERPL-MCNC: 2.7 MG/DL — SIGNIFICANT CHANGE UP (ref 2.5–4.5)
PLATELET # BLD AUTO: 211 K/UL — SIGNIFICANT CHANGE UP (ref 150–400)
POTASSIUM SERPL-MCNC: 3.2 MMOL/L — LOW (ref 3.5–5.3)
POTASSIUM SERPL-SCNC: 3.2 MMOL/L — LOW (ref 3.5–5.3)
PROT SERPL-MCNC: 7.3 GM/DL — SIGNIFICANT CHANGE UP (ref 6–8.3)
RBC # BLD: 3.73 M/UL — LOW (ref 4.2–5.8)
RBC # FLD: 15 % — HIGH (ref 10.3–14.5)
SARS-COV-2 IGG SERPL QL IA: NEGATIVE — SIGNIFICANT CHANGE UP
SARS-COV-2 IGM SERPL IA-ACNC: 0.08 INDEX — SIGNIFICANT CHANGE UP
SARS-COV-2 RNA SPEC QL NAA+PROBE: SIGNIFICANT CHANGE UP
SARS-COV-2 RNA SPEC QL NAA+PROBE: SIGNIFICANT CHANGE UP
SODIUM SERPL-SCNC: 143 MMOL/L — SIGNIFICANT CHANGE UP (ref 135–145)
SPECIMEN SOURCE: SIGNIFICANT CHANGE UP
WBC # BLD: 7.05 K/UL — SIGNIFICANT CHANGE UP (ref 3.8–10.5)
WBC # FLD AUTO: 7.05 K/UL — SIGNIFICANT CHANGE UP (ref 3.8–10.5)

## 2020-07-29 PROCEDURE — 74018 RADEX ABDOMEN 1 VIEW: CPT | Mod: 26

## 2020-07-29 PROCEDURE — 93306 TTE W/DOPPLER COMPLETE: CPT | Mod: 26

## 2020-07-29 PROCEDURE — 88307 TISSUE EXAM BY PATHOLOGIST: CPT | Mod: 26

## 2020-07-29 PROCEDURE — 99232 SBSQ HOSP IP/OBS MODERATE 35: CPT

## 2020-07-29 PROCEDURE — 93010 ELECTROCARDIOGRAM REPORT: CPT

## 2020-07-29 RX ORDER — MORPHINE SULFATE 50 MG/1
2 CAPSULE, EXTENDED RELEASE ORAL EVERY 4 HOURS
Refills: 0 | Status: DISCONTINUED | OUTPATIENT
Start: 2020-07-29 | End: 2020-07-30

## 2020-07-29 RX ORDER — ONDANSETRON 8 MG/1
4 TABLET, FILM COATED ORAL ONCE
Refills: 0 | Status: DISCONTINUED | OUTPATIENT
Start: 2020-07-29 | End: 2020-07-29

## 2020-07-29 RX ORDER — HYDROMORPHONE HYDROCHLORIDE 2 MG/ML
0.5 INJECTION INTRAMUSCULAR; INTRAVENOUS; SUBCUTANEOUS
Refills: 0 | Status: DISCONTINUED | OUTPATIENT
Start: 2020-07-29 | End: 2020-07-29

## 2020-07-29 RX ORDER — SODIUM CHLORIDE 9 MG/ML
1000 INJECTION INTRAMUSCULAR; INTRAVENOUS; SUBCUTANEOUS ONCE
Refills: 0 | Status: COMPLETED | OUTPATIENT
Start: 2020-07-29 | End: 2020-07-29

## 2020-07-29 RX ORDER — SODIUM CHLORIDE 9 MG/ML
1000 INJECTION, SOLUTION INTRAVENOUS
Refills: 0 | Status: DISCONTINUED | OUTPATIENT
Start: 2020-07-29 | End: 2020-07-29

## 2020-07-29 RX ORDER — MORPHINE SULFATE 50 MG/1
2 CAPSULE, EXTENDED RELEASE ORAL EVERY 6 HOURS
Refills: 0 | Status: DISCONTINUED | OUTPATIENT
Start: 2020-07-29 | End: 2020-07-29

## 2020-07-29 RX ORDER — FENTANYL CITRATE 50 UG/ML
50 INJECTION INTRAVENOUS
Refills: 0 | Status: DISCONTINUED | OUTPATIENT
Start: 2020-07-29 | End: 2020-07-29

## 2020-07-29 RX ORDER — PIPERACILLIN AND TAZOBACTAM 4; .5 G/20ML; G/20ML
3.38 INJECTION, POWDER, LYOPHILIZED, FOR SOLUTION INTRAVENOUS EVERY 8 HOURS
Refills: 0 | Status: DISCONTINUED | OUTPATIENT
Start: 2020-07-29 | End: 2020-07-30

## 2020-07-29 RX ORDER — KETOROLAC TROMETHAMINE 30 MG/ML
15 SYRINGE (ML) INJECTION EVERY 6 HOURS
Refills: 0 | Status: DISCONTINUED | OUTPATIENT
Start: 2020-07-29 | End: 2020-08-02

## 2020-07-29 RX ORDER — SODIUM CHLORIDE 9 MG/ML
1000 INJECTION, SOLUTION INTRAVENOUS
Refills: 0 | Status: DISCONTINUED | OUTPATIENT
Start: 2020-07-29 | End: 2020-08-02

## 2020-07-29 RX ORDER — ACETAMINOPHEN 500 MG
1000 TABLET ORAL ONCE
Refills: 0 | Status: COMPLETED | OUTPATIENT
Start: 2020-07-29 | End: 2020-07-30

## 2020-07-29 RX ORDER — POTASSIUM CHLORIDE 20 MEQ
10 PACKET (EA) ORAL
Refills: 0 | Status: COMPLETED | OUTPATIENT
Start: 2020-07-29 | End: 2020-07-29

## 2020-07-29 RX ORDER — HEPARIN SODIUM 5000 [USP'U]/ML
5000 INJECTION INTRAVENOUS; SUBCUTANEOUS EVERY 8 HOURS
Refills: 0 | Status: DISCONTINUED | OUTPATIENT
Start: 2020-07-29 | End: 2020-08-06

## 2020-07-29 RX ADMIN — FENTANYL CITRATE 50 MICROGRAM(S): 50 INJECTION INTRAVENOUS at 14:15

## 2020-07-29 RX ADMIN — PANTOPRAZOLE SODIUM 40 MILLIGRAM(S): 20 TABLET, DELAYED RELEASE ORAL at 09:22

## 2020-07-29 RX ADMIN — HEPARIN SODIUM 5000 UNIT(S): 5000 INJECTION INTRAVENOUS; SUBCUTANEOUS at 21:01

## 2020-07-29 RX ADMIN — Medication 100 MILLIEQUIVALENT(S): at 09:19

## 2020-07-29 RX ADMIN — SODIUM CHLORIDE 125 MILLILITER(S): 9 INJECTION, SOLUTION INTRAVENOUS at 14:36

## 2020-07-29 RX ADMIN — PIPERACILLIN AND TAZOBACTAM 200 GRAM(S): 4; .5 INJECTION, POWDER, LYOPHILIZED, FOR SOLUTION INTRAVENOUS at 00:01

## 2020-07-29 RX ADMIN — FENTANYL CITRATE 50 MICROGRAM(S): 50 INJECTION INTRAVENOUS at 14:29

## 2020-07-29 RX ADMIN — MORPHINE SULFATE 2 MILLIGRAM(S): 50 CAPSULE, EXTENDED RELEASE ORAL at 17:58

## 2020-07-29 RX ADMIN — Medication 25 MILLIGRAM(S): at 00:02

## 2020-07-29 RX ADMIN — SODIUM CHLORIDE 4000 MILLILITER(S): 9 INJECTION INTRAMUSCULAR; INTRAVENOUS; SUBCUTANEOUS at 00:56

## 2020-07-29 RX ADMIN — ONDANSETRON 4 MILLIGRAM(S): 8 TABLET, FILM COATED ORAL at 10:16

## 2020-07-29 RX ADMIN — PIPERACILLIN AND TAZOBACTAM 25 GRAM(S): 4; .5 INJECTION, POWDER, LYOPHILIZED, FOR SOLUTION INTRAVENOUS at 15:56

## 2020-07-29 RX ADMIN — ONDANSETRON 4 MILLIGRAM(S): 8 TABLET, FILM COATED ORAL at 00:40

## 2020-07-29 RX ADMIN — HEPARIN SODIUM 5000 UNIT(S): 5000 INJECTION INTRAVENOUS; SUBCUTANEOUS at 05:48

## 2020-07-29 RX ADMIN — Medication 100 MILLIEQUIVALENT(S): at 18:44

## 2020-07-29 RX ADMIN — SODIUM CHLORIDE 125 MILLILITER(S): 9 INJECTION, SOLUTION INTRAVENOUS at 21:01

## 2020-07-29 RX ADMIN — SODIUM CHLORIDE 4000 MILLILITER(S): 9 INJECTION INTRAMUSCULAR; INTRAVENOUS; SUBCUTANEOUS at 03:18

## 2020-07-29 RX ADMIN — FENTANYL CITRATE 50 MICROGRAM(S): 50 INJECTION INTRAVENOUS at 15:05

## 2020-07-29 RX ADMIN — MORPHINE SULFATE 2 MILLIGRAM(S): 50 CAPSULE, EXTENDED RELEASE ORAL at 20:04

## 2020-07-29 RX ADMIN — PIPERACILLIN AND TAZOBACTAM 25 GRAM(S): 4; .5 INJECTION, POWDER, LYOPHILIZED, FOR SOLUTION INTRAVENOUS at 21:02

## 2020-07-29 RX ADMIN — FENTANYL CITRATE 50 MICROGRAM(S): 50 INJECTION INTRAVENOUS at 15:37

## 2020-07-29 RX ADMIN — MORPHINE SULFATE 2 MILLIGRAM(S): 50 CAPSULE, EXTENDED RELEASE ORAL at 20:20

## 2020-07-29 RX ADMIN — FENTANYL CITRATE 50 MICROGRAM(S): 50 INJECTION INTRAVENOUS at 14:45

## 2020-07-29 RX ADMIN — Medication 100 MILLIEQUIVALENT(S): at 10:12

## 2020-07-29 RX ADMIN — PIPERACILLIN AND TAZOBACTAM 25 GRAM(S): 4; .5 INJECTION, POWDER, LYOPHILIZED, FOR SOLUTION INTRAVENOUS at 08:13

## 2020-07-29 RX ADMIN — FENTANYL CITRATE 50 MICROGRAM(S): 50 INJECTION INTRAVENOUS at 14:33

## 2020-07-29 RX ADMIN — Medication 15 MILLIGRAM(S): at 23:58

## 2020-07-29 RX ADMIN — FENTANYL CITRATE 50 MICROGRAM(S): 50 INJECTION INTRAVENOUS at 15:15

## 2020-07-29 NOTE — PROGRESS NOTE ADULT - ASSESSMENT
Impression:  Although white count and lactate have normalized after resuscitation.  I continue have concerns of patient's obstruction.  This is consistent with obstruction twice in the same region.  Patient currently has hiccups.    Plan:  I did discuss with patient surgical and nonsurgical options.  At this point due to the above findings noted recommend laparotomy with enterolysis and possible bowel resection if necessary.    All material risks and benefits to surgery and no surgery were discussed with patient.  These include, but not exclusive to,  infection, hematoma, bleeding, abscess; organ/nerve/vascular injury; scarring, deformity and/or death.  All questions were answered.

## 2020-07-29 NOTE — BRIEF OPERATIVE NOTE - NSICDXBRIEFPROCEDURE_GEN_ALL_CORE_FT
PROCEDURES:  Small bowel resection, segmental, multiple 29-Jul-2020 13:45:41  PEDRO XIE  Exploratory celiotomy 29-Jul-2020 13:45:30  PEDRO XIE

## 2020-07-29 NOTE — PROGRESS NOTE ADULT - SUBJECTIVE AND OBJECTIVE BOX
MARLEY EDDY  [id number]  Male         Daily   Pt seen for elevated lactate  Pt denies abd pain, nausea   No flatus    Physical Exam:    Pt is AAOx3  General: Well developed, in no acute distress.   Chest: Lungs clear, no rales, no rhonchi, no wheezes.   Heart: RR, no murmurs, no rubs, no gallops.   Abdomen: Soft, mild distention, no tenderness, no masses  NGT @ 1500 CC   Back: Normal curvature, no tenderness.   Neuro: Physiological, no localizing findings.   Skin: Normal, no rashes, no lesions noted.   Extremities: Warm, well perfused, no edema, Pulses intact    I&O's Summary    28 Jul 2020 07:01  -  29 Jul 2020 00:10  --------------------------------------------------------  IN: 0 mL / OUT: 950 mL / NET: -950 mL                              13.8   14.09 )-----------( 259      ( 28 Jul 2020 19:43 )             42.1       07-28    140  |  108  |  21  ----------------------------<  140<H>  3.5   |  24  |  0.96    Ca    8.4<L>      28 Jul 2020 19:43    TPro  8.7<H>  /  Alb  3.5  /  TBili  0.8  /  DBili  x   /  AST  25  /  ALT  42  /  AlkPhos  60  07-28        HEALTH ISSUES - PROBLEM Dx:

## 2020-07-29 NOTE — PROGRESS NOTE ADULT - ASSESSMENT
Lactate elevated - initially 2.4 now 3.3 after several boluses  U/O good urine clear    WBC down to 14  Bicarb Nl    Imp:  Abdominal exam is non peritoneal, non tender.  Pending morning lactic acid.  Currently on Humera for Crohns- Last dose 2 wks ago.  Plan con't hydration, if no improvement will plan surgery to evaluate obstruction and R/O ischemia.  The patient is added on for exploratory laparotomy today.      Discussed plan with general surgery team and with Dr. Alberts

## 2020-07-29 NOTE — PROGRESS NOTE ADULT - SUBJECTIVE AND OBJECTIVE BOX
The patient was seen and examined today bedside, the patient denied any pain. He denied passing gas or have bowel movement. He still making good urine output 700 cc over 12 hours. He got 6 liter boluses of crystalloids NGT out is 1920 cc since admission. NO acute events were reported from the nursing staff.     ICU Vital Signs Last 24 Hrs  T(C): 36.4 (29 Jul 2020 04:18), Max: 37.7 (29 Jul 2020 02:47)  T(F): 97.6 (29 Jul 2020 04:18), Max: 99.9 (29 Jul 2020 02:47)  HR: 90 (29 Jul 2020 04:18) (85 - 98)  BP: 151/70 (29 Jul 2020 04:18) (135/74 - 180/84)  BP(mean): 110 (28 Jul 2020 11:21) (110 - 110)  ABP: --  ABP(mean): --  RR: 17 (29 Jul 2020 04:18) (17 - 18)  SpO2: 95% (29 Jul 2020 04:18) (95% - 98%)      Physical Exam:    Pt is AAOx3  General: Well developed, in no acute distress.   Chest: Lungs clear, no rales, no rhonchi, no wheezes.   Heart: RR, no murmurs, no rubs, no gallops.   Abdomen: Soft, mild distention, no tenderness, no masses  NGT @ 1920 CC   Back: Normal curvature, no tenderness.   Neuro: Physiological, no localizing findings.   Skin: Normal, no rashes, no lesions noted.   Extremities: Warm, well perfused, no edema, Pulses intact                            13.8   14.09 )-----------( 259      ( 28 Jul 2020 19:43 )             42.1     07-28    140  |  108  |  21  ----------------------------<  140<H>  3.5   |  24  |  0.96    Ca    8.4<L>      28 Jul 2020 19:43    TPro  8.7<H>  /  Alb  3.5  /  TBili  0.8  /  DBili  x   /  AST  25  /  ALT  42  /  AlkPhos  60  07-28    PT/INR - ( 28 Jul 2020 19:43 )   PT: 12.1 sec;   INR: 1.05 ratio         PTT - ( 28 Jul 2020 19:43 )  PTT:28.7 sec

## 2020-07-29 NOTE — PROGRESS NOTE ADULT - SUBJECTIVE AND OBJECTIVE BOX
MARLEY EDDY  [id number]  Male    Daily   Patient had elevated lactate throughout the night in spite of large fluid resuscitation.  Patient also has large NG tube output consistent with continued bowel obstruction.    Physical Exam:    Pt is AAOx3  General: Well developed, in no acute distress.   Chest: Lungs clear, no rales, no rhonchi, no wheezes.   Heart: RR, no murmurs, no rubs, no gallops.   Abdomen: Soft, distended, no tenderness, no masses, BS normal.    Back: Normal curvature, no tenderness.   Neuro: Physiological, no localizing findings.   Skin: Normal, no rashes, no lesions noted.   Extremities: Warm, well perfused, no edema, Pulses intact    I&O's Summary    28 Jul 2020 07:01  -  29 Jul 2020 07:00  --------------------------------------------------------  IN: 5000 mL / OUT: 2620 mL / NET: 2380 mL                              11.4   7.05  )-----------( 211      ( 29 Jul 2020 08:02 )             35.0       07-29    143  |  109<H>  |  18  ----------------------------<  118<H>  3.2<L>   |  28  |  0.97    Ca    7.7<L>      29 Jul 2020 08:02  Phos  2.7     07-29  Mg     1.8     07-29    TPro  7.3  /  Alb  2.9<L>  /  TBili  0.7  /  DBili  x   /  AST  22  /  ALT  31  /  AlkPhos  45  07-29        HEALTH ISSUES - PROBLEM Dx:

## 2020-07-29 NOTE — BRIEF OPERATIVE NOTE - OPERATION/FINDINGS
Lysis of multiple intra abdominal adhesions  Area of internal hernia noted and reduced  Prior enteroenteric anastomosis noted to be stenotic and revised   Two jenaro of small bowel excised and reanastomosed   BROOK dressing applied  PACU in stable condition

## 2020-07-29 NOTE — PROGRESS NOTE ADULT - ASSESSMENT
Lactate elevated - initially 2.4 now 3.3 after several boluses  U/O good urine clear    WBC down to 14  Bicarb Nl    Imp:  Pt with improving numbers except Lactate  Non tender abd on exam  Hx mult SBO  Currently on Humera for Crohns- Last dose 2 wks ago    Plan con't hydration, if no improvement will plan surgery to evaluate obstruction and R/O ischemia      Discussed plan with patient who wishes to wait for repeat labs

## 2020-07-29 NOTE — CONSULT NOTE ADULT - SUBJECTIVE AND OBJECTIVE BOX
79 y/o male with a PMHx of CAD with stents in ,, Crohns, diverticulitis s/p open Sigmoidectomy, essential HTN, gastritis, 2 previous episodes of SBO, cholecystectomy presents to the ED c/o abd pain since last night after eating. +n/v. admitted with acute nausea and vomiting on  and found to have CT evidence of SBO. He was begun on treatment with IV fluids and NGT decompression with improvement of symptoms. Serum lactate is rising and bowel sounds remain absent and surgical intervention is being considered. He denies any abd pain now. No chest pain, sob, fever or chills.       PAST MEDICAL & SURGICAL HISTORY:  Essential hypertension  Coronary artery disease involving native heart without angina pectoris, unspecified vessel or lesion type  Gastritis  Diverticulitis  S/P coronary artery stent placement  S/P colon resection        FAMILY HISTORY:  Family history of diabetes mellitus (DM): father  FH: coronary artery disease: father    SOCIAL HISTORY:  Tobacco-    no       Alcohol-              Illicit drugs-  no          Vital Signs Last 24 Hrs  T(C): 37.2 (2020 08:31), Max: 37.7 (2020 02:47)  T(F): 99 (2020 08:31), Max: 99.9 (2020 02:47)  HR: 87 (2020 08:31) (85 - 98)  BP: 129/72 (2020 08:31) (129/72 - 180/84)  BP(mean): 110 (2020 11:21) (110 - 110)  RR: 17 (2020 08:31) (17 - 18)  SpO2: 94% (2020 08:31) (94% - 98%)          PHYSICAL EXAM  General Appearance: comfortable  HEENT: PERRL, NG tube in place  Neck: Supple, , no adenopathy, thyroid: not enlarged, no carotid bruit or JVD  Back: Symmetric, no  tenderness,no soft tissue tenderness  Lungs: Clear to auscultation bilaterally,no adventitious breath sounds, normal   expiratory phase  Heart: Regular rate and rhythm, S1, S2 normal, 1/6 NAHID LSB and base  Abdomen: distended, nontender, no organomegaly or masses. Absent BS.  Extremities: no cyanosis or edema, no joint swelling  Skin: Skin color, texture normal, no rashes   Neurologic: Alert and oriented X3 , cranial nerves intact, sensory and motor normal,                                11.4   7.05  )-----------( 211      ( 2020 08:02 )             35.0     2020 08:02    143    |  109    |  18     ----------------------------<  118    3.2     |  28     |  0.97     Ca    7.7        2020 08:02  Phos  2.7       2020 08:02  Mg     1.8       2020 08:02    TPro  7.3    /  Alb  2.9    /  TBili  0.7    /  DBili  x      /  AST  22     /  ALT  31     /  AlkPhos  45     2020 08:02    LIVER FUNCTIONS - ( 2020 08:02 )  Alb: 2.9 g/dL / Pro: 7.3 gm/dL / ALK PHOS: 45 U/L / ALT: 31 U/L / AST: 22 U/L / GGT: x           PT/INR - ( 2020 19:43 )   PT: 12.1 sec;   INR: 1.05 ratio         PTT - ( 2020 19:43 )  PTT:28.7 sec  CAPILLARY BLOOD GLUCOSE            Urinalysis Basic - ( 2020 13:36 )    Color: Yellow / Appearance: Clear / S.015 / pH: x  Gluc: x / Ketone: Negative  / Bili: Negative / Urobili: Negative mg/dL   Blood: x / Protein: 100 mg/dL / Nitrite: Negative   Leuk Esterase: Negative / RBC: 0-2 /HPF / WBC Negative   Sq Epi: x / Non Sq Epi: Occasional / Bacteria: Occasional          MEDICATIONS  (STANDING):  atorvastatin Oral Tab/Cap - Peds 10 milliGRAM(s) Oral daily  heparin   Injectable 5000 Unit(s) SubCutaneous every 8 hours  lactated ringers. 1000 milliLiter(s) (125 mL/Hr) IV Continuous <Continuous>  pantoprazole  Injectable 40 milliGRAM(s) IV Push daily  piperacillin/tazobactam IVPB.. 3.375 Gram(s) IV Intermittent every 8 hours  potassium chloride  10 mEq/100 mL IVPB 10 milliEquivalent(s) IV Intermittent every 1 hour    MEDICATIONS  (PRN):  acetaminophen   Tablet .. 650 milliGRAM(s) Oral every 6 hours PRN Temp greater or equal to 38C (100.4F), Mild Pain (1 - 3)  diazepam    Tablet 5 milliGRAM(s) Oral three times a day PRN anxiety  morphine  - Injectable 1 milliGRAM(s) IV Push every 4 hours PRN Moderate Pain (4 - 6)  ondansetron Injectable 4 milliGRAM(s) IV Push every 6 hours PRN Nausea and/or Vomiting          Assessment  and plan:     79 y/o male with a PMHx of CAD with stents in ,, Crohns, diverticulitis s/p open Sigmoidectomy, essential HTN, gastritis, 2 previous episodes of SBO, cholecystectomy presents to the ED c/o abd pain since last night after eating. +n/v. admitted with acute nausea and vomiting on  and found to have CT evidence of SBO. He was begun on treatment with IV fluids and NGT decompression with improvement of symptoms. Serum lactate is rising and bowel sounds remain absent and surgical intervention is being considered. He denies any abd pain now. No chest pain, sob, fever or chills.         1. Small bowel obstruction.   2.Crohn's disease  3.CAD post multiple stents with most recent ROSLYN 2019.   4. Mild AS. Stable  5.HBP. Stable.  6. Hypokalemia      NPO, NG suction  Pain meds  IV fluid, continue IV zosyn  Possible surgery today  Hold BP medications for now. Restart post surgery once PO starts  Hold aspirin and plavix- restart post op once OK with surgery  Replace potassium, follow  Follow urine output, intake  DVT prophylaxis    Patient is medically optimized for surgery.  No absolute contraindications      Thank you  Will follow with you

## 2020-07-29 NOTE — CONSULT NOTE ADULT - SUBJECTIVE AND OBJECTIVE BOX
Patient is a 80y old  Male who presents with a chief complaint of SBO (2020 00:09)      HPI:  2020- Cardiology Consultation: Patient seen for preoperative cardiology consultation. Patient with Crohns disease on Humera admitted with acute nausea and vomiting on  and found to have CT evidence of SBO. He was begun on treatment with IV fluids and NGT decompression with improvement of symptoms. Serum lactate is rising and bowel sounds remain absent and surgical intervention is being considered. In 2019 admitted with GI bleed requiring 1 unit of PRBCs with etiology not determined. Is S/p partial colectomy  apparently for diverticular disease. States he has a small bowel obstruction that resolved spontaneously a few years ago and again  in 10/2019 which resolved with conservative measures.  Cardiac history significant for HBP, HLD, CAD with multiple PCIs most recent ROSLYN 2019, mild aortic stenosis. He currently denies chest pain or dyspnea. There is no history of angina or heart failure. he is on chronic ASA and Plavix therapy with the last dose of these on 2020.     79 y/o male with a PMHx of CAD with stents, Crohns, diverticulitis s/p open Sigmoidectomy, essential HTN, gastritis, 2 previous episodes of SBO, cholecystectomy presents to the ED c/o abd pain since last night after eating. +n/v. Denies diarrhea, admits to 2 normal BMs this AM but denies any other Flatus. No recent travel. No known sick contacts. No other complaints at this time (2020 14:30)      PAST MEDICAL & SURGICAL HISTORY:  Essential hypertension  Coronary artery disease involving native heart without angina pectoris, unspecified vessel or lesion type  Gastritis  Diverticulitis  S/P coronary artery stent placement  S/P colon resection        Home Medications:   * Patient Currently Takes Medications as of 10-Mar-2020 07:20 documented in Structured Notes  · 	nystatin 100,000 units/mL oral suspension: 5 milliliter(s) orally 4 times a day   · 	labetalol 200 mg oral tablet: 2 tab(s) orally 2 times a day  · 	diazePAM 5 mg oral tablet: 1 tab(s) orally 3 times a day, As Needed  · 	CoQ10 300 mg oral capsule: 1 cap(s) orally once a day  · 	lidocaine 0.5% topical spray: Apply topically to affected area 3 times a day, As Needed - for moderate pain  · 	Protonix 40 mg oral delayed release tablet: 1 tab(s) orally once a day  · 	Lipitor 10 mg oral tablet: 1 tab(s) orally once a day  · 	Multiple Vitamins oral tablet: 1 tab(s) orally once a day  · 	Plavix 75 mg oral tablet: 1 tab(s) orally once a day  MEDICATIONS  (STANDING):  atorvastatin Oral Tab/Cap - Peds 10 milliGRAM(s) Oral daily  heparin   Injectable 5000 Unit(s) SubCutaneous every 8 hours  labetalol  Oral Tab/Cap - Peds 200 milliGRAM(s) Oral two times a day  lactated ringers. 1000 milliLiter(s) (125 mL/Hr) IV Continuous <Continuous>  pantoprazole  Injectable 40 milliGRAM(s) IV Push daily  piperacillin/tazobactam IVPB.. 3.375 Gram(s) IV Intermittent every 8 hours    MEDICATIONS  (PRN):  acetaminophen   Tablet .. 650 milliGRAM(s) Oral every 6 hours PRN Temp greater or equal to 38C (100.4F), Mild Pain (1 - 3)  diazepam    Tablet 5 milliGRAM(s) Oral three times a day PRN anxiety  morphine  - Injectable 1 milliGRAM(s) IV Push every 4 hours PRN Moderate Pain (4 - 6)  ondansetron Injectable 4 milliGRAM(s) IV Push every 6 hours PRN Nausea and/or Vomiting    Allergies and Intolerances:        Allergies:  	No Known Drug Allergies:   	contrast: Miscellaneous, Unknown, contrast  	Originally Entered as [Unknown] reaction to [hair dye]: Miscellaneous, Unknown, Originally Entered as [Unknown] reaction to [hair dye]      FAMILY HISTORY:  Family history of diabetes mellitus (DM): father  FH: coronary artery disease: father      SOCIAL HISTORY:  Tobacco-    no       Alcohol-              Illicit drugs-  no           Occupation-              Marital  status-  REVIEW OF SYSTEM:  Pertinent items are noted in HPI.    Vital Signs Last 24 Hrs  T(C): 36.4 (2020 04:18), Max: 37.7 (2020 02:47)  T(F): 97.6 (2020 04:18), Max: 99.9 (2020 02:47)  HR: 90 (2020 04:18) (85 - 98)  BP: 151/70 (2020 04:18) (135/74 - 180/84)  BP(mean): 110 (2020 11:21) (110 - 110)  RR: 17 (2020 04:18) (17 - 18)  SpO2: 95% (2020 04:18) (95% - 98%)    I&O's Summary    2020 07:01  -  2020 07:00  --------------------------------------------------------  IN: 5000 mL / OUT: 2620 mL / NET: 2380 mL      PHYSICAL EXAM  General Appearance: comfortable  HEENT: PERRL, NG tube in place  Neck: Supple, , no adenopathy, thyroid: not enlarged, no carotid bruit or JVD  Back: Symmetric, no  tenderness,no soft tissue tenderness  Lungs: Clear to auscultation bilaterally,no adventitious breath sounds, normal   expiratory phase  Heart: Regular rate and rhythm, S1, S2 normal, 1/6 NAHID LSB and base  Abdomen: distended, nontender, no organomegaly or masses. Absent BS.  Extremities: no cyanosis or edema, no joint swelling  Skin: Skin color, texture normal, no rashes   Neurologic: Alert and oriented X3 , cranial nerves intact, sensory and motor normal,        INTERPRETATION OF TELEMETRY:    ECG: sinus rhythm 88 BPM, mildly prolonged QTc 474 ms, nonspecific T wave abnormality.      LABS:                          13.8   14.09 )-----------( 259      ( 2020 19:43 )             42.1     07-28    140  |  108  |  21  ----------------------------<  140<H>  3.5   |  24  |  0.96    Ca    8.4<L>      2020 19:43    TPro  8.7<H>  /  Alb  3.5  /  TBili  0.8  /  DBili  x   /  AST  25  /  ALT  42  /  AlkPhos  60  07-28            PT/INR - ( 2020 19:43 )   PT: 12.1 sec;   INR: 1.05 ratio         PTT - ( 2020 19:43 )  PTT:28.7 sec  Urinalysis Basic - ( 2020 13:36 )    Color: Yellow / Appearance: Clear / S.015 / pH: x  Gluc: x / Ketone: Negative  / Bili: Negative / Urobili: Negative mg/dL   Blood: x / Protein: 100 mg/dL / Nitrite: Negative   Leuk Esterase: Negative / RBC: 0-2 /HPF / WBC Negative   Sq Epi: x / Non Sq Epi: Occasional / Bacteria: Occasional            RADIOLOGY & ADDITIONAL STUDIES:  < from: CT Abdomen and Pelvis w/ IV Cont (20 @ 12:58) >  EXAM:  CT ABDOMEN AND PELVIS IC                            PROCEDURE DATE:  2020    < from: CT Abdomen and Pelvis w/ IV Cont (20 @ 12:58) >  IMPRESSION:  Small bowel obstruction as above with mild ascites. Bowel ischemia cannot be excluded. Clinical correlation is necessary. This was discussed with Dr. Serrano at approximately 1:45 PM on 2020. Amongst the distended small bowel, focal regions that are decreased in caliber in the right side of the abdomen. Patient has a history of Crohn's disease. This may be due to stricturing or inflammation.  < from: CT Abdomen and Pelvis w/ IV Cont (20 @ 12:58) >  ANA HOSKINS M.D., ATTENDING RADIOLOGIST  This document has been electronically signed. 2020  1:49PM                    IMPRESSION:  1. Small bowel obstruction. Rising lactate and absent BS are a concern.   2.Crohn's disease  3.CAD post multiple stents with most recent ROSLYN 2019. Clinically stable without heart failure or angina.  4. Mild AS. Stable  5.HBP. Stable.   PLAN:  The patient is optimal for needed urgent surgery without additional cardiac testing. Hold ASA and Plavix for now and restart post op when hemostasis is acceptable. Monitor BP perioperatively and determine need for oral vs IV antihypertensives depending upon BP and whether he can take po meds. Will follow.

## 2020-07-30 LAB
ANION GAP SERPL CALC-SCNC: 5 MMOL/L — SIGNIFICANT CHANGE UP (ref 5–17)
BUN SERPL-MCNC: 19 MG/DL — SIGNIFICANT CHANGE UP (ref 7–23)
CALCIUM SERPL-MCNC: 7.4 MG/DL — LOW (ref 8.5–10.1)
CHLORIDE SERPL-SCNC: 108 MMOL/L — SIGNIFICANT CHANGE UP (ref 96–108)
CO2 SERPL-SCNC: 29 MMOL/L — SIGNIFICANT CHANGE UP (ref 22–31)
CREAT SERPL-MCNC: 1.04 MG/DL — SIGNIFICANT CHANGE UP (ref 0.5–1.3)
GLUCOSE SERPL-MCNC: 142 MG/DL — HIGH (ref 70–99)
HCT VFR BLD CALC: 35.9 % — LOW (ref 39–50)
HGB BLD-MCNC: 11.2 G/DL — LOW (ref 13–17)
MCHC RBC-ENTMCNC: 30.2 PG — SIGNIFICANT CHANGE UP (ref 27–34)
MCHC RBC-ENTMCNC: 31.2 GM/DL — LOW (ref 32–36)
MCV RBC AUTO: 96.8 FL — SIGNIFICANT CHANGE UP (ref 80–100)
PLATELET # BLD AUTO: 192 K/UL — SIGNIFICANT CHANGE UP (ref 150–400)
POTASSIUM SERPL-MCNC: 3.2 MMOL/L — LOW (ref 3.5–5.3)
POTASSIUM SERPL-SCNC: 3.2 MMOL/L — LOW (ref 3.5–5.3)
RBC # BLD: 3.71 M/UL — LOW (ref 4.2–5.8)
RBC # FLD: 15 % — HIGH (ref 10.3–14.5)
SODIUM SERPL-SCNC: 142 MMOL/L — SIGNIFICANT CHANGE UP (ref 135–145)
WBC # BLD: 9.15 K/UL — SIGNIFICANT CHANGE UP (ref 3.8–10.5)
WBC # FLD AUTO: 9.15 K/UL — SIGNIFICANT CHANGE UP (ref 3.8–10.5)

## 2020-07-30 PROCEDURE — 99232 SBSQ HOSP IP/OBS MODERATE 35: CPT

## 2020-07-30 RX ORDER — SODIUM CHLORIDE 9 MG/ML
500 INJECTION, SOLUTION INTRAVENOUS ONCE
Refills: 0 | Status: COMPLETED | OUTPATIENT
Start: 2020-07-30 | End: 2020-07-30

## 2020-07-30 RX ORDER — METRONIDAZOLE 500 MG
500 TABLET ORAL ONCE
Refills: 0 | Status: COMPLETED | OUTPATIENT
Start: 2020-07-30 | End: 2020-07-30

## 2020-07-30 RX ORDER — BENZOCAINE AND MENTHOL 5; 1 G/100ML; G/100ML
1 LIQUID ORAL EVERY 12 HOURS
Refills: 0 | Status: DISCONTINUED | OUTPATIENT
Start: 2020-07-30 | End: 2020-08-06

## 2020-07-30 RX ORDER — METRONIDAZOLE 500 MG
500 TABLET ORAL EVERY 8 HOURS
Refills: 0 | Status: DISCONTINUED | OUTPATIENT
Start: 2020-07-31 | End: 2020-08-02

## 2020-07-30 RX ORDER — POTASSIUM CHLORIDE 20 MEQ
10 PACKET (EA) ORAL
Refills: 0 | Status: COMPLETED | OUTPATIENT
Start: 2020-07-30 | End: 2020-07-30

## 2020-07-30 RX ORDER — ACETAMINOPHEN 500 MG
1000 TABLET ORAL ONCE
Refills: 0 | Status: COMPLETED | OUTPATIENT
Start: 2020-07-30 | End: 2020-07-30

## 2020-07-30 RX ORDER — METRONIDAZOLE 500 MG
TABLET ORAL
Refills: 0 | Status: DISCONTINUED | OUTPATIENT
Start: 2020-07-30 | End: 2020-08-02

## 2020-07-30 RX ORDER — SODIUM CHLORIDE 9 MG/ML
500 INJECTION, SOLUTION INTRAVENOUS
Refills: 0 | Status: DISCONTINUED | OUTPATIENT
Start: 2020-07-30 | End: 2020-08-05

## 2020-07-30 RX ADMIN — Medication 15 MILLIGRAM(S): at 11:44

## 2020-07-30 RX ADMIN — Medication 100 MILLIGRAM(S): at 21:50

## 2020-07-30 RX ADMIN — MORPHINE SULFATE 2 MILLIGRAM(S): 50 CAPSULE, EXTENDED RELEASE ORAL at 16:24

## 2020-07-30 RX ADMIN — SODIUM CHLORIDE 125 MILLILITER(S): 9 INJECTION, SOLUTION INTRAVENOUS at 07:37

## 2020-07-30 RX ADMIN — SODIUM CHLORIDE 125 MILLILITER(S): 9 INJECTION, SOLUTION INTRAVENOUS at 05:32

## 2020-07-30 RX ADMIN — MORPHINE SULFATE 2 MILLIGRAM(S): 50 CAPSULE, EXTENDED RELEASE ORAL at 01:58

## 2020-07-30 RX ADMIN — HEPARIN SODIUM 5000 UNIT(S): 5000 INJECTION INTRAVENOUS; SUBCUTANEOUS at 05:33

## 2020-07-30 RX ADMIN — PIPERACILLIN AND TAZOBACTAM 25 GRAM(S): 4; .5 INJECTION, POWDER, LYOPHILIZED, FOR SOLUTION INTRAVENOUS at 13:34

## 2020-07-30 RX ADMIN — SODIUM CHLORIDE 125 MILLILITER(S): 9 INJECTION, SOLUTION INTRAVENOUS at 21:50

## 2020-07-30 RX ADMIN — Medication 100 MILLIEQUIVALENT(S): at 21:50

## 2020-07-30 RX ADMIN — MORPHINE SULFATE 2 MILLIGRAM(S): 50 CAPSULE, EXTENDED RELEASE ORAL at 22:20

## 2020-07-30 RX ADMIN — MORPHINE SULFATE 2 MILLIGRAM(S): 50 CAPSULE, EXTENDED RELEASE ORAL at 21:50

## 2020-07-30 RX ADMIN — PIPERACILLIN AND TAZOBACTAM 25 GRAM(S): 4; .5 INJECTION, POWDER, LYOPHILIZED, FOR SOLUTION INTRAVENOUS at 05:31

## 2020-07-30 RX ADMIN — HEPARIN SODIUM 5000 UNIT(S): 5000 INJECTION INTRAVENOUS; SUBCUTANEOUS at 21:50

## 2020-07-30 RX ADMIN — Medication 400 MILLIGRAM(S): at 18:07

## 2020-07-30 RX ADMIN — Medication 400 MILLIGRAM(S): at 07:26

## 2020-07-30 RX ADMIN — Medication 100 MILLIEQUIVALENT(S): at 19:28

## 2020-07-30 RX ADMIN — HEPARIN SODIUM 5000 UNIT(S): 5000 INJECTION INTRAVENOUS; SUBCUTANEOUS at 13:33

## 2020-07-30 RX ADMIN — SODIUM CHLORIDE 3000 MILLILITER(S): 9 INJECTION, SOLUTION INTRAVENOUS at 07:38

## 2020-07-30 RX ADMIN — MORPHINE SULFATE 2 MILLIGRAM(S): 50 CAPSULE, EXTENDED RELEASE ORAL at 15:39

## 2020-07-30 RX ADMIN — Medication 1000 MILLIGRAM(S): at 21:51

## 2020-07-30 RX ADMIN — BENZOCAINE AND MENTHOL 1 LOZENGE: 5; 1 LIQUID ORAL at 17:49

## 2020-07-30 RX ADMIN — Medication 15 MILLIGRAM(S): at 05:32

## 2020-07-30 RX ADMIN — Medication 15 MILLIGRAM(S): at 00:10

## 2020-07-30 RX ADMIN — MORPHINE SULFATE 2 MILLIGRAM(S): 50 CAPSULE, EXTENDED RELEASE ORAL at 02:15

## 2020-07-30 RX ADMIN — SODIUM CHLORIDE 500 MILLILITER(S): 9 INJECTION, SOLUTION INTRAVENOUS at 20:30

## 2020-07-30 RX ADMIN — Medication 100 MILLIEQUIVALENT(S): at 18:25

## 2020-07-30 NOTE — DIETITIAN INITIAL EVALUATION ADULT. - PHYSICAL APPEARANCE
overweight/other (specify) no edema  last BM documented on 7/27.  karen score of 19; no PU documented.

## 2020-07-30 NOTE — DIETITIAN INITIAL EVALUATION ADULT. - PERTINENT LABORATORY DATA
07-30 Na142 mmol/L Glu 142 mg/dL<H> K+ 3.2 mmol/L<L> Cr  1.04 mg/dL BUN 19 mg/dL Phos 2.3 mg/dL<L> Alb n/a   PAB n/a

## 2020-07-30 NOTE — DIETITIAN INITIAL EVALUATION ADULT. - NAME AND PHONE
Miryam Roberson MA, RDN, CDN, [facility]  [phone] (office number)  [phone] (pager number)

## 2020-07-30 NOTE — DIETITIAN INITIAL EVALUATION ADULT. - PERTINENT MEDS FT
MEDICATIONS  (STANDING):  benzocaine 15 mG/menthol 3.6 mG (Sugar-Free) Lozenge 1 Lozenge Oral every 12 hours  heparin   Injectable 5000 Unit(s) SubCutaneous every 8 hours  lactated ringers. 1000 milliLiter(s) (125 mL/Hr) IV Continuous <Continuous>  piperacillin/tazobactam IVPB.. 3.375 Gram(s) IV Intermittent every 8 hours    MEDICATIONS  (PRN):  ketorolac   Injectable 15 milliGRAM(s) IV Push every 6 hours PRN Moderate Pain (4 - 6)  morphine  - Injectable 2 milliGRAM(s) IV Push every 4 hours PRN Severe Pain (7 - 10)

## 2020-07-30 NOTE — PHYSICAL THERAPY INITIAL EVALUATION ADULT - GENERAL OBSERVATIONS, REHAB EVAL
Pt was found sitting in bedside chair, Pt is willing to participate in PT, Pt c/o diarrhea, NGT discomfort

## 2020-07-30 NOTE — PROVIDER CONTACT NOTE (CHANGE IN STATUS NOTIFICATION) - SITUATION
Notify pt with a temp of 102, called surgical resident, unable to get in contact with them, called Surgical PA

## 2020-07-30 NOTE — PHYSICAL THERAPY INITIAL EVALUATION ADULT - CRITERIA FOR SKILLED THERAPEUTIC INTERVENTIONS
anticipated equipment needs at discharge/functional limitations in following categories/risk reduction/prevention/anticipated discharge recommendation/impairments found/predicted duration of therapy intervention/therapy frequency/rehab potential

## 2020-07-30 NOTE — PROGRESS NOTE ADULT - SUBJECTIVE AND OBJECTIVE BOX
CC: Nausea, vomiting  · Subjective and Objective: 	  79 y/o male with a PMHx of CAD with stents in 05/29,, Crohns, diverticulitis s/p open Sigmoidectomy, essential HTN, gastritis, 2 previous episodes of SBO, cholecystectomy presents to the ED c/o abd pain since last night after eating. +n/v. admitted with acute nausea and vomiting on 7/28 and found to have CT evidence of SBO. He was begun on treatment with IV fluids and NGT decompression with improvement of symptoms. Serum lactate is rising and bowel sounds remain absent and surgical intervention is being considered. He denies any abd pain now. No chest pain, sob, fever or chills.     7/30: Pt states he is passing gas.  States NGT is very uncomfortable, otherwise doing okay, HD stable, had febrile episode 38.1.      REVIEW OF SYSTEMS: All other review of systems is negative unless indicated above.    Vital Signs Last 24 Hrs  T(C): 37.2 (30 Jul 2020 08:18), Max: 38.1 (30 Jul 2020 06:56)  T(F): 98.9 (30 Jul 2020 08:18), Max: 100.6 (30 Jul 2020 06:56)  HR: 102 (30 Jul 2020 08:18) (82 - 115)  BP: 147/70 (30 Jul 2020 08:18) (130/61 - 161/78)  BP(mean): --  RR: 18 (30 Jul 2020 08:18) (12 - 20)  SpO2: 94% (30 Jul 2020 08:18) (94% - 100%)      PHYSICAL EXAM  General Appearance: comfortable, NAD  HEENT: PERRL, NG tube in place  Neck: Supple, , no adenopathy, thyroid: not enlarged, no carotid bruit or JVD  Lungs: Clear to auscultation bilaterally, no r/r/w  expiratory phase  Heart: Regular rate and rhythm, S1, S2 normal, 1/6 NAHID LSB and base  Abdomen: distended, nontender, no organomegaly or masses. Absent BS.  Extremities: no cyanosis or edema, no joint swelling  Skin: Skin color, texture normal, no rashes   Neurologic: Alert and oriented X3 , cranial nerves intact, sensory and motor normal,    MEDICATIONS  (STANDING):  benzocaine 15 mG/menthol 3.6 mG (Sugar-Free) Lozenge 1 Lozenge Oral every 12 hours  heparin   Injectable 5000 Unit(s) SubCutaneous every 8 hours  lactated ringers. 1000 milliLiter(s) (125 mL/Hr) IV Continuous <Continuous>  piperacillin/tazobactam IVPB.. 3.375 Gram(s) IV Intermittent every 8 hours    MEDICATIONS  (PRN):  ketorolac   Injectable 15 milliGRAM(s) IV Push every 6 hours PRN Moderate Pain (4 - 6)  morphine  - Injectable 2 milliGRAM(s) IV Push every 4 hours PRN Severe Pain (7 - 10)                              11.2   9.15  )-----------( 192      ( 30 Jul 2020 07:09 )             35.9     07-30    142  |  108  |  19  ----------------------------<  142<H>  3.2<L>   |  29  |  1.04    Ca    7.4<L>      30 Jul 2020 07:09  Phos  2.3     07-30  Mg     1.7     07-30    TPro  7.3  /  Alb  2.9<L>  /  TBili  0.7  /  DBili  x   /  AST  22  /  ALT  31  /  AlkPhos  45  07-29    CAPILLARY BLOOD GLUCOSE        LIVER FUNCTIONS - ( 29 Jul 2020 08:02 )  Alb: 2.9 g/dL / Pro: 7.3 gm/dL / ALK PHOS: 45 U/L / ALT: 31 U/L / AST: 22 U/L / GGT: x           PT/INR - ( 28 Jul 2020 19:43 )   PT: 12.1 sec;   INR: 1.05 ratio         PTT - ( 28 Jul 2020 19:43 )  PTT:28.7 sec        Assessment  and plan:  79 y/o male with a PMHx of CAD with stents in 05/29,, Crohns, diverticulitis s/p open Sigmoidectomy, essential HTN, gastritis, 2 previous episodes of SBO, cholecystectomy presents to the ED c/o abd pain since last night after eating. +n/v.     Small bowel obstruction:  -s/p VI, small bowel segmental resection x 2 POD #1  -monitor for fevers  -NG tube removal per surgical team  -remove tim  -ambulate patient  -replete electrolytes  -pain management  -supportive care  -restart cardiac meds once tolerating PO  -IV abx per surgical service  Crohn's diseas / CAD post multiple stents with most recent ROSLYN 5/2019 / Mild AS: Stable    DVT prophylaxis

## 2020-07-30 NOTE — PROGRESS NOTE ADULT - SUBJECTIVE AND OBJECTIVE BOX
Patient seen and examined at bedside. Patient has no complaints and reports pain is under control. Patient remains NPO post-op with NGT in place. Denies passing flatus. Nurse denies any acute events. Vitals reviewed, tachycardic overnight, otherwise WNL.    Vitals:  T(C): 38.1 ( @ 06:56), Max: 38.1 (30 @ 06:56)  HR: 107 ( @ 06:56) (82 - 115)  BP: 146/64 ( @ 06:56) (120/104 - 170/80)  RR: 17 ( @ 06:56) (12 - 21)  SpO2: 95% ( @ :56) (94% - 100%)     @ 07:01  -   @ 07:00  --------------------------------------------------------  IN:    IV PiggyBack: 100 mL    lactated ringers.: 250 mL    Other: 1500 mL  Total IN: 1850 mL    OUT:    Indwelling Catheter - Urethral: 750 mL    Other: 500 mL  Total OUT: 1250 mL    Total NET: 600 mL          Physical Exam:  Pt is AAOx3  General: Well developed, in no acute distress.   Chest: Lungs clear, no rales, no rhonchi, no wheezes.   Heart: RR, no murmurs, no rubs, no gallops.   Abdomen: Soft, no tenderness, no masses, BS normal. Midline dressing in place   Back: Normal curvature, no tenderness.   Neuro: Physiological, no localizing findings.   Skin: Normal, no rashes, no lesions noted.   Extremities: Warm, well perfused, no edema, Pulses intact     @ 07:09                    11.2  CBC: 9.15>)-------(<192                     35.9                 - | - | -    CMP:  ----------------------< -               - | - | -                      Ca:-  Phos:-  Mg:-               -|      |-        LFTs:  ------|-|-----             -|      |-   @ 08:02                    11.4  CBC: 7.05>)-------(<211                     35.0                 143 | 109 | 18    CMP:  ----------------------< 118               3.2 | 28 | 0.97                      Ca:7.7  Phos:2.7  M.8               0.7|      |22        LFTs:  ------|45|-----             -|      |-      Culture - Urine (collected 20 @ 13:36)  Source: .Urine None  Final Report (20 @ 13:28):    <10,000 CFU/mL Normal Urogenital Dayana    Culture - Blood (collected 20 @ 13:02)  Source: .Blood None  Preliminary Report (20 @ 17:01):    No growth to date.      Current Inpatient Medications:  heparin   Injectable 5000 Unit(s) SubCutaneous every 8 hours  ketorolac   Injectable 15 milliGRAM(s) IV Push every 6 hours PRN  lactated ringers. 1000 milliLiter(s) (125 mL/Hr) IV Continuous <Continuous>  morphine  - Injectable 2 milliGRAM(s) IV Push every 4 hours PRN  piperacillin/tazobactam IVPB.. 3.375 Gram(s) IV Intermittent every 8 hours

## 2020-07-30 NOTE — PHYSICAL THERAPY INITIAL EVALUATION ADULT - PERTINENT HX OF CURRENT PROBLEM, REHAB EVAL
Pt presents to the ED c/o abd pain since last night after eating. +n/v. admitted with acute nausea and vomiting on 7/28 and found to have CT evidence of SBO., Small bowel obstruction:, s/p VI, small bowel segmental resection x 2 POD #1

## 2020-07-30 NOTE — PROGRESS NOTE ADULT - SUBJECTIVE AND OBJECTIVE BOX
Patient is a 80y old  Male who presents with a chief complaint of SBO (2020 09:31)  HPI:  2020- Cardiology Consultation: Patient seen for preoperative cardiology consultation. Patient with Crohns disease on Humera admitted with acute nausea and vomiting on  and found to have CT evidence of SBO. He was begun on treatment with IV fluids and NGT decompression with improvement of symptoms. Serum lactate is rising and bowel sounds remain absent and surgical intervention is being considered. In 2019 admitted with GI bleed requiring 1 unit of PRBCs with etiology not determined. Is S/p partial colectomy  apparently for diverticular disease. States he has a small bowel obstruction that resolved spontaneously a few years ago and again  in 10/2019 which resolved with conservative measures.  Cardiac history significant for HBP, HLD, CAD with multiple PCIs most recent ROSLYN 2019, mild aortic stenosis. He currently denies chest pain or dyspnea. There is no history of angina or heart failure. he is on chronic ASA and Plavix therapy with the last dose of these on 2020.     Followup HPI:  - Denies chest pain or shortness of breath. Complains of heart burn and NGT is uncomfortable. S/P lysis of adhesions, SB resection .    PAST MEDICAL & SURGICAL HISTORY:  Essential hypertension  Coronary artery disease involving native heart without angina pectoris, unspecified vessel or lesion type  Gastritis  Diverticulitis  S/P coronary artery stent placement  S/P colon resection      Review of symptoms:  Negative except for as noted in today's HPI.      MEDICATIONS  (STANDING):  heparin   Injectable 5000 Unit(s) SubCutaneous every 8 hours  lactated ringers. 1000 milliLiter(s) (125 mL/Hr) IV Continuous <Continuous>  piperacillin/tazobactam IVPB.. 3.375 Gram(s) IV Intermittent every 8 hours    MEDICATIONS  (PRN):  ketorolac   Injectable 15 milliGRAM(s) IV Push every 6 hours PRN Moderate Pain (4 - 6)  morphine  - Injectable 2 milliGRAM(s) IV Push every 4 hours PRN Severe Pain (7 - 10)          Vital Signs Last 24 Hrs  T(C): 38.1 (2020 06:56), Max: 38.1 (2020 06:56)  T(F): 100.6 (2020 06:56), Max: 100.6 (2020 06:56)  HR: 107 (2020 06:56) (82 - 115)  BP: 146/64 (2020 06:56) (120/104 - 170/80)  BP(mean): --  RR: 17 (2020 06:56) (12 - 21)  SpO2: 95% (2020 06:56) (94% - 100%)    I&O's Summary    2020 07:01  -  2020 07:00  --------------------------------------------------------  IN: 1850 mL / OUT: 1250 mL / NET: 600 mL        PHYSICAL EXAM  General Appearance: alert . No distress.  HEENT: NGT  Neck:   Lungs: clear  Heart: 1/5 NAHID LSB and base  Abdomen: surgical dressing, mildly distended and tender, + BS  Extremities: no edema, compression devices in place  Neurologic: alert and oriented      INTERPRETATION OF TELEMETRY:    ECG:    LABS:                          11.2   9.15  )-----------( 192      ( 2020 07:09 )             35.9     07-29    143  |  109<H>  |  18  ----------------------------<  118<H>  3.2<L>   |  28  |  0.97    Ca    7.7<L>      2020 08:02  Phos  2.7     07-29  Mg     1.8     07-29    TPro  7.3  /  Alb  2.9<L>  /  TBili  0.7  /  DBili  x   /  AST  22  /  ALT  31  /  AlkPhos  45  07-29            PT/INR - ( 2020 19:43 )   PT: 12.1 sec;   INR: 1.05 ratio         PTT - ( 2020 19:43 )  PTT:28.7 sec  Urinalysis Basic - ( 2020 13:36 )    Color: Yellow / Appearance: Clear / S.015 / pH: x  Gluc: x / Ketone: Negative  / Bili: Negative / Urobili: Negative mg/dL   Blood: x / Protein: 100 mg/dL / Nitrite: Negative   Leuk Esterase: Negative / RBC: 0-2 /HPF / WBC Negative   Sq Epi: x / Non Sq Epi: Occasional / Bacteria: Occasional            RADIOLOGY & ADDITIONAL STUDIES:    IMPRESSION:  1. Small bowel obstruction. S/P SB resection and lysis of adhesions.   2.Crohn's disease  3.CAD post multiple stents with most recent ROSLYN 2019. Clinically stable without heart failure or angina.  4. Mild AS. Stable  5.HBP. Stable.   PLAN:  Suggest when NGT out restart labetalol 200 mg bid. Hold ASA and Plavix until Dr. Alberts OK with restarting these.

## 2020-07-30 NOTE — DIETITIAN INITIAL EVALUATION ADULT. - ADD RECOMMEND
1) if pt unable to resume GI tract use within 5 days, consider PN and re-consult 2) monitor NPO length, advancement/tolerance nutr source 3) daily wt checks to track/trend changes 4) monitor lytes/mins; replete/correct prn

## 2020-07-30 NOTE — PROGRESS NOTE ADULT - ASSESSMENT
79yo M with recurrent SBO, now s/p ex-lap with resection of prior anastomosis POD#1    Plan  Pain/nausea control prn  Monitor vitals  Continue NGT to LWS  Monitor bowel function  Keep tim in place; monitor UOP  Will restart home meds once tolerating PO  Continue to hold home Plavix  DVT/GI ppx      Will discuss plan with Dr. Alberts

## 2020-07-30 NOTE — DIETITIAN INITIAL EVALUATION ADULT. - OTHER INFO
81yo male with PMH significant for CAD with stents, Crohns, diverticulitis s/p open sigmoidectomy, essential HTN, gastritis, 2 previous episodes of SBO, cholecystectomy p/w abd pain since night PTA after eating, (+) N/V.  Pt reported to dietitian (+) diarrhea PTA.  Pt s/p ex celiotomy with resection on 7/29 2/2 SBO.  Pt remains with NGT to LWS. No flatus or BM reported.

## 2020-07-31 LAB
ANION GAP SERPL CALC-SCNC: 6 MMOL/L — SIGNIFICANT CHANGE UP (ref 5–17)
BUN SERPL-MCNC: 12 MG/DL — SIGNIFICANT CHANGE UP (ref 7–23)
CALCIUM SERPL-MCNC: 7.6 MG/DL — LOW (ref 8.5–10.1)
CHLORIDE SERPL-SCNC: 110 MMOL/L — HIGH (ref 96–108)
CO2 SERPL-SCNC: 26 MMOL/L — SIGNIFICANT CHANGE UP (ref 22–31)
CREAT SERPL-MCNC: 0.7 MG/DL — SIGNIFICANT CHANGE UP (ref 0.5–1.3)
GLUCOSE SERPL-MCNC: 98 MG/DL — SIGNIFICANT CHANGE UP (ref 70–99)
HCT VFR BLD CALC: 31.4 % — LOW (ref 39–50)
HGB BLD-MCNC: 10.1 G/DL — LOW (ref 13–17)
MAGNESIUM SERPL-MCNC: 1.8 MG/DL — SIGNIFICANT CHANGE UP (ref 1.6–2.6)
MCHC RBC-ENTMCNC: 30.6 PG — SIGNIFICANT CHANGE UP (ref 27–34)
MCHC RBC-ENTMCNC: 32.2 GM/DL — SIGNIFICANT CHANGE UP (ref 32–36)
MCV RBC AUTO: 95.2 FL — SIGNIFICANT CHANGE UP (ref 80–100)
PHOSPHATE SERPL-MCNC: 1.1 MG/DL — LOW (ref 2.5–4.5)
PLATELET # BLD AUTO: 165 K/UL — SIGNIFICANT CHANGE UP (ref 150–400)
POTASSIUM SERPL-MCNC: 3.2 MMOL/L — LOW (ref 3.5–5.3)
POTASSIUM SERPL-SCNC: 3.2 MMOL/L — LOW (ref 3.5–5.3)
RBC # BLD: 3.3 M/UL — LOW (ref 4.2–5.8)
RBC # FLD: 14.6 % — HIGH (ref 10.3–14.5)
SODIUM SERPL-SCNC: 142 MMOL/L — SIGNIFICANT CHANGE UP (ref 135–145)
WBC # BLD: 10.5 K/UL — SIGNIFICANT CHANGE UP (ref 3.8–10.5)
WBC # FLD AUTO: 10.5 K/UL — SIGNIFICANT CHANGE UP (ref 3.8–10.5)

## 2020-07-31 PROCEDURE — 99232 SBSQ HOSP IP/OBS MODERATE 35: CPT

## 2020-07-31 RX ORDER — LABETALOL HCL 100 MG
200 TABLET ORAL EVERY 12 HOURS
Refills: 0 | Status: DISCONTINUED | OUTPATIENT
Start: 2020-07-31 | End: 2020-08-05

## 2020-07-31 RX ORDER — POTASSIUM CHLORIDE 20 MEQ
40 PACKET (EA) ORAL ONCE
Refills: 0 | Status: COMPLETED | OUTPATIENT
Start: 2020-07-31 | End: 2020-07-31

## 2020-07-31 RX ADMIN — Medication 15 MILLIGRAM(S): at 01:19

## 2020-07-31 RX ADMIN — Medication 100 MILLIGRAM(S): at 06:14

## 2020-07-31 RX ADMIN — Medication 15 MILLIGRAM(S): at 22:30

## 2020-07-31 RX ADMIN — Medication 100 MILLIGRAM(S): at 22:17

## 2020-07-31 RX ADMIN — Medication 200 MILLIGRAM(S): at 22:17

## 2020-07-31 RX ADMIN — Medication 30 MILLILITER(S): at 22:18

## 2020-07-31 RX ADMIN — Medication 15 MILLIGRAM(S): at 15:05

## 2020-07-31 RX ADMIN — Medication 200 MILLIGRAM(S): at 12:20

## 2020-07-31 RX ADMIN — BENZOCAINE AND MENTHOL 1 LOZENGE: 5; 1 LIQUID ORAL at 06:15

## 2020-07-31 RX ADMIN — HEPARIN SODIUM 5000 UNIT(S): 5000 INJECTION INTRAVENOUS; SUBCUTANEOUS at 22:17

## 2020-07-31 RX ADMIN — SODIUM CHLORIDE 125 MILLILITER(S): 9 INJECTION, SOLUTION INTRAVENOUS at 18:56

## 2020-07-31 RX ADMIN — Medication 15 MILLIGRAM(S): at 14:49

## 2020-07-31 RX ADMIN — SODIUM CHLORIDE 125 MILLILITER(S): 9 INJECTION, SOLUTION INTRAVENOUS at 06:14

## 2020-07-31 RX ADMIN — Medication 15 MILLIGRAM(S): at 22:17

## 2020-07-31 RX ADMIN — HEPARIN SODIUM 5000 UNIT(S): 5000 INJECTION INTRAVENOUS; SUBCUTANEOUS at 06:14

## 2020-07-31 RX ADMIN — Medication 40 MILLIEQUIVALENT(S): at 18:56

## 2020-07-31 RX ADMIN — HEPARIN SODIUM 5000 UNIT(S): 5000 INJECTION INTRAVENOUS; SUBCUTANEOUS at 13:57

## 2020-07-31 RX ADMIN — Medication 15 MILLIGRAM(S): at 01:49

## 2020-07-31 RX ADMIN — Medication 100 MILLIGRAM(S): at 13:58

## 2020-07-31 RX ADMIN — BENZOCAINE AND MENTHOL 1 LOZENGE: 5; 1 LIQUID ORAL at 18:56

## 2020-07-31 NOTE — PROGRESS NOTE ADULT - ASSESSMENT
81yo M with recurrent SBO, now s/p ex-lap with resection of prior anastomosis POD#2    Plan  Pain/nausea control prn  Monitor vitals  NGT output downtrending, may be removed today  Continue to have watery bowel movements; sent for C.diff  Started on ppx C/diff regiment  Monitor bowel function  Will restart home meds once tolerating PO  Continue to hold home Plavix  DVT/GI ppx      Will discuss plan with Dr. Alberts 79yo M with recurrent SBO, now s/p ex-lap with resection of prior anastomosis POD#2  Episode of multiple loose bowel movements yesterday have now resolved.  Patient denies any abdominal complaints.  Mild tachycardia now resolved  One episode of elevated temperature    Plan  Pain/nausea control prn  Monitor vitals  NGT output downtrending, may be removed today  Zosyn DC'd  Continue to have watery bowel movements; sent for C.diff  Started on ppx C/diff regiment  Monitor bowel function  Will restart home meds once tolerating PO  Continue to hold home Plavix  DVT/GI ppx      Will discuss plan with Dr. Alberts

## 2020-07-31 NOTE — PROGRESS NOTE ADULT - SUBJECTIVE AND OBJECTIVE BOX
CC: Nausea, vomiting  · Subjective and Objective: 	  79 y/o male with a PMHx of CAD with stents in 05/29,, Crohns, diverticulitis s/p open Sigmoidectomy, essential HTN, gastritis, 2 previous episodes of SBO, cholecystectomy presents to the ED c/o abd pain since last night after eating. +n/v. admitted with acute nausea and vomiting on 7/28 and found to have CT evidence of SBO. He was begun on treatment with IV fluids and NGT decompression with improvement of symptoms. Serum lactate is rising and bowel sounds remain absent and surgical intervention is being considered. He denies any abd pain now. No chest pain, sob, fever or chills.     7/30: Pt states he is passing gas.  States NGT is very uncomfortable, otherwise doing okay, HD stable, had febrile episode 38.1.    7/31: Had multiple loose stools since yesterday, no further now. Afebrile. abx stopped.    REVIEW OF SYSTEMS: All other review of systems is negative unless indicated above.    Vital Signs Last 24 Hrs  T(C): 37 (31 Jul 2020 13:58), Max: 38.9 (30 Jul 2020 17:53)  T(F): 98.6 (31 Jul 2020 13:58), Max: 102 (30 Jul 2020 17:53)  HR: 101 (31 Jul 2020 13:58) (98 - 107)  BP: 148/74 (31 Jul 2020 13:58) (148/74 - 177/81)  BP(mean): --  RR: 16 (31 Jul 2020 13:58) (16 - 19)  SpO2: 96% (31 Jul 2020 13:58) (93% - 98%)    PHYSICAL EXAM  General Appearance: comfortable, NAD  HEENT: PERRL,   Neck: Supple, , no adenopathy, thyroid: not enlarged, no carotid bruit or JVD  Lungs: Clear to auscultation bilaterally, no r/r/w  expiratory phase  Heart: Regular rate and rhythm, S1, S2 normal, 1/6 NAHID LSB and base  Abdomen: distended, nontender, no organomegaly or masses. Absent BS.  Extremities: no cyanosis or edema, no joint swelling  Skin: Skin color, texture normal, no rashes   Neurologic: Alert and oriented X3 , cranial nerves intact, sensory and motor normal,    MEDICATIONS  (STANDING):  benzocaine 15 mG/menthol 3.6 mG (Sugar-Free) Lozenge 1 Lozenge Oral every 12 hours  heparin   Injectable 5000 Unit(s) SubCutaneous every 8 hours  labetalol 200 milliGRAM(s) Oral every 12 hours  lactated ringers. 500 milliLiter(s) (500 mL/Hr) IV Continuous <Continuous>  lactated ringers. 1000 milliLiter(s) (125 mL/Hr) IV Continuous <Continuous>  metroNIDAZOLE  IVPB 500 milliGRAM(s) IV Intermittent every 8 hours  metroNIDAZOLE  IVPB      potassium chloride    Tablet ER 40 milliEquivalent(s) Oral once    MEDICATIONS  (PRN):  ketorolac   Injectable 15 milliGRAM(s) IV Push every 6 hours PRN Moderate Pain (4 - 6)  morphine  - Injectable 2 milliGRAM(s) IV Push every 4 hours PRN Severe Pain (7 - 10)                              10.1   10.50 )-----------( 165      ( 31 Jul 2020 08:27 )             31.4     07-31    142  |  110<H>  |  12  ----------------------------<  98  3.2<L>   |  26  |  0.70    Ca    7.6<L>      31 Jul 2020 08:27  Phos  1.1     07-31  Mg     1.8     07-31      CAPILLARY BLOOD GLUCOSE        Assessment  and plan:  79 y/o male with a PMHx of CAD with stents in 05/29,, Crohns, diverticulitis s/p open Sigmoidectomy, essential HTN, gastritis, 2 previous episodes of SBO, cholecystectomy presents to the ED c/o abd pain since last night after eating. +n/v.     Small bowel obstruction:  -s/p VI, small bowel segmental resection x 2 POD # 2  -monitor for fevers  -NG tube and tim removed  -ambulate  -replete electrolytes  -pain management  -supportive care  -restart cardiac meds  -on flagyl for empiric cdiff tx due to diarrhea, stool studies pending collection, pt without further episodes.    Crohn's diseas / CAD post multiple stents with most recent ROSLYN 5/2019 / Mild AS: Stable    DVT prophylaxis                                                                          Electronic Signatures:  Primitivo Naylor)  (Signed 30-Jul-2020 14:16)  	Authored: Progress Note, Reason for Admission, Subjective and Objective      Last Updated: 30-Jul-2020 14:16 by Primitivo Naylor (DO)

## 2020-07-31 NOTE — PROGRESS NOTE ADULT - ASSESSMENT
1. Small bowel obstruction. S/P SB resection and lysis of adhesions.   2.Crohn's disease  3.CAD post multiple stents with most recent ROSLYN 5/2019. Clinically stable without heart failure or angina.  4. Mild AS. Stable  5.HBP. Stable.   PLAN:  Suggest when NGT out restart labetalol 200 mg bid. Hold ASA and Plavix until Dr. Alberts OK with restarting these.

## 2020-07-31 NOTE — PROGRESS NOTE ADULT - SUBJECTIVE AND OBJECTIVE BOX
Patient is a 80y old  Male who presents with a chief complaint of SBO (31 Jul 2020 08:47)      HPI:  81 y/o male with a PMHx of CAD with stents, Crohns, diverticulitis s/p open Sigmoidectomy, essential HTN, gastritis, 2 previous episodes of SBO, cholecystectomy presents to the ED c/o abd pain since last night after eating. +n/v. Denies diarrhea, admits to 2 normal BMs this AM but denies any other Flatus. No recent travel. No known sick contacts. No other complaints at this time (28 Jul 2020 14:30)  7/29/2020- Cardiology Consultation: Patient seen for preoperative cardiology consultation. Patient with Crohns disease on Humera admitted with acute nausea and vomiting on 7/28 and found to have CT evidence of SBO. He was begun on treatment with IV fluids and NGT decompression with improvement of symptoms. Serum lactate is rising and bowel sounds remain absent and surgical intervention is being considered. In August 2019 admitted with GI bleed requiring 1 unit of PRBCs with etiology not determined. Is S/p partial colectomy 2007 apparently for diverticular disease. States he has a small bowel obstruction that resolved spontaneously a few years ago and again  in 10/2019 which resolved with conservative measures.  Cardiac history significant for HBP, HLD, CAD with multiple PCIs most recent ROSLYN 5/2019, mild aortic stenosis. He currently denies chest pain or dyspnea. There is no history of angina or heart failure. he is on chronic ASA and Plavix therapy with the last dose of these on 7/27/2020.     Followup HPI:  7/30- Denies chest pain or shortness of breath. Complains of heart burn and NGT is uncomfortable. S/P lysis of adhesions, SB resection 7/29.  7/31 no new complaints. ngt  in place    PAST MEDICAL & SURGICAL HISTORY:  Essential hypertension  Coronary artery disease involving native heart without angina pectoris, unspecified vessel or lesion type  Gastritis  Diverticulitis  S/P coronary artery stent placement  S/P colon resection        MEDICATIONS  (STANDING):  benzocaine 15 mG/menthol 3.6 mG (Sugar-Free) Lozenge 1 Lozenge Oral every 12 hours  heparin   Injectable 5000 Unit(s) SubCutaneous every 8 hours  labetalol 200 milliGRAM(s) Oral every 12 hours  lactated ringers. 500 milliLiter(s) (500 mL/Hr) IV Continuous <Continuous>  lactated ringers. 1000 milliLiter(s) (125 mL/Hr) IV Continuous <Continuous>  metroNIDAZOLE  IVPB 500 milliGRAM(s) IV Intermittent every 8 hours  metroNIDAZOLE  IVPB        MEDICATIONS  (PRN):  ketorolac   Injectable 15 milliGRAM(s) IV Push every 6 hours PRN Moderate Pain (4 - 6)  morphine  - Injectable 2 milliGRAM(s) IV Push every 4 hours PRN Severe Pain (7 - 10)          Vital Signs Last 24 Hrs  T(C): 36.8 (31 Jul 2020 08:29), Max: 38.9 (30 Jul 2020 17:53)  T(F): 98.3 (31 Jul 2020 08:29), Max: 102 (30 Jul 2020 17:53)  HR: 98 (31 Jul 2020 08:29) (98 - 107)  BP: 157/70 (31 Jul 2020 08:29) (157/70 - 177/81)  BP(mean): --  RR: 18 (31 Jul 2020 08:29) (16 - 19)  SpO2: 95% (31 Jul 2020 08:29) (93% - 98%)    I&O's Summary    30 Jul 2020 07:01  -  31 Jul 2020 07:00  --------------------------------------------------------  IN: 0 mL / OUT: 2200 mL / NET: -2200 mL        PHYSICAL EXAM  General Appearance: alert . No distress.  HEENT: NGT  Neck:   Lungs: clear  Heart: 1/5 NAHID LSB and base  Abdomen: surgical dressing, mildly distended and tender, + BS  Extremities: no edema, compression devices in place  Neurologic: alert and oriented        INTERPRETATION OF TELEMETRY:    ECG:        LABS:                          10.1   10.50 )-----------( 165      ( 31 Jul 2020 08:27 )             31.4     07-31    142  |  110<H>  |  12  ----------------------------<  98  3.2<L>   |  26  |  0.70    Ca    7.6<L>      31 Jul 2020 08:27  Phos  1.1     07-31  Mg     1.8     07-31                        RADIOLOGY & ADDITIONAL STUDIES:

## 2020-07-31 NOTE — PROGRESS NOTE ADULT - SUBJECTIVE AND OBJECTIVE BOX
Patient seen and examined at bedside. Patient has no complaints and reports pain is under control. Patient remains NPO post-op with NGT in place. Admits to passing flatus and has had multiple watery bowel movement. Nurse denies any acute events. Vitals reviewed, tachycardic overnight, otherwise WNL.      Vitals:  T(C): 36.8 ( @ 08:29), Max: 38.9 ( @ 17:53)  HR: 98 ( @ 08:29) (98 - 107)  BP: 157/70 ( @ 08:29) (157/70 - 177/81)  RR: 18 ( @ 08:29) (16 - 19)  SpO2: 95% ( @ 08:29) (93% - 98%)     @ 07:01  -   @ 07:00  --------------------------------------------------------  IN:  Total IN: 0 mL    OUT:    Indwelling Catheter - Urethral: 400 mL    Nasoenteral Tube: 800 mL    Voided: 1000 mL  Total OUT: 2200 mL    Total NET: -2200 mL        Physical Exam:  Pt is AAOx3  General: Well developed, in no acute distress.   Chest: Lungs clear, no rales, no rhonchi, no wheezes.   Heart: RR, no murmurs, no rubs, no gallops.   Abdomen: Soft, no tenderness, no masses, BS normal. Midline dressing in place   Back: Normal curvature, no tenderness.   Neuro: Physiological, no localizing findings.   Skin: Normal, no rashes, no lesions noted.   Extremities: Warm, well perfused, no edema, Pulses intact     @ 08:27                    10.1  CBC: 10.50>)-------(<165                     31.4                 - | - | -    CMP:  ----------------------< -               - | - | -                      Ca:-  Phos:-  Mg:-               -|      |-        LFTs:  ------|-|-----             -|      |-   @ 07:09                    11.2  CBC: 9.15>)-------(<192                     35.9                 142 | 108 | 19    CMP:  ----------------------< 142               3.2 | 29 | 1.04                      Ca:7.4  Phos:2.3  M.7               -|      |-        LFTs:  ------|-|-----             -|      |-      Culture - Urine (collected 20 @ 13:36)  Source: .Urine None  Final Report (20 @ 13:28):    <10,000 CFU/mL Normal Urogenital Dayana    Culture - Blood (collected 20 @ 13:02)  Source: .Blood None  Preliminary Report (20 @ 17:01):    No growth to date.      Current Inpatient Medications:  benzocaine 15 mG/menthol 3.6 mG (Sugar-Free) Lozenge 1 Lozenge Oral every 12 hours  heparin   Injectable 5000 Unit(s) SubCutaneous every 8 hours  ketorolac   Injectable 15 milliGRAM(s) IV Push every 6 hours PRN  lactated ringers. 500 milliLiter(s) (500 mL/Hr) IV Continuous <Continuous>  lactated ringers. 1000 milliLiter(s) (125 mL/Hr) IV Continuous <Continuous>  metroNIDAZOLE  IVPB 500 milliGRAM(s) IV Intermittent every 8 hours  metroNIDAZOLE  IVPB      morphine  - Injectable 2 milliGRAM(s) IV Push every 4 hours PRN

## 2020-08-01 LAB
ANION GAP SERPL CALC-SCNC: 5 MMOL/L — SIGNIFICANT CHANGE UP (ref 5–17)
ANION GAP SERPL CALC-SCNC: 5 MMOL/L — SIGNIFICANT CHANGE UP (ref 5–17)
ANION GAP SERPL CALC-SCNC: 7 MMOL/L — SIGNIFICANT CHANGE UP (ref 5–17)
BUN SERPL-MCNC: 20 MG/DL — SIGNIFICANT CHANGE UP (ref 7–23)
BUN SERPL-MCNC: 21 MG/DL — SIGNIFICANT CHANGE UP (ref 7–23)
BUN SERPL-MCNC: 21 MG/DL — SIGNIFICANT CHANGE UP (ref 7–23)
C DIFF BY PCR RESULT: SIGNIFICANT CHANGE UP
C DIFF TOX GENS STL QL NAA+PROBE: SIGNIFICANT CHANGE UP
CALCIUM SERPL-MCNC: 7.6 MG/DL — LOW (ref 8.5–10.1)
CALCIUM SERPL-MCNC: 7.8 MG/DL — LOW (ref 8.5–10.1)
CALCIUM SERPL-MCNC: 8.1 MG/DL — LOW (ref 8.5–10.1)
CHLORIDE SERPL-SCNC: 107 MMOL/L — SIGNIFICANT CHANGE UP (ref 96–108)
CHLORIDE SERPL-SCNC: 110 MMOL/L — HIGH (ref 96–108)
CHLORIDE SERPL-SCNC: 110 MMOL/L — HIGH (ref 96–108)
CO2 SERPL-SCNC: 29 MMOL/L — SIGNIFICANT CHANGE UP (ref 22–31)
CO2 SERPL-SCNC: 30 MMOL/L — SIGNIFICANT CHANGE UP (ref 22–31)
CO2 SERPL-SCNC: 31 MMOL/L — SIGNIFICANT CHANGE UP (ref 22–31)
CREAT SERPL-MCNC: 0.76 MG/DL — SIGNIFICANT CHANGE UP (ref 0.5–1.3)
CREAT SERPL-MCNC: 0.8 MG/DL — SIGNIFICANT CHANGE UP (ref 0.5–1.3)
CREAT SERPL-MCNC: 0.84 MG/DL — SIGNIFICANT CHANGE UP (ref 0.5–1.3)
CULTURE RESULTS: SIGNIFICANT CHANGE UP
GLUCOSE SERPL-MCNC: 100 MG/DL — HIGH (ref 70–99)
GLUCOSE SERPL-MCNC: 119 MG/DL — HIGH (ref 70–99)
GLUCOSE SERPL-MCNC: 97 MG/DL — SIGNIFICANT CHANGE UP (ref 70–99)
HCT VFR BLD CALC: 32.1 % — LOW (ref 39–50)
HCT VFR BLD CALC: 32.9 % — LOW (ref 39–50)
HGB BLD-MCNC: 10.3 G/DL — LOW (ref 13–17)
HGB BLD-MCNC: 10.4 G/DL — LOW (ref 13–17)
MAGNESIUM SERPL-MCNC: 2.4 MG/DL — SIGNIFICANT CHANGE UP (ref 1.6–2.6)
MCHC RBC-ENTMCNC: 30.2 PG — SIGNIFICANT CHANGE UP (ref 27–34)
MCHC RBC-ENTMCNC: 30.3 PG — SIGNIFICANT CHANGE UP (ref 27–34)
MCHC RBC-ENTMCNC: 31.3 GM/DL — LOW (ref 32–36)
MCHC RBC-ENTMCNC: 32.4 GM/DL — SIGNIFICANT CHANGE UP (ref 32–36)
MCV RBC AUTO: 93.6 FL — SIGNIFICANT CHANGE UP (ref 80–100)
MCV RBC AUTO: 96.5 FL — SIGNIFICANT CHANGE UP (ref 80–100)
PHOSPHATE SERPL-MCNC: 2.4 MG/DL — LOW (ref 2.5–4.5)
PHOSPHATE SERPL-MCNC: 2.7 MG/DL — SIGNIFICANT CHANGE UP (ref 2.5–4.5)
PLATELET # BLD AUTO: 193 K/UL — SIGNIFICANT CHANGE UP (ref 150–400)
PLATELET # BLD AUTO: 202 K/UL — SIGNIFICANT CHANGE UP (ref 150–400)
POTASSIUM SERPL-MCNC: 3.1 MMOL/L — LOW (ref 3.5–5.3)
POTASSIUM SERPL-MCNC: 3.4 MMOL/L — LOW (ref 3.5–5.3)
POTASSIUM SERPL-MCNC: 3.5 MMOL/L — SIGNIFICANT CHANGE UP (ref 3.5–5.3)
POTASSIUM SERPL-SCNC: 3.1 MMOL/L — LOW (ref 3.5–5.3)
POTASSIUM SERPL-SCNC: 3.4 MMOL/L — LOW (ref 3.5–5.3)
POTASSIUM SERPL-SCNC: 3.5 MMOL/L — SIGNIFICANT CHANGE UP (ref 3.5–5.3)
RBC # BLD: 3.41 M/UL — LOW (ref 4.2–5.8)
RBC # BLD: 3.43 M/UL — LOW (ref 4.2–5.8)
RBC # FLD: 14.2 % — SIGNIFICANT CHANGE UP (ref 10.3–14.5)
RBC # FLD: 14.4 % — SIGNIFICANT CHANGE UP (ref 10.3–14.5)
SODIUM SERPL-SCNC: 143 MMOL/L — SIGNIFICANT CHANGE UP (ref 135–145)
SODIUM SERPL-SCNC: 145 MMOL/L — SIGNIFICANT CHANGE UP (ref 135–145)
SODIUM SERPL-SCNC: 146 MMOL/L — HIGH (ref 135–145)
SPECIMEN SOURCE: SIGNIFICANT CHANGE UP
WBC # BLD: 5.62 K/UL — SIGNIFICANT CHANGE UP (ref 3.8–10.5)
WBC # BLD: 5.84 K/UL — SIGNIFICANT CHANGE UP (ref 3.8–10.5)
WBC # FLD AUTO: 5.62 K/UL — SIGNIFICANT CHANGE UP (ref 3.8–10.5)
WBC # FLD AUTO: 5.84 K/UL — SIGNIFICANT CHANGE UP (ref 3.8–10.5)

## 2020-08-01 PROCEDURE — 71045 X-RAY EXAM CHEST 1 VIEW: CPT | Mod: 26,76

## 2020-08-01 PROCEDURE — 99232 SBSQ HOSP IP/OBS MODERATE 35: CPT

## 2020-08-01 PROCEDURE — 74018 RADEX ABDOMEN 1 VIEW: CPT | Mod: 26

## 2020-08-01 RX ORDER — POTASSIUM CHLORIDE 20 MEQ
10 PACKET (EA) ORAL
Refills: 0 | Status: COMPLETED | OUTPATIENT
Start: 2020-08-01 | End: 2020-08-01

## 2020-08-01 RX ORDER — ACETAMINOPHEN 500 MG
650 TABLET ORAL EVERY 6 HOURS
Refills: 0 | Status: DISCONTINUED | OUTPATIENT
Start: 2020-08-01 | End: 2020-08-06

## 2020-08-01 RX ORDER — POTASSIUM PHOSPHATE, MONOBASIC POTASSIUM PHOSPHATE, DIBASIC 236; 224 MG/ML; MG/ML
15 INJECTION, SOLUTION INTRAVENOUS ONCE
Refills: 0 | Status: COMPLETED | OUTPATIENT
Start: 2020-08-01 | End: 2020-08-01

## 2020-08-01 RX ORDER — CEFTRIAXONE 500 MG/1
1000 INJECTION, POWDER, FOR SOLUTION INTRAMUSCULAR; INTRAVENOUS EVERY 24 HOURS
Refills: 0 | Status: COMPLETED | OUTPATIENT
Start: 2020-08-01 | End: 2020-08-05

## 2020-08-01 RX ORDER — SODIUM CHLORIDE 9 MG/ML
500 INJECTION, SOLUTION INTRAVENOUS ONCE
Refills: 0 | Status: COMPLETED | OUTPATIENT
Start: 2020-08-01 | End: 2020-08-01

## 2020-08-01 RX ORDER — PANTOPRAZOLE SODIUM 20 MG/1
40 TABLET, DELAYED RELEASE ORAL ONCE
Refills: 0 | Status: COMPLETED | OUTPATIENT
Start: 2020-08-01 | End: 2020-08-01

## 2020-08-01 RX ORDER — POTASSIUM CHLORIDE 20 MEQ
40 PACKET (EA) ORAL EVERY 12 HOURS
Refills: 0 | Status: DISCONTINUED | OUTPATIENT
Start: 2020-08-01 | End: 2020-08-01

## 2020-08-01 RX ORDER — CEFTRIAXONE 500 MG/1
1000 INJECTION, POWDER, FOR SOLUTION INTRAMUSCULAR; INTRAVENOUS EVERY 24 HOURS
Refills: 0 | Status: DISCONTINUED | OUTPATIENT
Start: 2020-08-01 | End: 2020-08-01

## 2020-08-01 RX ORDER — SODIUM CHLORIDE 9 MG/ML
1000 INJECTION, SOLUTION INTRAVENOUS ONCE
Refills: 0 | Status: COMPLETED | OUTPATIENT
Start: 2020-08-01 | End: 2020-08-01

## 2020-08-01 RX ORDER — PANTOPRAZOLE SODIUM 20 MG/1
40 TABLET, DELAYED RELEASE ORAL DAILY
Refills: 0 | Status: DISCONTINUED | OUTPATIENT
Start: 2020-08-02 | End: 2020-08-06

## 2020-08-01 RX ORDER — ONDANSETRON 8 MG/1
4 TABLET, FILM COATED ORAL ONCE
Refills: 0 | Status: COMPLETED | OUTPATIENT
Start: 2020-08-01 | End: 2020-08-01

## 2020-08-01 RX ADMIN — Medication 100 MILLIGRAM(S): at 06:03

## 2020-08-01 RX ADMIN — ONDANSETRON 4 MILLIGRAM(S): 8 TABLET, FILM COATED ORAL at 03:30

## 2020-08-01 RX ADMIN — Medication 15 MILLIGRAM(S): at 23:08

## 2020-08-01 RX ADMIN — Medication 100 MILLIEQUIVALENT(S): at 23:48

## 2020-08-01 RX ADMIN — CEFTRIAXONE 1000 MILLIGRAM(S): 500 INJECTION, POWDER, FOR SOLUTION INTRAMUSCULAR; INTRAVENOUS at 17:06

## 2020-08-01 RX ADMIN — Medication 100 MILLIEQUIVALENT(S): at 22:45

## 2020-08-01 RX ADMIN — Medication 200 MILLIGRAM(S): at 21:47

## 2020-08-01 RX ADMIN — Medication 200 MILLIGRAM(S): at 09:28

## 2020-08-01 RX ADMIN — SODIUM CHLORIDE 2000 MILLILITER(S): 9 INJECTION, SOLUTION INTRAVENOUS at 11:51

## 2020-08-01 RX ADMIN — Medication 650 MILLIGRAM(S): at 09:28

## 2020-08-01 RX ADMIN — Medication 15 MILLIGRAM(S): at 22:53

## 2020-08-01 RX ADMIN — Medication 100 MILLIEQUIVALENT(S): at 13:38

## 2020-08-01 RX ADMIN — Medication 100 MILLIGRAM(S): at 15:50

## 2020-08-01 RX ADMIN — BENZOCAINE AND MENTHOL 1 LOZENGE: 5; 1 LIQUID ORAL at 16:06

## 2020-08-01 RX ADMIN — PANTOPRAZOLE SODIUM 40 MILLIGRAM(S): 20 TABLET, DELAYED RELEASE ORAL at 09:26

## 2020-08-01 RX ADMIN — Medication 100 MILLIEQUIVALENT(S): at 21:45

## 2020-08-01 RX ADMIN — HEPARIN SODIUM 5000 UNIT(S): 5000 INJECTION INTRAVENOUS; SUBCUTANEOUS at 13:38

## 2020-08-01 RX ADMIN — Medication 100 MILLIGRAM(S): at 22:46

## 2020-08-01 RX ADMIN — Medication 15 MILLIGRAM(S): at 15:51

## 2020-08-01 RX ADMIN — Medication 15 MILLIGRAM(S): at 14:43

## 2020-08-01 RX ADMIN — Medication 30 MILLILITER(S): at 09:26

## 2020-08-01 RX ADMIN — POTASSIUM PHOSPHATE, MONOBASIC POTASSIUM PHOSPHATE, DIBASIC 63.75 MILLIMOLE(S): 236; 224 INJECTION, SOLUTION INTRAVENOUS at 17:07

## 2020-08-01 RX ADMIN — SODIUM CHLORIDE 500 MILLILITER(S): 9 INJECTION, SOLUTION INTRAVENOUS at 20:50

## 2020-08-01 RX ADMIN — HEPARIN SODIUM 5000 UNIT(S): 5000 INJECTION INTRAVENOUS; SUBCUTANEOUS at 06:03

## 2020-08-01 RX ADMIN — Medication 650 MILLIGRAM(S): at 10:30

## 2020-08-01 RX ADMIN — Medication 100 MILLIEQUIVALENT(S): at 14:45

## 2020-08-01 RX ADMIN — HEPARIN SODIUM 5000 UNIT(S): 5000 INJECTION INTRAVENOUS; SUBCUTANEOUS at 21:47

## 2020-08-01 RX ADMIN — Medication 100 MILLIEQUIVALENT(S): at 12:56

## 2020-08-01 NOTE — PROGRESS NOTE ADULT - SUBJECTIVE AND OBJECTIVE BOX
CC: Nausea, vomiting  · Subjective and Objective: 	  79 y/o male with a PMHx of CAD with stents in 05/29,, Crohns, diverticulitis s/p open Sigmoidectomy, essential HTN, gastritis, 2 previous episodes of SBO, cholecystectomy presents to the ED c/o abd pain since last night after eating. +n/v. admitted with acute nausea and vomiting on 7/28 and found to have CT evidence of SBO. He was begun on treatment with IV fluids and NGT decompression with improvement of symptoms. Serum lactate is rising and bowel sounds remain absent and surgical intervention is being considered. He denies any abd pain now. No chest pain, sob, fever or chills.     7/30: Pt states he is passing gas.  States NGT is very uncomfortable, otherwise doing okay, HD stable, had febrile episode 38.1.    7/31: Had multiple loose stools since yesterday, no further now. Afebrile. abx stopped.  8/1: Pt weak appearing, has no specific complaints.  Had fever of 101 this morning.    REVIEW OF SYSTEMS: All other review of systems is negative unless indicated above.    Vital Signs Last 24 Hrs  T(C): 38.6 (01 Aug 2020 08:38), Max: 38.6 (01 Aug 2020 08:38)  T(F): 101.5 (01 Aug 2020 08:38), Max: 101.5 (01 Aug 2020 08:38)  HR: 93 (01 Aug 2020 08:38) (84 - 101)  BP: 149/73 (01 Aug 2020 08:38) (114/73 - 167/73)  BP(mean): --  RR: 18 (01 Aug 2020 08:38) (16 - 19)  SpO2: 95% (01 Aug 2020 08:38) (95% - 97%)    PHYSICAL EXAM  General Appearance: comfortable, NAD, weak appearing  HEENT: PERRL, NG tube replaced  Neck: Supple, , no adenopathy, thyroid: not enlarged, no carotid bruit or JVD  Lungs: Clear to auscultation bilaterally, no r/r/w  expiratory phase  Heart: Regular rate and rhythm, S1, S2 normal, 1/6 NAHID LSB and base  Abdomen: distended, nontender, no organomegaly or masses. Absent BS.  Extremities: no cyanosis or edema, no joint swelling  Skin: Skin color, texture normal, no rashes   Neurologic: Alert and oriented X3 , cranial nerves intact, sensory and motor normal,    MEDICATIONS  (STANDING):  benzocaine 15 mG/menthol 3.6 mG (Sugar-Free) Lozenge 1 Lozenge Oral every 12 hours  heparin   Injectable 5000 Unit(s) SubCutaneous every 8 hours  labetalol 200 milliGRAM(s) Oral every 12 hours  lactated ringers. 500 milliLiter(s) (500 mL/Hr) IV Continuous <Continuous>  lactated ringers. 1000 milliLiter(s) (125 mL/Hr) IV Continuous <Continuous>  metroNIDAZOLE  IVPB 500 milliGRAM(s) IV Intermittent every 8 hours  metroNIDAZOLE  IVPB      potassium chloride  10 mEq/100 mL IVPB 10 milliEquivalent(s) IV Intermittent every 1 hour  potassium phosphate IVPB 15 milliMole(s) IV Intermittent once    MEDICATIONS  (PRN):  acetaminophen   Tablet .. 650 milliGRAM(s) Oral every 6 hours PRN Temp greater or equal to 38C (100.4F), Mild Pain (1 - 3), Moderate Pain (4 - 6)  aluminum hydroxide/magnesium hydroxide/simethicone Suspension 30 milliLiter(s) Oral every 6 hours PRN Dyspepsia  ketorolac   Injectable 15 milliGRAM(s) IV Push every 6 hours PRN Moderate Pain (4 - 6)  morphine  - Injectable 2 milliGRAM(s) IV Push every 4 hours PRN Severe Pain (7 - 10)                              10.4   5.62  )-----------( 202      ( 01 Aug 2020 08:28 )             32.1     08-01    143  |  107  |  21  ----------------------------<  119<H>  3.1<L>   |  31  |  0.80    Ca    8.1<L>      01 Aug 2020 08:28  Phos  2.4     08-01  Mg     2.4     08-01      CAPILLARY BLOOD GLUCOSE      Assessment  and plan:  79 y/o male with a PMHx of CAD with stents in 05/29,, Crohns, diverticulitis s/p open Sigmoidectomy, essential HTN, gastritis, 2 previous episodes of SBO, cholecystectomy presents to the ED c/o abd pain since last night after eating. +n/v, found to have SBO, s/p segmental resection with hospital course complicated by ileus and post op fevers.     Small bowel obstruction:  -s/p VI, small bowel segmental resection x 2 POD # 3  -monitor for fevers  -NG tube replaced  -ambulate  -replete electrolytes  -pain management  -supportive care  -restart cardiac meds  -on flagyl for empiric cdiff tx due to diarrhea, stool studies pending collection, pt without further episodes.    Post op fever / ileus:   -NG tube replaced  -tylenol PRN fever  -xray noted, atelectasis  -on flagyl empirically, stool samples sent out for cdiff and GI PCR    Hypokalemia: Replete and monitor     Crohn's diseas / CAD post multiple stents with most recent ROSLYN 5/2019 / Mild AS: Stable    DVT prophylaxis                                                                          Electronic Signatures:  Primitivo Naylor)  (Signed 30-Jul-2020 14:16)  	Authored: Progress Note, Reason for Admission, Subjective and Objective      Last Updated: 30-Jul-2020 14:16 by Primitivo Naylor ()

## 2020-08-01 NOTE — PROGRESS NOTE ADULT - SUBJECTIVE AND OBJECTIVE BOX
Patient is a 80y old  Male who presents with a chief complaint of SBO (31 Jul 2020 14:48)      HPI:  79 y/o male with a PMHx of CAD with stents, Crohns, diverticulitis s/p open Sigmoidectomy, essential HTN, gastritis, 2 previous episodes of SBO, cholecystectomy presents to the ED c/o abd pain since last night after eating. +n/v. Denies diarrhea, admits to 2 normal BMs this AM but denies any other Flatus. No recent travel. No known sick contacts. No other complaints at this time (28 Jul 2020 14:30)    7/29/2020- Cardiology Consultation: Patient seen for preoperative cardiology consultation. Patient with Crohns disease on Humera admitted with acute nausea and vomiting on 7/28 and found to have CT evidence of SBO. He was begun on treatment with IV fluids and NGT decompression with improvement of symptoms. Serum lactate is rising and bowel sounds remain absent and surgical intervention is being considered. In August 2019 admitted with GI bleed requiring 1 unit of PRBCs with etiology not determined. Is S/p partial colectomy 2007 apparently for diverticular disease. States he has a small bowel obstruction that resolved spontaneously a few years ago and again  in 10/2019 which resolved with conservative measures.  Cardiac history significant for HBP, HLD, CAD with multiple PCIs most recent ROSLYN 5/2019, mild aortic stenosis. He currently denies chest pain or dyspnea. There is no history of angina or heart failure. he is on chronic ASA and Plavix therapy with the last dose of these on 7/27/2020.     Followup HPI:  7/30- Denies chest pain or shortness of breath. Complains of heart burn and NGT is uncomfortable. S/P lysis of adhesions, SB resection 7/29.  7/31 no new complaints. ngt  in place  8/1  NGT out complaining of nausea  PAST MEDICAL & SURGICAL HISTORY:  Essential hypertension  Coronary artery disease involving native heart without angina pectoris, unspecified vessel or lesion type  Gastritis  Diverticulitis  S/P coronary artery stent placement  S/P colon resection        MEDICATIONS  (STANDING):  benzocaine 15 mG/menthol 3.6 mG (Sugar-Free) Lozenge 1 Lozenge Oral every 12 hours  heparin   Injectable 5000 Unit(s) SubCutaneous every 8 hours  labetalol 200 milliGRAM(s) Oral every 12 hours  lactated ringers. 500 milliLiter(s) (500 mL/Hr) IV Continuous <Continuous>  lactated ringers. 1000 milliLiter(s) (125 mL/Hr) IV Continuous <Continuous>  metroNIDAZOLE  IVPB 500 milliGRAM(s) IV Intermittent every 8 hours  metroNIDAZOLE  IVPB        MEDICATIONS  (PRN):  ketorolac   Injectable 15 milliGRAM(s) IV Push every 6 hours PRN Moderate Pain (4 - 6)  morphine  - Injectable 2 milliGRAM(s) IV Push every 4 hours PRN Severe Pain (7 - 10)          Vital Signs Last 24 Hrs  T(C): 36.7 (01 Aug 2020 00:48), Max: 37.1 (31 Jul 2020 16:38)  T(F): 98.1 (01 Aug 2020 00:48), Max: 98.8 (31 Jul 2020 16:38)  HR: 84 (01 Aug 2020 00:48) (84 - 101)  BP: 137/62 (01 Aug 2020 00:48) (114/73 - 167/73)  BP(mean): --  RR: 19 (01 Aug 2020 00:48) (16 - 19)  SpO2: 95% (01 Aug 2020 00:48) (95% - 97%)    I&O's Summary    31 Jul 2020 07:01  -  01 Aug 2020 07:00  --------------------------------------------------------  IN: 2000 mL / OUT: 725 mL / NET: 1275 mL        PHYSICAL EXAM  General Appearance: alert .   HEENT:   Neck:   Lungs: clear  Heart: 1/5 NAHID LSB and base  Abdomen: surgical dressing, mildly distended and tender, + BS  Extremities: no edema, compression devices in place  Neurologic: alert and oriented      INTERPRETATION OF TELEMETRY:    ECG:        LABS:                          10.1   10.50 )-----------( 165      ( 31 Jul 2020 08:27 )             31.4     07-31    142  |  110<H>  |  12  ----------------------------<  98  3.2<L>   |  26  |  0.70    Ca    7.6<L>      31 Jul 2020 08:27  Phos  1.1     07-31  Mg     1.8     07-31                        RADIOLOGY & ADDITIONAL STUDIES:

## 2020-08-01 NOTE — PROGRESS NOTE ADULT - SUBJECTIVE AND OBJECTIVE BOX
MARLEY EDDY  [id number]  Male      Daily   Pt still with watery Diarrhea. C/O nausea and heartburn. Vomited 2 x since last night    Physical Exam:    Pt is AAOx3  General: Well developed, in no acute distress.   Chest: Lungs clear, no rales, no rhonchi, no wheezes.   Heart: RR, no murmurs, no rubs, no gallops.   Abdomen: Soft, no tenderness, no masses, distended    Back: Normal curvature, no tenderness.   Neuro: Physiological, no localizing findings.   Skin: Normal, no rashes, no lesions noted.   Extremities: Warm, well perfused, no edema, Pulses intact    I&O's Summary    31 Jul 2020 07:01  -  01 Aug 2020 07:00  --------------------------------------------------------  IN: 2000 mL / OUT: 725 mL / NET: 1275 mL                              10.4   5.62  )-----------( 202      ( 01 Aug 2020 08:28 )             32.1       08-01    143  |  107  |  21  ----------------------------<  119<H>  3.1<L>   |  31  |  0.80    Ca    8.1<L>      01 Aug 2020 08:28  Phos  2.4     08-01  Mg     2.4     08-01          HEALTH ISSUES - PROBLEM Dx: MARLEY EDDY  [id number]  Male      Daily   Pt still with watery Diarrhea. C/O nausea and heartburn. Vomited 2 x since last night  Temp to 101    Physical Exam:    Pt is AAOx3  General: Well developed, in no acute distress.   Chest: Lungs clear, no rales, no rhonchi, no wheezes.   Heart: RR, no murmurs, no rubs, no gallops.   Abdomen: Soft, no tenderness, no masses, distended    Back: Normal curvature, no tenderness.   Neuro: Physiological, no localizing findings.   Skin: Normal, no rashes, no lesions noted.   Extremities: Warm, well perfused, no edema, Pulses intact    I&O's Summary    31 Jul 2020 07:01  -  01 Aug 2020 07:00  --------------------------------------------------------  IN: 2000 mL / OUT: 725 mL / NET: 1275 mL                              10.4   5.62  )-----------( 202      ( 01 Aug 2020 08:28 )             32.1       08-01    143  |  107  |  21  ----------------------------<  119<H>  3.1<L>   |  31  |  0.80    Ca    8.1<L>      01 Aug 2020 08:28  Phos  2.4     08-01  Mg     2.4     08-01          HEALTH ISSUES - PROBLEM Dx:

## 2020-08-01 NOTE — CONSULT NOTE ADULT - SUBJECTIVE AND OBJECTIVE BOX
Patient is a 80y old  Male who presents with a chief complaint of SBO (01 Aug 2020 11:51)    HPI:  81 y/o male with a PMHx of CAD with stents, Crohns, diverticulitis s/p open Sigmoidectomy, essential HTN, gastritis, 2 previous episodes of SBO, cholecystectomy admitted on 7/28 for evaluation of abdominal pain after eating associated with nausea and vomiting, found to have small bowel obstruction, the patient underwent small bowel resection on 7/29 with end to end anastomosis. Post op has been having fever, also noted with ileus and had ngt placed. He did have some loose stool for which testing is pending. He denies any other specific complaints.           PMH: as above  PSH: as above  Meds: per reconciliation sheet, noted below  MEDICATIONS  (STANDING):  benzocaine 15 mG/menthol 3.6 mG (Sugar-Free) Lozenge 1 Lozenge Oral every 12 hours  cefTRIAXone Injectable. 1000 milliGRAM(s) IV Push every 24 hours  heparin   Injectable 5000 Unit(s) SubCutaneous every 8 hours  labetalol 200 milliGRAM(s) Oral every 12 hours  lactated ringers. 500 milliLiter(s) (500 mL/Hr) IV Continuous <Continuous>  lactated ringers. 1000 milliLiter(s) (125 mL/Hr) IV Continuous <Continuous>  metroNIDAZOLE  IVPB 500 milliGRAM(s) IV Intermittent every 8 hours  metroNIDAZOLE  IVPB      potassium phosphate IVPB 15 milliMole(s) IV Intermittent once    MEDICATIONS  (PRN):  acetaminophen   Tablet .. 650 milliGRAM(s) Oral every 6 hours PRN Temp greater or equal to 38C (100.4F), Mild Pain (1 - 3), Moderate Pain (4 - 6)  aluminum hydroxide/magnesium hydroxide/simethicone Suspension 30 milliLiter(s) Oral every 6 hours PRN Dyspepsia  ketorolac   Injectable 15 milliGRAM(s) IV Push every 6 hours PRN Moderate Pain (4 - 6)  morphine  - Injectable 2 milliGRAM(s) IV Push every 4 hours PRN Severe Pain (7 - 10)    Allergies    contrast (Unknown)  No Known Drug Allergies  Originally Entered as [Unknown] reaction to [hair dye] (Unknown)    Intolerances      Social: no smoking, no alcohol, no illegal drugs; no recent travel, no exposure to TB  FAMILY HISTORY:  Family history of diabetes mellitus (DM): father  FH: coronary artery disease: father     ROS: the patient has no chills, no HA, no dizziness, no sore throat, no blurry vision, no CP, no palpitations, no SOB, no cough, no abdominal pain,  no dysuria, no leg pain, no claudication, no rash, no joint aches, no rectal pain or bleeding, no night sweats  All other systems reviewed and are negative    Vital Signs Last 24 Hrs  T(C): 37.4 (01 Aug 2020 10:30), Max: 38.6 (01 Aug 2020 08:38)  T(F): 99.4 (01 Aug 2020 10:30), Max: 101.5 (01 Aug 2020 08:38)  HR: 93 (01 Aug 2020 08:38) (84 - 93)  BP: 149/73 (01 Aug 2020 08:38) (114/73 - 167/73)  BP(mean): --  RR: 18 (01 Aug 2020 08:38) (16 - 19)  SpO2: 95% (01 Aug 2020 08:38) (95% - 97%)  Daily     Daily     PE:    Constitutional: frail looking  HEENT: NC/AT, EOMI, PERRLA, conjunctivae clear; ears and nose atraumatic; pharynx clear; ngt in place  Neck: supple; thyroid not palpable  Back: no tenderness  Respiratory: respiratory effort normal; clear to auscultation  Cardiovascular: S1S2 regular, no murmurs  Abdomen: soft, not tender, not distended, positive BS; no liver or spleen organomegaly; midline wound covered with dressing  Genitourinary: no suprapubic tenderness  Musculoskeletal: no muscle tenderness, no joint swelling or tenderness  Neurological/ Psychiatric: AxOx3, judgement and insight normal;  moving all extremities  Skin: no rashes; no palpable lesions    Labs: all available labs reviewed                        10.4   5.62  )-----------( 202      ( 01 Aug 2020 08:28 )             32.1     08-01    146<H>  |  110<H>  |  20  ----------------------------<  100<H>  3.5   |  29  |  0.76    Ca    7.8<L>      01 Aug 2020 15:02  Phos  2.4     08-01  Mg     2.4     08-01             Culture Results:   GI PCR Results: NOT detected  *******Please Note:*******  GI panel PCR evaluates for:  Campylobacter, Plesiomonas shigelloides, Salmonella,  Vibrio, Yersinia enterocolitica, Enteroaggregative  Escherichia coli (EAEC), Enteropathogenic E.coli (EPEC),  Enterotoxigenic E. coli (ETEC) lt/st, Shiga-like  toxin-producing E. coli (STEC) stx1/stx2,  Shigella/ Enteroinvasive E. coli (EIEC), Cryptosporidium,  Cyclospora cayetanensis, Entamoeba histolytica,  Giardia lamblia, Adenovirus F 40/41, Astrovirus,  Norovirus GI/GII, Rotavirus A, Sapovirus (08-01 @ 02:55)          < from: Xray Chest 1 View- PORTABLE-Routine (08.01.20 @ 11:00) >    EXAM:  XR CHEST PORTABLE ROUTINE 1V                            PROCEDURE DATE:  08/01/2020          INTERPRETATION:  AP chest.    Clinical indications: NG tube placement.    IMPRESSION: The NG tube tip is in the stomach. Bibasilar atelectasis is present. There is a trace right pleural effusion. The heart is normal in size.      < end of copied text >                    Radiology: all available radiological tests reviewed    Advanced directives addressed: full resuscitation

## 2020-08-01 NOTE — PROGRESS NOTE ADULT - ASSESSMENT
Ileus most likely s/p bowel resection  Low K  Heartburn and nausea      Plan  OOB  Replace NGT  Replace K  Check C Diff  Check Phos level  AXR

## 2020-08-01 NOTE — PROGRESS NOTE ADULT - ASSESSMENT
1. Small bowel obstruction. S/P SB resection and lysis of adhesions.   2.Crohn's disease  3.CAD post multiple stents with most recent ROSLYN 5/2019. Clinically stable without heart failure or angina.  4. Mild AS. Stable  5.HBP. Stable.   PLAN:  cont  labetalol 200 mg bid.  resume plavix when ok'd by surgery  post op care per surgery

## 2020-08-01 NOTE — CONSULT NOTE ADULT - ASSESSMENT
79 y/o male with a PMHx of CAD with stents, Crohns, diverticulitis s/p open Sigmoidectomy, essential HTN, gastritis, 2 previous episodes of SBO, cholecystectomy admitted on 7/28 for evaluation of abdominal pain after eating associated with nausea and vomiting, found to have small bowel obstruction, the patient underwent small bowel resection on 7/29 with end to end anastomosis. Post op has been having fever, also noted with ileus and had ngt placed. He did have some loose stool for which testing is pending. He denies any other specific complaints.     1. Patient admitted with SBO s/p resection; post op fever; possibly atelectasis, micro aspiration pneumonia  - follow up cultures   - wound care per surgery as well as ngt  - iv hydration and supportive care   - serial cbc and monitor temperature   - reviewed prior medical records to evaluate for resistant or atypical pathogens   - agree with flagyl as ordered; awaiting cdiff toxin test  - will add ceftriaxone to cover microaerophilic pathogens of aspiration  - isolation pending cdiff assay  2. other issues; per medicine

## 2020-08-02 LAB
A1C WITH ESTIMATED AVERAGE GLUCOSE RESULT: 5.3 % — SIGNIFICANT CHANGE UP (ref 4–5.6)
ANION GAP SERPL CALC-SCNC: 6 MMOL/L — SIGNIFICANT CHANGE UP (ref 5–17)
ANION GAP SERPL CALC-SCNC: 7 MMOL/L — SIGNIFICANT CHANGE UP (ref 5–17)
BUN SERPL-MCNC: 19 MG/DL — SIGNIFICANT CHANGE UP (ref 7–23)
BUN SERPL-MCNC: 21 MG/DL — SIGNIFICANT CHANGE UP (ref 7–23)
CALCIUM SERPL-MCNC: 7.6 MG/DL — LOW (ref 8.5–10.1)
CALCIUM SERPL-MCNC: 7.7 MG/DL — LOW (ref 8.5–10.1)
CHLORIDE SERPL-SCNC: 111 MMOL/L — HIGH (ref 96–108)
CHLORIDE SERPL-SCNC: 111 MMOL/L — HIGH (ref 96–108)
CO2 SERPL-SCNC: 27 MMOL/L — SIGNIFICANT CHANGE UP (ref 22–31)
CO2 SERPL-SCNC: 27 MMOL/L — SIGNIFICANT CHANGE UP (ref 22–31)
CREAT SERPL-MCNC: 0.72 MG/DL — SIGNIFICANT CHANGE UP (ref 0.5–1.3)
CREAT SERPL-MCNC: 0.87 MG/DL — SIGNIFICANT CHANGE UP (ref 0.5–1.3)
CULTURE RESULTS: SIGNIFICANT CHANGE UP
ESTIMATED AVERAGE GLUCOSE: 105 MG/DL — SIGNIFICANT CHANGE UP (ref 68–114)
GLUCOSE SERPL-MCNC: 91 MG/DL — SIGNIFICANT CHANGE UP (ref 70–99)
GLUCOSE SERPL-MCNC: 93 MG/DL — SIGNIFICANT CHANGE UP (ref 70–99)
HCT VFR BLD CALC: 29.3 % — LOW (ref 39–50)
HGB BLD-MCNC: 9.3 G/DL — LOW (ref 13–17)
MAGNESIUM SERPL-MCNC: 2 MG/DL — SIGNIFICANT CHANGE UP (ref 1.6–2.6)
MAGNESIUM SERPL-MCNC: 2.2 MG/DL — SIGNIFICANT CHANGE UP (ref 1.6–2.6)
MCHC RBC-ENTMCNC: 30.5 PG — SIGNIFICANT CHANGE UP (ref 27–34)
MCHC RBC-ENTMCNC: 31.7 GM/DL — LOW (ref 32–36)
MCV RBC AUTO: 96.1 FL — SIGNIFICANT CHANGE UP (ref 80–100)
PHOSPHATE SERPL-MCNC: 2 MG/DL — LOW (ref 2.5–4.5)
PHOSPHATE SERPL-MCNC: 2 MG/DL — LOW (ref 2.5–4.5)
PLATELET # BLD AUTO: 203 K/UL — SIGNIFICANT CHANGE UP (ref 150–400)
POTASSIUM SERPL-MCNC: 3.4 MMOL/L — LOW (ref 3.5–5.3)
POTASSIUM SERPL-MCNC: 4.2 MMOL/L — SIGNIFICANT CHANGE UP (ref 3.5–5.3)
POTASSIUM SERPL-SCNC: 3.4 MMOL/L — LOW (ref 3.5–5.3)
POTASSIUM SERPL-SCNC: 4.2 MMOL/L — SIGNIFICANT CHANGE UP (ref 3.5–5.3)
RBC # BLD: 3.05 M/UL — LOW (ref 4.2–5.8)
RBC # FLD: 14.6 % — HIGH (ref 10.3–14.5)
SODIUM SERPL-SCNC: 144 MMOL/L — SIGNIFICANT CHANGE UP (ref 135–145)
SODIUM SERPL-SCNC: 145 MMOL/L — SIGNIFICANT CHANGE UP (ref 135–145)
SPECIMEN SOURCE: SIGNIFICANT CHANGE UP
WBC # BLD: 6.23 K/UL — SIGNIFICANT CHANGE UP (ref 3.8–10.5)
WBC # FLD AUTO: 6.23 K/UL — SIGNIFICANT CHANGE UP (ref 3.8–10.5)

## 2020-08-02 PROCEDURE — 99232 SBSQ HOSP IP/OBS MODERATE 35: CPT

## 2020-08-02 RX ORDER — DEXTROSE 50 % IN WATER 50 %
25 SYRINGE (ML) INTRAVENOUS ONCE
Refills: 0 | Status: DISCONTINUED | OUTPATIENT
Start: 2020-08-02 | End: 2020-08-05

## 2020-08-02 RX ORDER — DEXTROSE MONOHYDRATE, SODIUM CHLORIDE, AND POTASSIUM CHLORIDE 50; .745; 4.5 G/1000ML; G/1000ML; G/1000ML
1000 INJECTION, SOLUTION INTRAVENOUS
Refills: 0 | Status: DISCONTINUED | OUTPATIENT
Start: 2020-08-02 | End: 2020-08-03

## 2020-08-02 RX ORDER — POTASSIUM CHLORIDE 20 MEQ
10 PACKET (EA) ORAL
Refills: 0 | Status: COMPLETED | OUTPATIENT
Start: 2020-08-02 | End: 2020-08-02

## 2020-08-02 RX ORDER — POTASSIUM PHOSPHATE, MONOBASIC POTASSIUM PHOSPHATE, DIBASIC 236; 224 MG/ML; MG/ML
15 INJECTION, SOLUTION INTRAVENOUS ONCE
Refills: 0 | Status: DISCONTINUED | OUTPATIENT
Start: 2020-08-02 | End: 2020-08-02

## 2020-08-02 RX ORDER — INSULIN LISPRO 100/ML
VIAL (ML) SUBCUTANEOUS EVERY 6 HOURS
Refills: 0 | Status: DISCONTINUED | OUTPATIENT
Start: 2020-08-02 | End: 2020-08-05

## 2020-08-02 RX ORDER — DEXTROSE 50 % IN WATER 50 %
15 SYRINGE (ML) INTRAVENOUS ONCE
Refills: 0 | Status: DISCONTINUED | OUTPATIENT
Start: 2020-08-02 | End: 2020-08-05

## 2020-08-02 RX ORDER — SODIUM CHLORIDE 9 MG/ML
1000 INJECTION, SOLUTION INTRAVENOUS
Refills: 0 | Status: DISCONTINUED | OUTPATIENT
Start: 2020-08-02 | End: 2020-08-05

## 2020-08-02 RX ORDER — ONDANSETRON 8 MG/1
4 TABLET, FILM COATED ORAL EVERY 6 HOURS
Refills: 0 | Status: DISCONTINUED | OUTPATIENT
Start: 2020-08-02 | End: 2020-08-06

## 2020-08-02 RX ORDER — METRONIDAZOLE 500 MG
500 TABLET ORAL EVERY 8 HOURS
Refills: 0 | Status: DISCONTINUED | OUTPATIENT
Start: 2020-08-02 | End: 2020-08-03

## 2020-08-02 RX ORDER — ELECTROLYTE SOLUTION,INJ
1 VIAL (ML) INTRAVENOUS
Refills: 0 | Status: DISCONTINUED | OUTPATIENT
Start: 2020-08-02 | End: 2020-08-02

## 2020-08-02 RX ORDER — ADALIMUMAB 40MG/0.8ML
40 KIT SUBCUTANEOUS ONCE
Refills: 0 | Status: COMPLETED | OUTPATIENT
Start: 2020-08-03 | End: 2020-08-03

## 2020-08-02 RX ORDER — DEXTROSE 50 % IN WATER 50 %
12.5 SYRINGE (ML) INTRAVENOUS ONCE
Refills: 0 | Status: DISCONTINUED | OUTPATIENT
Start: 2020-08-02 | End: 2020-08-05

## 2020-08-02 RX ORDER — GLUCAGON INJECTION, SOLUTION 0.5 MG/.1ML
1 INJECTION, SOLUTION SUBCUTANEOUS ONCE
Refills: 0 | Status: DISCONTINUED | OUTPATIENT
Start: 2020-08-02 | End: 2020-08-05

## 2020-08-02 RX ADMIN — Medication 200 MILLIGRAM(S): at 22:23

## 2020-08-02 RX ADMIN — BENZOCAINE AND MENTHOL 1 LOZENGE: 5; 1 LIQUID ORAL at 09:56

## 2020-08-02 RX ADMIN — Medication 15 MILLIGRAM(S): at 08:42

## 2020-08-02 RX ADMIN — Medication 15 MILLIGRAM(S): at 18:01

## 2020-08-02 RX ADMIN — Medication 15 MILLIGRAM(S): at 23:23

## 2020-08-02 RX ADMIN — Medication 15 MILLIGRAM(S): at 10:27

## 2020-08-02 RX ADMIN — Medication 100 MILLIGRAM(S): at 05:28

## 2020-08-02 RX ADMIN — Medication 1 EACH: at 23:09

## 2020-08-02 RX ADMIN — DEXTROSE MONOHYDRATE, SODIUM CHLORIDE, AND POTASSIUM CHLORIDE 125 MILLILITER(S): 50; .745; 4.5 INJECTION, SOLUTION INTRAVENOUS at 10:54

## 2020-08-02 RX ADMIN — Medication 62.5 MILLIMOLE(S): at 22:23

## 2020-08-02 RX ADMIN — HEPARIN SODIUM 5000 UNIT(S): 5000 INJECTION INTRAVENOUS; SUBCUTANEOUS at 22:23

## 2020-08-02 RX ADMIN — Medication 200 MILLIGRAM(S): at 09:55

## 2020-08-02 RX ADMIN — SODIUM CHLORIDE 125 MILLILITER(S): 9 INJECTION, SOLUTION INTRAVENOUS at 06:32

## 2020-08-02 RX ADMIN — HEPARIN SODIUM 5000 UNIT(S): 5000 INJECTION INTRAVENOUS; SUBCUTANEOUS at 13:25

## 2020-08-02 RX ADMIN — Medication 15 MILLIGRAM(S): at 17:32

## 2020-08-02 RX ADMIN — HEPARIN SODIUM 5000 UNIT(S): 5000 INJECTION INTRAVENOUS; SUBCUTANEOUS at 05:29

## 2020-08-02 RX ADMIN — BENZOCAINE AND MENTHOL 1 LOZENGE: 5; 1 LIQUID ORAL at 22:23

## 2020-08-02 RX ADMIN — ONDANSETRON 4 MILLIGRAM(S): 8 TABLET, FILM COATED ORAL at 17:32

## 2020-08-02 RX ADMIN — Medication 100 MILLIGRAM(S): at 13:25

## 2020-08-02 RX ADMIN — PANTOPRAZOLE SODIUM 40 MILLIGRAM(S): 20 TABLET, DELAYED RELEASE ORAL at 09:55

## 2020-08-02 RX ADMIN — Medication 100 MILLIGRAM(S): at 22:24

## 2020-08-02 RX ADMIN — Medication 15 MILLIGRAM(S): at 23:25

## 2020-08-02 RX ADMIN — CEFTRIAXONE 1000 MILLIGRAM(S): 500 INJECTION, POWDER, FOR SOLUTION INTRAMUSCULAR; INTRAVENOUS at 17:34

## 2020-08-02 RX ADMIN — Medication 100 MILLIEQUIVALENT(S): at 13:54

## 2020-08-02 RX ADMIN — Medication 100 MILLIEQUIVALENT(S): at 12:54

## 2020-08-02 RX ADMIN — Medication 100 MILLIEQUIVALENT(S): at 10:55

## 2020-08-02 NOTE — PROGRESS NOTE ADULT - ASSESSMENT
Ileus most likely s/p bowel resection  Heartburn and nausea resolved      Plan  OOB  NGT in place, continue  check labs  Will start PPN

## 2020-08-02 NOTE — CONSULT NOTE ADULT - SUBJECTIVE AND OBJECTIVE BOX
HPI:  81 y/o male with a PMHx of CAD with stents, Crohns, diverticulitis s/p open Sigmoidectomy, essential HTN, gastritis, 2 previous episodes of SBO, cholecystectomy presents to the ED c/o abd pain since last night after eating. +n/v. Denies diarrhea, admits to 2 normal BMs this AM but denies any other Flatus. No recent travel. No known sick contacts. No other complaints at this time (28 Jul 2020 14:30)      PAST MEDICAL & SURGICAL HISTORY:  Essential hypertension  Coronary artery disease involving native heart without angina pectoris, unspecified vessel or lesion type  Gastritis  Diverticulitis s/p Sigmoidectomy  Crohns ileitis  S/P coronary artery stent placement  S/P colon resection      MEDICATIONS  (STANDING):  benzocaine 15 mG/menthol 3.6 mG (Sugar-Free) Lozenge 1 Lozenge Oral every 12 hours  cefTRIAXone Injectable. 1000 milliGRAM(s) IV Push every 24 hours  dextrose 5%. 1000 milliLiter(s) (50 mL/Hr) IV Continuous <Continuous>  dextrose 50% Injectable 12.5 Gram(s) IV Push once  dextrose 50% Injectable 25 Gram(s) IV Push once  dextrose 50% Injectable 25 Gram(s) IV Push once  heparin   Injectable 5000 Unit(s) SubCutaneous every 8 hours  insulin lispro (HumaLOG) corrective regimen sliding scale   SubCutaneous every 6 hours  labetalol 200 milliGRAM(s) Oral every 12 hours  lactated ringers. 500 milliLiter(s) (500 mL/Hr) IV Continuous <Continuous>  metroNIDAZOLE  IVPB 500 milliGRAM(s) IV Intermittent every 8 hours  pantoprazole  Injectable 40 milliGRAM(s) IV Push daily  Parenteral Nutrition - Adult 1 Each (83 mL/Hr) TPN Continuous <Continuous>  potassium chloride  10 mEq/100 mL IVPB 10 milliEquivalent(s) IV Intermittent every 1 hour  sodium chloride 0.45% with potassium chloride 20 mEq/L 1000 milliLiter(s) (125 mL/Hr) IV Continuous <Continuous>    MEDICATIONS  (PRN):  acetaminophen   Tablet .. 650 milliGRAM(s) Oral every 6 hours PRN Temp greater or equal to 38C (100.4F), Mild Pain (1 - 3), Moderate Pain (4 - 6)  aluminum hydroxide/magnesium hydroxide/simethicone Suspension 30 milliLiter(s) Oral every 6 hours PRN Dyspepsia  dextrose 40% Gel 15 Gram(s) Oral once PRN Blood Glucose LESS THAN 70 milliGRAM(s)/deciliter  glucagon  Injectable 1 milliGRAM(s) IntraMuscular once PRN Glucose LESS THAN 70 milligrams/deciliter  ketorolac   Injectable 15 milliGRAM(s) IV Push every 6 hours PRN Moderate Pain (4 - 6)  morphine  - Injectable 2 milliGRAM(s) IV Push every 4 hours PRN Severe Pain (7 - 10)      Allergies    contrast (Unknown)  No Known Drug Allergies  Originally Entered as [Unknown] reaction to [hair dye] (Unknown)    Intolerances        SOCIAL HISTORY:    FAMILY HISTORY:  Family history of diabetes mellitus (DM): father  FH: coronary artery disease: father   Non-contributory    REVIEW OF SYSTEMS      General:	    Respiratory and Thorax:  	  Cardiovascular:	    Gastrointestinal:	    Musculoskeletal:	   Vital Signs Last 24 Hrs  T(C): 36.8 (02 Aug 2020 08:52), Max: 37.7 (01 Aug 2020 21:03)  T(F): 98.3 (02 Aug 2020 08:52), Max: 99.8 (01 Aug 2020 21:03)  HR: 85 (02 Aug 2020 08:52) (85 - 100)  BP: 176/78 (02 Aug 2020 08:52) (153/74 - 176/78)  BP(mean): --  RR: 18 (02 Aug 2020 08:52) (18 - 18)  SpO2: 95% (02 Aug 2020 08:52) (95% - 95%)    HEENT :No Pallor.No icterus. EOMI,PERLAA  Chest : Clear to Auscultation  CVS : S1S2 Normal.No murmurs.  Abdomen: Soft.Non tender .Normal bowel sounds.No Organomegaly.  CNS: Alert.Oriented to Time,Place and Person.No focal deficit.  EXT: Normal Range of motion.No pitting edema.    LABS:                        9.3    6.23  )-----------( 203      ( 02 Aug 2020 09:06 )             29.3     08-02    145  |  111<H>  |  19  ----------------------------<  91  3.4<L>   |  27  |  0.72    Ca    7.7<L>      02 Aug 2020 09:06  Phos  2.0     08-02  Mg     2.2     08-02            RADIOLOGY & ADDITIONAL STUDIES:

## 2020-08-02 NOTE — PROGRESS NOTE ADULT - ASSESSMENT
79 y/o male with a PMHx of CAD with stents, Crohns, diverticulitis s/p open Sigmoidectomy, essential HTN, gastritis, 2 previous episodes of SBO, cholecystectomy admitted on 7/28 for evaluation of abdominal pain after eating associated with nausea and vomiting, found to have small bowel obstruction, the patient underwent small bowel resection on 7/29 with end to end anastomosis. Post op has been having fever, also noted with ileus and had ngt placed. He did have some loose stool for which testing is pending. He denies any other specific complaints.     1. Patient admitted with SBO s/p resection; post op fever; possibly atelectasis, micro aspiration pneumonia  - follow up cultures   - wound care per surgery as well as ngt  - iv hydration and supportive care   - serial cbc and monitor temperature   - reviewed prior medical records to evaluate for resistant or atypical pathogens   - day #2 metronidazole and ceftriaxone  - tolerating antibiotics without rashes or side effects   - no CDiff therefore most likely will stop metronidazole in am  2. other issues; per medicine

## 2020-08-02 NOTE — CHART NOTE - NSCHARTNOTEFT_GEN_A_CORE
Clinical Nutrition PPN Recommendation Note    *received consult for PPN recommendations.  d/w surgeon; pt has been NPO x 5 days, expected to be NPO x 3 additional days 2/2 ileus s/p ex-lap with resection of prior anastomosis on 7/29 due to recurrent SBO.  Pt now with NGT placement with LWS with 2750mL output.    *labs reviewed; 08-02 Na145 mmol/L Glu 91 mg/dL K+ 3.4 mmol/L<L> Cr  0.72 mg/dL BUN 19 mg/dL Phos 2.0 mg/dL<L> Alb n/a   PAB n/a       *hypokalemia and hypophosphatemia noted; monitor and replete prn.  bilirubin total WNL.  no triglyceride checked.    *output: urine: 500mL plus one unmeasured.  one BM.  NGT: 2750mL.  total: 3250mL.  *input: LR: 1997mL.  IVP: 850mL: total: 2847mL    *no edema.  abd distended.  karen score of 19; no PU documented.    *no new wt since admission; obtain new wt when feasible.    PPN RECOMMENDATIONS:  2000mL total volume    no lipids  80 g Amino Acids  80 g Dextrose  121 mEq NaCl  14 mEq NaAcetate  20 mMol NaPhos  42 mEq KCl  28 mEq KAcetate  10 mEq Calcium Gluconate  8 mEq Magnesium Sulfate  1 mL trace element  10 mL MVI     total Calories: 592 (meets ~34% of estimated calorie needs and 82% of estimated protein needs)    ADDITIONAL RECOMMENDATIONS:  1) strict I/O's  2) daily wt checks  3) obtain triglyceride level  4) q6hr POC checks  5) daily lyte checks      **************************************************************************************    · Weight Used for Calculation	adjusted  69Kg     Estimated Energy Needs (25-30 calories/kg):  · Weight  (lbs)	152.1 lb  · Weight (kg)	69 kg  · From (25cal/kg)	1725 · To (30cal/kg) 2070     Other Calculation:  · Other Calculation	Ht. 66 "     Wt. 81.3 Kg       29 BMI       IBW  65 Kg      Pt is at   125 %  IBW     Estimated Protein Needs (1.4-1.6 g/kg):  · Weight  (lbs)	152.1  · Weight (kg)	69 kg  · From (1.4 g/kg)	96.6 · To (1.6 g/kg)	110.4     Estimated Fluid Needs (25-30 ml/kg):  · Weight  (lbs)	152.1  · Weight (kg)	69  · From (25 ml/kg)	1725 · To (30 ml/kg) 2070 Clinical Nutrition PPN Recommendation Note    *received consult for PPN recommendations.  d/w surgeon; pt has been NPO x 5 days, expected to be NPO x 3 additional days 2/2 ileus s/p ex-lap with resection of prior anastomosis on 7/29 due to recurrent SBO.  Pt now with NGT placement with LWS with 2750mL output.    *labs reviewed; 08-02 Na145 mmol/L Glu 91 mg/dL K+ 3.4 mmol/L<L> Cr  0.72 mg/dL BUN 19 mg/dL Phos 2.0 mg/dL<L> Alb n/a   PAB n/a       *hypokalemia and hypophosphatemia noted; monitor and replete prn.  bilirubin total WNL.  no triglyceride checked.    *output: urine: 500mL plus one unmeasured.  one BM.  NGT: 2750mL.  total: 3250mL.  *input: LR: 1997mL.  IVP: 850mL: total: 2847mL    *no edema.  abd distended.  karen score of 19; no PU documented.    *no new wt since admission; obtain new wt when feasible.  Pt likely meets criteria for malnutrition, unable to prove at this time.      PPN RECOMMENDATIONS:  2000mL total volume    no lipids  80 g Amino Acids  80 g Dextrose  121 mEq NaCl  14 mEq NaAcetate  20 mMol NaPhos  42 mEq KCl  28 mEq KAcetate  10 mEq Calcium Gluconate  8 mEq Magnesium Sulfate  1 mL trace element  10 mL MVI     total Calories: 592 (meets ~34% of estimated calorie needs and 82% of estimated protein needs)    ADDITIONAL RECOMMENDATIONS:  1) strict I/O's  2) daily wt checks  3) obtain triglyceride level  4) q6hr POC checks  5) daily lyte checks      **************************************************************************************    · Weight Used for Calculation	adjusted  69Kg     Estimated Energy Needs (25-30 calories/kg):  · Weight  (lbs)	152.1 lb  · Weight (kg)	69 kg  · From (25cal/kg)	1725 · To (30cal/kg) 2070     Other Calculation:  · Other Calculation	Ht. 66 "     Wt. 81.3 Kg       29 BMI       IBW  65 Kg      Pt is at   125 %  IBW     Estimated Protein Needs (1.4-1.6 g/kg):  · Weight  (lbs)	152.1  · Weight (kg)	69 kg  · From (1.4 g/kg)	96.6 · To (1.6 g/kg)	110.4     Estimated Fluid Needs (25-30 ml/kg):  · Weight  (lbs)	152.1  · Weight (kg)	69  · From (25 ml/kg)	1725 · To (30 ml/kg) 2070

## 2020-08-02 NOTE — PHARMACOTHERAPY INTERVENTION NOTE - COMMENTS
Potassium phosphate 15mmol/250ml x1 dose was ordered. Pt's K= 4.2. MD oked to change to sodium phosphate 15mmol/250ml x 1 dose

## 2020-08-02 NOTE — CONSULT NOTE ADULT - ASSESSMENT
Recurrent SBO  S/P SB resection  H/O Crohns ileitis controlled on Biologic RX Humira  REC  Continue Humira-Pt is due for his Shot tomorrow  The Pt may take his own med

## 2020-08-02 NOTE — PROGRESS NOTE ADULT - SUBJECTIVE AND OBJECTIVE BOX
CC: Nausea, vomiting  · Subjective and Objective: 	  79 y/o male with a PMHx of CAD with stents in 05/29,, Crohns, diverticulitis s/p open Sigmoidectomy, essential HTN, gastritis, 2 previous episodes of SBO, cholecystectomy presents to the ED c/o abd pain since last night after eating. +n/v. admitted with acute nausea and vomiting on 7/28 and found to have CT evidence of SBO. He was begun on treatment with IV fluids and NGT decompression with improvement of symptoms. Serum lactate is rising and bowel sounds remain absent and surgical intervention is being considered. He denies any abd pain now. No chest pain, sob, fever or chills.     7/30: Pt states he is passing gas.  States NGT is very uncomfortable, otherwise doing okay, HD stable, had febrile episode 38.1.    7/31: Had multiple loose stools since yesterday, no further now. Afebrile. abx stopped.  8/1: Pt weak appearing, has no specific complaints.  Had fever of 101 this morning.  8/2: Feeling better, afebrile since yesterday.  NG in place.  Abx changed per ID for intraabd infection.    REVIEW OF SYSTEMS: All other review of systems is negative unless indicated above.    Vital Signs Last 24 Hrs  T(C): 36.8 (02 Aug 2020 08:52), Max: 37.7 (01 Aug 2020 21:03)  T(F): 98.3 (02 Aug 2020 08:52), Max: 99.8 (01 Aug 2020 21:03)  HR: 85 (02 Aug 2020 08:52) (85 - 100)  BP: 176/78 (02 Aug 2020 08:52) (153/74 - 176/78)  BP(mean): --  RR: 18 (02 Aug 2020 08:52) (18 - 18)  SpO2: 95% (02 Aug 2020 08:52) (95% - 95%)    PHYSICAL EXAM  General Appearance: comfortable, NAD, weak appearing  HEENT: PERRL, NG tube replaced  Neck: Supple, , no adenopathy, thyroid: not enlarged, no carotid bruit or JVD  Lungs: Clear to auscultation bilaterally, no r/r/w  expiratory phase  Heart: Regular rate and rhythm, S1, S2 normal, 1/6 NAHID LSB and base  Abdomen: distended, nontender, no organomegaly or masses. Absent BS.  Extremities: no cyanosis or edema, no joint swelling  Skin: Skin color, texture normal, no rashes   Neurologic: Alert and oriented X3 , cranial nerves intact, sensory and motor normal,    MEDICATIONS  (STANDING):  benzocaine 15 mG/menthol 3.6 mG (Sugar-Free) Lozenge 1 Lozenge Oral every 12 hours  cefTRIAXone Injectable. 1000 milliGRAM(s) IV Push every 24 hours  dextrose 5%. 1000 milliLiter(s) (50 mL/Hr) IV Continuous <Continuous>  dextrose 50% Injectable 12.5 Gram(s) IV Push once  dextrose 50% Injectable 25 Gram(s) IV Push once  dextrose 50% Injectable 25 Gram(s) IV Push once  heparin   Injectable 5000 Unit(s) SubCutaneous every 8 hours  insulin lispro (HumaLOG) corrective regimen sliding scale   SubCutaneous every 6 hours  labetalol 200 milliGRAM(s) Oral every 12 hours  lactated ringers. 500 milliLiter(s) (500 mL/Hr) IV Continuous <Continuous>  metroNIDAZOLE  IVPB 500 milliGRAM(s) IV Intermittent every 8 hours  pantoprazole  Injectable 40 milliGRAM(s) IV Push daily  Parenteral Nutrition - Adult 1 Each (83 mL/Hr) TPN Continuous <Continuous>  potassium chloride  10 mEq/100 mL IVPB 10 milliEquivalent(s) IV Intermittent every 1 hour  sodium chloride 0.45% with potassium chloride 20 mEq/L 1000 milliLiter(s) (125 mL/Hr) IV Continuous <Continuous>    MEDICATIONS  (PRN):  acetaminophen   Tablet .. 650 milliGRAM(s) Oral every 6 hours PRN Temp greater or equal to 38C (100.4F), Mild Pain (1 - 3), Moderate Pain (4 - 6)  aluminum hydroxide/magnesium hydroxide/simethicone Suspension 30 milliLiter(s) Oral every 6 hours PRN Dyspepsia  dextrose 40% Gel 15 Gram(s) Oral once PRN Blood Glucose LESS THAN 70 milliGRAM(s)/deciliter  glucagon  Injectable 1 milliGRAM(s) IntraMuscular once PRN Glucose LESS THAN 70 milligrams/deciliter  ketorolac   Injectable 15 milliGRAM(s) IV Push every 6 hours PRN Moderate Pain (4 - 6)  morphine  - Injectable 2 milliGRAM(s) IV Push every 4 hours PRN Severe Pain (7 - 10)                              9.3    6.23  )-----------( 203      ( 02 Aug 2020 09:06 )             29.3     08-02    145  |  111<H>  |  19  ----------------------------<  91  3.4<L>   |  27  |  0.72    Ca    7.7<L>      02 Aug 2020 09:06  Phos  2.0     08-02  Mg     2.2     08-02      CAPILLARY BLOOD GLUCOSE        Assessment  and plan:  79 y/o male with a PMHx of CAD with stents in 05/29,, Crohns, diverticulitis s/p open Sigmoidectomy, essential HTN, gastritis, 2 previous episodes of SBO, cholecystectomy presents to the ED c/o abd pain since last night after eating. +n/v, found to have SBO, s/p segmental resection with hospital course complicated by ileus and post op fevers.     Small bowel obstruction:  -s/p VI, small bowel segmental resection x 2 POD # 4  -monitor for fevers  -NG tube replaced, continue per surgery  -ambulate  -replete electrolytes  -pain management  -supportive care  -restart cardiac meds    Post op fever / ileus:   -NG tube replaced  -tylenol PRN fever  -xray noted, atelectasis  -cdiff and GI PCR neg  --c/w flagyl, ceftriaxone added empirically for intraabd infection.    Hypokalemia: Replete and monitor     Crohn's diseas / CAD post multiple stents with most recent ROSLYN 5/2019 / Mild AS: Stable    DVT prophylaxis  -heparin subc                                                                          Electronic Signatures:  Primitivo Naylor)  (Signed 30-Jul-2020 14:16)  	Authored: Progress Note, Reason for Admission, Subjective and Objective      Last Updated: 30-Jul-2020 14:16 by Primitivo Naylor ()

## 2020-08-02 NOTE — PROGRESS NOTE ADULT - SUBJECTIVE AND OBJECTIVE BOX
Date of service: 08-02-20 @ 15:15      Patient sitting in chair; no complaints; still with ngt but in good spirits; afebrile      ROS: no fever or chills; denies dizziness, no HA, no SOB or cough, no abdominal pain, no diarrhea or constipation; no dysuria, no urinary frequency, no legs pain, no rashes    MEDICATIONS  (STANDING):  benzocaine 15 mG/menthol 3.6 mG (Sugar-Free) Lozenge 1 Lozenge Oral every 12 hours  cefTRIAXone Injectable. 1000 milliGRAM(s) IV Push every 24 hours  dextrose 5%. 1000 milliLiter(s) (50 mL/Hr) IV Continuous <Continuous>  dextrose 50% Injectable 12.5 Gram(s) IV Push once  dextrose 50% Injectable 25 Gram(s) IV Push once  dextrose 50% Injectable 25 Gram(s) IV Push once  heparin   Injectable 5000 Unit(s) SubCutaneous every 8 hours  insulin lispro (HumaLOG) corrective regimen sliding scale   SubCutaneous every 6 hours  labetalol 200 milliGRAM(s) Oral every 12 hours  lactated ringers. 500 milliLiter(s) (500 mL/Hr) IV Continuous <Continuous>  metroNIDAZOLE  IVPB 500 milliGRAM(s) IV Intermittent every 8 hours  pantoprazole  Injectable 40 milliGRAM(s) IV Push daily  Parenteral Nutrition - Adult 1 Each (83 mL/Hr) TPN Continuous <Continuous>  sodium chloride 0.45% with potassium chloride 20 mEq/L 1000 milliLiter(s) (125 mL/Hr) IV Continuous <Continuous>    MEDICATIONS  (PRN):  acetaminophen   Tablet .. 650 milliGRAM(s) Oral every 6 hours PRN Temp greater or equal to 38C (100.4F), Mild Pain (1 - 3), Moderate Pain (4 - 6)  aluminum hydroxide/magnesium hydroxide/simethicone Suspension 30 milliLiter(s) Oral every 6 hours PRN Dyspepsia  dextrose 40% Gel 15 Gram(s) Oral once PRN Blood Glucose LESS THAN 70 milliGRAM(s)/deciliter  glucagon  Injectable 1 milliGRAM(s) IntraMuscular once PRN Glucose LESS THAN 70 milligrams/deciliter  ketorolac   Injectable 15 milliGRAM(s) IV Push every 6 hours PRN Moderate Pain (4 - 6)  morphine  - Injectable 2 milliGRAM(s) IV Push every 4 hours PRN Severe Pain (7 - 10)      Vital Signs Last 24 Hrs  T(C): 36.8 (02 Aug 2020 08:52), Max: 37.7 (01 Aug 2020 21:03)  T(F): 98.3 (02 Aug 2020 08:52), Max: 99.8 (01 Aug 2020 21:03)  HR: 85 (02 Aug 2020 08:52) (85 - 100)  BP: 176/78 (02 Aug 2020 08:52) (153/74 - 176/78)  BP(mean): --  RR: 18 (02 Aug 2020 08:52) (18 - 18)  SpO2: 95% (02 Aug 2020 08:52) (95% - 95%)        Physical Exam:            Constitutional: frail looking  HEENT: NC/AT, EOMI, PERRLA, conjunctivae clear; ears and nose atraumatic; pharynx clear; ngt in place  Neck: supple; thyroid not palpable  Back: no tenderness  Respiratory: respiratory effort normal; clear to auscultation  Cardiovascular: S1S2 regular, no murmurs  Abdomen: soft, not tender, not distended, positive BS; no liver or spleen organomegaly; midline wound covered with dressing  Genitourinary: no suprapubic tenderness  Musculoskeletal: no muscle tenderness, no joint swelling or tenderness  Neurological/ Psychiatric: AxOx3, judgement and insight normal;  moving all extremities  Skin: no rashes; no palpable lesions    Labs: all available labs reviewed                       Labs:                        9.3    6.23  )-----------( 203      ( 02 Aug 2020 09:06 )             29.3     08-02    145  |  111<H>  |  19  ----------------------------<  91  3.4<L>   |  27  |  0.72    Ca    7.7<L>      02 Aug 2020 09:06  Phos  2.0     08-02  Mg     2.2     08-02             Cultures:       GI PCR Panel, Stool (collected 08-01-20 @ 02:55)  Source: .Stool Feces  Final Report (08-01-20 @ 12:01):    GI PCR Results: NOT detected    *******Please Note:*******    GI panel PCR evaluates for:    Campylobacter, Plesiomonas shigelloides, Salmonella,    Vibrio, Yersinia enterocolitica, Enteroaggregative    Escherichia coli (EAEC), Enteropathogenic E.coli (EPEC),    Enterotoxigenic E. coli (ETEC) lt/st, Shiga-like    toxin-producing E. coli (STEC) stx1/stx2,    Shigella/ Enteroinvasive E. coli (EIEC), Cryptosporidium,    Cyclospora cayetanensis, Entamoeba histolytica,    Giardia lamblia, Adenovirus F 40/41, Astrovirus,    Norovirus GI/GII, Rotavirus A, Sapovirus    Culture - Urine (collected 07-28-20 @ 13:36)  Source: .Urine None  Final Report (07-29-20 @ 13:28):    <10,000 CFU/mL Normal Urogenital Dayana    Culture - Blood (collected 07-28-20 @ 13:02)  Source: .Blood None  Preliminary Report (07-29-20 @ 17:01):    No growth to date.      Clostridium difficile Toxin by PCR (07.31.20 @ 16:17)    Clostridium difficile Toxin by PCR: The results of this test should be interpreted with consideration of all  clinical and laboratory findings. This test determines the presence of  the C. difficile tcdB gene at a given time and is not intended to  identify antibiotic associated disease or C. difficile infection without  clinical context.  Successful treatment is based on the resolution of clinical symptoms.  This test should not be used as a "test of cure" because C. difficile DNA  will persist after successful treatment. Repeat testing will not be  permitted.    This test is performed on the BD MAX system using Real-Time PCR and  fluorogenic target-specific hybridization.    C Diff by PCR Result: NotDetec          < from: Xray Chest 1 View- PORTABLE-Routine (08.01.20 @ 11:00) >    EXAM:  XR CHEST PORTABLE ROUTINE 1V                            PROCEDURE DATE:  08/01/2020          INTERPRETATION:  AP chest.    Clinical indications: NG tube placement.    IMPRESSION: The NG tube tip is in the stomach. Bibasilar atelectasis is present. There is a trace right pleural effusion. The heart is normal in size.      < end of copied text >                    Radiology: all available radiological tests reviewed    Advanced directives addressed: full resuscitation

## 2020-08-03 LAB
ALBUMIN SERPL ELPH-MCNC: 2.3 G/DL — LOW (ref 3.3–5)
ALP SERPL-CCNC: 39 U/L — LOW (ref 40–120)
ALT FLD-CCNC: 33 U/L — SIGNIFICANT CHANGE UP (ref 12–78)
ANION GAP SERPL CALC-SCNC: 6 MMOL/L — SIGNIFICANT CHANGE UP (ref 5–17)
AST SERPL-CCNC: 33 U/L — SIGNIFICANT CHANGE UP (ref 15–37)
BILIRUB SERPL-MCNC: 0.3 MG/DL — SIGNIFICANT CHANGE UP (ref 0.2–1.2)
BUN SERPL-MCNC: 16 MG/DL — SIGNIFICANT CHANGE UP (ref 7–23)
CALCIUM SERPL-MCNC: 7.6 MG/DL — LOW (ref 8.5–10.1)
CHLORIDE SERPL-SCNC: 111 MMOL/L — HIGH (ref 96–108)
CO2 SERPL-SCNC: 25 MMOL/L — SIGNIFICANT CHANGE UP (ref 22–31)
CREAT SERPL-MCNC: 0.76 MG/DL — SIGNIFICANT CHANGE UP (ref 0.5–1.3)
GLUCOSE SERPL-MCNC: 108 MG/DL — HIGH (ref 70–99)
HCT VFR BLD CALC: 28.4 % — LOW (ref 39–50)
HGB BLD-MCNC: 9.1 G/DL — LOW (ref 13–17)
MAGNESIUM SERPL-MCNC: 2.1 MG/DL — SIGNIFICANT CHANGE UP (ref 1.6–2.6)
MCHC RBC-ENTMCNC: 30.5 PG — SIGNIFICANT CHANGE UP (ref 27–34)
MCHC RBC-ENTMCNC: 32 GM/DL — SIGNIFICANT CHANGE UP (ref 32–36)
MCV RBC AUTO: 95.3 FL — SIGNIFICANT CHANGE UP (ref 80–100)
PHOSPHATE SERPL-MCNC: 2.7 MG/DL — SIGNIFICANT CHANGE UP (ref 2.5–4.5)
PLATELET # BLD AUTO: 200 K/UL — SIGNIFICANT CHANGE UP (ref 150–400)
POTASSIUM SERPL-MCNC: 3.7 MMOL/L — SIGNIFICANT CHANGE UP (ref 3.5–5.3)
POTASSIUM SERPL-SCNC: 3.7 MMOL/L — SIGNIFICANT CHANGE UP (ref 3.5–5.3)
PROT SERPL-MCNC: 6.2 GM/DL — SIGNIFICANT CHANGE UP (ref 6–8.3)
RBC # BLD: 2.98 M/UL — LOW (ref 4.2–5.8)
RBC # FLD: 14.4 % — SIGNIFICANT CHANGE UP (ref 10.3–14.5)
SODIUM SERPL-SCNC: 142 MMOL/L — SIGNIFICANT CHANGE UP (ref 135–145)
TRIGL SERPL-MCNC: 95 MG/DL — SIGNIFICANT CHANGE UP (ref 10–149)
WBC # BLD: 8.27 K/UL — SIGNIFICANT CHANGE UP (ref 3.8–10.5)
WBC # FLD AUTO: 8.27 K/UL — SIGNIFICANT CHANGE UP (ref 3.8–10.5)

## 2020-08-03 PROCEDURE — 74018 RADEX ABDOMEN 1 VIEW: CPT | Mod: 26

## 2020-08-03 RX ORDER — ELECTROLYTE SOLUTION,INJ
1 VIAL (ML) INTRAVENOUS
Refills: 0 | Status: DISCONTINUED | OUTPATIENT
Start: 2020-08-03 | End: 2020-08-04

## 2020-08-03 RX ORDER — DEXTROSE MONOHYDRATE, SODIUM CHLORIDE, AND POTASSIUM CHLORIDE 50; .745; 4.5 G/1000ML; G/1000ML; G/1000ML
1000 INJECTION, SOLUTION INTRAVENOUS
Refills: 0 | Status: DISCONTINUED | OUTPATIENT
Start: 2020-08-03 | End: 2020-08-05

## 2020-08-03 RX ORDER — DIPHENHYDRAMINE HCL 50 MG
25 CAPSULE ORAL AT BEDTIME
Refills: 0 | Status: DISCONTINUED | OUTPATIENT
Start: 2020-08-03 | End: 2020-08-06

## 2020-08-03 RX ORDER — POTASSIUM PHOSPHATE, MONOBASIC POTASSIUM PHOSPHATE, DIBASIC 236; 224 MG/ML; MG/ML
15 INJECTION, SOLUTION INTRAVENOUS ONCE
Refills: 0 | Status: COMPLETED | OUTPATIENT
Start: 2020-08-03 | End: 2020-08-03

## 2020-08-03 RX ADMIN — Medication 200 MILLIGRAM(S): at 23:59

## 2020-08-03 RX ADMIN — Medication 100 MILLIGRAM(S): at 05:35

## 2020-08-03 RX ADMIN — DEXTROSE MONOHYDRATE, SODIUM CHLORIDE, AND POTASSIUM CHLORIDE 42 MILLILITER(S): 50; .745; 4.5 INJECTION, SOLUTION INTRAVENOUS at 06:39

## 2020-08-03 RX ADMIN — ADALIMUMAB 40 MILLIGRAM(S): KIT SUBCUTANEOUS at 10:41

## 2020-08-03 RX ADMIN — PANTOPRAZOLE SODIUM 40 MILLIGRAM(S): 20 TABLET, DELAYED RELEASE ORAL at 09:58

## 2020-08-03 RX ADMIN — Medication 200 MILLIGRAM(S): at 10:28

## 2020-08-03 RX ADMIN — HEPARIN SODIUM 5000 UNIT(S): 5000 INJECTION INTRAVENOUS; SUBCUTANEOUS at 05:36

## 2020-08-03 RX ADMIN — HEPARIN SODIUM 5000 UNIT(S): 5000 INJECTION INTRAVENOUS; SUBCUTANEOUS at 23:58

## 2020-08-03 RX ADMIN — ONDANSETRON 4 MILLIGRAM(S): 8 TABLET, FILM COATED ORAL at 09:58

## 2020-08-03 RX ADMIN — HEPARIN SODIUM 5000 UNIT(S): 5000 INJECTION INTRAVENOUS; SUBCUTANEOUS at 14:28

## 2020-08-03 RX ADMIN — CEFTRIAXONE 1000 MILLIGRAM(S): 500 INJECTION, POWDER, FOR SOLUTION INTRAMUSCULAR; INTRAVENOUS at 16:49

## 2020-08-03 RX ADMIN — POTASSIUM PHOSPHATE, MONOBASIC POTASSIUM PHOSPHATE, DIBASIC 63.75 MILLIMOLE(S): 236; 224 INJECTION, SOLUTION INTRAVENOUS at 15:00

## 2020-08-03 RX ADMIN — BENZOCAINE AND MENTHOL 1 LOZENGE: 5; 1 LIQUID ORAL at 10:26

## 2020-08-03 NOTE — PROGRESS NOTE ADULT - SUBJECTIVE AND OBJECTIVE BOX
The patient was seen and examined today bedside, the patient states that he had multiple bowel movements and passing gas, he states that he feels much better, he denied any nausea, vomiting, fever or chills. No acute events were reported from the nursing staff .    Physical Exam:    Pt is AAOx3  General: Well developed, in no acute distress.   Chest: Lungs clear, no rales, no rhonchi, no wheezes.   Heart: RR, no murmurs, no rubs, no gallops.   Abdomen: Soft, no tenderness, no masses, less distended  Back: Normal curvature, no tenderness.   Neuro: Physiological, no localizing findings.   Skin: Normal, no rashes, no lesions noted.   Extremities: Warm, well perfused, no edema, Pulses intact                          9.1    8.27  )-----------( 200      ( 03 Aug 2020 07:24 )             28.4     08-03    142  |  111<H>  |  16  ----------------------------<  108<H>  3.7   |  25  |  0.76    Ca    7.6<L>      03 Aug 2020 07:24  Phos  2.7     08-03  Mg     2.1     08-03    TPro  6.2  /  Alb  2.3<L>  /  TBili  0.3  /  DBili  x   /  AST  33  /  ALT  33  /  AlkPhos  39<L>  08-03

## 2020-08-03 NOTE — PROGRESS NOTE ADULT - SUBJECTIVE AND OBJECTIVE BOX
Interval History:    MEDICATIONS  (STANDING):  adalimumab Injectable 40 milliGRAM(s) SubCutaneous once  benzocaine 15 mG/menthol 3.6 mG (Sugar-Free) Lozenge 1 Lozenge Oral every 12 hours  cefTRIAXone Injectable. 1000 milliGRAM(s) IV Push every 24 hours  dextrose 5%. 1000 milliLiter(s) (50 mL/Hr) IV Continuous <Continuous>  dextrose 50% Injectable 12.5 Gram(s) IV Push once  dextrose 50% Injectable 25 Gram(s) IV Push once  dextrose 50% Injectable 25 Gram(s) IV Push once  heparin   Injectable 5000 Unit(s) SubCutaneous every 8 hours  insulin lispro (HumaLOG) corrective regimen sliding scale   SubCutaneous every 6 hours  labetalol 200 milliGRAM(s) Oral every 12 hours  lactated ringers. 500 milliLiter(s) (500 mL/Hr) IV Continuous <Continuous>  metroNIDAZOLE  IVPB 500 milliGRAM(s) IV Intermittent every 8 hours  pantoprazole  Injectable 40 milliGRAM(s) IV Push daily  Parenteral Nutrition - Adult 1 Each (83 mL/Hr) TPN Continuous <Continuous>  sodium chloride 0.45% with potassium chloride 20 mEq/L 1000 milliLiter(s) (42 mL/Hr) IV Continuous <Continuous>    MEDICATIONS  (PRN):  acetaminophen   Tablet .. 650 milliGRAM(s) Oral every 6 hours PRN Temp greater or equal to 38C (100.4F), Mild Pain (1 - 3), Moderate Pain (4 - 6)  aluminum hydroxide/magnesium hydroxide/simethicone Suspension 30 milliLiter(s) Oral every 6 hours PRN Dyspepsia  dextrose 40% Gel 15 Gram(s) Oral once PRN Blood Glucose LESS THAN 70 milliGRAM(s)/deciliter  glucagon  Injectable 1 milliGRAM(s) IntraMuscular once PRN Glucose LESS THAN 70 milligrams/deciliter  ketorolac   Injectable 15 milliGRAM(s) IV Push every 6 hours PRN Moderate Pain (4 - 6)  morphine  - Injectable 2 milliGRAM(s) IV Push every 4 hours PRN Severe Pain (7 - 10)  ondansetron Injectable 4 milliGRAM(s) IV Push every 6 hours PRN Nausea and/or Vomiting      Daily     Daily   BMI: 28.1 (07-30 @ 21:31)  Change in Weight:  Vital Signs Last 24 Hrs  T(C): 37 (02 Aug 2020 22:30), Max: 37.6 (02 Aug 2020 15:47)  T(F): 98.6 (02 Aug 2020 22:30), Max: 99.7 (02 Aug 2020 15:47)  HR: 81 (02 Aug 2020 22:30) (81 - 94)  BP: 164/77 (02 Aug 2020 22:30) (160/76 - 176/78)  BP(mean): --  RR: 18 (02 Aug 2020 22:30) (18 - 18)  SpO2: 96% (02 Aug 2020 22:30) (95% - 96%)  I&O's Detail    02 Aug 2020 07:01  -  03 Aug 2020 07:00  --------------------------------------------------------  IN:    IV PiggyBack: 400 mL    Oral Fluid: 50 mL    sodium chloride 0.45% with potassium chloride 20 mEq/L: 800 mL  Total IN: 1250 mL    OUT:    Nasoenteral Tube: 325 mL    Voided: 300 mL  Total OUT: 625 mL    Total NET: 625 mL          PHYSICAL EXAM  General:  Well developed, well nourished, alert and active, no pallor, NAD.  HEENT:    Normal appearance of conjunctiva, ears, nose, lips, oropharynx, and oral mucosa, anicteric.  Neck:  No masses, no asymmetry.  Lymph Nodes:  No lymphadenopathy.   Cardiovascular:  RRR normal S1/S2, no murmur.  Respiratory:  CTA B/L, normal respiratory effort.   Abdominal:   soft, no masses or tenderness, normoactive BS, NT/ND, no HSM.  Extremities:   No clubbing or cyanosis, normal capillary refill, no edema.   Skin:   No rash, jaundice, lesions, eczema.   Musculoskeletal:  No joint swelling, erythema or tenderness.   Other:     Lab Results:                        9.3    6.23  )-----------( 203      ( 02 Aug 2020 09:06 )             29.3     08-02    144  |  111<H>  |  21  ----------------------------<  93  4.2   |  27  |  0.87    Ca    7.6<L>      02 Aug 2020 16:37  Phos  2.0     08-02  Mg     2.0     08-02              Stool Results:          RADIOLOGY RESULTS:    SURGICAL PATHOLOGY:

## 2020-08-03 NOTE — CHART NOTE - NSCHARTNOTEFT_GEN_A_CORE
Clinical Nutrition PPN Recommendation Note    *received consult for PPN recommendations.  d/w surgeon; pt has been NPO x 5 days, expected to be NPO x 3 additional days 2/2 ileus s/p ex-lap with resection of prior anastomosis on 7/29 due to recurrent SBO. As per surgical resident, plan to keep NGT in place, keep pt NPO, and continue PPN. Plan for KUB and If KUB  showed improvement of the ileus will remove the NGT.    *NGT in place, pt reports nausea 2/2 NGT. During visit pt received zofran and protonix.     *labs reviewed; 08-03 Na142 mmol/L Glu 108 mg/dL<H> K+ 3.7 mmol/L Cr  0.76 mg/dL BUN 16 mg/dL Phos 2.7 mg/dL Alb 2.3 g/dL<L> PAB n/a       CAPILLARY BLOOD GLUCOSE  POCT Blood Glucose.: 103 mg/dL (03 Aug 2020 05:41)  POCT Blood Glucose.: 83 mg/dL (02 Aug 2020 23:20)  POCT Blood Glucose.: 95 mg/dL (02 Aug 2020 16:49)  POCT Blood Glucose.: 90 mg/dL (02 Aug 2020 12:53)    *lytes WNL, triglycerides.  ordered and corrected however not resulted. Continue to hold lipids until triglycerides obtained.     *output: urine: 300mL .  x4 BM overnight; pt reports another BM right before RD visit.  NGT: 325mL.  total: 3250mL.  *input: LR: 50 mL PO fluid.  IVP: 400mL: IVF: 800 mL total: not documented, pt receiving 2000 mL PPN infusion. total:  1250mL + PPN infusion    *no edema. Ernesto 16; no noted skin breakdown     *no new wt since admission; obtain new wt when feasible.  Pt likely meets criteria for malnutrition, unable to prove at this time.    **no changes to PPN order at this time as current order remains appropriate**    PPN RECOMMENDATIONS:  2000mL total volume    no lipids  80 g Amino Acids  80 g Dextrose  121 mEq NaCl  14 mEq NaAcetate  20 mMol NaPhos  42 mEq KCl  28 mEq KAcetate  10 mEq Calcium Gluconate  8 mEq Magnesium Sulfate  1 mL trace element  10 mL MVI     total Calories: 592 (meets ~34% of estimated calorie needs and 82% of estimated protein needs)    ADDITIONAL RECOMMENDATIONS:  1) strict I/O's  2) daily wt checks  3) obtain triglyceride level  4) q6hr POC checks  5) daily lyte checks      **************************************************************************************    · Weight Used for Calculation	adjusted  69Kg     Estimated Energy Needs (25-30 calories/kg):  · Weight  (lbs)	152.1 lb  · Weight (kg)	69 kg  · From (25cal/kg)	1725 · To (30cal/kg) 2070     Other Calculation:  · Other Calculation	Ht. 66 "     Wt. 81.3 Kg       29 BMI       IBW  65 Kg      Pt is at   125 %  IBW     Estimated Protein Needs (1.4-1.6 g/kg):  · Weight  (lbs)	152.1  · Weight (kg)	69 kg  · From (1.4 g/kg)	96.6 · To (1.6 g/kg)	110.4     Estimated Fluid Needs (25-30 ml/kg):  · Weight  (lbs)	152.1  · Weight (kg)	69  · From (25 ml/kg)	1725 · To (30 ml/kg) 2070.

## 2020-08-03 NOTE — PROGRESS NOTE ADULT - ASSESSMENT
SBO s/p SB resection  Diarrhea-CDiff Neg  Crohns ileitis  Plan  Humira SQ today  Will monitor BMs closely -hold off on steroids for  now SBO s/p SB resection  Diarrhea-CDiff Neg  4 small loose BMs overnight  NGoutput minimal overnight   Crohns ileitis  Plan  Humira SQ today  Will monitor BMs closely -hold off on steroids for  now   Consider D/cing NGT and trial of clears if OK with surgery

## 2020-08-03 NOTE — PROGRESS NOTE ADULT - ASSESSMENT
81 y/o male with a PMHx of CAD with stents, Crohns, diverticulitis s/p open Sigmoidectomy, essential HTN, gastritis, 2 previous episodes of SBO, cholecystectomy admitted on 7/28 for evaluation of abdominal pain after eating associated with nausea and vomiting, found to have small bowel obstruction, the patient underwent small bowel resection on 7/29 with end to end anastomosis. Post op has been having fever, also noted with ileus and had ngt placed. He did have some loose stool for which testing is pending. He denies any other specific complaints.     1. Patient admitted with SBO s/p resection; post op fever; possibly atelectasis, micro aspiration pneumonia  - follow up cultures   - wound care per surgery as well as ngt  - iv hydration and supportive care   - serial cbc and monitor temperature   - reviewed prior medical records to evaluate for resistant or atypical pathogens   - day #3 metronidazole and ceftriaxone; will stop metronidazole today  - tolerating antibiotics without rashes or side effects     2. other issues; per medicine

## 2020-08-03 NOTE — PROGRESS NOTE ADULT - SUBJECTIVE AND OBJECTIVE BOX
Date of service: 08-03-20 @ 14:09      Patient sitting in bed; still with ngt      ROS: unable to obtain secondary to patient medical condition     MEDICATIONS  (STANDING):  benzocaine 15 mG/menthol 3.6 mG (Sugar-Free) Lozenge 1 Lozenge Oral every 12 hours  cefTRIAXone Injectable. 1000 milliGRAM(s) IV Push every 24 hours  dextrose 5%. 1000 milliLiter(s) (50 mL/Hr) IV Continuous <Continuous>  dextrose 50% Injectable 12.5 Gram(s) IV Push once  dextrose 50% Injectable 25 Gram(s) IV Push once  dextrose 50% Injectable 25 Gram(s) IV Push once  heparin   Injectable 5000 Unit(s) SubCutaneous every 8 hours  insulin lispro (HumaLOG) corrective regimen sliding scale   SubCutaneous every 6 hours  labetalol 200 milliGRAM(s) Oral every 12 hours  lactated ringers. 500 milliLiter(s) (500 mL/Hr) IV Continuous <Continuous>  pantoprazole  Injectable 40 milliGRAM(s) IV Push daily  Parenteral Nutrition - Adult 1 Each (83 mL/Hr) TPN Continuous <Continuous>  Parenteral Nutrition - Adult 1 Each (83 mL/Hr) TPN Continuous <Continuous>  potassium phosphate IVPB 15 milliMole(s) IV Intermittent once  sodium chloride 0.45% with potassium chloride 20 mEq/L 1000 milliLiter(s) (42 mL/Hr) IV Continuous <Continuous>    MEDICATIONS  (PRN):  acetaminophen   Tablet .. 650 milliGRAM(s) Oral every 6 hours PRN Temp greater or equal to 38C (100.4F), Mild Pain (1 - 3), Moderate Pain (4 - 6)  aluminum hydroxide/magnesium hydroxide/simethicone Suspension 30 milliLiter(s) Oral every 6 hours PRN Dyspepsia  dextrose 40% Gel 15 Gram(s) Oral once PRN Blood Glucose LESS THAN 70 milliGRAM(s)/deciliter  diphenhydrAMINE   Injectable 25 milliGRAM(s) IV Push at bedtime PRN Insomnia  glucagon  Injectable 1 milliGRAM(s) IntraMuscular once PRN Glucose LESS THAN 70 milligrams/deciliter  ketorolac   Injectable 15 milliGRAM(s) IV Push every 6 hours PRN Moderate Pain (4 - 6)  morphine  - Injectable 2 milliGRAM(s) IV Push every 4 hours PRN Severe Pain (7 - 10)  ondansetron Injectable 4 milliGRAM(s) IV Push every 6 hours PRN Nausea and/or Vomiting      Vital Signs Last 24 Hrs  T(C): 36.7 (03 Aug 2020 08:46), Max: 37.6 (02 Aug 2020 15:47)  T(F): 98 (03 Aug 2020 08:46), Max: 99.7 (02 Aug 2020 15:47)  HR: 74 (03 Aug 2020 08:46) (74 - 94)  BP: 135/63 (03 Aug 2020 08:46) (135/63 - 164/77)  BP(mean): --  RR: 17 (03 Aug 2020 08:46) (17 - 18)  SpO2: 96% (03 Aug 2020 08:46) (96% - 96%)        Physical Exam:          Constitutional: frail looking  HEENT: NC/AT, EOMI, PERRLA, conjunctivae clear; ears and nose atraumatic; pharynx clear; ngt in place  Neck: supple; thyroid not palpable  Back: no tenderness  Respiratory: respiratory effort normal; clear to auscultation  Cardiovascular: S1S2 regular, no murmurs  Abdomen: soft, not tender, not distended, positive BS; no liver or spleen organomegaly; midline wound covered with dressing  Genitourinary: no suprapubic tenderness  Musculoskeletal: no muscle tenderness, no joint swelling or tenderness  Neurological/ Psychiatric: AxOx3, judgement and insight normal;  moving all extremities  Skin: no rashes; no palpable lesions    Labs: all available labs reviewed                       Labs:            Labs:                        9.1    8.27  )-----------( 200      ( 03 Aug 2020 07:24 )             28.4     08-03    142  |  111<H>  |  16  ----------------------------<  108<H>  3.7   |  25  |  0.76    Ca    7.6<L>      03 Aug 2020 07:24  Phos  2.7     08-03  Mg     2.1     08-03    TPro  6.2  /  Alb  2.3<L>  /  TBili  0.3  /  DBili  x   /  AST  33  /  ALT  33  /  AlkPhos  39<L>  08-03           Cultures:       GI PCR Panel, Stool (collected 08-01-20 @ 02:55)  Source: .Stool Feces  Final Report (08-01-20 @ 12:01):    GI PCR Results: NOT detected    *******Please Note:*******    GI panel PCR evaluates for:    Campylobacter, Plesiomonas shigelloides, Salmonella,    Vibrio, Yersinia enterocolitica, Enteroaggregative    Escherichia coli (EAEC), Enteropathogenic E.coli (EPEC),    Enterotoxigenic E. coli (ETEC) lt/st, Shiga-like    toxin-producing E. coli (STEC) stx1/stx2,    Shigella/ Enteroinvasive E. coli (EIEC), Cryptosporidium,    Cyclospora cayetanensis, Entamoeba histolytica,    Giardia lamblia, Adenovirus F 40/41, Astrovirus,    Norovirus GI/GII, Rotavirus A, Sapovirus    Culture - Urine (collected 07-28-20 @ 13:36)  Source: .Urine None  Final Report (07-29-20 @ 13:28):    <10,000 CFU/mL Normal Urogenital Dayana    Culture - Blood (collected 07-28-20 @ 13:02)  Source: .Blood None  Final Report (08-02-20 @ 17:01):    No Growth Final                Clostridium difficile Toxin by PCR (07.31.20 @ 16:17)    Clostridium difficile Toxin by PCR: The results of this test should be interpreted with consideration of all  clinical and laboratory findings. This test determines the presence of  the C. difficile tcdB gene at a given time and is not intended to  identify antibiotic associated disease or C. difficile infection without  clinical context.  Successful treatment is based on the resolution of clinical symptoms.  This test should not be used as a "test of cure" because C. difficile DNA  will persist after successful treatment. Repeat testing will not be  permitted.    This test is performed on the BD MAX system using Real-Time PCR and  fluorogenic target-specific hybridization.    C Diff by PCR Result: NotDetec          < from: Xray Chest 1 View- PORTABLE-Routine (08.01.20 @ 11:00) >    EXAM:  XR CHEST PORTABLE ROUTINE 1V                            PROCEDURE DATE:  08/01/2020          INTERPRETATION:  AP chest.    Clinical indications: NG tube placement.    IMPRESSION: The NG tube tip is in the stomach. Bibasilar atelectasis is present. There is a trace right pleural effusion. The heart is normal in size.      < end of copied text >                    Radiology: all available radiological tests reviewed    Advanced directives addressed: full resuscitation

## 2020-08-03 NOTE — PROGRESS NOTE ADULT - ASSESSMENT
1. Small bowel obstruction. S/P SB resection and lysis of adhesions.   2.Crohn's disease  3.CAD post multiple stents with most recent ROSLYN 5/2019. Clinically stable without heart failure or angina.  4. Mild AS. Stable  5.HBP. Stable.   PLAN:  cont  labetalol 200 mg bid.  resume plavix when ok'd by surgery  post op care per surgery  will follow prn only

## 2020-08-03 NOTE — PROGRESS NOTE ADULT - ASSESSMENT
Ileus most likely s/p bowel resection  Heartburn and nausea resolved        Plan:  Will get KUB.  If KUB  showed improvement of the ileus will remove the NGT.  Continue IV abx.  check labs  Continue PPN for now till the patient is tolerating his diet .  Will f/u ID recc and GI Recc.    The plan was discussed with Dr. Alberts Ileus most likely s/p bowel resection  Heartburn and nausea resolved        Plan:  Will get KUB.  Continue IV abx.  check labs  Continue PPN for now till the patient is tolerating his diet .  Will f/u ID recc and GI Recc.    The plan was discussed with Dr. Alberts

## 2020-08-03 NOTE — PROGRESS NOTE ADULT - SUBJECTIVE AND OBJECTIVE BOX
Patient is a 80y old  Male who presents with a chief complaint of SBO (03 Aug 2020 07:30)      HPI:  79 y/o male with a PMHx of CAD with stents, Crohns, diverticulitis s/p open Sigmoidectomy, essential HTN, gastritis, 2 previous episodes of SBO, cholecystectomy presents to the ED c/o abd pain since last night after eating. +n/v. Denies diarrhea, admits to 2 normal BMs this AM but denies any other Flatus. No recent travel. No known sick contacts. No other complaints at this time (28 Jul 2020 14:30)  7/29/2020- Cardiology Consultation: Patient seen for preoperative cardiology consultation. Patient with Crohns disease on Humera admitted with acute nausea and vomiting on 7/28 and found to have CT evidence of SBO. He was begun on treatment with IV fluids and NGT decompression with improvement of symptoms. Serum lactate is rising and bowel sounds remain absent and surgical intervention is being considered. In August 2019 admitted with GI bleed requiring 1 unit of PRBCs with etiology not determined. Is S/p partial colectomy 2007 apparently for diverticular disease. States he has a small bowel obstruction that resolved spontaneously a few years ago and again  in 10/2019 which resolved with conservative measures.  Cardiac history significant for HBP, HLD, CAD with multiple PCIs most recent ROSLYN 5/2019, mild aortic stenosis. He currently denies chest pain or dyspnea. There is no history of angina or heart failure. he is on chronic ASA and Plavix therapy with the last dose of these on 7/27/2020.     Followup HPI:  7/30- Denies chest pain or shortness of breath. Complains of heart burn and NGT is uncomfortable. S/P lysis of adhesions, SB resection 7/29.  7/31 no new complaints. ngt  in place  8/1  NGT out complaining of nausea  8/3  no new complaints    PAST MEDICAL & SURGICAL HISTORY:  Essential hypertension  Coronary artery disease involving native heart without angina pectoris, unspecified vessel or lesion type  Gastritis  Diverticulitis  S/P coronary artery stent placement  S/P colon resection        MEDICATIONS  (STANDING):  adalimumab Injectable 40 milliGRAM(s) SubCutaneous once  benzocaine 15 mG/menthol 3.6 mG (Sugar-Free) Lozenge 1 Lozenge Oral every 12 hours  cefTRIAXone Injectable. 1000 milliGRAM(s) IV Push every 24 hours  dextrose 5%. 1000 milliLiter(s) (50 mL/Hr) IV Continuous <Continuous>  dextrose 50% Injectable 12.5 Gram(s) IV Push once  dextrose 50% Injectable 25 Gram(s) IV Push once  dextrose 50% Injectable 25 Gram(s) IV Push once  heparin   Injectable 5000 Unit(s) SubCutaneous every 8 hours  insulin lispro (HumaLOG) corrective regimen sliding scale   SubCutaneous every 6 hours  labetalol 200 milliGRAM(s) Oral every 12 hours  lactated ringers. 500 milliLiter(s) (500 mL/Hr) IV Continuous <Continuous>  metroNIDAZOLE  IVPB 500 milliGRAM(s) IV Intermittent every 8 hours  pantoprazole  Injectable 40 milliGRAM(s) IV Push daily  Parenteral Nutrition - Adult 1 Each (83 mL/Hr) TPN Continuous <Continuous>  sodium chloride 0.45% with potassium chloride 20 mEq/L 1000 milliLiter(s) (42 mL/Hr) IV Continuous <Continuous>    MEDICATIONS  (PRN):  acetaminophen   Tablet .. 650 milliGRAM(s) Oral every 6 hours PRN Temp greater or equal to 38C (100.4F), Mild Pain (1 - 3), Moderate Pain (4 - 6)  aluminum hydroxide/magnesium hydroxide/simethicone Suspension 30 milliLiter(s) Oral every 6 hours PRN Dyspepsia  dextrose 40% Gel 15 Gram(s) Oral once PRN Blood Glucose LESS THAN 70 milliGRAM(s)/deciliter  glucagon  Injectable 1 milliGRAM(s) IntraMuscular once PRN Glucose LESS THAN 70 milligrams/deciliter  ketorolac   Injectable 15 milliGRAM(s) IV Push every 6 hours PRN Moderate Pain (4 - 6)  morphine  - Injectable 2 milliGRAM(s) IV Push every 4 hours PRN Severe Pain (7 - 10)  ondansetron Injectable 4 milliGRAM(s) IV Push every 6 hours PRN Nausea and/or Vomiting          Vital Signs Last 24 Hrs  T(C): 37 (02 Aug 2020 22:30), Max: 37.6 (02 Aug 2020 15:47)  T(F): 98.6 (02 Aug 2020 22:30), Max: 99.7 (02 Aug 2020 15:47)  HR: 81 (02 Aug 2020 22:30) (81 - 94)  BP: 164/77 (02 Aug 2020 22:30) (160/76 - 176/78)  BP(mean): --  RR: 18 (02 Aug 2020 22:30) (18 - 18)  SpO2: 96% (02 Aug 2020 22:30) (95% - 96%)    I&O's Summary    02 Aug 2020 07:01  -  03 Aug 2020 07:00  --------------------------------------------------------  IN: 1250 mL / OUT: 625 mL / NET: 625 mL        PHYSICAL EXAM  General Appearance: alert .   HEENT: ngt in  Neck:   Lungs: clear  Heart: 1/5 NAHID LSB and base  Abdomen: surgical dressing, mildly distended and tender, + BS  Extremities: no edema, compression devices in place  Neurologic: alert and oriented      INTERPRETATION OF TELEMETRY:    ECG:        LABS:                          9.1    8.27  )-----------( 200      ( 03 Aug 2020 07:24 )             28.4     08-03    142  |  111<H>  |  16  ----------------------------<  108<H>  3.7   |  25  |  0.76    Ca    7.6<L>      03 Aug 2020 07:24  Phos  2.7     08-03  Mg     2.1     08-03    TPro  6.2  /  Alb  2.3<L>  /  TBili  0.3  /  DBili  x   /  AST  33  /  ALT  33  /  AlkPhos  39<L>  08-03                      RADIOLOGY & ADDITIONAL STUDIES:

## 2020-08-04 LAB
ALBUMIN SERPL ELPH-MCNC: 2.6 G/DL — LOW (ref 3.3–5)
ALP SERPL-CCNC: 44 U/L — SIGNIFICANT CHANGE UP (ref 40–120)
ALT FLD-CCNC: 41 U/L — SIGNIFICANT CHANGE UP (ref 12–78)
ANION GAP SERPL CALC-SCNC: 6 MMOL/L — SIGNIFICANT CHANGE UP (ref 5–17)
AST SERPL-CCNC: 37 U/L — SIGNIFICANT CHANGE UP (ref 15–37)
BASOPHILS # BLD AUTO: 0.02 K/UL — SIGNIFICANT CHANGE UP (ref 0–0.2)
BASOPHILS NFR BLD AUTO: 0.2 % — SIGNIFICANT CHANGE UP (ref 0–2)
BILIRUB SERPL-MCNC: 0.3 MG/DL — SIGNIFICANT CHANGE UP (ref 0.2–1.2)
BUN SERPL-MCNC: 13 MG/DL — SIGNIFICANT CHANGE UP (ref 7–23)
CALCIUM SERPL-MCNC: 8.1 MG/DL — LOW (ref 8.5–10.1)
CHLORIDE SERPL-SCNC: 107 MMOL/L — SIGNIFICANT CHANGE UP (ref 96–108)
CO2 SERPL-SCNC: 25 MMOL/L — SIGNIFICANT CHANGE UP (ref 22–31)
CREAT SERPL-MCNC: 0.74 MG/DL — SIGNIFICANT CHANGE UP (ref 0.5–1.3)
EOSINOPHIL # BLD AUTO: 0.19 K/UL — SIGNIFICANT CHANGE UP (ref 0–0.5)
EOSINOPHIL NFR BLD AUTO: 2.4 % — SIGNIFICANT CHANGE UP (ref 0–6)
GLUCOSE SERPL-MCNC: 123 MG/DL — HIGH (ref 70–99)
HCT VFR BLD CALC: 31.4 % — LOW (ref 39–50)
HGB BLD-MCNC: 10.2 G/DL — LOW (ref 13–17)
IMM GRANULOCYTES NFR BLD AUTO: 1.9 % — HIGH (ref 0–1.5)
LYMPHOCYTES # BLD AUTO: 1.16 K/UL — SIGNIFICANT CHANGE UP (ref 1–3.3)
LYMPHOCYTES # BLD AUTO: 14.4 % — SIGNIFICANT CHANGE UP (ref 13–44)
MAGNESIUM SERPL-MCNC: 1.9 MG/DL — SIGNIFICANT CHANGE UP (ref 1.6–2.6)
MCHC RBC-ENTMCNC: 30.5 PG — SIGNIFICANT CHANGE UP (ref 27–34)
MCHC RBC-ENTMCNC: 32.5 GM/DL — SIGNIFICANT CHANGE UP (ref 32–36)
MCV RBC AUTO: 94 FL — SIGNIFICANT CHANGE UP (ref 80–100)
MONOCYTES # BLD AUTO: 1.06 K/UL — HIGH (ref 0–0.9)
MONOCYTES NFR BLD AUTO: 13.2 % — SIGNIFICANT CHANGE UP (ref 2–14)
NEUTROPHILS # BLD AUTO: 5.47 K/UL — SIGNIFICANT CHANGE UP (ref 1.8–7.4)
NEUTROPHILS NFR BLD AUTO: 67.9 % — SIGNIFICANT CHANGE UP (ref 43–77)
PHOSPHATE SERPL-MCNC: 3.6 MG/DL — SIGNIFICANT CHANGE UP (ref 2.5–4.5)
PLATELET # BLD AUTO: 231 K/UL — SIGNIFICANT CHANGE UP (ref 150–400)
POTASSIUM SERPL-MCNC: 4.2 MMOL/L — SIGNIFICANT CHANGE UP (ref 3.5–5.3)
POTASSIUM SERPL-SCNC: 4.2 MMOL/L — SIGNIFICANT CHANGE UP (ref 3.5–5.3)
PROT SERPL-MCNC: 7.1 GM/DL — SIGNIFICANT CHANGE UP (ref 6–8.3)
RBC # BLD: 3.34 M/UL — LOW (ref 4.2–5.8)
RBC # FLD: 14.2 % — SIGNIFICANT CHANGE UP (ref 10.3–14.5)
SODIUM SERPL-SCNC: 138 MMOL/L — SIGNIFICANT CHANGE UP (ref 135–145)
TRIGL SERPL-MCNC: 117 MG/DL — SIGNIFICANT CHANGE UP (ref 10–149)
WBC # BLD: 8.05 K/UL — SIGNIFICANT CHANGE UP (ref 3.8–10.5)
WBC # FLD AUTO: 8.05 K/UL — SIGNIFICANT CHANGE UP (ref 3.8–10.5)

## 2020-08-04 PROCEDURE — 74018 RADEX ABDOMEN 1 VIEW: CPT | Mod: 26

## 2020-08-04 RX ORDER — ELECTROLYTE SOLUTION,INJ
1 VIAL (ML) INTRAVENOUS
Refills: 0 | Status: DISCONTINUED | OUTPATIENT
Start: 2020-08-04 | End: 2020-08-05

## 2020-08-04 RX ORDER — KETOROLAC TROMETHAMINE 30 MG/ML
15 SYRINGE (ML) INJECTION EVERY 6 HOURS
Refills: 0 | Status: DISCONTINUED | OUTPATIENT
Start: 2020-08-04 | End: 2020-08-06

## 2020-08-04 RX ORDER — I.V. FAT EMULSION 20 G/100ML
18.75 EMULSION INTRAVENOUS
Qty: 45 | Refills: 0 | Status: DISCONTINUED | OUTPATIENT
Start: 2020-08-04 | End: 2020-08-05

## 2020-08-04 RX ADMIN — CEFTRIAXONE 1000 MILLIGRAM(S): 500 INJECTION, POWDER, FOR SOLUTION INTRAMUSCULAR; INTRAVENOUS at 21:49

## 2020-08-04 RX ADMIN — Medication 200 MILLIGRAM(S): at 09:26

## 2020-08-04 RX ADMIN — Medication 1 EACH: at 00:00

## 2020-08-04 RX ADMIN — DEXTROSE MONOHYDRATE, SODIUM CHLORIDE, AND POTASSIUM CHLORIDE 42 MILLILITER(S): 50; .745; 4.5 INJECTION, SOLUTION INTRAVENOUS at 06:40

## 2020-08-04 RX ADMIN — Medication 200 MILLIGRAM(S): at 21:49

## 2020-08-04 RX ADMIN — BENZOCAINE AND MENTHOL 1 LOZENGE: 5; 1 LIQUID ORAL at 09:26

## 2020-08-04 RX ADMIN — HEPARIN SODIUM 5000 UNIT(S): 5000 INJECTION INTRAVENOUS; SUBCUTANEOUS at 14:40

## 2020-08-04 RX ADMIN — BENZOCAINE AND MENTHOL 1 LOZENGE: 5; 1 LIQUID ORAL at 00:14

## 2020-08-04 RX ADMIN — Medication 15 MILLIGRAM(S): at 22:15

## 2020-08-04 RX ADMIN — PANTOPRAZOLE SODIUM 40 MILLIGRAM(S): 20 TABLET, DELAYED RELEASE ORAL at 09:26

## 2020-08-04 RX ADMIN — BENZOCAINE AND MENTHOL 1 LOZENGE: 5; 1 LIQUID ORAL at 21:49

## 2020-08-04 RX ADMIN — HEPARIN SODIUM 5000 UNIT(S): 5000 INJECTION INTRAVENOUS; SUBCUTANEOUS at 21:48

## 2020-08-04 RX ADMIN — Medication 1 EACH: at 23:31

## 2020-08-04 RX ADMIN — ONDANSETRON 4 MILLIGRAM(S): 8 TABLET, FILM COATED ORAL at 09:26

## 2020-08-04 RX ADMIN — HEPARIN SODIUM 5000 UNIT(S): 5000 INJECTION INTRAVENOUS; SUBCUTANEOUS at 06:37

## 2020-08-04 RX ADMIN — I.V. FAT EMULSION 18.75 ML/HR: 20 EMULSION INTRAVENOUS at 23:33

## 2020-08-04 RX ADMIN — Medication 15 MILLIGRAM(S): at 10:33

## 2020-08-04 NOTE — PROGRESS NOTE ADULT - ASSESSMENT
Ileus most likely s/p bowel resection  Heartburn and nausea resolved        Plan:  Will get KUB.  May remove NGT and start sips of Clears based on KUB  Continue IV abx.  check labs  Continue PPN for now till the patient is tolerating his diet .  Will f/u ID recc and GI Recc.    The plan was discussed with Dr. Alberts Resolving Ileus      Plan:  Will get KUB.  May remove NGT and start sips of Clears based on KUB  Continue IV abx.  check labs  Continue PPN for now till the patient is tolerating his diet .  Will f/u ID recc and GI Recc.    The plan was discussed with Dr. Alberts

## 2020-08-04 NOTE — PROGRESS NOTE ADULT - SUBJECTIVE AND OBJECTIVE BOX
Interval History:    MEDICATIONS  (STANDING):  benzocaine 15 mG/menthol 3.6 mG (Sugar-Free) Lozenge 1 Lozenge Oral every 12 hours  cefTRIAXone Injectable. 1000 milliGRAM(s) IV Push every 24 hours  dextrose 5%. 1000 milliLiter(s) (50 mL/Hr) IV Continuous <Continuous>  dextrose 50% Injectable 12.5 Gram(s) IV Push once  dextrose 50% Injectable 25 Gram(s) IV Push once  dextrose 50% Injectable 25 Gram(s) IV Push once  fat emulsion (Fish Oil and Plant Based) 20% Infusion 18.75 mL/Hr (18.75 mL/Hr) IV Continuous <Continuous>  heparin   Injectable 5000 Unit(s) SubCutaneous every 8 hours  insulin lispro (HumaLOG) corrective regimen sliding scale   SubCutaneous every 6 hours  labetalol 200 milliGRAM(s) Oral every 12 hours  lactated ringers. 500 milliLiter(s) (500 mL/Hr) IV Continuous <Continuous>  pantoprazole  Injectable 40 milliGRAM(s) IV Push daily  Parenteral Nutrition - Adult 1 Each (83 mL/Hr) TPN Continuous <Continuous>  Parenteral Nutrition - Adult 1 Each (83 mL/Hr) TPN Continuous <Continuous>  sodium chloride 0.45% with potassium chloride 20 mEq/L 1000 milliLiter(s) (42 mL/Hr) IV Continuous <Continuous>    MEDICATIONS  (PRN):  acetaminophen   Tablet .. 650 milliGRAM(s) Oral every 6 hours PRN Temp greater or equal to 38C (100.4F), Mild Pain (1 - 3), Moderate Pain (4 - 6)  aluminum hydroxide/magnesium hydroxide/simethicone Suspension 30 milliLiter(s) Oral every 6 hours PRN Dyspepsia  dextrose 40% Gel 15 Gram(s) Oral once PRN Blood Glucose LESS THAN 70 milliGRAM(s)/deciliter  diphenhydrAMINE   Injectable 25 milliGRAM(s) IV Push at bedtime PRN Insomnia  glucagon  Injectable 1 milliGRAM(s) IntraMuscular once PRN Glucose LESS THAN 70 milligrams/deciliter  ketorolac   Injectable 15 milliGRAM(s) IV Push every 6 hours PRN Moderate Pain (4 - 6)  morphine  - Injectable 2 milliGRAM(s) IV Push every 4 hours PRN Severe Pain (7 - 10)  ondansetron Injectable 4 milliGRAM(s) IV Push every 6 hours PRN Nausea and/or Vomiting      Daily     Daily Weight in k.9 (04 Aug 2020 06:19)  BMI: 28.1 (07-30 @ 21:31)  Change in Weight:  Vital Signs Last 24 Hrs  T(C): 36.9 (04 Aug 2020 09:11), Max: 37.4 (03 Aug 2020 16:27)  T(F): 98.4 (04 Aug 2020 09:11), Max: 99.3 (03 Aug 2020 16:27)  HR: 81 (04 Aug 2020 09:11) (69 - 81)  BP: 160/71 (04 Aug 2020 09:11) (154/60 - 169/75)  BP(mean): --  RR: 17 (04 Aug 2020 09:11) (17 - 18)  SpO2: 96% (04 Aug 2020 09:11) (94% - 96%)  I&O's Detail    03 Aug 2020 07:01  -  04 Aug 2020 07:00  --------------------------------------------------------  IN:  Total IN: 0 mL    OUT:    Nasoenteral Tube: 100 mL    Voided: 300 mL  Total OUT: 400 mL    Total NET: -400 mL      04 Aug 2020 07:01  -  04 Aug 2020 14:14  --------------------------------------------------------  IN:  Total IN: 0 mL    OUT:    Stool: 1 mL    Voided: 800 mL  Total OUT: 801 mL    Total NET: -801 mL          PHYSICAL EXAM  General:  Well developed, well nourished, alert and active, no pallor, NAD.  HEENT:    Normal appearance of conjunctiva, ears, nose, lips, oropharynx, and oral mucosa, anicteric.  Neck:  No masses, no asymmetry.  Lymph Nodes:  No lymphadenopathy.   Cardiovascular:  RRR normal S1/S2, no murmur.  Respiratory:  CTA B/L, normal respiratory effort.   Abdominal:   soft, no masses or tenderness, normoactive BS, NT/ND, no HSM.  Extremities:   No clubbing or cyanosis, normal capillary refill, no edema.   Skin:   No rash, jaundice, lesions, eczema.   Musculoskeletal:  No joint swelling, erythema or tenderness.   Other:     Lab Results:                        10.2   8.05  )-----------( 231      ( 04 Aug 2020 10:46 )             31.4     08-04    138  |  107  |  13  ----------------------------<  123<H>  4.2   |  25  |  0.74    Ca    8.1<L>      04 Aug 2020 10:46  Phos  3.6     08-04  Mg     1.9     08-04    TPro  7.1  /  Alb  2.6<L>  /  TBili  0.3  /  DBili  x   /  AST  37  /  ALT  41  /  AlkPhos  44  08-04    LIVER FUNCTIONS - ( 04 Aug 2020 10:46 )  Alb: 2.6 g/dL / Pro: 7.1 gm/dL / ALK PHOS: 44 U/L / ALT: 41 U/L / AST: 37 U/L / GGT: x                 Stool Results:          RADIOLOGY RESULTS:    SURGICAL PATHOLOGY:

## 2020-08-04 NOTE — PROGRESS NOTE ADULT - ASSESSMENT
Crohns ileitis  SBO likely secondary to scar tissue and ingesting Corn x 2 recently  s/p Laparotomy  I Idoubt that the  present diarrhea is due to an IBD flare  CDiff Neg  REC  Clears > Full liquids  Hold off on steroids  Will follow

## 2020-08-04 NOTE — CHART NOTE - NSCHARTNOTEFT_GEN_A_CORE
Clinical Nutrition PPN Recommendation Note    *received consult for PPN recommendations.  d/w surgeon; pt has been NPO x 5 days, expected to be NPO x 3 additional days 2/2 ileus s/p ex-lap with resection of prior anastomosis on 7/29 due to recurrent SBO.Noted pt to be started on sips of clears. noted NGT to be removed.     *labs reviewed; 08-04 Na138 mmol/L Glu 123 mg/dL<H> K+ 4.2 mmol/L Cr  0.74 mg/dL BUN 13 mg/dL Phos 3.6 mg/dL Alb 2.6 g/dL<L> PAB n/a   ; lytes WNL; no results for Mg and Phos, discussed w/ the lab reporting Mg 1.9 and Phos 3.6.     CAPILLARY BLOOD GLUCOSE  POCT Blood Glucose.: 121 mg/dL (04 Aug 2020 06:40)  POCT Blood Glucose.: 111 mg/dL (04 Aug 2020 00:24)  POCT Blood Glucose.: 106 mg/dL (03 Aug 2020 16:57)  POCT Blood Glucose.: 121 mg/dL (03 Aug 2020 11:36)    *Triglycerides WNL; recommend adding 45 g lipids to PPN order.     *output: urine: 300mL .   NGT: 100mL.  total: 400mL.  *intake: no intake documented. Noted IVF Nacl + 20 mEq Kcl running TV 1000 mL; PPN 2000 mL TV    *no edema. Ernesto 18; no noted skin breakdown     *no new wt since admission; obtain new wt when feasible.  Pt likely meets criteria for malnutrition, unable to prove at this time.    PPN RECOMMENDATIONS:  2000mL total volume    45 gm Lipids 20%  80 g Amino Acids  80 g Dextrose  121 mEq NaCl  14 mEq NaAcetate  20 mMol NaPhos  42 mEq KCl  28 mEq KAcetate  10 mEq Calcium Gluconate  8 mEq Magnesium Sulfate  1 mL trace element  10 mL MVI     total Calories: 592 (meets ~34% of estimated calorie needs and 82% of estimated protein needs)    ADDITIONAL RECOMMENDATIONS:  1) strict I/O's  2) daily wt checks  3) obtain triglyceride level  4) q6hr POC checks  5) daily lyte checks      **************************************************************************************    · Weight Used for Calculation	adjusted  69Kg     Estimated Energy Needs (25-30 calories/kg):  · Weight  (lbs)	152.1 lb  · Weight (kg)	69 kg  · From (25cal/kg)	1725 · To (30cal/kg) 2070     Other Calculation:  · Other Calculation	Ht. 66 "     Wt. 81.3 Kg       29 BMI       IBW  65 Kg      Pt is at   125 %  IBW     Estimated Protein Needs (1.4-1.6 g/kg):  · Weight  (lbs)	152.1  · Weight (kg)	69 kg  · From (1.4 g/kg)	96.6 · To (1.6 g/kg)	110.4     Estimated Fluid Needs (25-30 ml/kg):  · Weight  (lbs)	152.1  · Weight (kg)	69  · From (25 ml/kg)	1725 · To (30 ml/kg) 2070. Clinical Nutrition PPN Recommendation Note    *received consult for PPN recommendations.  d/w surgeon; pt has been NPO x 5 days, expected to be NPO x 3 additional days 2/2 ileus s/p ex-lap with resection of prior anastomosis on 7/29 due to recurrent SBO.Noted pt to be started on sips of clears. noted NGT to be removed.     *labs reviewed; 08-04 Na138 mmol/L Glu 123 mg/dL<H> K+ 4.2 mmol/L Cr  0.74 mg/dL BUN 13 mg/dL Phos 3.6 mg/dL Alb 2.6 g/dL<L> PAB n/a   ; lytes WNL; no results for Mg and Phos, discussed w/ the lab reporting Mg 1.9 and Phos 3.6.     CAPILLARY BLOOD GLUCOSE  POCT Blood Glucose.: 121 mg/dL (04 Aug 2020 06:40)  POCT Blood Glucose.: 111 mg/dL (04 Aug 2020 00:24)  POCT Blood Glucose.: 106 mg/dL (03 Aug 2020 16:57)  POCT Blood Glucose.: 121 mg/dL (03 Aug 2020 11:36)    *Triglycerides WNL; recommend adding 45 g lipids to PPN order.     *output: urine: 300mL .   NGT: 100mL.  total: 400mL.  *intake: no intake documented. Noted IVF Nacl + 20 mEq Kcl running TV 1000 mL; PPN 2000 mL TV    *no edema. Ernesto 18; no noted skin breakdown     *noted new wt 68.9kg; admission wt 81kg indicating wt loss of 12.1kg, 6% BW and clinically significant. Based on significant unintentional wt loss and pt not meeting at least 50% ENN x 7 days, pt meets criteria for severe malnutrition in the context of acute illness.       2000mL total volume    45 gm Lipids 20%  80 g Amino Acids  80 g Dextrose  121 mEq NaCl  14 mEq NaAcetate  20 mMol NaPhos  42 mEq KCl  28 mEq KAcetate  10 mEq Calcium Gluconate  8 mEq Magnesium Sulfate  1 mL trace element  10 mL MVI         total Calories: 1042 (meets ~60% of estimated calorie needs and 82% of estimated protein needs)    ADDITIONAL RECOMMENDATIONS:  1) strict I/O's  2) daily wt checks  3) obtain triglyceride level  4) q6hr POC checks  5) daily lyte checks      **************************************************************************************    · Weight Used for Calculation	current 69Kg     Estimated Energy Needs (25-30 calories/kg):  · Weight  (lbs)	152.1 lb  · Weight (kg)	69 kg  · From (25cal/kg)	1725 · To (30cal/kg) 2070     Other Calculation:  · Other Calculation	Ht. 66 "     Wt. 69 Kg       24.5 BMI       IBW  65 Kg      Pt is at   125 %  IBW     Estimated Protein Needs (1.4-1.6 g/kg):  · Weight  (lbs)	152.1  · Weight (kg)	69 kg  · From (1.4 g/kg)	96.6 · To (1.6 g/kg)	110.4     Estimated Fluid Needs (25-30 ml/kg):  · Weight  (lbs)	152.1  · Weight (kg)	69  · From (25 ml/kg)	1725 · To (30 ml/kg) 2070. Clinical Nutrition PPN Recommendation Note    *received consult for PPN recommendations.  d/w surgeon; pt has been NPO x 5 days, expected to be NPO x 3 additional days 2/2 ileus s/p ex-lap with resection of prior anastomosis on 7/29 due to recurrent SBO.Noted pt to be started on sips of clears. noted NGT to be removed.     *labs reviewed; 08-04 Na138 mmol/L Glu 123 mg/dL<H> K+ 4.2 mmol/L Cr  0.74 mg/dL BUN 13 mg/dL Phos 3.6 mg/dL Alb 2.6 g/dL<L> PAB n/a   ; lytes WNL; no results for Mg and Phos, discussed w/ the lab reporting Mg 1.9 and Phos 3.6.     CAPILLARY BLOOD GLUCOSE  POCT Blood Glucose.: 121 mg/dL (04 Aug 2020 06:40)  POCT Blood Glucose.: 111 mg/dL (04 Aug 2020 00:24)  POCT Blood Glucose.: 106 mg/dL (03 Aug 2020 16:57)  POCT Blood Glucose.: 121 mg/dL (03 Aug 2020 11:36)    *Triglycerides WNL; recommend adding 45 g lipids to PPN order.     *output: urine: 300mL .   NGT: 100mL.  total: 400mL.  *intake: no intake documented. Noted IVF Nacl + 20 mEq Kcl running TV 1000 mL; PPN 2000 mL TV    *no edema. Ernesto 18; no noted skin breakdown     *noted new wt 68.9kg; admission wt 81kg indicating wt loss of 12.1kg, 15% BW and clinically significant however likely that admission wt inaccurate. Based on significant unintentional wt loss and pt not meeting at least 50% ENN x 7 days. Pt remains at high risk for malnutrition however based on current information pt does not meet criteria.     2000mL total volume    45 gm Lipids 20%  80 g Amino Acids  80 g Dextrose  121 mEq NaCl  14 mEq NaAcetate  20 mMol NaPhos  42 mEq KCl  28 mEq KAcetate  10 mEq Calcium Gluconate  8 mEq Magnesium Sulfate  1 mL trace element  10 mL MVI         total Calories: 1042 (meets ~60% of estimated calorie needs and 82% of estimated protein needs)    ADDITIONAL RECOMMENDATIONS:  1) strict I/O's  2) daily wt checks  3) obtain triglyceride level  4) q6hr POC checks  5) daily lyte checks      **************************************************************************************    · Weight Used for Calculation	current 69Kg     Estimated Energy Needs (25-30 calories/kg):  · Weight  (lbs)	152.1 lb  · Weight (kg)	69 kg  · From (25cal/kg)	1725 · To (30cal/kg) 2070     Other Calculation:  · Other Calculation	Ht. 66 "     Wt. 69 Kg       24.5 BMI       IBW  65 Kg      Pt is at   125 %  IBW     Estimated Protein Needs (1.4-1.6 g/kg):  · Weight  (lbs)	152.1  · Weight (kg)	69 kg  · From (1.4 g/kg)	96.6 · To (1.6 g/kg)	110.4     Estimated Fluid Needs (25-30 ml/kg):  · Weight  (lbs)	152.1  · Weight (kg)	69  · From (25 ml/kg)	1725 · To (30 ml/kg) 2070.

## 2020-08-04 NOTE — PROGRESS NOTE ADULT - SUBJECTIVE AND OBJECTIVE BOX
The patient was seen and examined today bedside, the patient states that he had multiple bowel movements and passing gas, he states that he feels much better, he denied any nausea, vomiting, fever or chills. No acute events were reported from the nursing staff .        Vitals:  T(C): 37.1 ( @ 00:29), Max: 37.4 ( @ 16:27)  HR: 69 ( 00:29) (69 - 81)  BP: 154/60 ( 00:29) (135/63 - 169/75)  RR: 17 ( 00:29) (17 - 18)  SpO2: 95% ( 00:29) (94% - 96%)     @ :  -   @ 07:00  --------------------------------------------------------  IN:  Total IN: 0 mL    OUT:    Nasoenteral Tube: 100 mL    Voided: 300 mL  Total OUT: 400 mL    Total NET: -400 mL      Physical Exam:    Pt is AAOx3  General: Well developed, in no acute distress.   Chest: Lungs clear, no rales, no rhonchi, no wheezes.   Heart: RR, no murmurs, no rubs, no gallops.   Abdomen: Soft, no tenderness, no masses, less distended  Back: Normal curvature, no tenderness.   Neuro: Physiological, no localizing findings.   Skin: Normal, no rashes, no lesions noted.   Extremities: Warm, well perfused, no edema, Pulses intact     @ 07:24                    9.1  CBC: 8.27>)-------(<200                     28.4                 142 | 111 | 16    CMP:  ----------------------< 108               3.7 | 25 | 0.76                      Ca:7.6  Phos:2.7  M.1               0.3|      |33        LFTs:  ------|39|-----             -|      |-      GI PCR Panel, Stool (collected 08-01-20 @ 02:55)  Source: .Stool Feces  Final Report (20 @ 12:01):    GI PCR Results: NOT detected    *******Please Note:*******    GI panel PCR evaluates for:    Campylobacter, Plesiomonas shigelloides, Salmonella,    Vibrio, Yersinia enterocolitica, Enteroaggregative    Escherichia coli (EAEC), Enteropathogenic E.coli (EPEC),    Enterotoxigenic E. coli (ETEC) lt/st, Shiga-like    toxin-producing E. coli (STEC) stx1/stx2,    Shigella/ Enteroinvasive E. coli (EIEC), Cryptosporidium,    Cyclospora cayetanensis, Entamoeba histolytica,    Giardia lamblia, Adenovirus F 40/41, Astrovirus,    Norovirus GI/GII, Rotavirus A, Sapovirus      Current Inpatient Medications:  acetaminophen   Tablet .. 650 milliGRAM(s) Oral every 6 hours PRN  aluminum hydroxide/magnesium hydroxide/simethicone Suspension 30 milliLiter(s) Oral every 6 hours PRN  benzocaine 15 mG/menthol 3.6 mG (Sugar-Free) Lozenge 1 Lozenge Oral every 12 hours  cefTRIAXone Injectable. 1000 milliGRAM(s) IV Push every 24 hours  dextrose 40% Gel 15 Gram(s) Oral once PRN  dextrose 5%. 1000 milliLiter(s) (50 mL/Hr) IV Continuous <Continuous>  dextrose 50% Injectable 12.5 Gram(s) IV Push once  dextrose 50% Injectable 25 Gram(s) IV Push once  dextrose 50% Injectable 25 Gram(s) IV Push once  diphenhydrAMINE   Injectable 25 milliGRAM(s) IV Push at bedtime PRN  glucagon  Injectable 1 milliGRAM(s) IntraMuscular once PRN  heparin   Injectable 5000 Unit(s) SubCutaneous every 8 hours  insulin lispro (HumaLOG) corrective regimen sliding scale   SubCutaneous every 6 hours  labetalol 200 milliGRAM(s) Oral every 12 hours  lactated ringers. 500 milliLiter(s) (500 mL/Hr) IV Continuous <Continuous>  morphine  - Injectable 2 milliGRAM(s) IV Push every 4 hours PRN  ondansetron Injectable 4 milliGRAM(s) IV Push every 6 hours PRN  pantoprazole  Injectable 40 milliGRAM(s) IV Push daily  Parenteral Nutrition - Adult 1 Each (83 mL/Hr) TPN Continuous <Continuous>  sodium chloride 0.45% with potassium chloride 20 mEq/L 1000 milliLiter(s) (42 mL/Hr) IV Continuous <Continuous>

## 2020-08-05 LAB
ANION GAP SERPL CALC-SCNC: 8 MMOL/L — SIGNIFICANT CHANGE UP (ref 5–17)
BUN SERPL-MCNC: 17 MG/DL — SIGNIFICANT CHANGE UP (ref 7–23)
CALCIUM SERPL-MCNC: 8.3 MG/DL — LOW (ref 8.5–10.1)
CHLORIDE SERPL-SCNC: 107 MMOL/L — SIGNIFICANT CHANGE UP (ref 96–108)
CO2 SERPL-SCNC: 24 MMOL/L — SIGNIFICANT CHANGE UP (ref 22–31)
CREAT SERPL-MCNC: 0.8 MG/DL — SIGNIFICANT CHANGE UP (ref 0.5–1.3)
GLUCOSE SERPL-MCNC: 103 MG/DL — HIGH (ref 70–99)
HCT VFR BLD CALC: 32.8 % — LOW (ref 39–50)
HGB BLD-MCNC: 10.5 G/DL — LOW (ref 13–17)
MAGNESIUM SERPL-MCNC: 2.1 MG/DL — SIGNIFICANT CHANGE UP (ref 1.6–2.6)
MCHC RBC-ENTMCNC: 30.6 PG — SIGNIFICANT CHANGE UP (ref 27–34)
MCHC RBC-ENTMCNC: 32 GM/DL — SIGNIFICANT CHANGE UP (ref 32–36)
MCV RBC AUTO: 95.6 FL — SIGNIFICANT CHANGE UP (ref 80–100)
PHOSPHATE SERPL-MCNC: 3.7 MG/DL — SIGNIFICANT CHANGE UP (ref 2.5–4.5)
PLATELET # BLD AUTO: 271 K/UL — SIGNIFICANT CHANGE UP (ref 150–400)
POTASSIUM SERPL-MCNC: 4.9 MMOL/L — SIGNIFICANT CHANGE UP (ref 3.5–5.3)
POTASSIUM SERPL-SCNC: 4.9 MMOL/L — SIGNIFICANT CHANGE UP (ref 3.5–5.3)
RBC # BLD: 3.43 M/UL — LOW (ref 4.2–5.8)
RBC # FLD: 14.3 % — SIGNIFICANT CHANGE UP (ref 10.3–14.5)
SODIUM SERPL-SCNC: 139 MMOL/L — SIGNIFICANT CHANGE UP (ref 135–145)
WBC # BLD: 7.82 K/UL — SIGNIFICANT CHANGE UP (ref 3.8–10.5)
WBC # FLD AUTO: 7.82 K/UL — SIGNIFICANT CHANGE UP (ref 3.8–10.5)

## 2020-08-05 RX ORDER — ELECTROLYTE SOLUTION,INJ
1 VIAL (ML) INTRAVENOUS
Refills: 0 | Status: DISCONTINUED | OUTPATIENT
Start: 2020-08-05 | End: 2020-08-05

## 2020-08-05 RX ORDER — HYDRALAZINE HCL 50 MG
10 TABLET ORAL EVERY 6 HOURS
Refills: 0 | Status: DISCONTINUED | OUTPATIENT
Start: 2020-08-05 | End: 2020-08-06

## 2020-08-05 RX ORDER — LABETALOL HCL 100 MG
400 TABLET ORAL
Refills: 0 | Status: DISCONTINUED | OUTPATIENT
Start: 2020-08-05 | End: 2020-08-06

## 2020-08-05 RX ADMIN — Medication 1: at 12:17

## 2020-08-05 RX ADMIN — Medication 400 MILLIGRAM(S): at 22:16

## 2020-08-05 RX ADMIN — Medication 15 MILLIGRAM(S): at 22:40

## 2020-08-05 RX ADMIN — PANTOPRAZOLE SODIUM 40 MILLIGRAM(S): 20 TABLET, DELAYED RELEASE ORAL at 10:15

## 2020-08-05 RX ADMIN — HEPARIN SODIUM 5000 UNIT(S): 5000 INJECTION INTRAVENOUS; SUBCUTANEOUS at 05:46

## 2020-08-05 RX ADMIN — HEPARIN SODIUM 5000 UNIT(S): 5000 INJECTION INTRAVENOUS; SUBCUTANEOUS at 22:13

## 2020-08-05 RX ADMIN — Medication 30 MILLILITER(S): at 15:40

## 2020-08-05 RX ADMIN — CEFTRIAXONE 1000 MILLIGRAM(S): 500 INJECTION, POWDER, FOR SOLUTION INTRAMUSCULAR; INTRAVENOUS at 22:13

## 2020-08-05 RX ADMIN — BENZOCAINE AND MENTHOL 1 LOZENGE: 5; 1 LIQUID ORAL at 10:16

## 2020-08-05 RX ADMIN — Medication 15 MILLIGRAM(S): at 22:14

## 2020-08-05 RX ADMIN — Medication 200 MILLIGRAM(S): at 10:16

## 2020-08-05 RX ADMIN — HEPARIN SODIUM 5000 UNIT(S): 5000 INJECTION INTRAVENOUS; SUBCUTANEOUS at 13:52

## 2020-08-05 RX ADMIN — BENZOCAINE AND MENTHOL 1 LOZENGE: 5; 1 LIQUID ORAL at 22:13

## 2020-08-05 NOTE — CHART NOTE - NSCHARTNOTEFT_GEN_A_CORE
Clinical Nutrition PPN Recommendation Note    *received consult for PPN recommendations.  d/w surgeon; pt has been NPO x 5 days, expected to be NPO x 3 additional days 2/2 ileus s/p ex-lap with resection of prior anastomosis on 7/29 due to recurrent SBO. Pt advanced to clear liquids and if tolerated to be advanced to full liquids as per surgery.     *labs reviewed;08-05 Na139 mmol/L Glu 103 mg/dL<H> K+ 4.9 mmol/L Cr  0.80 mg/dL BUN 17 mg/dL Phos 3.7 mg/dL Alb n/a   PAB n/a ; K trending up, as per orders pt was receiving IVF NaCL and 20 mEq K which was d/cd this AM. K decreased by 6 mEq in bag to avoid hyperkalemia. Will continue to monitor.     CAPILLARY BLOOD GLUCOSE    POCT Blood Glucose.: 119 mg/dL (05 Aug 2020 08:01)  POCT Blood Glucose.: 110 mg/dL (05 Aug 2020 05:44)  POCT Blood Glucose.: 114 mg/dL (04 Aug 2020 23:44)  POCT Blood Glucose.: 113 mg/dL (04 Aug 2020 17:23)  POCT Blood Glucose.: 132 mg/dL (04 Aug 2020 11:39)      *output: urine: 1800mL; x1 BM over past 24 hours.   *intake: no intake documented    *no edema. Ernesto 18; no noted skin breakdown     *new wt 8/4 68.9kg ; continue to monitor wt daily to track/trend     PPN Recommendations:    2000mL total volume    45 gm Lipids 20%  80 g Amino Acids  80 g Dextrose  121 mEq NaCl  6 mEq NaAcetate  20 mMol NaPhos  35 mEq KCl  29 mEq KAcetate  10 mEq Calcium Gluconate  8 mEq Magnesium Sulfate  1 mL trace element  10 mL MVI         total Calories: 1042 (meets ~60% of estimated calorie needs and 82% of estimated protein needs)    ADDITIONAL RECOMMENDATIONS:  1) strict I/O's  2) daily wt checks  3) obtain triglyceride level  4) q6hr POC checks  5) daily lyte checks      **************************************************************************************    · Weight Used for Calculation	current 69Kg     Estimated Energy Needs (25-30 calories/kg):  · Weight  (lbs)	152.1 lb  · Weight (kg)	69 kg  · From (25cal/kg)	1725 · To (30cal/kg) 2070     Other Calculation:  · Other Calculation	Ht. 66 "     Wt. 69 Kg       24.5 BMI       IBW  65 Kg      Pt is at   125 %  IBW     Estimated Protein Needs (1.4-1.6 g/kg):  · Weight  (lbs)	152.1  · Weight (kg)	69 kg  · From (1.4 g/kg)	96.6 · To (1.6 g/kg)	110.4     Estimated Fluid Needs (25-30 ml/kg):  · Weight  (lbs)	152.1  · Weight (kg)	69  · From (25 ml/kg)	1725 · To (30 ml/kg) 2070.

## 2020-08-05 NOTE — PROGRESS NOTE ADULT - ASSESSMENT
81 y/o male with a PMHx of CAD with stents, Crohns, diverticulitis s/p open Sigmoidectomy, essential HTN, gastritis, 2 previous episodes of SBO, cholecystectomy admitted on 7/28 for evaluation of abdominal pain after eating associated with nausea and vomiting, found to have small bowel obstruction, the patient underwent small bowel resection on 7/29 with end to end anastomosis. Post op has been having fever, also noted with ileus and had ngt placed. He did have some loose stool for which testing is pending. He denies any other specific complaints.     1. Patient admitted with SBO s/p resection; post op fever; possibly atelectasis, micro aspiration pneumonia  - follow up cultures   - wound care per surgery   - iv hydration and supportive care   - serial cbc and monitor temperature   - reviewed prior medical records to evaluate for resistant or atypical pathogens   - day #5 ceftriaxone; will stop ceftriaxone after today dose  - diet per surgery      2. other issues; per medicine

## 2020-08-05 NOTE — PROGRESS NOTE ADULT - SUBJECTIVE AND OBJECTIVE BOX
Date of service: 08-05-20 @ 09:55    Patient sitting in chair; awaiting clear diet; had ngt removed        ROS: no fever or chills; denies dizziness, no HA, no SOB or cough, no abdominal pain, no diarrhea or constipation; no dysuria, no urinary frequency, no legs pain, no rashes    MEDICATIONS  (STANDING):  benzocaine 15 mG/menthol 3.6 mG (Sugar-Free) Lozenge 1 Lozenge Oral every 12 hours  cefTRIAXone Injectable. 1000 milliGRAM(s) IV Push every 24 hours  dextrose 5%. 1000 milliLiter(s) (50 mL/Hr) IV Continuous <Continuous>  dextrose 50% Injectable 12.5 Gram(s) IV Push once  dextrose 50% Injectable 25 Gram(s) IV Push once  dextrose 50% Injectable 25 Gram(s) IV Push once  fat emulsion (Fish Oil and Plant Based) 20% Infusion 18.75 mL/Hr (18.75 mL/Hr) IV Continuous <Continuous>  heparin   Injectable 5000 Unit(s) SubCutaneous every 8 hours  insulin lispro (HumaLOG) corrective regimen sliding scale   SubCutaneous every 6 hours  labetalol 200 milliGRAM(s) Oral every 12 hours  pantoprazole  Injectable 40 milliGRAM(s) IV Push daily  Parenteral Nutrition - Adult 1 Each (83 mL/Hr) TPN Continuous <Continuous>    MEDICATIONS  (PRN):  acetaminophen   Tablet .. 650 milliGRAM(s) Oral every 6 hours PRN Temp greater or equal to 38C (100.4F), Mild Pain (1 - 3), Moderate Pain (4 - 6)  aluminum hydroxide/magnesium hydroxide/simethicone Suspension 30 milliLiter(s) Oral every 6 hours PRN Dyspepsia  dextrose 40% Gel 15 Gram(s) Oral once PRN Blood Glucose LESS THAN 70 milliGRAM(s)/deciliter  diphenhydrAMINE   Injectable 25 milliGRAM(s) IV Push at bedtime PRN Insomnia  glucagon  Injectable 1 milliGRAM(s) IntraMuscular once PRN Glucose LESS THAN 70 milligrams/deciliter  hydrALAZINE Injectable 10 milliGRAM(s) IV Push every 6 hours PRN SBP > 170  ketorolac   Injectable 15 milliGRAM(s) IV Push every 6 hours PRN Moderate Pain (4 - 6)  morphine  - Injectable 2 milliGRAM(s) IV Push every 4 hours PRN Severe Pain (7 - 10)  ondansetron Injectable 4 milliGRAM(s) IV Push every 6 hours PRN Nausea and/or Vomiting      Vital Signs Last 24 Hrs  T(C): 36.3 (05 Aug 2020 08:47), Max: 36.8 (04 Aug 2020 22:02)  T(F): 97.3 (05 Aug 2020 08:47), Max: 98.2 (04 Aug 2020 22:02)  HR: 87 (05 Aug 2020 08:47) (74 - 87)  BP: 173/72 (05 Aug 2020 08:47) (137/72 - 173/72)  BP(mean): --  RR: 17 (05 Aug 2020 08:47) (17 - 17)  SpO2: 97% (05 Aug 2020 08:47) (94% - 97%)        Physical Exam:        Constitutional: frail looking  HEENT: NC/AT, EOMI, PERRLA, conjunctivae clear; ears and nose atraumatic; pharynx clear; ngt removed  Neck: supple; thyroid not palpable  Back: no tenderness  Respiratory: respiratory effort normal; clear to auscultation  Cardiovascular: S1S2 regular, no murmurs  Abdomen: soft, not tender, not distended, positive BS; no liver or spleen organomegaly; midline wound covered with dressing  Genitourinary: no suprapubic tenderness  Musculoskeletal: no muscle tenderness, no joint swelling or tenderness  Neurological/ Psychiatric: AxOx3, judgement and insight normal;  moving all extremities  Skin: no rashes; no palpable lesions    Labs: all available labs reviewed              Labs:                        10.5   7.82  )-----------( 271      ( 05 Aug 2020 06:25 )             32.8     08-05    139  |  107  |  17  ----------------------------<  103<H>  4.9   |  24  |  0.80    Ca    8.3<L>      05 Aug 2020 06:25  Phos  3.7     08-05  Mg     2.1     08-05    TPro  7.1  /  Alb  2.6<L>  /  TBili  0.3  /  DBili  x   /  AST  37  /  ALT  41  /  AlkPhos  44  08-04           Cultures:       GI PCR Panel, Stool (collected 08-01-20 @ 02:55)  Source: .Stool Feces  Final Report (08-01-20 @ 12:01):    GI PCR Results: NOT detected    *******Please Note:*******    GI panel PCR evaluates for:    Campylobacter, Plesiomonas shigelloides, Salmonella,    Vibrio, Yersinia enterocolitica, Enteroaggregative    Escherichia coli (EAEC), Enteropathogenic E.coli (EPEC),    Enterotoxigenic E. coli (ETEC) lt/st, Shiga-like    toxin-producing E. coli (STEC) stx1/stx2,    Shigella/ Enteroinvasive E. coli (EIEC), Cryptosporidium,    Cyclospora cayetanensis, Entamoeba histolytica,    Giardia lamblia, Adenovirus F 40/41, Astrovirus,    Norovirus GI/GII, Rotavirus A, Sapovirus                    Clostridium difficile Toxin by PCR (07.31.20 @ 16:17)    Clostridium difficile Toxin by PCR: The results of this test should be interpreted with consideration of all  clinical and laboratory findings. This test determines the presence of  the C. difficile tcdB gene at a given time and is not intended to  identify antibiotic associated disease or C. difficile infection without  clinical context.  Successful treatment is based on the resolution of clinical symptoms.  This test should not be used as a "test of cure" because C. difficile DNA  will persist after successful treatment. Repeat testing will not be  permitted.    This test is performed on the BD MAX system using Real-Time PCR and  fluorogenic target-specific hybridization.    C Diff by PCR Result: NotDetec          < from: Xray Chest 1 View- PORTABLE-Routine (08.01.20 @ 11:00) >    EXAM:  XR CHEST PORTABLE ROUTINE 1V                            PROCEDURE DATE:  08/01/2020          INTERPRETATION:  AP chest.    Clinical indications: NG tube placement.    IMPRESSION: The NG tube tip is in the stomach. Bibasilar atelectasis is present. There is a trace right pleural effusion. The heart is normal in size.      < end of copied text >                    Radiology: all available radiological tests reviewed    Advanced directives addressed: full resuscitation

## 2020-08-05 NOTE — PROGRESS NOTE ADULT - ASSESSMENT
SBO s/p Laparotomy  crohns disease  tolerating Full liquids  Advance to soft diet in AM  D/Cplanning

## 2020-08-05 NOTE — PROGRESS NOTE ADULT - SUBJECTIVE AND OBJECTIVE BOX
The patient was seen and examined today bedside, the patient states that he had multiple bowel movements and passing gas, he states that he feels much better, he denied any nausea, vomiting, fever or chills, tolerating sips of clears. No acute events were reported from the nursing staff .      Vitals:  T(C): 36.6 ( @ 00:10), Max: 36.9 ( @ 09:11)  HR: 77 ( @ 00:10) (74 - 82)  BP: 137/72 ( @ 00:10) (137/72 - 169/85)  RR: 17 ( @ 00:10) (17 - 17)  SpO2: 94% ( 00:10) (94% - 96%)     @ 07:01  -   @ 07:00  --------------------------------------------------------  IN:  Total IN: 0 mL    OUT:    Stool: 1 mL    Voided: 1800 mL  Total OUT: 1801 mL    Total NET: -1801 mL        Physical Exam:    Pt is AAOx3  General: Well developed, in no acute distress.   Chest: Lungs clear, no rales, no rhonchi, no wheezes.   Heart: RR, no murmurs, no rubs, no gallops.   Abdomen: Soft, no tenderness, no masses, less distended  Back: Normal curvature, no tenderness.   Neuro: Physiological, no localizing findings.   Skin: Normal, no rashes, no lesions noted.   Extremities: Warm, well perfused, no edema, Pulses intact       @ 10:46                    10.2  CBC: 8.05>)-------(<231                     31.4                 138 | 107 | 13    CMP:  ----------------------< 123               4.2 | 25 | 0.74                      Ca:8.1  Phos:3.6  M.9               0.3|      |37        LFTs:  ------|44|-----             -|      |-      GI PCR Panel, Stool (collected 20 @ 02:55)  Source: .Stool Feces  Final Report (20 @ 12:01):    GI PCR Results: NOT detected    *******Please Note:*******    GI panel PCR evaluates for:    Campylobacter, Plesiomonas shigelloides, Salmonella,    Vibrio, Yersinia enterocolitica, Enteroaggregative    Escherichia coli (EAEC), Enteropathogenic E.coli (EPEC),    Enterotoxigenic E. coli (ETEC) lt/st, Shiga-like    toxin-producing E. coli (STEC) stx1/stx2,    Shigella/ Enteroinvasive E. coli (EIEC), Cryptosporidium,    Cyclospora cayetanensis, Entamoeba histolytica,    Giardia lamblia, Adenovirus F 40/41, Astrovirus,    Norovirus GI/GII, Rotavirus A, Sapovirus      Current Inpatient Medications:  acetaminophen   Tablet .. 650 milliGRAM(s) Oral every 6 hours PRN  aluminum hydroxide/magnesium hydroxide/simethicone Suspension 30 milliLiter(s) Oral every 6 hours PRN  benzocaine 15 mG/menthol 3.6 mG (Sugar-Free) Lozenge 1 Lozenge Oral every 12 hours  cefTRIAXone Injectable. 1000 milliGRAM(s) IV Push every 24 hours  dextrose 40% Gel 15 Gram(s) Oral once PRN  dextrose 5%. 1000 milliLiter(s) (50 mL/Hr) IV Continuous <Continuous>  dextrose 50% Injectable 12.5 Gram(s) IV Push once  dextrose 50% Injectable 25 Gram(s) IV Push once  dextrose 50% Injectable 25 Gram(s) IV Push once  diphenhydrAMINE   Injectable 25 milliGRAM(s) IV Push at bedtime PRN  fat emulsion (Fish Oil and Plant Based) 20% Infusion 18.75 mL/Hr (18.75 mL/Hr) IV Continuous <Continuous>  glucagon  Injectable 1 milliGRAM(s) IntraMuscular once PRN  heparin   Injectable 5000 Unit(s) SubCutaneous every 8 hours  insulin lispro (HumaLOG) corrective regimen sliding scale   SubCutaneous every 6 hours  ketorolac   Injectable 15 milliGRAM(s) IV Push every 6 hours PRN  labetalol 200 milliGRAM(s) Oral every 12 hours  lactated ringers. 500 milliLiter(s) (500 mL/Hr) IV Continuous <Continuous>  morphine  - Injectable 2 milliGRAM(s) IV Push every 4 hours PRN  ondansetron Injectable 4 milliGRAM(s) IV Push every 6 hours PRN  pantoprazole  Injectable 40 milliGRAM(s) IV Push daily  Parenteral Nutrition - Adult 1 Each (83 mL/Hr) TPN Continuous <Continuous>  sodium chloride 0.45% with potassium chloride 20 mEq/L 1000 milliLiter(s) (42 mL/Hr) IV Continuous <Continuous>

## 2020-08-05 NOTE — PROGRESS NOTE ADULT - ATTENDING COMMENTS
Patient was seen and examined, modifications and corrections made  I agree with findings and plan

## 2020-08-05 NOTE — PROGRESS NOTE ADULT - ASSESSMENT
Resolving Ileus      Plan:  Will get KUB.  Continue Clear liquid diet  Continue IV abx.  check labs  Continue PPN for now till the patient is tolerating his diet .  Will f/u ID recc and GI Recc.    The plan was discussed with Dr. Alberts Resolving Ileus      Plan:  Will get KUB.  Continue Clear liquid, adv to fullsContinue IV abx.  check labs  Continue PPN for now till the patient is tolerating his diet .  Will f/u ID recc and GI Recc.    The plan was discussed with Dr. Alberts

## 2020-08-06 ENCOUNTER — TRANSCRIPTION ENCOUNTER (OUTPATIENT)
Age: 80
End: 2020-08-06

## 2020-08-06 VITALS
RESPIRATION RATE: 18 BRPM | HEART RATE: 85 BPM | DIASTOLIC BLOOD PRESSURE: 66 MMHG | OXYGEN SATURATION: 98 % | SYSTOLIC BLOOD PRESSURE: 128 MMHG

## 2020-08-06 LAB
ALBUMIN SERPL ELPH-MCNC: 2.7 G/DL — LOW (ref 3.3–5)
ALP SERPL-CCNC: 48 U/L — SIGNIFICANT CHANGE UP (ref 40–120)
ALT FLD-CCNC: 46 U/L — SIGNIFICANT CHANGE UP (ref 12–78)
ANION GAP SERPL CALC-SCNC: 5 MMOL/L — SIGNIFICANT CHANGE UP (ref 5–17)
AST SERPL-CCNC: 33 U/L — SIGNIFICANT CHANGE UP (ref 15–37)
BASOPHILS # BLD AUTO: 0.04 K/UL — SIGNIFICANT CHANGE UP (ref 0–0.2)
BASOPHILS NFR BLD AUTO: 0.4 % — SIGNIFICANT CHANGE UP (ref 0–2)
BILIRUB SERPL-MCNC: 0.2 MG/DL — SIGNIFICANT CHANGE UP (ref 0.2–1.2)
BUN SERPL-MCNC: 19 MG/DL — SIGNIFICANT CHANGE UP (ref 7–23)
CALCIUM SERPL-MCNC: 8.1 MG/DL — LOW (ref 8.5–10.1)
CHLORIDE SERPL-SCNC: 109 MMOL/L — HIGH (ref 96–108)
CO2 SERPL-SCNC: 24 MMOL/L — SIGNIFICANT CHANGE UP (ref 22–31)
CREAT SERPL-MCNC: 0.96 MG/DL — SIGNIFICANT CHANGE UP (ref 0.5–1.3)
EOSINOPHIL # BLD AUTO: 0.23 K/UL — SIGNIFICANT CHANGE UP (ref 0–0.5)
EOSINOPHIL NFR BLD AUTO: 2.4 % — SIGNIFICANT CHANGE UP (ref 0–6)
GLUCOSE SERPL-MCNC: 105 MG/DL — HIGH (ref 70–99)
HCT VFR BLD CALC: 33.8 % — LOW (ref 39–50)
HGB BLD-MCNC: 10.5 G/DL — LOW (ref 13–17)
IMM GRANULOCYTES NFR BLD AUTO: 1.3 % — SIGNIFICANT CHANGE UP (ref 0–1.5)
LYMPHOCYTES # BLD AUTO: 1.61 K/UL — SIGNIFICANT CHANGE UP (ref 1–3.3)
LYMPHOCYTES # BLD AUTO: 16.8 % — SIGNIFICANT CHANGE UP (ref 13–44)
MCHC RBC-ENTMCNC: 30 PG — SIGNIFICANT CHANGE UP (ref 27–34)
MCHC RBC-ENTMCNC: 31.1 GM/DL — LOW (ref 32–36)
MCV RBC AUTO: 96.6 FL — SIGNIFICANT CHANGE UP (ref 80–100)
MONOCYTES # BLD AUTO: 1.05 K/UL — HIGH (ref 0–0.9)
MONOCYTES NFR BLD AUTO: 11 % — SIGNIFICANT CHANGE UP (ref 2–14)
NEUTROPHILS # BLD AUTO: 6.53 K/UL — SIGNIFICANT CHANGE UP (ref 1.8–7.4)
NEUTROPHILS NFR BLD AUTO: 68.1 % — SIGNIFICANT CHANGE UP (ref 43–77)
PHOSPHATE SERPL-MCNC: 3.1 MG/DL — SIGNIFICANT CHANGE UP (ref 2.5–4.5)
PLATELET # BLD AUTO: 342 K/UL — SIGNIFICANT CHANGE UP (ref 150–400)
POTASSIUM SERPL-MCNC: 4.2 MMOL/L — SIGNIFICANT CHANGE UP (ref 3.5–5.3)
POTASSIUM SERPL-SCNC: 4.2 MMOL/L — SIGNIFICANT CHANGE UP (ref 3.5–5.3)
PROT SERPL-MCNC: 7.3 GM/DL — SIGNIFICANT CHANGE UP (ref 6–8.3)
RBC # BLD: 3.5 M/UL — LOW (ref 4.2–5.8)
RBC # FLD: 14.4 % — SIGNIFICANT CHANGE UP (ref 10.3–14.5)
SODIUM SERPL-SCNC: 138 MMOL/L — SIGNIFICANT CHANGE UP (ref 135–145)
TRIGL SERPL-MCNC: 173 MG/DL — HIGH (ref 10–149)
WBC # BLD: 9.58 K/UL — SIGNIFICANT CHANGE UP (ref 3.8–10.5)
WBC # FLD AUTO: 9.58 K/UL — SIGNIFICANT CHANGE UP (ref 3.8–10.5)

## 2020-08-06 RX ADMIN — PANTOPRAZOLE SODIUM 40 MILLIGRAM(S): 20 TABLET, DELAYED RELEASE ORAL at 10:52

## 2020-08-06 RX ADMIN — BENZOCAINE AND MENTHOL 1 LOZENGE: 5; 1 LIQUID ORAL at 10:49

## 2020-08-06 RX ADMIN — HEPARIN SODIUM 5000 UNIT(S): 5000 INJECTION INTRAVENOUS; SUBCUTANEOUS at 05:41

## 2020-08-06 NOTE — DISCHARGE NOTE PROVIDER - HOSPITAL COURSE
81 y/o male with a PMHx of CAD with stents, Crohns, diverticulitis s/p open Sigmoidectomy, essential HTN, gastritis, 2 previous episodes of SBO, cholecystectomy presents to the ED c/o abd pain since last night after eating. Conservative management for recurrent SBO attempted with NGT and fluid resuscitation but patient condition worseded. patient went to OR for small bowel resection of two segments with primary anastamosis. Patient tolerated the procedure well. Recovery was complicated by postop ileus which has since resolved. Patient now tolerating diet with good bowel function and is stable for discharge

## 2020-08-06 NOTE — PROGRESS NOTE ADULT - SUBJECTIVE AND OBJECTIVE BOX
Patient seen and examined at bedside. Patient has no complaints and reports pain is under control. Patient is tolerating diet without nausea or vomiting and is passing flatus. Nurse denies any acute events. Vitals reviewed and WNL.    Vitals:  T(C): 36.7 ( @ 16:25), Max: 36.7 ( @ 16:25)  HR: 90 ( @ 22:19) (86 - 92)  BP: 140/74 ( @ 22:19) (121/62 - 140/74)  RR: 17 ( @ 20:55) (17 - 17)  SpO2: 97% ( @ 20:55) (96% - 97%)      Physical Exam:  Pt is AAOx3  General: Well developed, in no acute distress.   Chest: Lungs clear, no rales, no rhonchi, no wheezes.   Heart: RR, no murmurs, no rubs, no gallops.   Abdomen: Soft, no tenderness, no masses, BS normal.    Back: Normal curvature, no tenderness.   Neuro: Physiological, no localizing findings.   Skin: Normal, no rashes, no lesions noted.   Extremities: Warm, well perfused, no edema, Pulses intact     @ 06:25                    10.5  CBC: 7.82>)-------(<271                     32.8                 139 | 107 | 17    CMP:  ----------------------< 103               4.9 | 24 | 0.80                      Ca:8.3  Phos:3.7  M.1               -|      |-        LFTs:  ------|-|-----             -|      |-      Current Inpatient Medications:  acetaminophen   Tablet .. 650 milliGRAM(s) Oral every 6 hours PRN  aluminum hydroxide/magnesium hydroxide/simethicone Suspension 30 milliLiter(s) Oral every 6 hours PRN  benzocaine 15 mG/menthol 3.6 mG (Sugar-Free) Lozenge 1 Lozenge Oral every 12 hours  diphenhydrAMINE   Injectable 25 milliGRAM(s) IV Push at bedtime PRN  heparin   Injectable 5000 Unit(s) SubCutaneous every 8 hours  hydrALAZINE Injectable 10 milliGRAM(s) IV Push every 6 hours PRN  ketorolac   Injectable 15 milliGRAM(s) IV Push every 6 hours PRN  labetalol 400 milliGRAM(s) Oral two times a day  ondansetron Injectable 4 milliGRAM(s) IV Push every 6 hours PRN  pantoprazole  Injectable 40 milliGRAM(s) IV Push daily

## 2020-08-06 NOTE — DISCHARGE NOTE PROVIDER - NSDCCPTREATMENT_GEN_ALL_CORE_FT
PRINCIPAL PROCEDURE  Procedure: Small bowel resection, segmental, multiple  Findings and Treatment:       SECONDARY PROCEDURE  Procedure: Exploratory celiotomy  Findings and Treatment:

## 2020-08-06 NOTE — PROGRESS NOTE ADULT - ASSESSMENT
Resolving Ileus      Plan:  Will get KUB.  adv to low fiber diet  PPN discontinued  Continue IV abx.  check labs  Will f/u ID recc and GI Recc.  Patient stable for discharge if he continues to tolerate diet and remains stable    Will discuss plan with Dr. Alberts

## 2020-08-06 NOTE — DISCHARGE NOTE NURSING/CASE MANAGEMENT/SOCIAL WORK - PATIENT PORTAL LINK FT
You can access the FollowMyHealth Patient Portal offered by [facility] by registering at the following website: [email or web address]. By joining [employer]’s FollowMyHealth portal, you will also be able to view your health information using other applications (apps) compatible with our system.

## 2020-08-06 NOTE — DISCHARGE NOTE PROVIDER - NSDCFUADDINST_GEN_ALL_CORE_FT
Patient may resume regular activity and diet as tolerated. Please limit heavy lifting or strenuous activity for 4-8weeks. May take shower tomorrow but do not scrub wound, surgical glue will fall off on its own after multiple showers with warm soap and water. Please call and schedule follow up appointment as advised.

## 2020-08-06 NOTE — CHART NOTE - NSCHARTNOTEFT_GEN_A_CORE
Clinical Nutrition BRIEF NOTE    *pt managed for ileus s/p ex-lap with resection of prior anastomosis on 7/29 due to recurrent SBO. Pt advanced from full liquid to low fiber diet today, pending d/c today.    *d/w pt low fiber diet and encouraged pt to f/u with outpatient GI dietitian.  pt verbalized understanding.    *pt stated PO intake of full liquid diet as good with no N/V or abd pain.    *labs reviewed; 08-06 Na138 mmol/L Glu 105 mg/dL<H> K+ 4.2 mmol/L Cr  0.96 mg/dL BUN 19 mg/dL Phos 3.1 mg/dL Alb 2.7 g/dL<L> PAB n/a     *lytes WNL.    *no edema.  BM (+) loose on 8/5.  karen score of 19; no PU documented.    *new wt documented: 75.3Kg on 8/6.  Pt with documented 6Kg wt loss in 9 days (7%); clinically significant.  Likely due to suboptimal caloric intake since admission, pt was meeting <75% of estimated caloric intake x 7 days.    RECOMMENDATIONS:  1) encourage protein/calorie intake at each meal with high protein snack between meals to optimize PO intake  2) encourage pt to f/u with outpatient dietitian   3) weekly wt checks to track/trend changes        Weight Used for Calculation	current 69Kg     Estimated Energy Needs (25-30 calories/kg):  · Weight  (lbs)	152.1 lb  · Weight (kg)	69 kg  · From (25cal/kg)	1725 · To (30cal/kg) 2070     Other Calculation:  · Other Calculation	Ht. 66 "     Wt. 69 Kg       24.5 BMI       IBW  65 Kg      Pt is at   125 %  IBW     Estimated Protein Needs (1.4-1.6 g/kg):  · Weight  (lbs)	152.1  · Weight (kg)	69 kg  · From (1.4 g/kg)	96.6 · To (1.6 g/kg)	110.4     Estimated Fluid Needs (25-30 ml/kg):  · Weight  (lbs)	152.1  · Weight (kg)	69  · From (25 ml/kg)	1725 · To (30 ml/kg) 2070.

## 2020-08-06 NOTE — DISCHARGE NOTE PROVIDER - NSDCMRMEDTOKEN_GEN_ALL_CORE_FT
CoQ10 300 mg oral capsule: 1 cap(s) orally once a day  diazePAM 5 mg oral tablet: 1 tab(s) orally 3 times a day, As Needed  labetalol 200 mg oral tablet: 2 tab(s) orally 2 times a day  lidocaine 0.5% topical spray: Apply topically to affected area 3 times a day, As Needed - for moderate pain  Lipitor 10 mg oral tablet: 1 tab(s) orally once a day  Multiple Vitamins oral tablet: 1 tab(s) orally once a day  Plavix 75 mg oral tablet: 1 tab(s) orally once a day  Protonix 40 mg oral delayed release tablet: 1 tab(s) orally once a day

## 2020-08-06 NOTE — PROGRESS NOTE ADULT - PROVIDER SPECIALTY LIST ADULT
CCU
Cardiology
Cardiology
Gastroenterology
Hospitalist
Infectious Disease
Infectious Disease
Surgery
Gastroenterology
Hospitalist
Cardiology
Infectious Disease
Gastroenterology

## 2020-08-14 DIAGNOSIS — I25.10 ATHEROSCLEROTIC HEART DISEASE OF NATIVE CORONARY ARTERY WITHOUT ANGINA PECTORIS: ICD-10-CM

## 2020-08-14 DIAGNOSIS — R50.82 POSTPROCEDURAL FEVER: ICD-10-CM

## 2020-08-14 DIAGNOSIS — K50.90 CROHN'S DISEASE, UNSPECIFIED, WITHOUT COMPLICATIONS: ICD-10-CM

## 2020-08-14 DIAGNOSIS — J98.11 ATELECTASIS: ICD-10-CM

## 2020-08-14 DIAGNOSIS — Z95.5 PRESENCE OF CORONARY ANGIOPLASTY IMPLANT AND GRAFT: ICD-10-CM

## 2020-08-14 DIAGNOSIS — K91.89 OTHER POSTPROCEDURAL COMPLICATIONS AND DISORDERS OF DIGESTIVE SYSTEM: ICD-10-CM

## 2020-08-14 DIAGNOSIS — I10 ESSENTIAL (PRIMARY) HYPERTENSION: ICD-10-CM

## 2020-08-14 DIAGNOSIS — K46.9 UNSPECIFIED ABDOMINAL HERNIA WITHOUT OBSTRUCTION OR GANGRENE: ICD-10-CM

## 2020-08-14 DIAGNOSIS — K56.7 ILEUS, UNSPECIFIED: ICD-10-CM

## 2020-08-14 DIAGNOSIS — Z79.01 LONG TERM (CURRENT) USE OF ANTICOAGULANTS: ICD-10-CM

## 2020-08-14 DIAGNOSIS — K56.2 VOLVULUS: ICD-10-CM

## 2020-08-14 DIAGNOSIS — J69.0 PNEUMONITIS DUE TO INHALATION OF FOOD AND VOMIT: ICD-10-CM

## 2020-08-14 DIAGNOSIS — K56.609 UNSPECIFIED INTESTINAL OBSTRUCTION, UNSPECIFIED AS TO PARTIAL VERSUS COMPLETE OBSTRUCTION: ICD-10-CM

## 2020-09-11 NOTE — DISCHARGE NOTE NURSING/CASE MANAGEMENT/SOCIAL WORK - NSDPDISTO_GEN_ALL_CORE
COVID-19 and Cancer Patients    What is COVID-19?  COVID-19 is a new type of virus that can cause mild to severe infections in the lungs. Like other viruses, it can lead to serious infections for people with weakened immune systems. COVID-19 may cause more severe infections than other viruses. We do not have a vaccine to help control its spread, but experts are working to make a vaccine.    How does COVID-19 spread?  The virus can spread easily, just like the common cold or flu. It spreads when an infected person coughs or sneezes droplets that can get into the eyes, nose, or mouth of people nearby. Droplets also land on surfaces that people touch before touching their own eyes, nose, or mouth.    How can I protect myself?  These are some of the best ways to protect yourself and others from the virus:  - Wash your hands often with soap and water for at least 20 seconds.  - Use hand  with 60% or more alcohol until you can wash your hands with soap and water.  - Avoid touching your eyes, nose, and mouth without washing your hands first.  - Clean and disinfect surfaces often. Regular household wipes and sprays will kill the virus. Be sure to  clean places that people touch a lot, such as door handles, phones, keyboards, and light switches.  - Avoid handshakes, hugging, and standing or sitting close to people who are coughing or sneezing.  - Be as healthy as you can. Get plenty of sleep, eat healthy, exercise, and manage your stress.  If you are sick, follow these steps:  - Stay home.  - Cover your nose and mouth when you cough or sneeze. If you use a tissue, put it in the garbage right  away. If you do not have a tissue, cough or sneeze into your elbow crease.  - Call before going to your medical appointments. Let them know about recent travel or if you have had  contact with a person with COVID-19.          As of 3/12/2020  Should I wear a mask?  Experts don't believe that wearing a mask is helpful for the  general public, unless there is concern you have been exposed and may not have symptoms. Some people should use certain types of masks because of their own health or the type of work they do. Talk to your doctor or nurse to see if you would benefit from wearing a mask. If you arrive at the hospital with respiratory symptoms, please ask for a mask.    What if I care for or live with a cancer patient?  If you are caring for or living with someone with cancer, do your best to keep them from getting the virus.  Follow the steps to protect yourself listed on this sheet.  If you become sick yourself, call your doctor to see what more you should do to protect your loved one.    What about people visiting?   If you are sick or have been exposed to COVID-19, we ask that you not come to [facility].    If patients have symptoms, call the [facility] Covid-19 Line ([phone])    We ask that you find someone who isn't sick to join your loved one at their appointments.  To protect all patients, we may limit the number of people who can visit someone staying in the hospital.  Please check with your care team.    How will [facility] protect me from getting COVID-19?  Our hospital and clinics are taking steps to keep infected patients separate from those who may be at risk. At every appointment, your care team will ask questions about overall health and recent travel. We may ask some patients to wait in a separate room or to reschedule until they are feeling better if they have symptoms.  We are also taking extra steps to clean and disinfect surfaces throughout our hospital and clinics.     Will you still care for me if I get sick?  Yes. Your care is our top priority. Although we may change some ways we care for you, we will never put your care or health at risk.            CALL BEFORE YOU ACT   Dial 211 or Text keyword LACOVID to [phone] for general questions and information.   If patients have  symptoms, call the [facility] Covid-19 Line ([phone])               [facility] ([email or web address])    [email or web address]     Home

## 2021-01-09 ENCOUNTER — EMERGENCY (EMERGENCY)
Facility: HOSPITAL | Age: 81
LOS: 0 days | Discharge: HOME | End: 2021-01-09
Attending: EMERGENCY MEDICINE
Payer: MEDICARE

## 2021-01-09 VITALS — WEIGHT: 169.09 LBS | HEIGHT: 78 IN

## 2021-01-09 VITALS — DIASTOLIC BLOOD PRESSURE: 67 MMHG | SYSTOLIC BLOOD PRESSURE: 147 MMHG

## 2021-01-09 DIAGNOSIS — Z95.5 PRESENCE OF CORONARY ANGIOPLASTY IMPLANT AND GRAFT: ICD-10-CM

## 2021-01-09 DIAGNOSIS — Z79.02 LONG TERM (CURRENT) USE OF ANTITHROMBOTICS/ANTIPLATELETS: ICD-10-CM

## 2021-01-09 DIAGNOSIS — Z87.19 PERSONAL HISTORY OF OTHER DISEASES OF THE DIGESTIVE SYSTEM: ICD-10-CM

## 2021-01-09 DIAGNOSIS — I25.10 ATHEROSCLEROTIC HEART DISEASE OF NATIVE CORONARY ARTERY WITHOUT ANGINA PECTORIS: ICD-10-CM

## 2021-01-09 DIAGNOSIS — Z95.5 PRESENCE OF CORONARY ANGIOPLASTY IMPLANT AND GRAFT: Chronic | ICD-10-CM

## 2021-01-09 DIAGNOSIS — I10 ESSENTIAL (PRIMARY) HYPERTENSION: ICD-10-CM

## 2021-01-09 DIAGNOSIS — Z90.49 ACQUIRED ABSENCE OF OTHER SPECIFIED PARTS OF DIGESTIVE TRACT: Chronic | ICD-10-CM

## 2021-01-09 DIAGNOSIS — Z91.041 RADIOGRAPHIC DYE ALLERGY STATUS: ICD-10-CM

## 2021-01-09 LAB
ALBUMIN SERPL ELPH-MCNC: 3.9 G/DL — SIGNIFICANT CHANGE UP (ref 3.3–5)
ALP SERPL-CCNC: 79 U/L — SIGNIFICANT CHANGE UP (ref 40–120)
ALT FLD-CCNC: 63 U/L — SIGNIFICANT CHANGE UP (ref 12–78)
ANION GAP SERPL CALC-SCNC: 6 MMOL/L — SIGNIFICANT CHANGE UP (ref 5–17)
AST SERPL-CCNC: 40 U/L — HIGH (ref 15–37)
BASOPHILS # BLD AUTO: 0.04 K/UL — SIGNIFICANT CHANGE UP (ref 0–0.2)
BASOPHILS NFR BLD AUTO: 0.5 % — SIGNIFICANT CHANGE UP (ref 0–2)
BILIRUB SERPL-MCNC: 0.6 MG/DL — SIGNIFICANT CHANGE UP (ref 0.2–1.2)
BUN SERPL-MCNC: 14 MG/DL — SIGNIFICANT CHANGE UP (ref 7–23)
CALCIUM SERPL-MCNC: 9.1 MG/DL — SIGNIFICANT CHANGE UP (ref 8.5–10.1)
CHLORIDE SERPL-SCNC: 108 MMOL/L — SIGNIFICANT CHANGE UP (ref 96–108)
CO2 SERPL-SCNC: 26 MMOL/L — SIGNIFICANT CHANGE UP (ref 22–31)
CREAT SERPL-MCNC: 0.98 MG/DL — SIGNIFICANT CHANGE UP (ref 0.5–1.3)
EOSINOPHIL # BLD AUTO: 0.05 K/UL — SIGNIFICANT CHANGE UP (ref 0–0.5)
EOSINOPHIL NFR BLD AUTO: 0.6 % — SIGNIFICANT CHANGE UP (ref 0–6)
GLUCOSE SERPL-MCNC: 105 MG/DL — HIGH (ref 70–99)
HCT VFR BLD CALC: 41.3 % — SIGNIFICANT CHANGE UP (ref 39–50)
HGB BLD-MCNC: 13.5 G/DL — SIGNIFICANT CHANGE UP (ref 13–17)
IMM GRANULOCYTES NFR BLD AUTO: 0.2 % — SIGNIFICANT CHANGE UP (ref 0–1.5)
LYMPHOCYTES # BLD AUTO: 1.94 K/UL — SIGNIFICANT CHANGE UP (ref 1–3.3)
LYMPHOCYTES # BLD AUTO: 23.2 % — SIGNIFICANT CHANGE UP (ref 13–44)
MAGNESIUM SERPL-MCNC: 2.2 MG/DL — SIGNIFICANT CHANGE UP (ref 1.6–2.6)
MCHC RBC-ENTMCNC: 29.9 PG — SIGNIFICANT CHANGE UP (ref 27–34)
MCHC RBC-ENTMCNC: 32.7 GM/DL — SIGNIFICANT CHANGE UP (ref 32–36)
MCV RBC AUTO: 91.4 FL — SIGNIFICANT CHANGE UP (ref 80–100)
MONOCYTES # BLD AUTO: 0.75 K/UL — SIGNIFICANT CHANGE UP (ref 0–0.9)
MONOCYTES NFR BLD AUTO: 8.9 % — SIGNIFICANT CHANGE UP (ref 2–14)
NEUTROPHILS # BLD AUTO: 5.58 K/UL — SIGNIFICANT CHANGE UP (ref 1.8–7.4)
NEUTROPHILS NFR BLD AUTO: 66.6 % — SIGNIFICANT CHANGE UP (ref 43–77)
PLATELET # BLD AUTO: 248 K/UL — SIGNIFICANT CHANGE UP (ref 150–400)
POTASSIUM SERPL-MCNC: 3.7 MMOL/L — SIGNIFICANT CHANGE UP (ref 3.5–5.3)
POTASSIUM SERPL-SCNC: 3.7 MMOL/L — SIGNIFICANT CHANGE UP (ref 3.5–5.3)
PROT SERPL-MCNC: 9.4 GM/DL — HIGH (ref 6–8.3)
RBC # BLD: 4.52 M/UL — SIGNIFICANT CHANGE UP (ref 4.2–5.8)
RBC # FLD: 15.9 % — HIGH (ref 10.3–14.5)
SODIUM SERPL-SCNC: 140 MMOL/L — SIGNIFICANT CHANGE UP (ref 135–145)
TROPONIN I SERPL-MCNC: <0.015 NG/ML — SIGNIFICANT CHANGE UP (ref 0.01–0.04)
WBC # BLD: 8.38 K/UL — SIGNIFICANT CHANGE UP (ref 3.8–10.5)
WBC # FLD AUTO: 8.38 K/UL — SIGNIFICANT CHANGE UP (ref 3.8–10.5)

## 2021-01-09 PROCEDURE — 93005 ELECTROCARDIOGRAM TRACING: CPT

## 2021-01-09 PROCEDURE — 85025 COMPLETE CBC W/AUTO DIFF WBC: CPT

## 2021-01-09 PROCEDURE — 80053 COMPREHEN METABOLIC PANEL: CPT

## 2021-01-09 PROCEDURE — 99284 EMERGENCY DEPT VISIT MOD MDM: CPT | Mod: 25

## 2021-01-09 PROCEDURE — 83735 ASSAY OF MAGNESIUM: CPT

## 2021-01-09 PROCEDURE — 99285 EMERGENCY DEPT VISIT HI MDM: CPT

## 2021-01-09 PROCEDURE — 36415 COLL VENOUS BLD VENIPUNCTURE: CPT

## 2021-01-09 PROCEDURE — 96374 THER/PROPH/DIAG INJ IV PUSH: CPT

## 2021-01-09 PROCEDURE — 93010 ELECTROCARDIOGRAM REPORT: CPT

## 2021-01-09 PROCEDURE — 84484 ASSAY OF TROPONIN QUANT: CPT

## 2021-01-09 RX ORDER — LABETALOL HCL 100 MG
40 TABLET ORAL ONCE
Refills: 0 | Status: COMPLETED | OUTPATIENT
Start: 2021-01-09 | End: 2021-01-09

## 2021-01-09 RX ADMIN — Medication 40 MILLIGRAM(S): at 17:07

## 2021-01-09 NOTE — ED ADULT TRIAGE NOTE - CHIEF COMPLAINT QUOTE
pt presents to Ed with complaints of hypertension. pt states BP today was 209/95. pt is currently on BP medications. pt has been compliant with BP meds.

## 2021-01-09 NOTE — ED STATDOCS - OBJECTIVE STATEMENT
79 y/o male with PMHx of CAD s/p stents on Plavis, HTN presents to the ED c/o HTN. Pt states his BP today was 209/95. Yesterday bp was 133/75 and he had a HA. Does not have a HA today. States he has been consistent with his medications (Labetalol 400 mg). Denies chest pain. PMD: Laina.

## 2021-01-09 NOTE — ED STATDOCS - CARE PROVIDER_API CALL
Faraz Almendarez)  Cardiovascular Disease; Internal Medicine  [address] [place] [address]  Phone: [phone]  Fax: [phone]  Follow Up Time:

## 2021-01-09 NOTE — ED STATDOCS - ATTENDING CONTRIBUTION TO CARE
I, Fozia Damian MD,  performed the initial face to face bedside interview with this patient regarding history of present illness, review of symptoms and relevant past medical, social and family history.  I completed an independent physical examination.  I was the initial provider who evaluated this patient. I have signed out the follow up of any pending tests (i.e. labs, radiological studies) to the ACP.  I have communicated the patient’s plan of care and disposition with the ACP.  The history, relevant review of systems, past medical and surgical history, medical decision making, and physical examination was documented by the scribe in my presence and I attest to the accuracy of the documentation.

## 2021-01-09 NOTE — ED STATDOCS - PATIENT PORTAL LINK FT
You can access the FollowMyHealth Patient Portal offered by [facility] by registering at the following website: [email or web address]. By joining [employer]’s FollowMyHealth portal, you will also be able to view your health information using other applications (apps) compatible with our system.

## 2021-01-09 NOTE — ED STATDOCS - CLINICAL SUMMARY MEDICAL DECISION MAKING FREE TEXT BOX
Labs, EKG, medicate and reassess. Labs, EKG, medicate and reassess.            79 y/o male with PMHx of CAD s/p stents on Plavix, HTN presents to the ED c/o HTN. Pt states his BP today was 209/95 Labs, EKG, medicate and reassess.            79 y/o male with PMHx of CAD s/p stents on Plavix, HTN presents to the ED c/o HTN. Pt states his BP today was 209/95  BP at /73

## 2021-01-25 ENCOUNTER — APPOINTMENT (OUTPATIENT)
Dept: DISASTER EMERGENCY | Facility: CLINIC | Age: 81
End: 2021-01-25

## 2021-02-17 NOTE — DISCHARGE NOTE PROVIDER - NSCORESITESY/N_GEN_A_CORE_RD
Progress Note  Subjective  Patient seen and examined this morning. She was resting comfortably in bed. She reports no nausea, emesis, chest pain, diarrhea.     Objective:   ?  Vital Signs (last 24 hrs)_____ Last Charted___________Minimum____________ Maximum____________  Temp    98.3  (JAN 16 15:56) 98.2  (JAN 16 12:54) 98.3  (JAN 16 15:56)  Heart Rate   79  (JAN 16 15:56) 79  (JAN 16 15:56) 79  (JAN 16 15:56)  Resp Rate       20  (JAN 16 15:56) 17  (JAN 16 03:15) H 24 (THERON 15 20:00)  SBP    113  (JAN 16 15:56) 106  (JAN 16 04:15) 133  (JAN 16 09:00)  DBP    76  (JAN 16 15:56) 61  (THERON 15 22:15) H 115 (THERON 15 18:15)      Intake Output Balance    01/16/2019 7a-3p   380   260   120   3p-11p    50     0    50 As of 17:30   11p-7a     0     0     0   Totals   430   260   170    01/15/2019 7a-3p   569   365   204   3p-11p   800   325   475   11p-7a   900   410   490   Totals  2269  1100  1169    01/14/2019 7a-3p  3188   410  2778   3p-11p  1313   475   838   11p-7a  1299   310   989   Totals  5801  1195  4605          PHYSICAL EXAMINATION:      CONSTITUTIONAL: No acute distress resting in bed comfortably    HEENT:  Mucous membranes dry and pink.  Pupils are equal and reactive.  No icterus.  No exudate.     NECK:  Short neck secondary to obesity and no lymph nodes are palpated.     HEART:  S1, S2 heard.  No murmurs.     LUNGS:  Anteriorly diminished bilaterally secondary to body habitus.     ABDOMEN:  Obese.  No tenderness in the epigastric area, no rebound no guarding    EXTREMITIES:  Lower extremities; no pedal edema.  No calf tenderness.     MUSCULOSKELETAL:  No joint effusion at this time.     SKIN:  No rash.     NEURO:  Limited, but the patient is moving all her extremities and they are doing an IV line at the bedside.    Labs (Last four charted values)  WBC                  H 11.2 (JAN 16) H 19.0 (THERON 15) H 28.0 (JAN 14)   Hgb                  L 8.7 (JAN 16) L 9.0 (THERON 15) L 10.7 (JAN 14)   Hct                   L 27 (JAN 16) L 28 (THERON 15) L 32 (JAN 14)   Plt                  L 118 (JAN 16) L 134 (THERON 15) 286 (JAN 14)   Na                   L 129 (JAN 16) L 131 (THERON 15) L 135 (JAN 14) L 133 (JAN 14)   K                    3.7 (JAN 16) 3.8 (THERON 15) 3.8 (JAN 14) L 3.1 (JAN 14)   CO2                  L 17 (JAN 16) L 18 (THERON 15) 21 (JAN 14) 20 (JAN 14)   Cl                   101 (JAN 16) 102 (THERON 15) 104 (JAN 14) 99 (JAN 14)   Cr                   H 1.47 (JAN 16) H 1.54 (THERON 15) 1.23 (JAN 14) 1.14 (JAN 14)   BUN                  H 34 (JAN 16) H 29 (THERON 15) H 23 (JAN 14) 18 (JAN 14)   Glucose              88 (JAN 16) 92 (THERON 15) H 114 (JAN 14) H 113 (JAN 14)   Mg                   1.9 (JAN 16) L 1.0 (THERON 15)   Phos                 4.3 (JAN 16) 3.8 (THERON 15)   Ca                   L 8.3 (JAN 16) L 7.8 (THERON 15) L 7.6 (JAN 14) 9.0 (JAN 14)   PT                   H 13.1 (THERON 15)   INR                  H 1.3 (THERON 15)     RADIOLOGY IMAGING:  CAT scan of the abdomen and pelvis with contrast showed 0.4 x 0.6 x 0.6 cm acute obstructing distal right ureter calculus.  There is heterogenous perfusion on the right renal cortex with extensive fat stranding in the perinephric space and around the right ureter.  Possible superimposed infection cannot be completely ruled out excluded.  Followup is recommended.     X-ray of the abdomen noted right ureteral stent placement.  Please correlate it with primary service procedure report.     Operative report from Dr. Goss, had a distal right ureteral calculus with obstruction status post cystourethroscopy with right ureteroscopy, laser lithotripsy with insertion of a stent, and fragmentation of stone.    ASSESSMENT AND PLAN:  A 48-year-old  female with history of chronic rheumatoid arthritis, on methotrexate and Rituxan with leucovorin, restless legs syndrome, obesity, osteoarthritis, who came with bilateral flank pain with nausea, vomiting, and found out right ureteral stent with  obstruction with possible sepsis.  Admitted to ICU after emergently had a cystourethroscopy with right ureteral stent placed after lithotripsy by Dr. Goss.    # Severe sepsis with Septic shock:  Possible secondary to right pyelonephritis or urinary tract infection secondary to obstructive kidney stone with infection.  The patient is immunocompromised secondary to methotrexate and Rituxan.  The patient had an emergency cystourethroscopy by Dr. Goss and did a right ureteroscopy with laser lithotripsy and then placed a stent.  The patient got admitted to ICU.  Hypotensive, got IV fluids, trying to get a PICC line, central line.  The patient agrees and the patient was refusing before.  Discussed with the daughter.  Plan is to get a central line and start on pressors, in case the patient's blood pressure improved from 50s to 90s.  Awake, alert.  Continue with Zosyn, IV fluids, and kept her n.p.o. for now for time being.  Blood cultures and urine cultures were sent in the ER. Positive for E.coli  ID Dr. Watkins and intensivist Dr. Akbar's group is on consult and Dr. Hunter is urologist. Cont zosyn    #Acute right ureter obstructive stone with possible right pyelonephritis:  The patient had emergency cystourethroscopy by Dr. Goss with right ureteroscopy with laser fragmentation with stent placed.  .  Continue with Zosyn and IV fluids.  Zofran p.r.n.  For fever, Tylenol. Monitor urine output for postobstructive diuresis    #History of rheumatoid arthritis, chronic.  The patient is on methotrexate and Rituxan infusion, rheumatologist in CTU.  The patient is immunocompromised.Hold methotrexate.     #Osteoarthritis:  Tylenol for now.    #Restless legs syndrome. Hold pramxepol &  watch for now.    #Hyponatremia: She received IVF resuscitation without improvement. will consult nephrology for assistance.     #Hypokalemia.  Replace the potassium.    #Lactic acidosis, possible secondary to sepsis. Resolved.    #Obesity:   Discussed about lifestyle modifications.Patient expresses verbal understanding.     #Deep venous thrombosis with Lovenox.    Medications (19) Active  Scheduled: (8)  enoxaparin 40 mg/0.4 mL Syringe  40 mg 0.4 mL, SubQ, qDay  Initiate Magnesium Replacement Protocol  Per MD Order, qDay  Initiate Potassium Replacement Protocol  Per MD Order, qDay  leucovorin calcium 5 mg Tab  5 mg 1 tab, PO, Wednesday  pantoprazole 40 mg EC Tab  40 mg 1 tab, PO, qDayACB  phenazopyridine HCl 100 mg Tab  200 mg 2 tab, PO, TID  piperacillin-tazobactam IVPB  3.375 g 50 mL, IV Piggyback, q8hr  Ropinirole 3mg tablet  1 each, PO, BID  Continuous: (1)  Sodium Chloride 0.9% 1,000 mL  1,000 mL, IV, 60 mL/hr  PRN: (10)  acetaminophen 325mg Tab  650 mg 2 tab, PO, q4hr  benzocaine-menthol throat lozenge  1 each, PO, PRN  guaiFENesin 200 mg/10 mL Syrup  200 mg 10 mL, PO, q6hr  HYDROcodone-APAP 5/325 Tab  1 tab, PO, q4hr  morphine sulfate 4 mg/mL Syringe  2 mg 0.5 mL, IV Push, q4hr  ondansetron 4 mg DIS Tab  4 mg 1 tab, PO, q6hr  ondansetron 4 mg/2 mL Vial  4 mg 2 mL, IV Push, q6hr  sodium chloride 0.9% 10mL Syringe  20 mL, IV Push, PRN  sodium chloride 0.9% 3mL Syringe  10 mL, IV Push, PRN  sodium chloride 0.9% 3mL Syringe  10 mL, IV Push, PRN               Electronically Signed On 01.16.2019 17:36  __________________________________________________Kodak Song MD     Yes

## 2021-02-21 ENCOUNTER — APPOINTMENT (OUTPATIENT)
Dept: DISASTER EMERGENCY | Facility: CLINIC | Age: 81
End: 2021-02-21

## 2021-02-21 DIAGNOSIS — Z01.818 ENCOUNTER FOR OTHER PREPROCEDURAL EXAMINATION: ICD-10-CM

## 2021-02-21 LAB — SARS-COV-2 N GENE NPH QL NAA+PROBE: NOT DETECTED

## 2021-02-23 RX ORDER — DIAZEPAM 5 MG
1 TABLET ORAL
Qty: 0 | Refills: 0 | DISCHARGE

## 2021-02-23 RX ORDER — LIDOCAINE 4 G/100G
1 CREAM TOPICAL
Qty: 0 | Refills: 0 | DISCHARGE

## 2021-02-23 NOTE — H&P ADULT - NSHPREVIEWOFSYSTEMS_GEN_ALL_CORE
CONSTITUTIONAL: No fever, weight loss, or fatigue  EYES: No eye pain, visual disturbances, or discharge  ENMT:  No difficulty hearing, tinnitus, vertigo; No sinus or throat pain  NECK: No pain or stiffness  BREASTS: No pain, masses, or nipple discharge  RESPIRATORY: + shortness of breath  CARDIOVASCULAR: No chest pain, palpitations, dizziness, or leg swelling  GASTROINTESTINAL: No abdominal or epigastric pain. No nausea, vomiting, or hematemesis; No diarrhea or constipation. No melena or hematochezia.  GENITOURINARY: No dysuria, frequency, hematuria, or incontinence  NEUROLOGICAL: No headaches, memory loss, loss of strength, numbness, or tremors  SKIN: No itching, burning, rashes, or lesions   ENDOCRINE: No heat or cold intolerance; No hair loss  MUSCULOSKELETAL: + back pain  PSYCHIATRIC: No depression, anxiety, mood swings, or difficulty sleeping

## 2021-02-23 NOTE — H&P ADULT - HISTORY OF PRESENT ILLNESS
82 y/o male with PMHx of anemia of chronic disease, HLD. HTN, CAD with stents presented to cardiology for follow BP check. Patient reporting SOB and back tightness on exertion. Given recurrent exertional angina referred for cardiac cath.  82 y/o male with PMHx of anemia of chronic disease, HLD. HTN, CAD with stents presented to cardiology for follow BP check. Patient reporting SOB and back tightness on exertion. Given recurrent exertional angina referred for cardiac cath for further evaluation.

## 2021-02-23 NOTE — H&P ADULT - PROBLEM SELECTOR PLAN 1
with exertional angina  -plan for cardiac cath with possible PCI   - risks and benefits of procedure reviewed, all questions answered   - informed consent obtained, pt verbalized understanding.

## 2021-02-23 NOTE — H&P ADULT - ASSESSMENT
82 y/o male with PMHx of anemia of chronic disease, HLD. HTN, CAD with stents presented to cardiology for follow BP check. Patient reporting SOB and back tightness on exertion. Given recurrent exertional angina referred for cardiac cath.     ASA class:  Creatinine:  GFR:  Bleeding  Risk score:   82 y/o male with PMHx of anemia of chronic disease, HLD. HTN, CAD with stents presented to cardiology for follow BP check. Patient reporting SOB and back tightness on exertion. Given recurrent exertional angina referred for cardiac cath.     ASA class: II  Creatinine: 1.0  GFR: 76  Bleeding  Risk score: 0.9

## 2021-02-24 ENCOUNTER — OUTPATIENT (OUTPATIENT)
Dept: OUTPATIENT SERVICES | Facility: HOSPITAL | Age: 81
LOS: 1 days | Discharge: HOME | End: 2021-02-24
Payer: MEDICARE

## 2021-02-24 VITALS
TEMPERATURE: 98 F | HEART RATE: 74 BPM | DIASTOLIC BLOOD PRESSURE: 83 MMHG | SYSTOLIC BLOOD PRESSURE: 169 MMHG | WEIGHT: 170.42 LBS | HEIGHT: 66 IN | OXYGEN SATURATION: 98 % | RESPIRATION RATE: 16 BRPM

## 2021-02-24 VITALS — SYSTOLIC BLOOD PRESSURE: 155 MMHG | HEART RATE: 67 BPM | RESPIRATION RATE: 18 BRPM | DIASTOLIC BLOOD PRESSURE: 72 MMHG

## 2021-02-24 DIAGNOSIS — Z95.5 PRESENCE OF CORONARY ANGIOPLASTY IMPLANT AND GRAFT: Chronic | ICD-10-CM

## 2021-02-24 DIAGNOSIS — Z90.49 ACQUIRED ABSENCE OF OTHER SPECIFIED PARTS OF DIGESTIVE TRACT: Chronic | ICD-10-CM

## 2021-02-24 DIAGNOSIS — I25.10 ATHEROSCLEROTIC HEART DISEASE OF NATIVE CORONARY ARTERY WITHOUT ANGINA PECTORIS: ICD-10-CM

## 2021-02-24 PROCEDURE — C9600: CPT | Mod: RC

## 2021-02-24 PROCEDURE — 86850 RBC ANTIBODY SCREEN: CPT

## 2021-02-24 PROCEDURE — 86900 BLOOD TYPING SEROLOGIC ABO: CPT

## 2021-02-24 PROCEDURE — 93010 ELECTROCARDIOGRAM REPORT: CPT

## 2021-02-24 PROCEDURE — C1760: CPT

## 2021-02-24 PROCEDURE — 36415 COLL VENOUS BLD VENIPUNCTURE: CPT

## 2021-02-24 PROCEDURE — C1874: CPT

## 2021-02-24 PROCEDURE — C1894: CPT

## 2021-02-24 PROCEDURE — C1769: CPT

## 2021-02-24 PROCEDURE — 93005 ELECTROCARDIOGRAM TRACING: CPT

## 2021-02-24 PROCEDURE — 93454 CORONARY ARTERY ANGIO S&I: CPT | Mod: 59

## 2021-02-24 PROCEDURE — 86901 BLOOD TYPING SEROLOGIC RH(D): CPT

## 2021-02-24 PROCEDURE — C1725: CPT

## 2021-02-24 PROCEDURE — C1887: CPT

## 2021-02-24 PROCEDURE — 99232 SBSQ HOSP IP/OBS MODERATE 35: CPT

## 2021-02-24 RX ORDER — ATORVASTATIN CALCIUM 80 MG/1
10 TABLET, FILM COATED ORAL AT BEDTIME
Refills: 0 | Status: DISCONTINUED | OUTPATIENT
Start: 2021-02-24 | End: 2021-02-24

## 2021-02-24 RX ORDER — ASPIRIN/CALCIUM CARB/MAGNESIUM 324 MG
325 TABLET ORAL ONCE
Refills: 0 | Status: COMPLETED | OUTPATIENT
Start: 2021-02-24 | End: 2021-02-24

## 2021-02-24 RX ORDER — AMLODIPINE BESYLATE 2.5 MG/1
5 TABLET ORAL ONCE
Refills: 0 | Status: DISCONTINUED | OUTPATIENT
Start: 2021-02-24 | End: 2021-02-24

## 2021-02-24 RX ORDER — PANTOPRAZOLE SODIUM 20 MG/1
40 TABLET, DELAYED RELEASE ORAL
Refills: 0 | Status: DISCONTINUED | OUTPATIENT
Start: 2021-02-24 | End: 2021-02-24

## 2021-02-24 RX ORDER — ASPIRIN/CALCIUM CARB/MAGNESIUM 324 MG
1 TABLET ORAL
Qty: 30 | Refills: 11
Start: 2021-02-24 | End: 2022-02-18

## 2021-02-24 RX ORDER — CLOPIDOGREL BISULFATE 75 MG/1
75 TABLET, FILM COATED ORAL DAILY
Refills: 0 | Status: DISCONTINUED | OUTPATIENT
Start: 2021-02-24 | End: 2021-02-24

## 2021-02-24 RX ORDER — ASPIRIN/CALCIUM CARB/MAGNESIUM 324 MG
81 TABLET ORAL DAILY
Refills: 0 | Status: DISCONTINUED | OUTPATIENT
Start: 2021-02-24 | End: 2021-02-24

## 2021-02-24 RX ORDER — SODIUM CHLORIDE 9 MG/ML
1000 INJECTION INTRAMUSCULAR; INTRAVENOUS; SUBCUTANEOUS
Refills: 0 | Status: DISCONTINUED | OUTPATIENT
Start: 2021-02-24 | End: 2021-02-24

## 2021-02-24 RX ORDER — LABETALOL HCL 100 MG
400 TABLET ORAL
Refills: 0 | Status: DISCONTINUED | OUTPATIENT
Start: 2021-02-24 | End: 2021-02-24

## 2021-02-24 RX ADMIN — Medication 325 MILLIGRAM(S): at 11:45

## 2021-02-24 RX ADMIN — CLOPIDOGREL BISULFATE 75 MILLIGRAM(S): 75 TABLET, FILM COATED ORAL at 11:45

## 2021-02-24 NOTE — PACU DISCHARGE NOTE - COMMENTS
Pt. verb. agreement and understanding to and teach back to written and verbal discharge instructions and medication list and MD follow up.   Pt. discharged to home via private auto accompanied by wife; escorted to auto via w/c by transport tech.

## 2021-02-24 NOTE — PROGRESS NOTE ADULT - SUBJECTIVE AND OBJECTIVE BOX
HPI:  82 y/o male with PMHx of anemia of chronic disease, HLD. HTN, CAD with stents presented to cardiology for follow BP check. Patient reporting SOB and back tightness on exertion. Given recurrent exertional angina referred for cardiac cath for further evaluation.  Now, Pt is s/p distal RCA x1 stent.     Cardiac cath < from: Cardiac Cath Lab - Adult (02.24.21 @ 07:58) >  Impression     Diagnostic Conclusions   Three vessel CAD with patent stents in LAD,Circ and significant lesion in   distal RCA     Interventional Conclusions    Successful Coronary Intervention ROSLYN of distal RCA.     Recommendations     Aggressive medical management of coronary artery disease and its   underlying risk factors.   Dual antiplatelet therapy for at least one year  < end of copied text >    ROS: denies chest pain/ pressure, SOB or palpitation     Vital Signs;  T(C): 36.6 (02-24-21 @ 06:56), Max: 36.6 (02-24-21 @ 06:56)  HR: 64 (02-24-21 @ 12:25) (62 - 74)  BP: 137/80 (02-24-21 @ 12:25) (131/64 - 169/83)  RR: 16 (02-24-21 @ 12:25) (16 - 16)  SpO2: 98% (02-24-21 @ 12:25) (95% - 98%)    Physical Exam:    General: awake, no acute distress   HEENT: NCAT, neck supple   CV: RRR, normal S1S2, no murmur/ rub   Pulmonary: clear, no wheezing or rales   GI: +BS, soft, non-tender, non-distended   : voiding freely   Extremities: no edema, + pedal pulses   Skin: no rashes or lesion. Rt. femoral access site (s/p Mynx): no hematoma or bleeding     Medications:  amLODIPine   Tablet 5 milliGRAM(s) Oral Once  atorvastatin 10 milliGRAM(s) Oral at bedtime  clopidogrel Tablet 75 milliGRAM(s) Oral daily  labetalol 400 milliGRAM(s) Oral two times a day  pantoprazole    Tablet 40 milliGRAM(s) Oral before breakfast  sodium chloride 0.9%. 1000 milliLiter(s) IV Continuous <Continuous>    # CAD: s/p distal RCA x1 today   - CICU monitor   - IV hydration  - Labs and EKG in am   - start ASA 81 mg daily (given 325 mg in cath lab)  - continue Plavix 75 mg daily   - continue Labetalol 400 mg BID  - continue Lipitor 10 mg daily   - post procedure, outcome and follow up care reviewed with patient/Dr. Toledo   - possible discharge home this afternoon if stable   - follow up with [facility] in 1 week     Discussed the plan with Dr. Toledo, Pt and cath NP  HPI:  80 y/o male with PMHx of anemia of chronic disease, HLD. HTN, CAD with stents presented to cardiology for follow BP check. Patient reporting SOB and back tightness on exertion. Given recurrent exertional angina referred for cardiac cath for further evaluation.  Now, Pt is s/p distal RCA x1 stent.     Cardiac cath < from: Cardiac Cath Lab - Adult (02.24.21 @ 07:58) >  Impression     Diagnostic Conclusions   Three vessel CAD with patent stents in LAD,Circ and significant lesion in   distal RCA     Interventional Conclusions    Successful Coronary Intervention ROSLYN of distal RCA.     Recommendations     Aggressive medical management of coronary artery disease and its   underlying risk factors.   Dual antiplatelet therapy for at least one year  < end of copied text >    ROS: denies chest pain/ pressure, SOB or palpitation     Vital Signs;  T(C): 36.6 (02-24-21 @ 06:56), Max: 36.6 (02-24-21 @ 06:56)  HR: 64 (02-24-21 @ 12:25) (62 - 74)  BP: 137/80 (02-24-21 @ 12:25) (131/64 - 169/83)  RR: 16 (02-24-21 @ 12:25) (16 - 16)  SpO2: 98% (02-24-21 @ 12:25) (95% - 98%)    Physical Exam:    General: awake, no acute distress   HEENT: NCAT, neck supple   CV: RRR, normal S1S2, no murmur/ rub   Pulmonary: clear, no wheezing or rales   GI: +BS, soft, non-tender, non-distended   : voiding freely   Extremities: no edema, + pedal pulses   Skin: no rashes or lesion. Rt. femoral access site (s/p Mynx): no hematoma or bleeding     Post EKG: SR at 64 bpm, 1st AVB (JASON: 210 ms     Medications:  amLODIPine   Tablet 5 milliGRAM(s) Oral Once  atorvastatin 10 milliGRAM(s) Oral at bedtime  clopidogrel Tablet 75 milliGRAM(s) Oral daily  labetalol 400 milliGRAM(s) Oral two times a day  pantoprazole    Tablet 40 milliGRAM(s) Oral before breakfast  sodium chloride 0.9%. 1000 milliLiter(s) IV Continuous <Continuous>    # CAD: s/p distal RCA x1 today   - CICU monitor   - IV hydration  - Labs and EKG in am   - start ASA 81 mg daily (given 325 mg in cath lab)  - continue Plavix 75 mg daily   - continue Labetalol 400 mg BID  - continue Lipitor 10 mg daily   - post procedure, outcome and follow up care reviewed with patient/Dr. Toledo   - possible discharge home this afternoon if stable   - follow up with [facility] in 1 week     Discussed the plan with Dr. Toledo, Pt and cath NP

## 2021-02-25 DIAGNOSIS — I25.110 ATHEROSCLEROTIC HEART DISEASE OF NATIVE CORONARY ARTERY WITH UNSTABLE ANGINA PECTORIS: ICD-10-CM

## 2021-02-25 DIAGNOSIS — K29.70 GASTRITIS, UNSPECIFIED, WITHOUT BLEEDING: ICD-10-CM

## 2021-02-25 DIAGNOSIS — E78.5 HYPERLIPIDEMIA, UNSPECIFIED: ICD-10-CM

## 2021-02-25 DIAGNOSIS — I10 ESSENTIAL (PRIMARY) HYPERTENSION: ICD-10-CM

## 2021-02-25 DIAGNOSIS — Z91.041 RADIOGRAPHIC DYE ALLERGY STATUS: ICD-10-CM

## 2021-02-25 DIAGNOSIS — I20.0 UNSTABLE ANGINA: ICD-10-CM

## 2021-06-08 NOTE — ED ADULT NURSE NOTE - OBJECTIVE STATEMENT
PT presents with elevated BP at home and at PMD office. PT states hx HTN on medication daily.  Pt denies chest pain or headache stating "I feel great but my blood pressure is bad". No

## 2021-08-09 NOTE — ED PROVIDER NOTE - NS ED MD DISPO SPECIAL CONSIDERATION1
2021       Miles Khoury MD  [address]  [facility] [address]  Via In Basket      Patient: Miles Hoffmann   YOB: 1948   Date of Visit: 2021       Dear Dr. Khoury:    Thank you for referring Miles Hoffmann to me for evaluation. Below are my notes for this visit with him.    If you have questions, please do not hesitate to call me. I look forward to following your patient along with you.      Sincerely,        Angel Bustos MD        CC: No Recipients  Angel Bustos MD  2021  3:19 PM  Signed    Cardiology Clinic Note: Lee Temple DO and Angel Bustos MD, MPH     Referred by:   Primary care physician: Miles Khoury MD     Consultation for Miles Hoffmann  : 1948      Subjective:   Miles Hoffmann is a 73 year old man with past medical history significant for HOCM with no prior syncope or SCD, persistent aFib, atrial flutter s/p ablation in , mild mitral valve stenosis and moderate regurgitation with prior annuloplasty ring in  at [facility], mild CAD, hypertension, and hyperlipidemia who returns for follow up.     Mr. Hoffmann presented to the ED at [facility] on 2021 with palpitations. He had woken up from sleep at 4 am due to his racing heartbeat. He then felt lightheaded, nauseous, and an overall feeling of malaise. During this 2-hour episode he also had left sided pressure like chest pain, which did not radiate and was not associated with shortness of breath or diaphoresis. He denied any exertional angina. Troponin x3 was negative and EKG did not show any ischemia. Echocardiogram showed EF 61%, G2DD, dynamic obstruction during Valsalva in the outflow tract, with a peak velocity of 573 cm/sec and a peak gradient of 131 mmHg. Cardiology recommended outpatient cardiac MRI with q flow and outptaient coronary CTA. His home metoprolol was uptitrated to 75 mg BID and he was discharged with an event monitor.     He followed  up with Dr. Obinna Flores on 2/22/2021, at which time he was feeling generally from from a cardiac perspective. He denied any shortness of breath or chest pain but had felt some palpitations and nausea. No changes were made to his medications.     He then followed up with me on 3/25/2021, at which time he reported feeling much better. He continued to have some palpitations and also reported some chest pain which was reproducible with palpation and had been occurring intermittently since his mitral valve surgery in 1999. He was able to carry out his normal activities without any chest pain. A cardiac MRI was ordered for him to quantify severity of LVOT obstruction. However this was not done as the patient later presented to [facility] with chest pain and shortness of breath (4/26/2021). He underwent a coronary CTA which showed 70% stenosis of the LAD. This was followed by an angiogram which demonstrated only mild disease (15% of the distal left main, 15% of the 1st marginal, 20% of the proximal LAD). It was noted that CT overestimated disease because of artifact. The patient was later discharged in stable condition.     At his last visit 5/13/2021, Mr. Hoffmann has been having episodes of low blood pressure. He cut back his metoprolol dose from 75 mg to 50 mg twice daily and reports that his blood pressure seems to tolerate this. He has shortness of breath and wheezing in the evening when he lays down which is improved with furosemide. He has no dyspnea when he walks around but notes that due to his back pain he is not doing much activity. Otherwise he denies any chest pain, peripheral edema, palpitations. Overall he states that he is getting along reasonably well.     At today's visit, Mr. Hoffmann reports no cardiovascular symptoms including chest pain, shortness of breath.  Is able to walk more than 2 miles without any difficulty or needing to rest.  Main limitation to different/more intense activity is his back pain.   Interested in discussing results of cardiac MRI.    Review of Systems   Constitutional: Negative for fatigue and unexpected weight change.   HENT: Negative for nosebleeds.    Respiratory: Negative for cough and shortness of breath + orthopnea and wheezing.    Cardiovascular: Negative for chest pain, palpitations and leg swelling.   Gastrointestinal: Negative for abdominal distention, abdominal pain and blood in stool.   Endocrine: Negative for polyuria.   Genitourinary: Negative for hematuria.   Musculoskeletal: Negative for myalgias.   Skin: Negative for pallor.   Neurological: Negative for syncope + light-headedness.   Hematological: Does not bruise/bleed easily.   Otherwise complete ROS is negative.      Past Medical History:   Diagnosis Date   • Anxiety    • Atrial fibrillation (CMS/HCC)    • Chronic pain    • Depression    • Diabetes mellitus (CMS/HCC)    • Essential (primary) hypertension    • Gout    • High cholesterol    • Hyperlipoproteinemia    • Hypertrophic cardiomyopathy (CMS/HCC)    • Spondylosis     s/p ACDF        Past Surgical History:   Procedure Laterality Date   • Anterior cervical discectomy w/ fusion      C5-C6   • Cardiac surgery     • Cervical disc surgery Right         Family History   Problem Relation Age of Onset   • Myocardial Infarction Father         Social History     Tobacco Use   • Smoking status: Never Smoker   • Smokeless tobacco: Never Used   Vaping Use   • Vaping Use: never used   Substance Use Topics   • Alcohol use: Yes     Comment: social   • Drug use: Not Currently        ALLERGIES:   Allergen Reactions   • Short Ragweed Pollen Ext Other (See Comments)     Sneezing, runny nose      Current Outpatient Medications   Medication Sig   • HYDROmorphone (DILAUDID) 8 MG tablet Take 1 tablet by mouth every 6 hours as needed for Pain.   • clopidogrel (PLAVIX) 75 MG tablet Take 1 tablet by mouth daily.   • isosorbide mononitrate (IMDUR) 30 MG 24 hr tablet Take 1 tablet by mouth daily.  Do not start before April 30, 2021.   • AMIODarone (PACERONE) 200 MG tablet Take 0.5 tablets by mouth daily.   • rosuvastatin (CRESTOR) 20 MG tablet Take 1 tablet by mouth daily.   • warfarin (COUMADIN) 3 MG tablet Take 3 mg by mouth 5 days a week. Indications: Atrial Fibrillation, Valve Repair    • warfarin (COUMADIN) 2 MG tablet Take 2 mg by mouth daily. Indications: Atrial Fibrillation, Valve repair    • potassium chloride (K-TAB) 20 MEQ ER tablet Take 20 mEq by mouth daily.    • levothyroxine 75 MCG tablet Take 75 mcg by mouth daily (before breakfast).    • metformin (GLUCOPHAGE) 1000 MG tablet Take 1,000 mg by mouth 2 times daily (with meals).    • colchicine (COLCRYS) 0.6 MG tablet Take 0.6 mg by mouth daily as needed (Gout Flares).    • irbesartan (AVAPRO) 75 MG tablet Take 75 mg by mouth nightly.   • allopurinol (ZYLOPRIM) 100 MG tablet Take 300 mg by mouth daily.    • furosemide (LASIX) 40 MG tablet Take 40 mg by mouth daily as needed.    • naLOXone (NARCAN) 4 MG/0.1ML nasal spray Spray the content of 1 device into 1 nostril. Call 911. May repeat with 2nd device in alternate nostril if no response in 2-3 minutes. Use in case of suspected opioid overdose.   • metoPROLOL tartrate 75 MG Tab Take 75 mg by mouth 2 times daily. (Patient taking differently: Take 50 mg by mouth 2 times daily. )     No current facility-administered medications for this visit.        Objective:     Physical Exam:   Visit Vitals  /76 (BP Location: LUE - Left upper extremity, Patient Position: Sitting, Cuff Size: Regular)   Pulse 68   Ht 5' 10\" (1.778 m)   Wt 91.2 kg (201 lb 1 oz)   SpO2 98%   BMI 28.85 kg/m²     Wt Readings from Last 4 Encounters:   08/09/21 91.2 kg (201 lb 1 oz)   07/13/21 95.3 kg (210 lb)   06/09/21 95.3 kg (210 lb)   05/13/21 95.6 kg (210 lb 12.2 oz)     General: NAD  Eyes:  No arcus; No xanthelasma;  No ptosis; pupils equal in size; no icterus  Neck: No JVD sitting; carotid normal amplitude; normal cervical  ROM  Respiratory: Symmetric chest expansion, No crackles, wheezes or dullness  Cardiovascular:  Rhythm regular; Normal S1, S2 split normal; No carotid bruits; DP pulses 2+ bilaterally: 2/6 holosystolic murmur best heard at the apex   GI: Not distended, non-tender, BS active; no flank dullness  MSK: Muscle mass as expected for age   Extremities: No edema; no varicose veins  Neurologic:  Alert, no tremor  Mood: Euthymic  Skin: Intact     Labs:  Cholesterol (mg/dL)   Date Value   03/25/2021 145     HDL (mg/dL)   Date Value   03/25/2021 47     Cholesterol/ HDL Ratio (no units)   Date Value   03/25/2021 3.1     Triglycerides (mg/dL)   Date Value   03/25/2021 148     LDL (mg/dL)   Date Value   03/25/2021 68     WBC (K/mcL)   Date Value   04/29/2021 8.6     RBC (mil/mcL)   Date Value   04/29/2021 4.07 (L)     HCT (%)   Date Value   04/29/2021 39.0     HGB (g/dL)   Date Value   04/29/2021 12.1 (L)     PLT (K/mcL)   Date Value   04/29/2021 184        Sodium (mmol/L)   Date Value   04/29/2021 144     Potassium (mmol/L)   Date Value   04/29/2021 4.6     Chloride (mmol/L)   Date Value   04/29/2021 110 (H)     Glucose (mg/dL)   Date Value   04/29/2021 137 (H)     Calcium (mg/dL)   Date Value   04/29/2021 8.9     Carbon Dioxide (mmol/L)   Date Value   04/29/2021 28     BUN (mg/dL)   Date Value   04/29/2021 15     Creatinine (mg/dL)   Date Value   04/29/2021 1.05        GOT/AST (Units/L)   Date Value   04/26/2021 24     GPT/ALT (Units/L)   Date Value   04/26/2021 30     Alkaline Phosphatase (Units/L)   Date Value   04/26/2021 112     Bilirubin, Total (mg/dL)   Date Value   04/26/2021 0.7        Imaging:    TTE 5/31/2019:  STUDY CONCLUSIONS  SUMMARY:  1. Left ventricle: The cavity size is normal. There is focal basal     hypertrophy. Systolic function is normal. The estimated ejection     fraction is 55-60%, by visual assessment.  2. Aortic valve: There is a turbulent flow noted at the level of the LVOT.     There is a mildly  increased LVOT velocity with peak gradient 23mmHg,     likely due to systolic anterior motion of the mitral valve.  3. Mitral valve: There appears to be a repair of the mitral valve with a     mitral annular ring. Doppler parameters suggest moderate stenosis. The     is also mild systolic anterior motion of the anterior leaflet.  4. Left atrium: The atrium is severely dilated.  5. Right ventricle: The cavity size is mildly dilated. Wall thickness is     normal. Systolic pressure is mildly increased. The estimated peak     pressure is 44mm Hg.  6. Inferior vena cava: The vessel is dilated. The respirophasic diameter     changes are in the normal range (greater than or equal to 50%).     TTE 2/17/2021:  STUDY CONCLUSIONS  SUMMARY:  1. Left ventricle: The cavity size is normal. Wall thickness is severely     increased. There is focal basal hypertrophy. The ejection fraction was     measured by visual estimation. There is dynamic obstruction during     Valsalva in the outflow tract, with a peak velocity of 573cm/sec and a     peak gradient of 131mm Hg. (image 103) Features are consistent with a     pseudonormal left ventricular filling pattern, with concomitant     abnormal relaxation and increased filling pressure (grade 2 diastolic     dysfunction). Doppler parameters are consistent with high ventricular     filling pressure. The interventricular septal thickness at end-diastole     is 1.7cm. The ejection fraction is 60%.  2. Aortic valve: The annulus is mildly calcified. The valve is trileaflet.     The leaflets are mildly thickened and mildly calcified. Cusp separation     is normal. Transvalvular velocity is increased more than expected.     There is no stenosis. (Likely due to LVOT obstruction).  3. Mitral valve: There is an annuloplasty ring. The annulus is mildly     calcified. The leaflets are mildly thickened and noncalcified. There is     moderate systolic anterior motion of the anterior leaflet and  chordal     structures. Transvalvular velocity is increased more than expected. The     findings are consistent with mild stenosis. Moderate regurgitation,     with a single jet directed eccentrically and anteriorly. The mean     diastolic gradient is 5mm Hg.  4. Left atrium: The atrium is dilated.  5. Right ventricle: The TAPSE is reduced, suggestive of RV systolic     dysfunction. Systolic pressure is within the normal range. The     estimated peak pressure is 16mm Hg.  6. Inferior vena cava: The vessel is normal in size. The respirophasic     diameter changes are in the normal range (greater than or equal to     50%).  Impressions:   The study shows worsening since the study of 06/03/2019.  There is likely hypertrophic cardiomyopathy with LVOT obstruction, with  peak gradient as noted above. The Mitral valve regurgitation is eccentric  and best seen on PLAX. Difficult to quantify. Likely underestimated.  Recommendations:  Consider cMRI to look for LGE, vs JAM to better quantify  MR.     Holter Monitor 2/18/2021:    Coronary CTA 4/27/2021:  IMPRESSION:     Agatston coronary calcium score of 274 (25-50 percentile).      Motion artifact throughout the proximal to mid right coronary artery on all  series limits evaluation despite correction of arrhythmia prior to  postprocessing.  No high-grade focal stenosis identified in the right  coronary artery which gives rise to the posterior descending coronary  artery.  Greater than 70% focal narrowing at the origin of the medium size  LAD secondary to fatty plaque.     CAD RADS4A. Consider invasive coronary angiogram or functional assessment  for further evaluation.  Perfect serve text message sent to Dr. Yanez with read receipt at 1:12 PM  04/27/2021.     Mitral valve prosthesis.  Enlarged left atrium.    Coronary Catheterization 4/29/2021:  Left Main   The vessel is greater than 4 mm.   Dist LM lesion with 15% stenosis. Lesion length: discrete (<10 mm). Pre-intervention  ANIKA flow: 3.   Left Anterior Descending   Prox LAD lesion with 20% stenosis. Lesion length: diffuse (>20 mm). Pre-intervention ANIKA flow: 3.   Left Circumflex   First Obtuse Marginal Branch   1st Mrg lesion with 15% stenosis. Lesion length: tubular (10-20 mm). Pre-intervention ANIKA flow: 3.   Right Coronary Artery   The vessel is 3.5-4 mm. The vessel exhibits minimal luminal irregularities.     Cardiac MRI 6/24/2021:  FINDINGS:     1. The left ventricle is normal in size. There is moderate-severe focal  basal hypertrophy of the newton-septum (maximum thickness 15mm).  The  remainder of the LV myocardial walls have normal thickness.  There is  evidence for dynamic flow acceleration in the LVOT without significant  gradient.  There is no definitive evidence for FABIENNE.  There are no wall  motion abnormalities.  The global systolic function is normal with a  calculated LVEF of 55%.  2. The right ventricle is normal in size and wall thickness.  There are no  wall motion abnormalities.  The global systolic function is normal with a  calculated RVEF of 56%.  3. The left atrium is normal in size. The right atrium is normal in size.  4. The aortic valve is probably tricuspid.  There is no evidence for aortic  stenosis.  5.  There is mitral valve annuloplasty ring with moderate mitral  regurgitation (regurgitant fraction is 17%). There is mild tricuspid  regurgitation.  6. There is no pericardial effusion. The pericardium appears normal.  7. There is no evidence of intracardiac thrombus.  8. Delayed contrast enhancement images are abnormal.  There is a small  focal area of mid-myocardial enhancement in the basal anteroseptal wall in  the area of maximum thickness.  This may be seen in hypertrophic  cardiomyopathy or sarcoidosis.  The remainder of the LV and RV show no  evidence of myocardial inflammation, infiltration, or fibrosis.        IMPRESSION:     1. There is moderate-severe focal basal hypertrophy of the  newton-septum  (maximum thickness 15mm).  The remainder of the LV myocardial walls have  normal thickness.  There is evidence for dynamic flow acceleration in the  LVOT without significant gradient.  There is no definitive evidence for  FABIENNE.  There are no wall motion abnormalities.  The global systolic function  is normal with a calculated LVEF of 55%.  2. Normal RV size and systolic function.  3. There is mitral valve annuloplasty ring with moderate mitral  regurgitation (regurgitant fraction is 17%).   4. Delayed contrast enhancement images are abnormal.  There is a small  focal area of mid-myocardial enhancement in the basal anteroseptal wall in  the area of maximum thickness.  This may be seen in hypertrophic  cardiomyopathy or sarcoidosis.  The remainder of the LV and RV show no  evidence of myocardial inflammation, infiltration, or fibrosis.    Impression/Report/Plan:  In summary, Mr. Hoffmann is a 73 year old male with past medical history significant for HOCM with no prior syncope or SCD, persistent aFib, atrial flutter s/p ablation in 2017, mild mitral valve stenosis and moderate regurgitation with prior annuloplasty ring in 1999 at [facility], mild CAD, hypertension, and hyperlipidemia who returns for follow up.      #1 HOCM  #2 Nonrheumatic mitral valve stenosis s/p MV ring in 1999  #3 Moderate mitral valve regurgitation  Echocardiogram shows dynamic obstruction during Valsalva in the outflow tract, with a peak velocity of 5.73 m/sec and a peak gradient of 131 mmHg.   Further characterization of HOCM with cardiac MRI reveals moderate to severe focal basal hypertrophy (max thickness 15mm), and only small focal area of scar. Given symptomatic improvement with medical management, alcohol septal ablation is not indicated at this time but could be utilized in the future if needed.   - Continue metoprolol tartrate 50 mg twice daily.   - Lasix as needed for weight increase > 3 pounds or or swelling in  ankles    #4 Nonobstructive CAD  He had a recent angiogram which revealed only 15% stenosis of the distal left main, 15% of the 1st marginal, and 20% of the proximal LAD, which was overestimated on the coronary CTA due to artifcat. Currently the patient is free of chest pain he will be managed on GDMT for prevention of incident cardiovascular events.   - Continue clopidogrel 75 mg daily.   - Continue rosuvastatin 20 mg daily.   - Continue metoprolol tartrate 50 mg twice daily.       #5 HLD  This is well controlled on the current statin.   - Continue rosuvastatin 20 mg daily.   - Heart healthy mediterranean style diet recommended.     #6 Valvular, persistent atrial fibrillation with atrial flutter s/p ablation in 2017  Under management of Dr. Obinna Flores.   - Continue warfarin.   - Continue amiodarone.   - Keep appointment with Dr. Flores on 8/25.  -- Amiodarone monitoring to be managed by PCP per EP guidance.   - Chest Xray and PFTs annually  - Liver function tests and TSH/Free T4 twice per year  - Serum electrolytes and magnesium yearly  - Regular ophthalmic exams.   (PCP managing and testing Wednesday)     Follow up: 6 months.     I saw and evaluated I saw and evaluated Mr. Hoffmann and discussed his care with Dr. Temple. I agree with her plan for management and the above note represents our combined documentation and plan.       Angel Bustos MD, MPH  Interventional Cardiology  Please contact via Epic or [employer]    Diagnoses Today:   1. Hypertrophic obstructive cardiomyopathy (CMS/HCC)    2. Hyperlipidemia, unspecified hyperlipidemia type    3. Persistent atrial fibrillation (CMS/HCC)                        None

## 2021-09-17 NOTE — ASU PREOP CHECKLIST - HAIR REMOVAL
You can access the FollowMyHealth Patient Portal offered by [facility] by registering at the following website: [email or web address]. By joining [facility]’s FollowMyHealth portal, you will also be able to view your health information using other applications (apps) compatible with our system.
[email or web address]

## 2021-12-30 ENCOUNTER — OUTPATIENT (OUTPATIENT)
Dept: OUTPATIENT SERVICES | Facility: HOSPITAL | Age: 81
LOS: 1 days | End: 2021-12-30
Payer: MEDICARE

## 2021-12-30 DIAGNOSIS — Z95.5 PRESENCE OF CORONARY ANGIOPLASTY IMPLANT AND GRAFT: Chronic | ICD-10-CM

## 2021-12-30 DIAGNOSIS — Z20.828 CONTACT WITH AND (SUSPECTED) EXPOSURE TO OTHER VIRAL COMMUNICABLE DISEASES: ICD-10-CM

## 2021-12-30 DIAGNOSIS — Z90.49 ACQUIRED ABSENCE OF OTHER SPECIFIED PARTS OF DIGESTIVE TRACT: Chronic | ICD-10-CM

## 2021-12-30 LAB — SARS-COV-2 RNA SPEC QL NAA+PROBE: DETECTED

## 2021-12-30 PROCEDURE — U0005: CPT

## 2021-12-30 PROCEDURE — U0003: CPT

## 2021-12-30 PROCEDURE — C9803: CPT

## 2021-12-31 DIAGNOSIS — Z20.828 CONTACT WITH AND (SUSPECTED) EXPOSURE TO OTHER VIRAL COMMUNICABLE DISEASES: ICD-10-CM

## 2022-01-01 NOTE — ED PROVIDER NOTE - NSFOLLOWUPINSTRUCTIONS_ED_ALL_ED_FT
Statement Selected
1. return for worsening symptoms or anything concerning to you  2. take all home meds as prescribed  3. follow up with your pmd call to make an appointment  4. drink plenty of fluids  5. Take Tylenol 650 mg every 6 hours as needed for pain.  6. use nystatin as directed

## 2022-03-17 NOTE — CONSULT NOTE ADULT - SUBJECTIVE AND OBJECTIVE BOX
Routing refill request to provider for review/approval because:  Drug not on the [facility] refill protocol     Ghazal Villegas RN, BSN  [facility] [place]        
HPI:  80 y/o male with a PMHx of CAD s/p coronary stents x3 (Last stent this year), HTN, GI bleed, gastritis, diverticulitis s/p colon resection presents to the ED c/o onset of abd pain, n/v this morning. Recent evaluation for GI bleed at  requiring blood transfusion, source of bleeding was not found. Wife at bedside notes pt was told of Crohn's disease Dx last night after capsule imaging 3 weeks ago with GI specialist Dr. Esquivel. Pt was well last night when he ate unseasoned butternut squash and rotisserie chicken for dinner. Wife states pt was going to have PPD test today before starting Prednisone. Denies hematochezia, coffee ground emesis, melena. At time of exam, pt notes episodes of vomiting were minimal, until he came to ED. +abd pain. Denies diarrhea. Notes today's symptoms are not similar to when he was seen for GI bleed. NKDA. No other complaints at this time.      Patient has hx of colectomy in 2007 for diverticulitis with Dr Dowling, along with Lap Cholecystectomy in 2017 with Dr Gong. Patient requesting Dr Gong. Last flatus several days ago, last BM this morning small in caliber, patient feels constipated.       ICU Vital Signs Last 24 Hrs  T(C): 36.9 (05 Oct 2019 15:09), Max: 37 (05 Oct 2019 10:23)  T(F): 98.5 (05 Oct 2019 15:09), Max: 98.6 (05 Oct 2019 10:23)  HR: 81 (05 Oct 2019 15:09) (75 - 81)  BP: 143/82 (05 Oct 2019 15:09) (143/82 - 191/96)  BP(mean): --  ABP: --  ABP(mean): --  RR: 17 (05 Oct 2019 15:09) (17 - 18)  SpO2: 97% (05 Oct 2019 15:09) (97% - 98%) (05 Oct 2019 16:20)      PAST MEDICAL & SURGICAL HISTORY:  Essential hypertension  Coronary artery disease involving native heart without angina pectoris, unspecified vessel or lesion type  Gastritis  Diverticulitis  S/P coronary artery stent placement  S/P colon resection      MEDICATIONS  (STANDING):  atorvastatin 10 milliGRAM(s) Oral at bedtime  heparin  Injectable 5000 Unit(s) SubCutaneous every 8 hours  influenza   Vaccine 0.5 milliLiter(s) IntraMuscular once  labetalol 200 milliGRAM(s) Oral two times a day  pantoprazole  Injectable 40 milliGRAM(s) IV Push daily  piperacillin/tazobactam IVPB.. 3.375 Gram(s) IV Intermittent every 8 hours  sodium chloride 0.9%. 1000 milliLiter(s) (100 mL/Hr) IV Continuous <Continuous>    MEDICATIONS  (PRN):  ketorolac   Injectable 15 milliGRAM(s) IV Push every 6 hours PRN Moderate Pain (4 - 6)  ondansetron Injectable 4 milliGRAM(s) IV Push every 6 hours PRN Nausea and/or Vomiting      Allergies    contrast (Unknown)  No Known Drug Allergies  Originally Entered as [Unknown] reaction to [hair dye] (Unknown)    Intolerances        SOCIAL HISTORY:    FAMILY HISTORY:  Family history of diabetes mellitus (DM): father  FH: coronary artery disease: father   Non-contributory    REVIEW OF SYSTEMS      General:	    Respiratory and Thorax:  	  Cardiovascular:	    Gastrointestinal:	    Musculoskeletal:	   Vital Signs Last 24 Hrs  T(C): 36.9 (06 Oct 2019 10:39), Max: 37.1 (05 Oct 2019 17:29)  T(F): 98.4 (06 Oct 2019 10:39), Max: 98.8 (05 Oct 2019 17:29)  HR: 91 (06 Oct 2019 10:39) (80 - 94)  BP: 146/47 (06 Oct 2019 10:39) (120/57 - 154/71)  BP(mean): 73 (06 Oct 2019 05:18) (73 - 73)  RR: 17 (06 Oct 2019 10:39) (16 - 18)  SpO2: 99% (06 Oct 2019 10:39) (96% - 99%)    HEENT :No Pallor.No icterus. EOMI,PERLAA  Chest : Clear to Auscultation  CVS : S1S2 Normal.No murmurs.  Abdomen: Soft.Non tender .Normal bowel sounds.No Organomegaly.  CNS: Alert.Oriented to Time,Place and Person.No focal deficit.  EXT: Normal Range of motion.No pitting edema.    LABS:                        10.7   6.38  )-----------( 250      ( 06 Oct 2019 08:00 )             33.6     10-06    141  |  112<H>  |  17  ----------------------------<  101<H>  3.5   |  24  |  0.94    Ca    7.8<L>      06 Oct 2019 08:00  Phos  2.8     10-06  Mg     1.9     10-06    TPro  9.1<H>  /  Alb  4.2  /  TBili  0.6  /  DBili  x   /  AST  39<H>  /  ALT  48  /  AlkPhos  67  10-05      LIVER FUNCTIONS - ( 05 Oct 2019 11:12 )  Alb: 4.2 g/dL / Pro: 9.1 gm/dL / ALK PHOS: 67 U/L / ALT: 48 U/L / AST: 39 U/L / GGT: x             RADIOLOGY & ADDITIONAL STUDIES:
Patient is a 79y old  Male who presents with a chief complaint of Abdominal PAin (06 Oct 2019 08:17)      HPI:  10/6/2019- Cardiology Consultation: Patient seen for cardiology evaluation in the setting of partial small bowel obstruction. Admitted with abdominal pain, nausea and vomiting and CT imaging consistent with SBO. He was kept NPO on admission and has begun having bowel movements and the abdominal pain and nausea and vomiting are resolved. Recently diagnosed with Crohn's disease after a pill cam study by Dr. Esquivel but not yet started on therapy. In 2019 admitted with GI bleed requiring 1 unit of PRBCs with etiology not determined. Is S/p partial colectomy  apparently for diverticular disease. States he has a small bowel obstruction that resolved spontaneously a few years ago. Cardiac history significant for HBP, HLD, CAD with multiple PCIs most recent ROSLYN 2019, mild aortic stenosis. He currently denies chest pain or dyspnea.     80 y/o male with a PMHx of CAD s/p coronary stents x3 (Last stent this year), HTN, GI bleed, gastritis, diverticulitis s/p colon resection presents to the ED c/o onset of abd pain, n/v this morning. Recent evaluation for GI bleed at  requiring blood transfusion, source of bleeding was not found. Wife at bedside notes pt was told of Crohn's disease Dx last night after capsule imaging 3 weeks ago with GI specialist Dr. Esquivel. Pt was well last night when he ate unseasoned butternut squash and rotisserie chicken for dinner. Wife states pt was going to have PPD test today before starting Prednisone. Denies hematochezia, coffee ground emesis, melena. At time of exam, pt notes episodes of vomiting were minimal, until he came to ED. +abd pain. Denies diarrhea. Notes today's symptoms are not similar to when he was seen for GI bleed. NKDA. No other complaints at this time.      Patient has hx of colectomy in  for diverticulitis with Dr Dowling, along with Lap Cholecystectomy in 2017 with Dr Gong. Patient requesting Dr Gong. Last flatus several days ago, last BM this morning small in caliber, patient feels constipated.       ICU Vital Signs Last 24 Hrs  T(C): 36.9 (05 Oct 2019 15:09), Max: 37 (05 Oct 2019 10:23)  T(F): 98.5 (05 Oct 2019 15:09), Max: 98.6 (05 Oct 2019 10:23)  HR: 81 (05 Oct 2019 15:09) (75 - 81)  BP: 143/82 (05 Oct 2019 15:09) (143/82 - 191/96)  BP(mean): --  ABP: --  ABP(mean): --  RR: 17 (05 Oct 2019 15:09) (17 - 18)  SpO2: 97% (05 Oct 2019 15:09) (97% - 98%) (05 Oct 2019 16:20)      PAST MEDICAL & SURGICAL HISTORY:  Essential hypertension  Coronary artery disease involving native heart without angina pectoris, unspecified vessel or lesion type  Gastritis  Diverticulitis  S/P coronary artery stent placement  S/P colon resection      Allergies and Intolerances:        Allergies:  	No Known Drug Allergies:   	contrast: Miscellaneous, Unknown, contrast  	Originally Entered as [Unknown] reaction to [hair dye]: Miscellaneous, Unknown, Originally Entered as [Unknown] reaction to [hair dye]    Home Medications:   * Patient Currently Takes Medications as of 30-Aug-2019 09:03 documented in Structured Notes  · 	aspirin 81 mg oral tablet, chewable: Last Dose Taken:  , 1 tab(s) orally once a day  · 	labetalol 200 mg oral tablet: Last Dose Taken:  , 2 tab(s) orally 2 times a day  · 	diazePAM 5 mg oral tablet: Last Dose Taken:  , 1 tab(s) orally 3 times a day, As Needed  · 	CoQ10 300 mg oral capsule: Last Dose Taken:  , 1 cap(s) orally once a day  · 	lidocaine 0.5% topical spray: Last Dose Taken:  , Apply topically to affected area 3 times a day, As Needed - for moderate pain  · 	Protonix 40 mg oral delayed release tablet: Last Dose Taken:  , 1 tab(s) orally once a day  · 	Lipitor 10 mg oral tablet: Last Dose Taken:  , 1 tab(s) orally once a day  · 	Multiple Vitamins oral tablet: Last Dose Taken:  , 1 tab(s) orally once a day  · 	Plavix 75 mg oral tablet: Last Dose Taken:  , 1 tab(s) orally once a day    MEDICATIONS  (STANDING):  heparin  Injectable 5000 Unit(s) SubCutaneous every 8 hours  influenza   Vaccine 0.5 milliLiter(s) IntraMuscular once  pantoprazole  Injectable 40 milliGRAM(s) IV Push daily  piperacillin/tazobactam IVPB.. 3.375 Gram(s) IV Intermittent every 8 hours  sodium chloride 0.9%. 1000 milliLiter(s) (100 mL/Hr) IV Continuous <Continuous>    MEDICATIONS  (PRN):  ketorolac   Injectable 15 milliGRAM(s) IV Push every 6 hours PRN Moderate Pain (4 - 6)  ondansetron Injectable 4 milliGRAM(s) IV Push every 6 hours PRN Nausea and/or Vomiting      FAMILY HISTORY:  Family history of diabetes mellitus (DM): father  FH: coronary artery disease: father    Social History:  Social History (marital status, living situation, occupation, tobacco use, alcohol and drug use, and sexual history): non smoker non alcoholic	    SOCIAL HISTORY:  Tobacco-           Alcohol-              Illicit drugs-              Occupation-              Marital  status-  REVIEW OF SYSTEM:  Pertinent items are noted in HPI.    Vital Signs Last 24 Hrs  T(C): 36.9 (06 Oct 2019 05:18), Max: 37.1 (05 Oct 2019 17:29)  T(F): 98.5 (06 Oct 2019 05:18), Max: 98.8 (05 Oct 2019 17:29)  HR: 94 (06 Oct 2019 05:18) (75 - 94)  BP: 120/57 (06 Oct 2019 05:18) (120/57 - 191/96)  BP(mean): 73 (06 Oct 2019 05:18) (73 - 73)  RR: 18 (06 Oct 2019 05:18) (16 - 18)  SpO2: 96% (06 Oct 2019 05:18) (96% - 99%)    I&O's Summary    PHYSICAL EXAM  General Appearance:   HEENT: PERRL, conjunctiva clear, EOM's intact, non injected pharynx, no exudate, TM   normal  Neck: Supple, , no adenopathy, thyroid: not enlarged, no carotid bruit or JVD  Back: Symmetric, no  tenderness,no soft tissue tenderness  Lungs: Clear to auscultation bilaterally,no adventitious breath sounds, normal   expiratory phase  Heart: Regular rate and rhythm, S1 normal, S2 mildly reduced, 2/6 NAHID base. no gallop or rub.   Abdomen: Soft, non-tender, bowel sounds active , no hepatosplenomegaly. Healed midline scar.   Extremities: no cyanosis or edema, no joint swelling  Skin: Skin color, texture normal, no rashes   Neurologic: Alert and oriented X3 , cranial nerves intact, sensory and motor normal,        INTERPRETATION OF TELEMETRY:    ECG: sinus rhythm 73 BPM, inferior MI age undetermined.         LABS:                          10.7   6.38  )-----------( 250      ( 06 Oct 2019 08:00 )             33.6     10    137  |  104  |  20  ----------------------------<  140<H>  4.2   |  24  |  1.00    Ca    9.1      05 Oct 2019 11:12  Mg     2.2     10-05    TPro  9.1<H>  /  Alb  4.2  /  TBili  0.6  /  DBili  x   /  AST  39<H>  /  ALT  48  /  AlkPhos  67  10    CARDIAC MARKERS ( 05 Oct 2019 11:12 )  <0.015 ng/mL / x     / 474 U/L / x     / x                Urinalysis Basic - ( 05 Oct 2019 11:05 )    Color: Yellow / Appearance: Clear / S.015 / pH: x  Gluc: x / Ketone: Negative  / Bili: Negative / Urobili: Negative mg/dL   Blood: x / Protein: 30 mg/dL / Nitrite: Negative   Leuk Esterase: Negative / RBC: Negative /HPF / WBC Negative   Sq Epi: x / Non Sq Epi: Negative / Bacteria: Occasional            RADIOLOGY & ADDITIONAL STUDIES:    IMPRESSION:  1.Partial SBO, improved.   2.CAD, S/P multiple PCIs, last ROSLYN 2019. Stable.   3.Mild AS, stable.  4.HBP, stable.   5.Possible Crohn's disease.    PLAN:  Restart labetalol and atorvastatin. Hold ASA and Plavix today and restart these tomorrow if surgery is not indicated. Will follow.

## 2022-03-23 NOTE — PATIENT PROFILE ADULT. - AS SC BRADEN M[employer]ILITY
Daily Note     Today's date: 3/23/2022  Patient name: Lindsay Dan  : 2012  MRN: [id number]  Referring provider: Armando Hess MD  Dx:   Encounter Diagnosis     ICD-10-CM    1  Fine motor delay  F82    2  Reading impairment  F81 0        Start Time:   Stop Time: 1530  Total time in clinic (min): 58 minutes    Subjective: Yusef was seen to address the following areas: fine motor coordination, visual perception/ocular motor, handwriting skills, self help and sensory processing  OT wore mask per COVID protocol  Yusef wore a mask, tolerated well  Yusef's mother was not present during the session        Objective: Yusef was escorted to the tx room by the OT  Tx initiated with hand washing  [facility]) Reported engine was "just right"  Chose activity to keep engine "just right"  Flipped gold coin to determine what activity/"tool for body" to perform for UE & trunk strength/sensory: dolphin yoga held for 20 seconds, airplane yoga held for ~15 seconds on L LE with min difficulty, sit ups 10xs, push up touch opposite hand to shoulder 10xs, bicycle for ~20 seconds with min/mod difficulty and lunges 5xs  Table top activities followed  Returned homework and received a sticker today  Corrected errors   activity- word search with few verbal/visual cues  (N, TA)  Prone on scooter board propelled with UE to retrieve letters for [employer] game (N, TE)  Table top  "Handwriting Without Tears" (HWT)- cursive book  Introduced letters S/s, copied 3-5xs each  Copied 6 words and a sentence (4 words) focusing on letter s  Translate 4 proper words print to cursive with upper case A, C E & J, visual cues for proper formation  Overall fair+ accuracy on writing skills  (TA)    Assessment: Tolerated treatment well  Patient followed most directions  Good attention and cooperation today  Continue with "Alert Program" problem solving  Discussed session with mother  Plan: Continue per plan of care 
(4) no limitation

## 2022-04-28 NOTE — ED STATDOCS - NS ED ATTENDING STATEMENT MOD
notifed Shawna at [facility] of OR time and arrival time changes of 1130/1330. She verbalzies understanding. Attending Only

## 2022-06-11 ENCOUNTER — NON-APPOINTMENT (OUTPATIENT)
Age: 82
End: 2022-06-11

## 2022-06-26 ENCOUNTER — NON-APPOINTMENT (OUTPATIENT)
Age: 82
End: 2022-06-26

## 2022-07-20 NOTE — PATIENT PROFILE ADULT. - FUNCTIONAL SCREEN CURRENT LEVEL: SWALLOWING (IF SCORE 2 OR MORE FOR ANY ITEM, CONSULT REHAB SERVICES), MLM)
Social Work Intervention  [facility]    Data/Intervention:    Patient Name:  Betty Rogers  /Age:  1973 (49 year old)    Visit Type: in person  Referral Source: n/a  Reason for Referral:  New pt GILBERTO introduction    Collaborated With:    Betty    Psychosocial Information/Concerns:  Betty is here today for C1D1    Intervention/Education/Resources Provided:  SW met with Betty in the infusion center, introducing self and briefly explaining SW role. Betty states she is doing well today, she has been looking forward to getting started with treatment. Denies any anxiety or significant stress as she is going through this process.     Betty lives with her two children (9 and 14) half of the time, she shares custody with her ex-. She reports they are coping well at this time. She notes that they have shown good empathy and it seems that they are more sad for Betty and what she is going through then having trouble adjusting or feeling worried. She reports a very strong support system around her and no major concerns at this time.     Betty denies any insurance of financial concerns. She is working and plans to continue to do so, has completed [place] paperwork for days she needs to miss.     SW provided contact information and encouraged Betty to reach out at any time with     Assessment/Plan:  SW will remain available as needed and appropriate for future support and resource needs.     Provided patient/family with contact information and availability.      ANTONY Gonzalez, [facility]  Clinical , Adult Oncology  Phone: [phone]       (0) swallows foods/liquids without difficulty

## 2022-09-13 ENCOUNTER — TRANSCRIPTION ENCOUNTER (OUTPATIENT)
Age: 82
End: 2022-09-13

## 2022-09-13 ENCOUNTER — OUTPATIENT (OUTPATIENT)
Dept: EMERGENCY DEPT | Facility: HOSPITAL | Age: 82
LOS: 1 days | Discharge: HOME | End: 2022-09-13
Payer: MEDICARE

## 2022-09-13 VITALS — WEIGHT: 164.91 LBS | HEIGHT: 66 IN

## 2022-09-13 DIAGNOSIS — Z95.5 PRESENCE OF CORONARY ANGIOPLASTY IMPLANT AND GRAFT: ICD-10-CM

## 2022-09-13 DIAGNOSIS — Z90.49 ACQUIRED ABSENCE OF OTHER SPECIFIED PARTS OF DIGESTIVE TRACT: Chronic | ICD-10-CM

## 2022-09-13 DIAGNOSIS — I25.10 ATHEROSCLEROTIC HEART DISEASE OF NATIVE CORONARY ARTERY WITHOUT ANGINA PECTORIS: ICD-10-CM

## 2022-09-13 DIAGNOSIS — Z95.5 PRESENCE OF CORONARY ANGIOPLASTY IMPLANT AND GRAFT: Chronic | ICD-10-CM

## 2022-09-13 DIAGNOSIS — R07.9 CHEST PAIN, UNSPECIFIED: ICD-10-CM

## 2022-09-13 LAB
ALBUMIN SERPL ELPH-MCNC: 3.3 G/DL — SIGNIFICANT CHANGE UP (ref 3.3–5)
ALP SERPL-CCNC: 76 U/L — SIGNIFICANT CHANGE UP (ref 40–120)
ALT FLD-CCNC: 46 U/L — SIGNIFICANT CHANGE UP (ref 12–78)
ANION GAP SERPL CALC-SCNC: 6 MMOL/L — SIGNIFICANT CHANGE UP (ref 5–17)
AST SERPL-CCNC: 36 U/L — SIGNIFICANT CHANGE UP (ref 15–37)
BASOPHILS # BLD AUTO: 0.04 K/UL — SIGNIFICANT CHANGE UP (ref 0–0.2)
BASOPHILS NFR BLD AUTO: 0.5 % — SIGNIFICANT CHANGE UP (ref 0–2)
BILIRUB SERPL-MCNC: 0.5 MG/DL — SIGNIFICANT CHANGE UP (ref 0.2–1.2)
BUN SERPL-MCNC: 15 MG/DL — SIGNIFICANT CHANGE UP (ref 7–23)
CALCIUM SERPL-MCNC: 8.9 MG/DL — SIGNIFICANT CHANGE UP (ref 8.5–10.1)
CHLORIDE SERPL-SCNC: 108 MMOL/L — SIGNIFICANT CHANGE UP (ref 96–108)
CO2 SERPL-SCNC: 24 MMOL/L — SIGNIFICANT CHANGE UP (ref 22–31)
CREAT SERPL-MCNC: 0.94 MG/DL — SIGNIFICANT CHANGE UP (ref 0.5–1.3)
EGFR: 81 ML/MIN/1.73M2 — SIGNIFICANT CHANGE UP
EOSINOPHIL # BLD AUTO: 0.12 K/UL — SIGNIFICANT CHANGE UP (ref 0–0.5)
EOSINOPHIL NFR BLD AUTO: 1.4 % — SIGNIFICANT CHANGE UP (ref 0–6)
FLUAV AG NPH QL: SIGNIFICANT CHANGE UP
FLUBV AG NPH QL: SIGNIFICANT CHANGE UP
GLUCOSE SERPL-MCNC: 103 MG/DL — HIGH (ref 70–99)
HCT VFR BLD CALC: 37.9 % — LOW (ref 39–50)
HGB BLD-MCNC: 12.8 G/DL — LOW (ref 13–17)
IMM GRANULOCYTES NFR BLD AUTO: 0.4 % — SIGNIFICANT CHANGE UP (ref 0–1.5)
LIDOCAIN IGE QN: 124 U/L — SIGNIFICANT CHANGE UP (ref 73–393)
LYMPHOCYTES # BLD AUTO: 1.3 K/UL — SIGNIFICANT CHANGE UP (ref 1–3.3)
LYMPHOCYTES # BLD AUTO: 15.4 % — SIGNIFICANT CHANGE UP (ref 13–44)
MCHC RBC-ENTMCNC: 31.6 PG — SIGNIFICANT CHANGE UP (ref 27–34)
MCHC RBC-ENTMCNC: 33.8 GM/DL — SIGNIFICANT CHANGE UP (ref 32–36)
MCV RBC AUTO: 93.6 FL — SIGNIFICANT CHANGE UP (ref 80–100)
MONOCYTES # BLD AUTO: 1.01 K/UL — HIGH (ref 0–0.9)
MONOCYTES NFR BLD AUTO: 12 % — SIGNIFICANT CHANGE UP (ref 2–14)
NEUTROPHILS # BLD AUTO: 5.95 K/UL — SIGNIFICANT CHANGE UP (ref 1.8–7.4)
NEUTROPHILS NFR BLD AUTO: 70.3 % — SIGNIFICANT CHANGE UP (ref 43–77)
NT-PROBNP SERPL-SCNC: 41 PG/ML — SIGNIFICANT CHANGE UP (ref 0–450)
PLATELET # BLD AUTO: 238 K/UL — SIGNIFICANT CHANGE UP (ref 150–400)
POTASSIUM SERPL-MCNC: 3.8 MMOL/L — SIGNIFICANT CHANGE UP (ref 3.5–5.3)
POTASSIUM SERPL-SCNC: 3.8 MMOL/L — SIGNIFICANT CHANGE UP (ref 3.5–5.3)
PROT SERPL-MCNC: 8.1 GM/DL — SIGNIFICANT CHANGE UP (ref 6–8.3)
RBC # BLD: 4.05 M/UL — LOW (ref 4.2–5.8)
RBC # FLD: 13.5 % — SIGNIFICANT CHANGE UP (ref 10.3–14.5)
RSV RNA NPH QL NAA+NON-PROBE: SIGNIFICANT CHANGE UP
SARS-COV-2 RNA SPEC QL NAA+PROBE: SIGNIFICANT CHANGE UP
SODIUM SERPL-SCNC: 138 MMOL/L — SIGNIFICANT CHANGE UP (ref 135–145)
TROPONIN I, HIGH SENSITIVITY RESULT: 3.95 NG/L — SIGNIFICANT CHANGE UP
TROPONIN I, HIGH SENSITIVITY RESULT: 4.41 NG/L — SIGNIFICANT CHANGE UP
TROPONIN I, HIGH SENSITIVITY RESULT: 4.48 NG/L — SIGNIFICANT CHANGE UP
WBC # BLD: 8.45 K/UL — SIGNIFICANT CHANGE UP (ref 3.8–10.5)
WBC # FLD AUTO: 8.45 K/UL — SIGNIFICANT CHANGE UP (ref 3.8–10.5)

## 2022-09-13 PROCEDURE — 84484 ASSAY OF TROPONIN QUANT: CPT | Mod: 91

## 2022-09-13 PROCEDURE — 85027 COMPLETE CBC AUTOMATED: CPT

## 2022-09-13 PROCEDURE — C1760: CPT

## 2022-09-13 PROCEDURE — 99223 1ST HOSP IP/OBS HIGH 75: CPT

## 2022-09-13 PROCEDURE — 80061 LIPID PANEL: CPT

## 2022-09-13 PROCEDURE — 93454 CORONARY ARTERY ANGIO S&I: CPT

## 2022-09-13 PROCEDURE — 90662 IIV NO PRSV INCREASED AG IM: CPT

## 2022-09-13 PROCEDURE — C1769: CPT

## 2022-09-13 PROCEDURE — 71045 X-RAY EXAM CHEST 1 VIEW: CPT | Mod: 26

## 2022-09-13 PROCEDURE — 80048 BASIC METABOLIC PNL TOTAL CA: CPT

## 2022-09-13 PROCEDURE — 99285 EMERGENCY DEPT VISIT HI MDM: CPT | Mod: CS

## 2022-09-13 PROCEDURE — C1894: CPT

## 2022-09-13 PROCEDURE — C1887: CPT

## 2022-09-13 PROCEDURE — 93010 ELECTROCARDIOGRAM REPORT: CPT

## 2022-09-13 PROCEDURE — 36415 COLL VENOUS BLD VENIPUNCTURE: CPT

## 2022-09-13 RX ORDER — AMLODIPINE BESYLATE 2.5 MG/1
5 TABLET ORAL
Refills: 0 | Status: DISCONTINUED | OUTPATIENT
Start: 2022-09-13 | End: 2022-09-14

## 2022-09-13 RX ORDER — ACETAMINOPHEN 500 MG
650 TABLET ORAL EVERY 6 HOURS
Refills: 0 | Status: DISCONTINUED | OUTPATIENT
Start: 2022-09-13 | End: 2022-09-14

## 2022-09-13 RX ORDER — DIAZEPAM 5 MG
5 TABLET ORAL ONCE
Refills: 0 | Status: DISCONTINUED | OUTPATIENT
Start: 2022-09-13 | End: 2022-09-13

## 2022-09-13 RX ORDER — CLOPIDOGREL BISULFATE 75 MG/1
75 TABLET, FILM COATED ORAL DAILY
Refills: 0 | Status: DISCONTINUED | OUTPATIENT
Start: 2022-09-13 | End: 2022-09-14

## 2022-09-13 RX ORDER — ISOSORBIDE MONONITRATE 60 MG/1
1 TABLET, EXTENDED RELEASE ORAL
Qty: 0 | Refills: 0 | DISCHARGE

## 2022-09-13 RX ORDER — AMLODIPINE BESYLATE 2.5 MG/1
1 TABLET ORAL
Qty: 0 | Refills: 0 | DISCHARGE

## 2022-09-13 RX ORDER — PANTOPRAZOLE SODIUM 20 MG/1
40 TABLET, DELAYED RELEASE ORAL
Refills: 0 | Status: DISCONTINUED | OUTPATIENT
Start: 2022-09-13 | End: 2022-09-14

## 2022-09-13 RX ORDER — TRAMADOL HYDROCHLORIDE 50 MG/1
1 TABLET ORAL
Qty: 0 | Refills: 0 | DISCHARGE

## 2022-09-13 RX ORDER — ATORVASTATIN CALCIUM 80 MG/1
40 TABLET, FILM COATED ORAL AT BEDTIME
Refills: 0 | Status: DISCONTINUED | OUTPATIENT
Start: 2022-09-13 | End: 2022-09-14

## 2022-09-13 RX ORDER — ASPIRIN/CALCIUM CARB/MAGNESIUM 324 MG
324 TABLET ORAL ONCE
Refills: 0 | Status: COMPLETED | OUTPATIENT
Start: 2022-09-13 | End: 2022-09-13

## 2022-09-13 RX ORDER — PANTOPRAZOLE SODIUM 20 MG/1
1 TABLET, DELAYED RELEASE ORAL
Qty: 0 | Refills: 0 | DISCHARGE

## 2022-09-13 RX ORDER — LABETALOL HCL 100 MG
200 TABLET ORAL ONCE
Refills: 0 | Status: COMPLETED | OUTPATIENT
Start: 2022-09-13 | End: 2022-09-13

## 2022-09-13 RX ORDER — INFLUENZA VIRUS VACCINE 15; 15; 15; 15 UG/.5ML; UG/.5ML; UG/.5ML; UG/.5ML
0.7 SUSPENSION INTRAMUSCULAR ONCE
Refills: 0 | Status: COMPLETED | OUTPATIENT
Start: 2022-09-13 | End: 2022-09-14

## 2022-09-13 RX ORDER — ASPIRIN/CALCIUM CARB/MAGNESIUM 324 MG
81 TABLET ORAL DAILY
Refills: 0 | Status: DISCONTINUED | OUTPATIENT
Start: 2022-09-13 | End: 2022-09-14

## 2022-09-13 RX ORDER — LANOLIN ALCOHOL/MO/W.PET/CERES
3 CREAM (GRAM) TOPICAL AT BEDTIME
Refills: 0 | Status: DISCONTINUED | OUTPATIENT
Start: 2022-09-13 | End: 2022-09-14

## 2022-09-13 RX ORDER — ONDANSETRON 8 MG/1
4 TABLET, FILM COATED ORAL EVERY 8 HOURS
Refills: 0 | Status: DISCONTINUED | OUTPATIENT
Start: 2022-09-13 | End: 2022-09-14

## 2022-09-13 RX ORDER — LABETALOL HCL 100 MG
200 TABLET ORAL
Refills: 0 | Status: DISCONTINUED | OUTPATIENT
Start: 2022-09-13 | End: 2022-09-14

## 2022-09-13 RX ORDER — AMLODIPINE BESYLATE 2.5 MG/1
10 TABLET ORAL ONCE
Refills: 0 | Status: COMPLETED | OUTPATIENT
Start: 2022-09-13 | End: 2022-09-13

## 2022-09-13 RX ADMIN — Medication 650 MILLIGRAM(S): at 16:02

## 2022-09-13 RX ADMIN — Medication 5 MILLIGRAM(S): at 21:10

## 2022-09-13 RX ADMIN — Medication 324 MILLIGRAM(S): at 05:55

## 2022-09-13 RX ADMIN — AMLODIPINE BESYLATE 10 MILLIGRAM(S): 2.5 TABLET ORAL at 07:27

## 2022-09-13 RX ADMIN — Medication 200 MILLIGRAM(S): at 21:10

## 2022-09-13 RX ADMIN — Medication 200 MILLIGRAM(S): at 07:27

## 2022-09-13 RX ADMIN — ATORVASTATIN CALCIUM 40 MILLIGRAM(S): 80 TABLET, FILM COATED ORAL at 21:09

## 2022-09-13 RX ADMIN — Medication 81 MILLIGRAM(S): at 10:45

## 2022-09-13 RX ADMIN — CLOPIDOGREL BISULFATE 75 MILLIGRAM(S): 75 TABLET, FILM COATED ORAL at 10:45

## 2022-09-13 RX ADMIN — AMLODIPINE BESYLATE 5 MILLIGRAM(S): 2.5 TABLET ORAL at 21:10

## 2022-09-13 RX ADMIN — PANTOPRAZOLE SODIUM 40 MILLIGRAM(S): 20 TABLET, DELAYED RELEASE ORAL at 10:45

## 2022-09-13 NOTE — ED ADULT NURSE REASSESSMENT NOTE - NS ED NURSE REASSESS COMMENT FT1
Pt A+Ox4. VSS. Pt only c/o lower back pn which is chronic. No CP. Ambulating to BR without difficulty. Pt has Crohns and did have 1 loose BM this morning. RSR 70's occ PVC's. Monitored closely. Call bell in reach.

## 2022-09-13 NOTE — H&P ADULT - ASSESSMENT
#Chest pain  #CAD s/p stents RCA, LCX, LAD  Admit to telemetry  Trend troponins  Cont Aspirin, Plavix, statin  Cardio eval DR Toledo  May need cath    #HTN/hyperlipidemia  Cont Labetalol  Increase statin to 40mg    #Crohn's disease  On Stelara  Seeing DR Esquivel    #DVT proph- Lovenox    #Dispo- inpatient admit

## 2022-09-13 NOTE — H&P ADULT - HISTORY OF PRESENT ILLNESS
83yo/M with PMh CAD s/p stents in LAD, distal RCA, Crohn's disease on Stelara s/p sigmoidectomy, HTN, hyperlipidemia presented with chest pain. Started yesterday, mainly lower left side of the chest, associated with nausea and some diarrhea, lasted for few hours then got better. He thought he ate something wrong, but pain was severe and he came in to ED. First trop is negative.

## 2022-09-13 NOTE — CONSULT NOTE ADULT - SUBJECTIVE AND OBJECTIVE BOX
Patient is a 82y old  Male who presents with a chief complaint of Chest pain (13 Sep 2022 10:24)      HPI:  81yo/M with PMh CAD s/p stents in LAD distant , distal RCA, Crohn's disease on Stelara s/p sigmoidectomy, HTN, hyperlipidemia presented with chest pain. Started yesterday, mainly lower left side of the chest, associated with nausea and some diarrhea, lasted for few hours then got better. He thought he ate something wrong, but pain was severe and he came in to ED. Trop neg times 3   in 2021 he had back pain radiated to chest and ended up having a RCA lesion that needed a ROSLYN .   He cannot have a stress test because he has had "severe  reaction" in the past from one   He's CP free now     PAST MEDICAL & SURGICAL HISTORY:  Diverticulitis      Gastritis      Coronary artery disease involving native heart without angina pectoris, unspecified vessel or lesion type      Essential hypertension      S/P colon resection  sigmoidectomy      S/P coronary artery stent placement      S/P cholecystectomy                                        MEDICATIONS  (STANDING):  amLODIPine   Tablet 5 milliGRAM(s) Oral <User Schedule>  aspirin enteric coated 81 milliGRAM(s) Oral daily  atorvastatin 40 milliGRAM(s) Oral at bedtime  clopidogrel Tablet 75 milliGRAM(s) Oral daily  influenza  Vaccine (HIGH DOSE) 0.7 milliLiter(s) IntraMuscular once  labetalol 200 milliGRAM(s) Oral two times a day  pantoprazole    Tablet 40 milliGRAM(s) Oral before breakfast    MEDICATIONS  (PRN):  acetaminophen     Tablet .. 650 milliGRAM(s) Oral every 6 hours PRN Temp greater or equal to 38C (100.4F), Mild Pain (1 - 3)  aluminum hydroxide/magnesium hydroxide/simethicone Suspension 30 milliLiter(s) Oral every 4 hours PRN Dyspepsia  melatonin 3 milliGRAM(s) Oral at bedtime PRN Insomnia  ondansetron Injectable 4 milliGRAM(s) IV Push every 8 hours PRN Nausea and/or Vomiting      FAMILY HISTORY:  FH: coronary artery disease  father    Family history of diabetes mellitus (DM)  father        SOCIAL HISTORY:    CIGARETTES:        Vital Signs Last 24 Hrs  T(C): 36.8 (13 Sep 2022 09:00), Max: 37 (13 Sep 2022 05:32)  T(F): 98.2 (13 Sep 2022 09:00), Max: 98.6 (13 Sep 2022 05:32)  HR: 75 (13 Sep 2022 09:00) (75 - 80)  BP: 133/72 (13 Sep 2022 09:00) (133/72 - 176/86)  BP(mean): 112 (13 Sep 2022 05:32) (112 - 112)  RR: 18 (13 Sep 2022 09:00) (18 - 18)  SpO2: 99% (13 Sep 2022 09:00) (96% - 99%)    Parameters below as of 13 Sep 2022 09:00  Patient On (Oxygen Delivery Method): room air                INTERPRETATION OF TELEMETRY:    ECG:    I&O's Detail      LABS:                        12.8   8.45  )-----------( 238      ( 13 Sep 2022 05:45 )             37.9     09-13    138  |  108  |  15  ----------------------------<  103<H>  3.8   |  24  |  0.94    Ca    8.9      13 Sep 2022 05:45    TPro  8.1  /  Alb  3.3  /  TBili  0.5  /  DBili  x   /  AST  36  /  ALT  46  /  AlkPhos  76  09-13            I&O's Summary    BNPSerum Pro-Brain Natriuretic Peptide: 41 pg/mL (09-13 @ 05:45)    RADIOLOGY & ADDITIONAL STUDIES:

## 2022-09-13 NOTE — H&P ADULT - NSHPLABSRESULTS_GEN_ALL_CORE
12.8   8.45  )-----------( 238      ( 13 Sep 2022 05:45 )             37.9     13 Sep 2022 05:45    138    |  108    |  15     ----------------------------<  103    3.8     |  24     |  0.94     Ca    8.9        13 Sep 2022 05:45    TPro  8.1    /  Alb  3.3    /  TBili  0.5    /  DBili  x      /  AST  36     /  ALT  46     /  AlkPhos  76     13 Sep 2022 05:45    LIVER FUNCTIONS - ( 13 Sep 2022 05:45 )  Alb: 3.3 g/dL / Pro: 8.1 gm/dL / ALK PHOS: 76 U/L / ALT: 46 U/L / AST: 36 U/L / GGT: x             CAPILLARY BLOOD GLUCOSE

## 2022-09-13 NOTE — H&P ADULT - NSICDXPASTSURGICALHX_GEN_ALL_CORE_FT
PAST SURGICAL HISTORY:  S/P cholecystectomy     S/P colon resection sigmoidectomy    S/P coronary artery stent placement

## 2022-09-13 NOTE — PATIENT PROFILE ADULT - FALL HARM RISK - HARM RISK INTERVENTIONS

## 2022-09-13 NOTE — PHARMACOTHERAPY INTERVENTION NOTE - COMMENTS
Medication history complete. Medications and allergies reviewed with patient and confirmed with Dr.First

## 2022-09-13 NOTE — CONSULT NOTE ADULT - ASSESSMENT
81yo/M with PMh CAD s/p stents in LAD distant , distal RCA, Crohn's disease on Stelara s/p sigmoidectomy, HTN, hyperlipidemia presented with chest pain. Started yesterday, mainly lower left side of the chest, associated with nausea and some diarrhea, lasted for few hours then got better. He thought he ate something wrong, but pain was severe and he came in to ED. Trop neg times 3   in 2021 he had back pain radiated to chest and ended up having a RCA lesion that needed a ROSLYN .   He cannot have a stress test because he has had "severe  reaction" in the past from one   He's CP free now   EKG does not show acute STT changes   1) pain is atypical but he presented wth atypical pain in the past when he had his RCA stent , Dr De Santiago to perform cath tomorrow   2) cont asa plavix labatolol statins

## 2022-09-14 ENCOUNTER — TRANSCRIPTION ENCOUNTER (OUTPATIENT)
Age: 82
End: 2022-09-14

## 2022-09-14 VITALS — HEART RATE: 85 BPM | SYSTOLIC BLOOD PRESSURE: 135 MMHG | DIASTOLIC BLOOD PRESSURE: 62 MMHG

## 2022-09-14 LAB
ANION GAP SERPL CALC-SCNC: 6 MMOL/L — SIGNIFICANT CHANGE UP (ref 5–17)
BUN SERPL-MCNC: 14 MG/DL — SIGNIFICANT CHANGE UP (ref 7–23)
CALCIUM SERPL-MCNC: 8.6 MG/DL — SIGNIFICANT CHANGE UP (ref 8.5–10.1)
CHLORIDE SERPL-SCNC: 108 MMOL/L — SIGNIFICANT CHANGE UP (ref 96–108)
CHOLEST SERPL-MCNC: 117 MG/DL — SIGNIFICANT CHANGE UP
CO2 SERPL-SCNC: 26 MMOL/L — SIGNIFICANT CHANGE UP (ref 22–31)
CREAT SERPL-MCNC: 0.91 MG/DL — SIGNIFICANT CHANGE UP (ref 0.5–1.3)
EGFR: 84 ML/MIN/1.73M2 — SIGNIFICANT CHANGE UP
GLUCOSE SERPL-MCNC: 99 MG/DL — SIGNIFICANT CHANGE UP (ref 70–99)
HCT VFR BLD CALC: 37.3 % — LOW (ref 39–50)
HDLC SERPL-MCNC: 32 MG/DL — LOW
HGB BLD-MCNC: 12.4 G/DL — LOW (ref 13–17)
LIPID PNL WITH DIRECT LDL SERPL: 36 MG/DL — SIGNIFICANT CHANGE UP
MCHC RBC-ENTMCNC: 31.2 PG — SIGNIFICANT CHANGE UP (ref 27–34)
MCHC RBC-ENTMCNC: 33.2 GM/DL — SIGNIFICANT CHANGE UP (ref 32–36)
MCV RBC AUTO: 94 FL — SIGNIFICANT CHANGE UP (ref 80–100)
NON HDL CHOLESTEROL: 85 MG/DL — SIGNIFICANT CHANGE UP
PLATELET # BLD AUTO: 227 K/UL — SIGNIFICANT CHANGE UP (ref 150–400)
POTASSIUM SERPL-MCNC: 3.4 MMOL/L — LOW (ref 3.5–5.3)
POTASSIUM SERPL-SCNC: 3.4 MMOL/L — LOW (ref 3.5–5.3)
RBC # BLD: 3.97 M/UL — LOW (ref 4.2–5.8)
RBC # FLD: 13.6 % — SIGNIFICANT CHANGE UP (ref 10.3–14.5)
SODIUM SERPL-SCNC: 140 MMOL/L — SIGNIFICANT CHANGE UP (ref 135–145)
TRIGL SERPL-MCNC: 246 MG/DL — HIGH
WBC # BLD: 7.44 K/UL — SIGNIFICANT CHANGE UP (ref 3.8–10.5)
WBC # FLD AUTO: 7.44 K/UL — SIGNIFICANT CHANGE UP (ref 3.8–10.5)

## 2022-09-14 PROCEDURE — 99239 HOSP IP/OBS DSCHRG MGMT >30: CPT

## 2022-09-14 RX ORDER — POTASSIUM CHLORIDE 20 MEQ
40 PACKET (EA) ORAL ONCE
Refills: 0 | Status: COMPLETED | OUTPATIENT
Start: 2022-09-14 | End: 2022-09-14

## 2022-09-14 RX ORDER — SODIUM CHLORIDE 9 MG/ML
250 INJECTION INTRAMUSCULAR; INTRAVENOUS; SUBCUTANEOUS ONCE
Refills: 0 | Status: COMPLETED | OUTPATIENT
Start: 2022-09-14 | End: 2022-09-14

## 2022-09-14 RX ORDER — SODIUM CHLORIDE 9 MG/ML
1000 INJECTION INTRAMUSCULAR; INTRAVENOUS; SUBCUTANEOUS
Refills: 0 | Status: DISCONTINUED | OUTPATIENT
Start: 2022-09-14 | End: 2022-09-14

## 2022-09-14 RX ADMIN — SODIUM CHLORIDE 225 MILLILITER(S): 9 INJECTION INTRAMUSCULAR; INTRAVENOUS; SUBCUTANEOUS at 10:27

## 2022-09-14 RX ADMIN — PANTOPRAZOLE SODIUM 40 MILLIGRAM(S): 20 TABLET, DELAYED RELEASE ORAL at 06:13

## 2022-09-14 RX ADMIN — Medication 200 MILLIGRAM(S): at 13:59

## 2022-09-14 RX ADMIN — CLOPIDOGREL BISULFATE 75 MILLIGRAM(S): 75 TABLET, FILM COATED ORAL at 07:48

## 2022-09-14 RX ADMIN — Medication 81 MILLIGRAM(S): at 07:49

## 2022-09-14 RX ADMIN — SODIUM CHLORIDE 500 MILLILITER(S): 9 INJECTION INTRAMUSCULAR; INTRAVENOUS; SUBCUTANEOUS at 08:35

## 2022-09-14 RX ADMIN — Medication 40 MILLIEQUIVALENT(S): at 15:31

## 2022-09-14 RX ADMIN — AMLODIPINE BESYLATE 5 MILLIGRAM(S): 2.5 TABLET ORAL at 07:49

## 2022-09-14 RX ADMIN — INFLUENZA VIRUS VACCINE 0.7 MILLILITER(S): 15; 15; 15; 15 SUSPENSION INTRAMUSCULAR at 18:02

## 2022-09-14 NOTE — DISCHARGE NOTE NURSING/CASE MANAGEMENT/SOCIAL WORK - PATIENT PORTAL LINK FT
You can access the FollowMyHealth Patient Portal offered by [facility] by registering at the following website: [email or web address]. By joining [facility]’s FollowMyHealth portal, you will also be able to view your health information using other applications (apps) compatible with our system.

## 2022-09-14 NOTE — PACU DISCHARGE NOTE - COMMENTS
Patient s/p LHC via RFA. Mynx closure device to RFA in place. No s/s of bleeding or hematoma. RLE warm and mobile. VS Stable. NS at 225 cc/hr infusing at this time. Patient placed on remote telemetry monitor. Report given to ROHIT Salvador on 3E and she confirms patient is being remotely monitored at this time

## 2022-09-14 NOTE — DISCHARGE NOTE PROVIDER - NSDCCAREPROVSEEN_GEN_ALL_CORE_FT
Magnolia Boyle (hospital medicine)  Rosalie Toledo (interventional cardiology)  Tani Grajeda (cardiology)

## 2022-09-14 NOTE — DISCHARGE NOTE NURSING/CASE MANAGEMENT/SOCIAL WORK - NSDCPEFALRISK_GEN_ALL_CORE
For information on Fall & Injury Prevention, visit: [email or web address] OR  [email or web address] OR  https://www.cdc.gov/steadi/patient.html

## 2022-09-14 NOTE — DISCHARGE NOTE PROVIDER - NSDCMRMEDTOKEN_GEN_ALL_CORE_FT
amLODIPine 5 mg oral tablet: 1 tab(s) orally 2 times a day  Aspirin Enteric Coated 81 mg oral delayed release tablet: 1 tab(s) orally once a day  CoQ10 300 mg oral capsule: 1 cap(s) orally once a day  diazePAM 5 mg oral tablet: 1 tab(s) orally every 8 hours, As Needed  labetalol 200 mg oral tablet: 2 tab(s) orally 2 times a day  lidocaine 5% patch: Apply topically to affected area as directed  Lipitor 10 mg oral tablet: 1 tab(s) orally once a day  Multiple Vitamins oral tablet: 1 tab(s) orally once a day  pantoprazole 40 mg oral delayed release tablet: 1 tab(s) orally once a day (in the morning)  Plavix 75 mg oral tablet: 1 tab(s) orally once a day  Prevagen 50 mcg (2000 intl units) oral capsule: 1 cap(s) orally once a day  Stelara 45 mg/0.5 mL subcutaneous solution: Inject every two months  traMADol 50 mg oral tablet: 1 tab(s) orally 3 times a day, As Needed

## 2022-09-14 NOTE — DISCHARGE NOTE PROVIDER - PROVIDER TOKENS
PROVIDER:[TOKEN:[[id number]],FOLLOWUP:[1 week]],PROVIDER:[TOKEN:[[id number]],FOLLOWUP:[Routine]]

## 2022-09-14 NOTE — DISCHARGE NOTE PROVIDER - NSDCCPCAREPLAN_GEN_ALL_CORE_FT
PRINCIPAL DISCHARGE DIAGNOSIS  Diagnosis: Chest pain  Assessment and Plan of Treatment: Your cardiac cath showed patent (open) stents. Follow up with your cardiologist.

## 2022-09-14 NOTE — CHART NOTE - NSCHARTNOTEFT_GEN_A_CORE
[facility] risks, benefits and alternatives discussed with patient. Risk discussed included, but not limited to MI, stroke, mortality, major bleeding, arrythmia, or infection. Consent obtained and signed educational material provided. Pt. verbalizes and understands pre-procedural instructions.  ASA: II  Bleeding Risk Score: 0.9%  Creatinine: 0.94  GFR: 81  Bahman Score: MARIA T risk 8 points Pt. pre-hydrated with 0.9% NS 250cc bolus IV x1

## 2022-09-14 NOTE — PROGRESS NOTE ADULT - ASSESSMENT
81yo/M with PMh CAD s/p stents in LAD distant , distal RCA, Crohn's disease on Stelara s/p sigmoidectomy, HTN, hyperlipidemia presented with chest pain.   Atypical in nature  S/P LHC revealing unchanged anatomy from 2020  No cardiac etiology for the presentation  Consider DC planning

## 2022-09-14 NOTE — DISCHARGE NOTE PROVIDER - HOSPITAL COURSE
CC: CP  HPI and Hospital Course: 83yo/M with PMh CAD s/p stents in LAD, distal RCA, Crohn's disease on Stelara s/p sigmoidectomy, HTN, hyperlipidemia presented with chest pain, underwent [facility] w patent stents.     VITALS:  T(F): 98 (09-14-22 @ 08:01), Max: 98 (09-14-22 @ 08:01)  HR: 75 (09-14-22 @ 10:35) (75 - 82)  BP: 144/65 (09-14-22 @ 10:05) (129/66 - 165/71)  RR: 16 (09-14-22 @ 10:35) (16 - 19)  SpO2: 98% (09-14-22 @ 10:35) (95% - 99%)    PHYSICAL EXAM:  General: NAD, lying in bed  HEENT:  pupils equal and reactive, EOMI, no oropharyngeal lesions, erythema, exudates, oral thrush  NECK:   supple, no carotid bruits, no palpable lymph nodes, no thyromegaly  CV:  +S1, +S2, regular, no murmurs or rubs  RESP:   lungs clear to auscultation bilaterally, no wheezing, rales, rhonchi, good air entry bilaterally  BREAST:  not examined  GI:  abdomen soft, non-tender, non-distended, normal BS, no bruits, no abdominal masses, no palpable masses  RECTAL:  not examined  :  not examined  MSK:   normal muscle tone, no atrophy, no rigidity, no contractions  EXT:  no clubbing, no cyanosis, no edema, no calf pain, swelling or erythema  VASCULAR:  pulses equal and symmetric in the upper and lower extremities  NEURO:  AAOX3, no focal neurological deficits, follows all commands, able to move extremities spontaneously  SKIN:  no ulcers, lesions or rashes    [facility] verbal report 9/14/22: patent stents      #Chest pain  #CAD s/p stents RCA, LCX, LAD  -tele  -trops neg  -EKG no ST changes  -[facility] w patents stents (Dr Toledo)  -continue ASA, plavix, statin  -known to Dr. Almendarez    #HTN/hyperlipidemia  -labetalol, norvasc, statin    #Crohn's disease  On Stelara  Seeing DR Esquivel    d/c home w cards f/u  I have spent 35 min on day of d/c coordinating care.

## 2022-09-14 NOTE — DISCHARGE NOTE PROVIDER - CARE PROVIDER_API CALL
Faraz Almendarez)  Cardiovascular Disease; Internal Medicine  [address] [place] [address]  Phone: [phone]  Fax: [phone]  Follow Up Time: 1 week    Rosalie Toledo)  Cardiovascular Disease; Interventional Cardiology  [address] [place] [address]  Phone: [phone]  Fax: [phone]  Follow Up Time: Routine

## 2022-09-14 NOTE — CHART NOTE - NSCHARTNOTEFT_GEN_A_CORE
Nurse Practitioner Progress note:     HPI:  83yo/M with PMh CAD s/p stents in LAD, distal RCA, Crohn's disease on Stelara s/p sigmoidectomy, HTN, hyperlipidemia presented with chest pain. Started yesterday, mainly lower left side of the chest, associated with nausea and some diarrhea, lasted for few hours then got better. He thought he ate something wrong, but pain was severe and he came in to ED. First trop is negative. (13 Sep 2022 10:24)      T(C): 36.7 (09-14-22 @ 08:01), Max: 36.7 (09-14-22 @ 08:01)  HR: 77 (09-14-22 @ 09:35) (75 - 82)  BP: 145/74 (09-14-22 @ 09:35) (129/66 - 165/71)  RR: 16 (09-14-22 @ 09:35) (16 - 19)  SpO2: 98% (09-14-22 @ 09:35) (95% - 99%)  Wt(kg): --    PHYSICAL EXAM:  Neurologic: Non-focal, AxOx3.  No neuro deficits  Vascular: Peripheral pulses palpable 2+ bilaterally  Procedure Site: Rt. groin Mynxx closure device site benign soft no bleeding no hematoma +1 PP    PROCEDURE RESULTS:  S/P LHC patent stents       ASSESSMENT/PLAN: 	  83yo/M with PMh CAD s/p stents in LAD, distal RCA, Crohn's disease on Stelara s/p sigmoidectomy, HTN, hyperlipidemia presented with chest pain. Started yesterday, mainly lower left side of the chest, associated with nausea and some diarrhea, lasted for few hours then got better. He thought he ate something wrong, but pain was severe and he came in to ED. First trop is negative. S/P LHC    -VS, labs, diet, activity as per post cath orders  -IV hydration  -Encourage PO fluids  -Continue current medications  -Plan of care D/W pt. and MD  -Post cath instructions reviewed with pt., pt. verbalizes and understands instructions  -Follow-up with attending Nurse Practitioner Progress note:     HPI:  81yo/M with PMh CAD s/p stents in LAD, distal RCA, Crohn's disease on Stelara s/p sigmoidectomy, HTN, hyperlipidemia presented with chest pain. Started yesterday, mainly lower left side of the chest, associated with nausea and some diarrhea, lasted for few hours then got better. He thought he ate something wrong, but pain was severe and he came in to ED. First trop is negative. (13 Sep 2022 10:24)      T(C): 36.7 (09-14-22 @ 08:01), Max: 36.7 (09-14-22 @ 08:01)  HR: 77 (09-14-22 @ 09:35) (75 - 82)  BP: 145/74 (09-14-22 @ 09:35) (129/66 - 165/71)  RR: 16 (09-14-22 @ 09:35) (16 - 19)  SpO2: 98% (09-14-22 @ 09:35) (95% - 99%)  Wt(kg): --    PHYSICAL EXAM:  Neurologic: Non-focal, AxOx3.  No neuro deficits  Vascular: Peripheral pulses palpable 2+ bilaterally  Procedure Site: Rt. groin Mynxx closure device site benign soft no bleeding no hematoma +1 PP    PROCEDURE RESULTS:  S/P C patent stents       ASSESSMENT/PLAN: 	  81yo/M with PMh CAD s/p stents in LAD, distal RCA, Crohn's disease on Stelara s/p sigmoidectomy, HTN, hyperlipidemia presented with chest pain. Started yesterday, mainly lower left side of the chest, associated with nausea and some diarrhea, lasted for few hours then got better. He thought he ate something wrong, but pain was severe and he came in to ED. First trop is negative. S/P LHC    -VS, labs, diet, activity as per post cath orders  -IV hydration pt. hydrated with 0.9% NS @225cc/hr x3 hours   -Encourage PO fluids  -Continue current medications  -Plan of care D/W pt. and MD  -Post cath instructions reviewed with pt., pt. verbalizes and understands instructions  -Follow-up with attending/cardiologist

## 2022-10-25 NOTE — ED PROVIDER NOTE - CROS ED RESP ALL NEG
Patient's Name: Jeannie Albright  MRN: [id number]    YOB: 1978    Date of Service: 10/25/2022   Age: 44 yrs      CHIEF COMPLAINT: Office Visit (Steady neck pain is 4/10)    I had the pleasure of seeing Jeannie Albright in the [facility] outpatient office today for a follow up visit.  I last evaluated her on 5/12/2021. Since that time patient admits to significant improvement from her concussion symptoms. She still has difficulties with numbers, it takes her a minute with her address.Today patient indicates she continues with chronic numbness tightness tingling of bilateral upper extremities as well as both hands and fingers. She has infrequent headaches. The pain severity is 4/10 VNAS on average, 8/10 VNAS at its worst.  The pain is aggravated by hitting her head, random flare ups, doing too much activity during a day resulting in muscle tension and alleviated by 3 advil and large ice pack to neck and back.  Patient has been undergoing chiropractic manipulation twice monthly with some relief.  She has tried topical therapies salon pas.      We reviewed MRI cervical from 3/26/2021 demonstrating early degenerative disc changes mainly at C6-7. There is some mild left foraminal narrowing at C6-7.    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 Her restrictions had been implemented to allow her recovery from her concussions.     REVIEW OF SYSTEMS: Denies any bowel or bladder issues, falls, saddle anesthesia, fevers, or new muscle spasms.    PAST MEDICAL, SOCIAL and FAMILY HISTORY: Reviewed, noted in EPIC, and otherwise unchanged.     ALLERGIES:    ALLERGIES:  No Known Allergies     MEDICATIONS:    Current Outpatient Medications   Medication Sig Dispense Refill   • pregabalin (LYRICA) 25 MG capsule Take 1 capsule by mouth in the morning and 1 capsule in the evening. 30 capsule 0   • docusate sodium, DSS, 100 MG Cap Take 1 capsule by mouth  daily.     • fluticasone-salmeterol 250-50 MCG/DOSE inhaler Inhale 1 puff into the lungs.     • Zinc Chelated 50 MG Tab Take 1 tablet by mouth daily.     • lisdexamfetamine (Vyvanse) 50 MG capsule Take 1 capsule by mouth every morning. Dose increase 12/6/21 30 capsule 0   • albuterol 108 (90 Base) MCG/ACT inhaler Inhale 2 puffs into the lungs every 4 hours as needed for Wheezing. 8.5 g 0   • sumatriptan (Imitrex) 100 MG tablet One at onset of migraine repeat if needed in 2 hours no more than 2 tab in 24 hours. 9 tablet 3   • Ascorbic Acid (VITAMIN C PO) Take 200 mg by mouth daily.     • Vitamin D, Ergocalciferol, 1.25 mg (50,000 units) capsule Take 1 capsule by mouth 1 day a week. 8 capsule 0   • tiZANidine (ZANAFLEX) 4 MG tablet Take 1 tablet by mouth every 8 hours as needed (muscle spasms). 60 tablet 0   • acetaminophen (TYLENOL) 500 MG tablet Take 500 mg by mouth every 6 hours as needed for Pain.     • diclofenac (VOLTAREN) 1 % gel Apply 2 g topically 4 times daily. Apply to the posterior neck and left shoulder. 300 g 0   • omeprazole (PRILOSEC) 20 MG capsule Take 20 mg by mouth daily.        No current facility-administered medications for this visit.        PHYSICAL EXAM:    Blood pressure 118/60, pulse 75, height 5' 2\" (1.575 m), weight 70.9 kg (156 lb 3.2 oz), last menstrual period 01/24/2022, SpO2 98 %. Body mass index is 28.57 kg/m².  General: NAD, well hydrated, well nourished, adult female.  Psych: Mood and affect appropriate.   Eyes: No scleral icterus, EOMI.   Respiratory: Respiratory effort normal, no retractions.   Vascular exam: No significant lower extremity edema, limbs normal temp. Calves are soft and non tender.   Skin: Skin turgor is normal, without erythema.   Neurologic exam:                  Sensation: Intact to light touch and symmetric.             DTR's: 1+ and symmetric in the UEs, 2+ and symmetric in the LEs             UMN Findings: There were no Rosas’s signs. Toes were downgoing. No  ankle clonus.             Motor Exam:                        RUE:   D 5/5    B 5/5    T 5/5    WE 5/5    HI 5/5                        LUE:   D 5/5    B 5/5    T 5/5    WE 5/5    HI 5/5                        RLE:   HF 5/5    KE 5/5    DF 5/5    EHL 5/5    TF 5/5                        LLE:    HF 5/5    KE 5/5    DF 5/5    EHL 5/5    TF 5/5  MSK exam:              Full nonpainful ROM bilat shoulders and hips             Hamstring and hip flexor ROM decreased  Spine exam:   Spinal lordosis and kyphosis within normal range.  No scoliosis  No pelvic obliquity.  Moderate cervical spinal percussion tenderness.  Mild palpation tenderness bilateral C2, C3, C5.   Mild pain with palpation of the greater occipital notch   SI joints non painful to palpation.  Cervical/thoracic/lumbar ROM is mildly restricted  - Standing flexion test.  Pain was moderately exacerbated by facet loading.  Positive cervical Spurling's bilat, greater left than right  - Straight leg raise bilat        ASSESSMENT:  1. Cervical radiculopathy    2. Cervical spondylosis without myelopathy    3. Cervicogenic headache    4. Neck pain        RECOMMENDATIONS:    · I educated her on the anatomy, pathophysiology, and biomechanics of the disease process.  · I reviewed the options for further diagnostic work up in detail.  · I reviewed the conservative and interventional treatment options in detail.  · Continue Advil as needed  · Start tart cherry juice gummies   · Start pregabalin 25 mg twice daily. Take 1st capsule at bedtime if no side effects in the morning take 2nd capsule. Call within 15 days to report medication effectiveness and any side effects.  · Restrictions lifted at work.  · Side effects, risks, and potential benefits of the medications were reviewed in detail.  · Red flags were reviewed and she voiced understanding.  · All questions answered.  · Follow up in the office as needed.      Thank you for allowing me to participate in this patient's  care.    Colby Garcia DO, is my collaborating physician.     BERT Hayes   Physical Medicine & Rehabilitation    I spent a total of 48 minutes on the day of the visit.   which includes preparing to see the patient  by reviewing prior records,  obtaining and reviewing history,  performing a physical exam, counseling the patient, documenting clinical information in the medical record,ordering medications/tests/procedures and coordinating care        negative...

## 2022-11-06 ENCOUNTER — INPATIENT (INPATIENT)
Facility: HOSPITAL | Age: 82
LOS: 2 days | Discharge: HOME | DRG: 65 | End: 2022-11-09
Attending: HOSPITALIST | Admitting: HOSPITALIST
Payer: MEDICARE

## 2022-11-06 ENCOUNTER — TRANSCRIPTION ENCOUNTER (OUTPATIENT)
Age: 82
End: 2022-11-06

## 2022-11-06 VITALS — HEIGHT: 66 IN | WEIGHT: 172.18 LBS

## 2022-11-06 DIAGNOSIS — Z95.5 PRESENCE OF CORONARY ANGIOPLASTY IMPLANT AND GRAFT: Chronic | ICD-10-CM

## 2022-11-06 DIAGNOSIS — Z90.49 ACQUIRED ABSENCE OF OTHER SPECIFIED PARTS OF DIGESTIVE TRACT: Chronic | ICD-10-CM

## 2022-11-06 LAB
ALBUMIN SERPL ELPH-MCNC: 3.5 G/DL — SIGNIFICANT CHANGE UP (ref 3.3–5)
ALP SERPL-CCNC: 73 U/L — SIGNIFICANT CHANGE UP (ref 40–120)
ALT FLD-CCNC: 50 U/L — SIGNIFICANT CHANGE UP (ref 12–78)
ANION GAP SERPL CALC-SCNC: 4 MMOL/L — LOW (ref 5–17)
APTT BLD: 28.9 SEC — SIGNIFICANT CHANGE UP (ref 27.5–35.5)
AST SERPL-CCNC: 35 U/L — SIGNIFICANT CHANGE UP (ref 15–37)
BASOPHILS # BLD AUTO: 0.02 K/UL — SIGNIFICANT CHANGE UP (ref 0–0.2)
BASOPHILS NFR BLD AUTO: 0.2 % — SIGNIFICANT CHANGE UP (ref 0–2)
BILIRUB SERPL-MCNC: 0.5 MG/DL — SIGNIFICANT CHANGE UP (ref 0.2–1.2)
BUN SERPL-MCNC: 16 MG/DL — SIGNIFICANT CHANGE UP (ref 7–23)
CALCIUM SERPL-MCNC: 8.8 MG/DL — SIGNIFICANT CHANGE UP (ref 8.5–10.1)
CHLORIDE SERPL-SCNC: 109 MMOL/L — HIGH (ref 96–108)
CO2 SERPL-SCNC: 26 MMOL/L — SIGNIFICANT CHANGE UP (ref 22–31)
CREAT SERPL-MCNC: 1.04 MG/DL — SIGNIFICANT CHANGE UP (ref 0.5–1.3)
EGFR: 72 ML/MIN/1.73M2 — SIGNIFICANT CHANGE UP
EOSINOPHIL # BLD AUTO: 0.07 K/UL — SIGNIFICANT CHANGE UP (ref 0–0.5)
EOSINOPHIL NFR BLD AUTO: 0.7 % — SIGNIFICANT CHANGE UP (ref 0–6)
FLUAV AG NPH QL: SIGNIFICANT CHANGE UP
FLUBV AG NPH QL: SIGNIFICANT CHANGE UP
GLUCOSE SERPL-MCNC: 113 MG/DL — HIGH (ref 70–99)
HCT VFR BLD CALC: 36.5 % — LOW (ref 39–50)
HGB BLD-MCNC: 12.7 G/DL — LOW (ref 13–17)
IMM GRANULOCYTES NFR BLD AUTO: 0.2 % — SIGNIFICANT CHANGE UP (ref 0–0.9)
INR BLD: 1.07 RATIO — SIGNIFICANT CHANGE UP (ref 0.88–1.16)
LYMPHOCYTES # BLD AUTO: 1.97 K/UL — SIGNIFICANT CHANGE UP (ref 1–3.3)
LYMPHOCYTES # BLD AUTO: 19.2 % — SIGNIFICANT CHANGE UP (ref 13–44)
MCHC RBC-ENTMCNC: 32.1 PG — SIGNIFICANT CHANGE UP (ref 27–34)
MCHC RBC-ENTMCNC: 34.8 GM/DL — SIGNIFICANT CHANGE UP (ref 32–36)
MCV RBC AUTO: 92.2 FL — SIGNIFICANT CHANGE UP (ref 80–100)
MONOCYTES # BLD AUTO: 1.14 K/UL — HIGH (ref 0–0.9)
MONOCYTES NFR BLD AUTO: 11.1 % — SIGNIFICANT CHANGE UP (ref 2–14)
NEUTROPHILS # BLD AUTO: 7.06 K/UL — SIGNIFICANT CHANGE UP (ref 1.8–7.4)
NEUTROPHILS NFR BLD AUTO: 68.6 % — SIGNIFICANT CHANGE UP (ref 43–77)
PLATELET # BLD AUTO: 226 K/UL — SIGNIFICANT CHANGE UP (ref 150–400)
POTASSIUM SERPL-MCNC: 3.8 MMOL/L — SIGNIFICANT CHANGE UP (ref 3.5–5.3)
POTASSIUM SERPL-SCNC: 3.8 MMOL/L — SIGNIFICANT CHANGE UP (ref 3.5–5.3)
PROT SERPL-MCNC: 8.1 GM/DL — SIGNIFICANT CHANGE UP (ref 6–8.3)
PROTHROM AB SERPL-ACNC: 12.4 SEC — SIGNIFICANT CHANGE UP (ref 10.5–13.4)
RBC # BLD: 3.96 M/UL — LOW (ref 4.2–5.8)
RBC # FLD: 13.5 % — SIGNIFICANT CHANGE UP (ref 10.3–14.5)
RSV RNA NPH QL NAA+NON-PROBE: SIGNIFICANT CHANGE UP
SARS-COV-2 RNA SPEC QL NAA+PROBE: SIGNIFICANT CHANGE UP
SODIUM SERPL-SCNC: 139 MMOL/L — SIGNIFICANT CHANGE UP (ref 135–145)
TROPONIN I, HIGH SENSITIVITY RESULT: 19.8 NG/L — SIGNIFICANT CHANGE UP
WBC # BLD: 10.28 K/UL — SIGNIFICANT CHANGE UP (ref 3.8–10.5)
WBC # FLD AUTO: 10.28 K/UL — SIGNIFICANT CHANGE UP (ref 3.8–10.5)

## 2022-11-06 PROCEDURE — 93325 DOPPLER ECHO COLOR FLOW MAPG: CPT

## 2022-11-06 PROCEDURE — 97163 PT EVAL HIGH COMPLEX 45 MIN: CPT | Mod: GP

## 2022-11-06 PROCEDURE — 97116 GAIT TRAINING THERAPY: CPT | Mod: GP

## 2022-11-06 PROCEDURE — 93306 TTE W/DOPPLER COMPLETE: CPT

## 2022-11-06 PROCEDURE — 70544 MR ANGIOGRAPHY HEAD W/O DYE: CPT

## 2022-11-06 PROCEDURE — 93312 ECHO TRANSESOPHAGEAL: CPT

## 2022-11-06 PROCEDURE — 36415 COLL VENOUS BLD VENIPUNCTURE: CPT

## 2022-11-06 PROCEDURE — 71045 X-RAY EXAM CHEST 1 VIEW: CPT | Mod: 26

## 2022-11-06 PROCEDURE — 70547 MR ANGIOGRAPHY NECK W/O DYE: CPT

## 2022-11-06 PROCEDURE — 80048 BASIC METABOLIC PNL TOTAL CA: CPT

## 2022-11-06 PROCEDURE — 93320 DOPPLER ECHO COMPLETE: CPT

## 2022-11-06 PROCEDURE — 80061 LIPID PANEL: CPT

## 2022-11-06 PROCEDURE — 70551 MRI BRAIN STEM W/O DYE: CPT | Mod: MA

## 2022-11-06 PROCEDURE — 70450 CT HEAD/BRAIN W/O DYE: CPT | Mod: 26,MA

## 2022-11-06 PROCEDURE — 99285 EMERGENCY DEPT VISIT HI MDM: CPT

## 2022-11-06 PROCEDURE — 83036 HEMOGLOBIN GLYCOSYLATED A1C: CPT

## 2022-11-06 PROCEDURE — 82962 GLUCOSE BLOOD TEST: CPT

## 2022-11-06 PROCEDURE — 93010 ELECTROCARDIOGRAM REPORT: CPT

## 2022-11-06 RX ORDER — ASPIRIN/CALCIUM CARB/MAGNESIUM 324 MG
81 TABLET ORAL DAILY
Refills: 0 | Status: DISCONTINUED | OUTPATIENT
Start: 2022-11-06 | End: 2022-11-09

## 2022-11-06 RX ORDER — ATORVASTATIN CALCIUM 80 MG/1
1 TABLET, FILM COATED ORAL
Qty: 0 | Refills: 0 | DISCHARGE

## 2022-11-06 RX ORDER — ASPIRIN/CALCIUM CARB/MAGNESIUM 324 MG
325 TABLET ORAL ONCE
Refills: 0 | Status: COMPLETED | OUTPATIENT
Start: 2022-11-06 | End: 2022-11-06

## 2022-11-06 RX ORDER — ASCORBIC ACID 60 MG
500 TABLET,CHEWABLE ORAL DAILY
Refills: 0 | Status: DISCONTINUED | OUTPATIENT
Start: 2022-11-06 | End: 2022-11-09

## 2022-11-06 RX ORDER — ATORVASTATIN CALCIUM 80 MG/1
20 TABLET, FILM COATED ORAL AT BEDTIME
Refills: 0 | Status: DISCONTINUED | OUTPATIENT
Start: 2022-11-06 | End: 2022-11-09

## 2022-11-06 RX ORDER — DIAZEPAM 5 MG
1 TABLET ORAL
Qty: 0 | Refills: 0 | DISCHARGE

## 2022-11-06 RX ORDER — ALPRAZOLAM 0.25 MG
0.25 TABLET ORAL AT BEDTIME
Refills: 0 | Status: DISCONTINUED | OUTPATIENT
Start: 2022-11-06 | End: 2022-11-09

## 2022-11-06 RX ORDER — CLOPIDOGREL BISULFATE 75 MG/1
75 TABLET, FILM COATED ORAL DAILY
Refills: 0 | Status: DISCONTINUED | OUTPATIENT
Start: 2022-11-06 | End: 2022-11-09

## 2022-11-06 RX ORDER — AMLODIPINE BESYLATE 2.5 MG/1
5 TABLET ORAL DAILY
Refills: 0 | Status: DISCONTINUED | OUTPATIENT
Start: 2022-11-06 | End: 2022-11-07

## 2022-11-06 RX ORDER — TRAMADOL HYDROCHLORIDE 50 MG/1
50 TABLET ORAL THREE TIMES A DAY
Refills: 0 | Status: DISCONTINUED | OUTPATIENT
Start: 2022-11-06 | End: 2022-11-09

## 2022-11-06 RX ORDER — LIDOCAINE 4 G/100G
1 CREAM TOPICAL EVERY 24 HOURS
Refills: 0 | Status: DISCONTINUED | OUTPATIENT
Start: 2022-11-06 | End: 2022-11-09

## 2022-11-06 RX ORDER — CLOPIDOGREL BISULFATE 75 MG/1
1 TABLET, FILM COATED ORAL
Qty: 0 | Refills: 0 | DISCHARGE

## 2022-11-06 RX ORDER — AMLODIPINE BESYLATE 2.5 MG/1
1 TABLET ORAL
Qty: 0 | Refills: 0 | DISCHARGE

## 2022-11-06 RX ORDER — CHOLECALCIFEROL (VITAMIN D3) 125 MCG
1 CAPSULE ORAL
Qty: 0 | Refills: 0 | DISCHARGE

## 2022-11-06 RX ORDER — PANTOPRAZOLE SODIUM 20 MG/1
40 TABLET, DELAYED RELEASE ORAL
Refills: 0 | Status: DISCONTINUED | OUTPATIENT
Start: 2022-11-06 | End: 2022-11-09

## 2022-11-06 RX ORDER — LABETALOL HCL 100 MG
400 TABLET ORAL
Refills: 0 | Status: DISCONTINUED | OUTPATIENT
Start: 2022-11-06 | End: 2022-11-08

## 2022-11-06 RX ORDER — LANOLIN ALCOHOL/MO/W.PET/CERES
5 CREAM (GRAM) TOPICAL AT BEDTIME
Refills: 0 | Status: DISCONTINUED | OUTPATIENT
Start: 2022-11-06 | End: 2022-11-09

## 2022-11-06 RX ADMIN — TRAMADOL HYDROCHLORIDE 50 MILLIGRAM(S): 50 TABLET ORAL at 23:11

## 2022-11-06 RX ADMIN — Medication 400 MILLIGRAM(S): at 23:11

## 2022-11-06 RX ADMIN — Medication 325 MILLIGRAM(S): at 21:31

## 2022-11-06 RX ADMIN — ATORVASTATIN CALCIUM 20 MILLIGRAM(S): 80 TABLET, FILM COATED ORAL at 23:11

## 2022-11-06 NOTE — ED PROVIDER NOTE - NEUROLOGICAL, MLM
Alert and oriented, no focal deficits, no motor or sensory deficits.  Normal speech, CNs II - XII intact, no focal motor/sensory deficits.

## 2022-11-06 NOTE — ED PROVIDER NOTE - PROGRESS NOTE DETAILS
TITO Dumont MD:  CT Code Stroke Head verbal report: no acute IC pathology. TITO Dumont MD:  Paging Neuro on call. C MD Tim:  NIHSS = 0: pt is NOT a t-PA candidate.  Paging Neuro on call. TITO Dumont MD:  Dr. Freire aware of tele Observation status, + PHANI.

## 2022-11-06 NOTE — H&P ADULT - NSHPLABSRESULTS_GEN_ALL_CORE
COMPARISON EXAMINATION: None.  HEAD CT:  VENTRICLES AND SULCI: Ventricles and sulci are unremarkable for patient   age.  INTRA-AXIAL: No intracranial mass, acute hemorrhage, or midline shift is   present. There is non-specific decreased attenuation in the white matter   likely microvascular disease.  EXTRA-AXIAL: No extra-axial fluid collection is present.  INTRACRANIAL HEMORRHAGE: None.    VISUALIZED SINUSES: No air-fluid levels are identified.  VISUALIZED MASTOIDS:  Clear.  CALVARIUM:  Intact.  MISCELLANEOUS:  Bilateral cataract surgery.    SOFT TISSUES: Unremarkable.  BONES: Unremarkable.    IMPRESSION:  HEAD CT: Mild volume loss, microvascular disease, no acute hemorrhage or   midline shift.
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
## 2022-11-06 NOTE — ED PROVIDER NOTE - MUSCULOSKELETAL, MLM
Spine appears normal, range of motion is not limited, no muscle or joint tenderness.  ARREOLA x 4, no ofcal tender.

## 2022-11-06 NOTE — ED ADULT NURSE NOTE - OBJECTIVE STATEMENT
Pt presents to ED c/o Pt presents to ED c/o dizziness since yesterday. then one episode of confusion today, unable to recognize wife. NIH score 0 in ED.

## 2022-11-06 NOTE — H&P ADULT - CRANIAL NERVE
normal fluent speech  tongue midline  no facial asymmetry normal fluent speech  tongue midline  no facial asymmetry  VF by confrontation w/o deficit

## 2022-11-06 NOTE — ED PROVIDER NOTE - CONSTITUTIONAL, MLM
normal... Elderly WM, awake, alert, oriented to person, place, time/situation and in no apparent distress.  No respiratory distress.

## 2022-11-06 NOTE — H&P ADULT - MENTAL STATUS
alert and oriented x 3  suspect processing difficulty ; when asked how long did the dizziness last? He repeated that it occurred when he was driving.  Prompted again and he remarked until he got home  Finally answered for about 1 hour

## 2022-11-06 NOTE — ED PROVIDER NOTE - CLINICAL SUMMARY MEDICAL DECISION MAKING FREE TEXT BOX
82-year-old white male, PMH includes hypertension and CAD with coronary stents, brought in by private car via wife regarding episode "dizziness" onset ~ 11 AM followed by episode early afternoon not recognizing his wife & GARIBAY.  Code Strok initiated upon ED arrival.  Plan: Code Stroke: CT head, stroke labs, EKG, NIHSS, dysphagia screen, monitor, observe, reassess.  NIHSS = 0: pt is NOT a t-PA candidate. 82-year-old white male, PMH includes hypertension and CAD with coronary stents, brought in by private car via wife regarding episode "dizziness" onset ~ 11 AM followed by episode early afternoon not recognizing his wife & GARIBAY.  Code Stroke initiated upon ED arrival.  Plan: Code Stroke: CT head, stroke labs, EKG, NIHSS, dysphagia screen, monitor, observe, reassess.  NIHSS = 0: pt is NOT a t-PA candidate.

## 2022-11-06 NOTE — ED PROVIDER NOTE - SKIN, MLM
Skin normal color for race, warm, dry and intact. No evidence of rash.  No tactile warmth nor diaphoresis.

## 2022-11-06 NOTE — ED ADULT TRIAGE NOTE - CHIEF COMPLAINT QUOTE
patient ambulatory to ED with wife c/o dizziness.  patient reports episode of dizziness, lightheadedness while driving at 11 AM.  still c/o dizziness.  wife notes episode of confusion about 1 hour ago where patient did not recognize wife and was asking her where she was - lasted about 1 min.  on plavix.  hx HTN, stents.  .  ANI Beatty to triage for eval, Code Stroke called at 1642.  BEFAST +.

## 2022-11-06 NOTE — PATIENT PROFILE ADULT - FALL HARM RISK - HARM RISK INTERVENTIONS

## 2022-11-06 NOTE — PHARMACOTHERAPY INTERVENTION NOTE - COMMENTS
Medication history complete. Medications and allergies reviewed with patient and confirmed with . With assistance of wife, Silva at bedside.

## 2022-11-06 NOTE — H&P ADULT - ASSESSMENT
82 year old male w CAD s/p stents, Crohn disease, HTN, chronic low back pain  came to ED ambulatory w wife c/o dizziness    #TIA  dizziness  confusion  processing issue 82 year old male w CAD s/p stents, Crohn disease, HTN, chronic low back pain  came to ED ambulatory w wife c/o dizziness    #TIA  dizziness  confusion  processing issue during interview  memory issue not being able to identify wife  NIHSS 0  can swallow  1. place on observation/telemetry  2. serial trop  3. neuro checks q 4 hrs  4. MRI  5. MRA H+N  6. premedication prior  7. increased statin to 20 mg  8. ECHO  9. neuro      #CAD w stents  recent cath  patent stents  1. asa + plavix DAPT  2. statin      #HTN  1. continue home meds amlodipine, labetalol      #Chronic LBP  1. continue lidoderm 5% as 4% in hosp  2.  "  tramadol      #Insomnia  1. melatonin  2. xanax 0.25  Pt states takes valium but it is not on his list      #VTE  low risk        75 minutes

## 2022-11-06 NOTE — H&P ADULT - NSHPPHYSICALEXAM_GEN_ALL_CORE
Vital Signs Last 24 Hrs  T(C): 36.8 (06 Nov 2022 17:14), Max: 36.8 (06 Nov 2022 17:14)  T(F): 98.3 (06 Nov 2022 17:14), Max: 98.3 (06 Nov 2022 17:14)  HR: 74 (06 Nov 2022 17:14) (74 - 74)  BP: 175/82 (06 Nov 2022 17:14) (175/82 - 175/82)  BP(mean): 109 (06 Nov 2022 17:14) (109 - 109)  RR: 17 (06 Nov 2022 17:14) (17 - 17)  SpO2: 97% (06 Nov 2022 17:14) (97% - 97%)    Parameters below as of 06 Nov 2022 17:14  Patient On (Oxygen Delivery Method): room air

## 2022-11-07 DIAGNOSIS — G45.9 TRANSIENT CEREBRAL ISCHEMIC ATTACK, UNSPECIFIED: ICD-10-CM

## 2022-11-07 LAB
A1C WITH ESTIMATED AVERAGE GLUCOSE RESULT: 5.8 % — HIGH (ref 4–5.6)
ANION GAP SERPL CALC-SCNC: 5 MMOL/L — SIGNIFICANT CHANGE UP (ref 5–17)
BUN SERPL-MCNC: 13 MG/DL — SIGNIFICANT CHANGE UP (ref 7–23)
CALCIUM SERPL-MCNC: 8.8 MG/DL — SIGNIFICANT CHANGE UP (ref 8.5–10.1)
CHLORIDE SERPL-SCNC: 109 MMOL/L — HIGH (ref 96–108)
CHOLEST SERPL-MCNC: 118 MG/DL — SIGNIFICANT CHANGE UP
CO2 SERPL-SCNC: 25 MMOL/L — SIGNIFICANT CHANGE UP (ref 22–31)
CREAT SERPL-MCNC: 0.86 MG/DL — SIGNIFICANT CHANGE UP (ref 0.5–1.3)
EGFR: 86 ML/MIN/1.73M2 — SIGNIFICANT CHANGE UP
ESTIMATED AVERAGE GLUCOSE: 120 MG/DL — HIGH (ref 68–114)
GLUCOSE BLDC GLUCOMTR-MCNC: 132 MG/DL — HIGH (ref 70–99)
GLUCOSE BLDC GLUCOMTR-MCNC: 181 MG/DL — HIGH (ref 70–99)
GLUCOSE SERPL-MCNC: 102 MG/DL — HIGH (ref 70–99)
HDLC SERPL-MCNC: 38 MG/DL — LOW
LIPID PNL WITH DIRECT LDL SERPL: 47 MG/DL — SIGNIFICANT CHANGE UP
NON HDL CHOLESTEROL: 79 MG/DL — SIGNIFICANT CHANGE UP
POTASSIUM SERPL-MCNC: 3.5 MMOL/L — SIGNIFICANT CHANGE UP (ref 3.5–5.3)
POTASSIUM SERPL-SCNC: 3.5 MMOL/L — SIGNIFICANT CHANGE UP (ref 3.5–5.3)
SODIUM SERPL-SCNC: 139 MMOL/L — SIGNIFICANT CHANGE UP (ref 135–145)
TRIGL SERPL-MCNC: 159 MG/DL — HIGH

## 2022-11-07 PROCEDURE — 70544 MR ANGIOGRAPHY HEAD W/O DYE: CPT | Mod: 26,59

## 2022-11-07 PROCEDURE — 70551 MRI BRAIN STEM W/O DYE: CPT | Mod: 26

## 2022-11-07 PROCEDURE — 70547 MR ANGIOGRAPHY NECK W/O DYE: CPT | Mod: 26

## 2022-11-07 PROCEDURE — 99232 SBSQ HOSP IP/OBS MODERATE 35: CPT

## 2022-11-07 PROCEDURE — 99223 1ST HOSP IP/OBS HIGH 75: CPT

## 2022-11-07 PROCEDURE — 93306 TTE W/DOPPLER COMPLETE: CPT | Mod: 26

## 2022-11-07 RX ORDER — ENOXAPARIN SODIUM 100 MG/ML
40 INJECTION SUBCUTANEOUS EVERY 24 HOURS
Refills: 0 | Status: DISCONTINUED | OUTPATIENT
Start: 2022-11-07 | End: 2022-11-09

## 2022-11-07 RX ADMIN — ATORVASTATIN CALCIUM 20 MILLIGRAM(S): 80 TABLET, FILM COATED ORAL at 21:03

## 2022-11-07 RX ADMIN — Medication 400 MILLIGRAM(S): at 21:02

## 2022-11-07 RX ADMIN — AMLODIPINE BESYLATE 5 MILLIGRAM(S): 2.5 TABLET ORAL at 10:30

## 2022-11-07 RX ADMIN — Medication 81 MILLIGRAM(S): at 10:29

## 2022-11-07 RX ADMIN — PANTOPRAZOLE SODIUM 40 MILLIGRAM(S): 20 TABLET, DELAYED RELEASE ORAL at 06:19

## 2022-11-07 RX ADMIN — ENOXAPARIN SODIUM 40 MILLIGRAM(S): 100 INJECTION SUBCUTANEOUS at 17:12

## 2022-11-07 RX ADMIN — Medication 0.5 MILLIGRAM(S): at 13:14

## 2022-11-07 RX ADMIN — Medication 0.25 MILLIGRAM(S): at 00:31

## 2022-11-07 RX ADMIN — Medication 500 MILLIGRAM(S): at 10:29

## 2022-11-07 RX ADMIN — Medication 1 TABLET(S): at 10:29

## 2022-11-07 RX ADMIN — Medication 0.25 MILLIGRAM(S): at 21:03

## 2022-11-07 RX ADMIN — Medication 5 MILLIGRAM(S): at 21:02

## 2022-11-07 RX ADMIN — Medication 5 MILLIGRAM(S): at 00:31

## 2022-11-07 RX ADMIN — CLOPIDOGREL BISULFATE 75 MILLIGRAM(S): 75 TABLET, FILM COATED ORAL at 10:29

## 2022-11-07 RX ADMIN — Medication 400 MILLIGRAM(S): at 10:29

## 2022-11-07 NOTE — DISCHARGE NOTE NURSING/CASE MANAGEMENT/SOCIAL WORK - NSDCVIVACCINE_GEN_ALL_CORE_FT
influenza, high-dose, quadrivalent; 14-Sep-2022 18:02; Pam Grubbs (RN); [employer]; [id number] (Exp. Date: 30-Jun-2023); IntraMuscular; Deltoid Left.; 0.7 milliLiter(s); VIS (VIS Published: 06-Aug-2021, VIS Presented: 14-Sep-2022);
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
## 2022-11-07 NOTE — PHYSICAL THERAPY INITIAL EVALUATION ADULT - PLANNED THERAPY INTERVENTIONS, PT EVAL
Eval, amb, transfers, bed mobility, stair training  x 15'. Pt left in bed with all observation section equipment/material intact and bed alarm activated. See above for details, no goals set, and D/C PT.

## 2022-11-07 NOTE — SWALLOW BEDSIDE ASSESSMENT ADULT - SWALLOW EVAL: CRITERIA FOR SKILLED INTERVENTION MET
DO NOT FEEL THAT ACUTE SPEECH PATHOLOGY FOLLOW UP WOULD CHANGE CLINICAL MANAGEMENT/OUTCOME AT THIS TIME WHILE IN HOSPITAL. PT'S SPEECH-LANGUAGE ABILITIES AND OROPHARYNGEAL SWALLOWING ABILITIES APPEARED TO BE FUNCTIONAL/AT USUAL STATE. GIVEN ABOVE, THIS SERVICE WILL NOT ACTIVELY FOLLOW. RECONSULT PRN SHOULD STATUS CHANGE AND CONDITION WARRANT.
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
## 2022-11-07 NOTE — SWALLOW BEDSIDE ASSESSMENT ADULT - SWALLOW EVAL: PROGNOSIS
2) Pt exhibits functional Oropharyngeal Swallowing abilities for age. NO behavioral aspiration signs exhibited. Odynophagia denied. No change in O2 sats noted on exam.
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
## 2022-11-07 NOTE — SWALLOW BEDSIDE ASSESSMENT ADULT - NS SPL SWALLOW CLINIC TRIAL FT
Pt exhibited functional Oropharyngeal Swallowing abilities for age on exam. NO behavioral aspiration signs exhibited. Odynophagia denied. No change in O2 sats noted on exam.

## 2022-11-07 NOTE — DISCHARGE NOTE NURSING/CASE MANAGEMENT/SOCIAL WORK - PATIENT PORTAL LINK FT
You can access the FollowMyHealth Patient Portal offered by [facility] by registering at the following website: [email or web address]. By joining [facility]’s FollowMyHealth portal, you will also be able to view your health information using other applications (apps) compatible with our system.

## 2022-11-07 NOTE — PHYSICAL THERAPY INITIAL EVALUATION ADULT - REHAB POTENTIAL, PT EVAL
Pt independent with amb, transfers, bed mobility, and stair mobility and is not a skilled acute care setting PT candidate. Pt can amb with floor care, D/C PT.

## 2022-11-07 NOTE — SWALLOW BEDSIDE ASSESSMENT ADULT - SLP GENERAL OBSERVATIONS
The pt is alert and interactive. His receptive language abilities were relatively preserved and he was able to verbalize during communicative probes without evidence of a primary motor speech or primary linguistic pathology. In conversation, pt's verbalizations were intelligible, fluent, linguistically intact and contextually appropriate. He was oriented to name/place/date/president. Pt was able to recall names of family members. Pt's wife was at chairside who did report noticing her  exhibit transient confusion PTH but feels that pt's communicative integrity is currently back to usual state
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
## 2022-11-07 NOTE — SWALLOW BEDSIDE ASSESSMENT ADULT - SWALLOW EVAL: RECOMMENDED DIET
SUGGEST A REGULAR TEXTURE DIET WITH THIN LIQUID CONSISTENCIES AS THE PATIENT APPEARED CLINICALLY TOLERANT OF THESE FOOD CONSISTENCIES FROM AN OROPHARYNGEAL SWALLOWING PERSPECTIVE ON EXAM.

## 2022-11-08 PROCEDURE — 99232 SBSQ HOSP IP/OBS MODERATE 35: CPT

## 2022-11-08 RX ORDER — LABETALOL HCL 100 MG
300 TABLET ORAL
Refills: 0 | Status: DISCONTINUED | OUTPATIENT
Start: 2022-11-08 | End: 2022-11-09

## 2022-11-08 RX ADMIN — Medication 300 MILLIGRAM(S): at 11:39

## 2022-11-08 RX ADMIN — ENOXAPARIN SODIUM 40 MILLIGRAM(S): 100 INJECTION SUBCUTANEOUS at 18:22

## 2022-11-08 RX ADMIN — PANTOPRAZOLE SODIUM 40 MILLIGRAM(S): 20 TABLET, DELAYED RELEASE ORAL at 06:23

## 2022-11-08 RX ADMIN — Medication 0.25 MILLIGRAM(S): at 21:24

## 2022-11-08 RX ADMIN — ATORVASTATIN CALCIUM 20 MILLIGRAM(S): 80 TABLET, FILM COATED ORAL at 21:25

## 2022-11-08 RX ADMIN — Medication 5 MILLIGRAM(S): at 21:24

## 2022-11-08 RX ADMIN — Medication 300 MILLIGRAM(S): at 21:24

## 2022-11-08 NOTE — PROGRESS NOTE ADULT - SUBJECTIVE AND OBJECTIVE BOX
Pt has no complaints, sitting in chair,     was seen by cardio Dr. Smtih possible HODA plan      ROS: As above, other ROS Negative    MEDICATIONS  (STANDING):  ALPRAZolam 0.25 milliGRAM(s) Oral at bedtime  ascorbic acid 500 milliGRAM(s) Oral daily  aspirin enteric coated 81 milliGRAM(s) Oral daily  atorvastatin 20 milliGRAM(s) Oral at bedtime  clopidogrel Tablet 75 milliGRAM(s) Oral daily  enoxaparin Injectable 40 milliGRAM(s) SubCutaneous every 24 hours  labetalol 300 milliGRAM(s) Oral two times a day  melatonin 5 milliGRAM(s) Oral at bedtime  multivitamin 1 Tablet(s) Oral daily  pantoprazole    Tablet 40 milliGRAM(s) Oral before breakfast      Vital Signs Last 24 Hrs  T(C): 36.9 (08 Nov 2022 08:00), Max: 37 (07 Nov 2022 20:08)  T(F): 98.4 (08 Nov 2022 08:00), Max: 98.6 (07 Nov 2022 20:08)  HR: 79 (08 Nov 2022 08:00) (79 - 90)  BP: 147/67 (08 Nov 2022 08:00) (112/58 - 147/67)  BP(mean): --  RR: 17 (08 Nov 2022 08:00) (16 - 18)  SpO2: 97% (08 Nov 2022 08:00) (95% - 97%)    Parameters below as of 08 Nov 2022 08:00  Patient On (Oxygen Delivery Method): room air                              12.7   10.28 )-----------( 226      ( 06 Nov 2022 17:03 )             36.5     11-07    139  |  109<H>  |  13  ----------------------------<  102<H>  3.5   |  25  |  0.86    Ca    8.8      07 Nov 2022 07:34    TPro  8.1  /  Alb  3.5  /  TBili  0.5  /  DBili  x   /  AST  35  /  ALT  50  /  AlkPhos  73  11-06      11-07 Chol 118 LDL -- HDL 38<L> Trig 159<H>    Radiology report:  - CT Head  - MRI brain  - MRA brain/carotids  - EEG Pt has no complaints, sitting in chair,     was seen by cardio Dr. Smith possible HODA plan    ROS: As above, other ROS Negative    MEDICATIONS  (STANDING):  ALPRAZolam 0.25 milliGRAM(s) Oral at bedtime  ascorbic acid 500 milliGRAM(s) Oral daily  aspirin enteric coated 81 milliGRAM(s) Oral daily  atorvastatin 20 milliGRAM(s) Oral at bedtime  clopidogrel Tablet 75 milliGRAM(s) Oral daily  enoxaparin Injectable 40 milliGRAM(s) SubCutaneous every 24 hours  labetalol 300 milliGRAM(s) Oral two times a day  melatonin 5 milliGRAM(s) Oral at bedtime  multivitamin 1 Tablet(s) Oral daily  pantoprazole    Tablet 40 milliGRAM(s) Oral before breakfast      Vital Signs Last 24 Hrs  T(C): 36.9 (08 Nov 2022 08:00), Max: 37 (07 Nov 2022 20:08)  T(F): 98.4 (08 Nov 2022 08:00), Max: 98.6 (07 Nov 2022 20:08)  HR: 79 (08 Nov 2022 08:00) (79 - 90)  BP: 147/67 (08 Nov 2022 08:00) (112/58 - 147/67)  BP(mean): --  RR: 17 (08 Nov 2022 08:00) (16 - 18)  SpO2: 97% (08 Nov 2022 08:00) (95% - 97%)    Parameters below as of 08 Nov 2022 08:00  Patient On (Oxygen Delivery Method): room air    Gen exam:  Normocephalic, in no distress, awake and alert.  HEENT: PERRLA, EOMI, decreased hearing  Neck: Supple.    Neurological exam:  HF: A x O x 3. Appropriately interactive, normal affect. Speech fluent, No Aphasia or paraphasic errors. Naming /repetition intact   CN: BÁRBARA, EOMI, VFF, facial sensation normal, no NLFD, [place], tongue midline, Palate moves equally, SCM equal bilaterally  Motor: No pronator drift, Strength 5/5 in all 4 ext, normal bulk- tone, no tremor/rigidity.    Sens: Intact to light touch    Reflexes: Symmetric and normal, downgoing toes b/l  Coord:  No FNFA, dysmetria, ELAINE intact   Gait/Balance: Satble    NIHSS -0               12.7   10.28 )-----------( 226      ( 06 Nov 2022 17:03 )             36.5     11-07    139  |  109<H>  |  13  ----------------------------<  102<H>  3.5   |  25  |  0.86    Ca    8.8      07 Nov 2022 07:34    TPro  8.1  /  Alb  3.5  /  TBili  0.5  /  DBili  x   /  AST  35  /  ALT  50  /  AlkPhos  73  11-06 11-07 Chol 118 LDL -- HDL 38<L> Trig 159<H>    Radiology report:  < from: MR Head No Cont (11.07.22 @ 14:00) >  1.  RIGHT CAROTID NECK CIRCULATION:   Intact.    2.  LEFT CAROTID NECK CIRCULATION:    Intact.    3.  VERTEBRAL NECK CIRCULATION:   Intact    4.  ANTERIOR INTRACRANIAL CIRCULATION:     Intact.    5.  POSTERIOR INTRACRANIAL CIRCULATION:   Intact.    6.  BRAIN:  Tiny foci of acute infarction including multiple locations   within the left cerebral hemisphere with a parasagittal anterior to   posterior orientation suggesting low-flow/watershed mechanism. An   additional acute infarction is found in the right cerebellar hemisphere.     Ischemic white matter disease and atrophy typical for age

## 2022-11-08 NOTE — CONSULT NOTE ADULT - ASSESSMENT
CVA (embolic) with hx. of CAD and HTN, HL, and AS.    Further assessment with HODA and event monitor for PAF.    D/w patient, and staff and Dr. Almendarez

## 2022-11-08 NOTE — CONSULT NOTE ADULT - SUBJECTIVE AND OBJECTIVE BOX
Patient: Berta Cornell Date: 3/9/2020   : 1954 Attending: Blaine Caballero MD   65 year old female        Pre Operative Diagnosis: Severe aortic valve stenosis.  Aneurysm of the ascending aorta.     Post Operative Diagnosis: Same     Procedures Performed:    1.  Aortic valve replacement ([place] Inspiris Resilia , 23 mm bovine pericardial bioprosthesis)  2.  Resection of a sending aortic aneurysm with graft replacement (30 mm Hemashield platinum dacryon graft, supra coronary).  3.  Left femoral artery cannulation (for cardiopulmonary bypass) and arterial repair  4.  Sternal plating     Post-op Days: # 6       Subjective:   Patient has post operative pain controlled with medication  Continues to be in and out of afib - PO Amio/Diltiazem drip per cards  Tolerating PO, Ambulatory  Chest tubes removed  Pacer wires in place    Family at bedside and update given.   Patient seen and examined by Dr Sanches    TELE: SR    Allergies:   ALLERGIES:  No Known Allergies    Vitals:    Vital Last Value 24 Hour Range   Temperature 98.4 °F (36.9 °C) (20 1134) Temp  Min: 97.3 °F (36.3 °C)  Max: 99.3 °F (37.4 °C)   Pulse 91 (20 1134) Pulse  Min: 61  Max: 144   Respiratory 18 (20 1134) Resp  Min: 16  Max: 18   Non-Invasive  Blood Pressure 95/49 (20 1134) BP  Min: 92/66  Max: 117/76   Pulse Oximetry 98 % (20 1134) SpO2  Min: 94 %  Max: 99 %   CVP (!) 14 mmHg (20 0800) No data recorded     Arterial BP      PAP      CO/CI     SVO2       Vital Today Admit   Weight 108.3 kg (238 lb 12.1 oz) (20 0437) Weight: 107.5 kg (237 lb) (20 1254)   Height N/A Height: 5' 3\" (160 cm) (20 1254)   BMI N/A BMI (Calculated): 41.98 (20 1254)       Intake/Output:    No intake/output data recorded.    I/O last 3 completed shifts:  In: 935.2 [P.O.:840; I.V.:95.2]  Out: 802 [Urine:800; Stool:2]    Physical examination:     Body mass index is 42.29 kg/m².  Weight:   Weight    20 0600  03/07/20 0524 03/08/20 0500 03/09/20 0437   Weight: 115.1 kg (253 lb 12 oz) 112.2 kg (247 lb 5.7 oz) 109.3 kg (240 lb 15.4 oz) 108.3 kg (238 lb 12.1 oz)       Head: Normocephalic   Eyes: Sclerae are nonicteric  Neck: No neck mass.  Supple.  Heart sis2 irregular  Lungs: decreased breath sounds  Abdomen: Soft.  Nontender.  No masses are palpable.  Extremities: warm with generalized edema noted  Integumentary: No rash or ulceration.  Psychiatric: Appropriate affect and orientation.  Neurologic: No gross sensory or motor deficit.  Cranial nerves are intact.    Medications/Infusions:  Scheduled:   • WARFARIN - PHARMACIST MONITORED   Does not apply See Admin Instructions   • AMIODarone  400 mg Oral 3 times per day   • bumetanide  1 mg Intravenous BID   • metoPROLOL tartrate  50 mg Oral 2 times per day   • fondaparinux  2.5 mg Subcutaneous Daily   • ferrous sulfate  325 mg Oral Daily with breakfast   • insulin lispro   Subcutaneous TID WC   • famotidine  20 mg Oral 2 times per day   • sertraline  100 mg Oral Daily   • atorvastatin  20 mg Oral Nightly   • oxybutynin  5 mg Oral Daily   • sodium chloride (PF)  2 mL Intracatheter 2 times per day   • aspirin  325 mg Oral Daily       Continuous Infusions:       Laboratory & Imaging:  XR CHEST PA OR AP 1 VIEW   Final Result   1.  Removal of the right IJ introducer and chest tubes without visible pneumothorax.   2.  Mildly prominent cardiac silhouette with mild perihilar interstitial edema.   3.  Subsegmental atelectasis or scarring with improving aeration at the lung bases.      Electronically Signed by: RUFUS HORTA M.D.    Signed on: 3/7/2020 5:23 PM          XR CHEST PA OR AP 1 VIEW   Final Result   Stable appearance of the chest.  Please see report for details.      Electronically Signed by: MG JONES M.D.    Signed on: 3/7/2020 6:56 AM          XR CHEST PA OR AP 1 VIEW   Final Result       Postsurgical changes from aortic valve replacement with no significant  Full note to follow. change in appearance of the heart or lungs compared to the prior study.      Electronically Signed by: YESSICA LANZA M.D.    Signed on: 3/5/2020 8:07 AM          XR CHEST PA OR AP 1 VIEW   Final Result   FINDINGS/IMPRESSION: Portable upright AP view is submitted.  Bandlike opacities along the left lower lung zone are new or increased platelike atelectatic changes.  Retrocardiac opacity persists.  There is right perihilar plate like atelectasis which is    slightly improved.  Cardiac mediastinal enlargement is unchanged.      Postoperative changes from median sternotomy and aortic valve replacement are redemonstrated.  Two mediastinal chest tubes are unchanged in position.  ET tube and enteric tubes have been removed.  Right internal jugular pulmonary arterial catheter    terminates in expected location of the distal main pulmonary artery.  No radiographic findings of pneumothorax.         Electronically Signed by: HOLLAND HOFFMAN D.O.    Signed on: 3/4/2020 9:05 AM          XR CHEST PA OR AP 1 VIEW   Final Result      1.  Lines and tubes as above.  Of note, tip of the endotracheal tube is at the ashly and can be pulled back.      2.  Low lung volumes.  Bandlike atelectasis in the right upper lobe adjacent to the minor fissure.         Electronically Signed by: JAMILA LUCIO M.D.    Signed on: 3/3/2020 2:44 PM              No results displayed because visit has over 200 results.         Impression:  Pre Operative Diagnosis: Severe aortic valve stenosis.  Aneurysm of the ascending aorta.     Post Operative Diagnosis: Same     Procedures Performed:    1.  Aortic valve replacement ([place] Inspiris Resilia , 23 mm bovine pericardial bioprosthesis)  2.  Resection of a sending aortic aneurysm with graft replacement (30 mm Hemashield platinum dacryon graft, supra coronary).  3.  Left femoral artery cannulation (for cardiopulmonary bypass) and arterial repair  4.  Sternal plating     Post-op Days: #6    Plan:  1. O2  protocol  2. Iron supplementation  3. Bumex 1mg BID  4. Echo post op 3/4/20 with EF 45 %  5. PAF post op. PO amiodarone/IV Dilt and betablocker per Cardiology  6. Pharmacy to dose Coumadin starting today  7. Increase therapies    Nadya Larkin PA-C  [phone]  [facility]  Cardiothoracic Surgery           HPI:  82 year old male w CAD s/p stents, Crohn disease, HTN, chronic low back pain  came to ED ambulatory w wife c/o dizziness while driving out to [facility] w his wife he c/o dizziness  +lightheadedness not vertigo; increased when he tried to get out of the car and he indicated he did not feel steady on his feet  Wife noticed he was swerving as he was driving. They turned around and wife drove home. He took a brief nap in his chair after eating lunch. When he awakened, he asked her where his wife was; he did not recognize that she was right in front of him. This confusion lasted several minutes and was +associated w a headache graded 2/10 at the vertex. Had admission here  for chest pain and LHC showed patent stents. MRI confirms stroke.  Cardiology consulted to look for cardiac source of embolus.  Denies chest pain, shortness of breath at rest, dyspnea on exertion, orthopnea, paroxysmal nocturnal dyspnea, claudication, pre-syncope or syncope.    PAST MEDICAL HX  CAD coronary artery disease involving native heart without angina pectoris, unspecified vessel or lesion type   Crohn disease  Diverticulitis   Essential hypertension HTN  Gastritis  LBP low back pain : chronic  pt sees pain eugene meds and epidurals have not helped    PAST SURGICAL HX  S/P cholecystectomy   S/P colon resection sigmoidectomy  S/P coronary artery stent placement.     FAMILY HX  Family history of diabetes mellitus (DM), father  FH: coronary artery disease, father      SOCIAL HX    Nonsmoker  No alcohol  Rides bicycle 1 hr qd  works in the yard    Allergies  contrast (Unknown)  IV Contrast (Unknown)  Originally Entered as [Unknown] reaction to [hair dye] (Unknown)    Home Medications:  amLODIPine 5 mg oral tablet: 1 tab(s) orally once a day (07 Nov 2022 10:03)  ascorbic acid 500 mg oral tablet: 1 tab(s) orally once a day (07 Nov 2022 10:03)  Aspirin Enteric Coated 81 mg oral delayed release tablet: 1 tab(s) orally once a day (06 Nov 2022 22:28)  Biofreeze 4% topical gel: Apply topically to affected area 2 times a day, As Needed (07 Nov 2022 10:03)  CoQ10 300 mg oral capsule: 1 cap(s) orally once a day (06 Nov 2022 22:28)  Flonase 50 mcg/inh nasal spray: 1 spray(s) nasal once a day, As Needed (07 Nov 2022 10:03)  labetalol 200 mg oral tablet: 2 tab(s) orally 2 times a day (06 Nov 2022 22:28)  lidocaine 5% patch: Apply topically to affected area as directed (06 Nov 2022 22:28)  Lipitor 10 mg oral tablet: 1 tab(s) orally once a day (in the morning) (07 Nov 2022 10:03)  Multiple Vitamins oral tablet: 1 tab(s) orally once a day (06 Nov 2022 22:28)  pantoprazole 40 mg oral delayed release tablet: 1 tab(s) orally once a day (in the morning) (06 Nov 2022 22:28)  Plavix 75 mg oral tablet: 1 tab(s) orally once a day (in the morning) (07 Nov 2022 10:03)  potassium gluconate 550 mg oral tablet: 1 tab(s) orally once a day (07 Nov 2022 10:03)  Stelara 45 mg/0.5 mL subcutaneous solution: Inject every two months (06 Nov 2022 22:28)  traMADol 50 mg oral tablet: 1 tab(s) orally 3 times a day, As Needed (06 Nov 2022 22:28)    HOSPITAL MEDICATIONS:   MEDICATIONS  (STANDING):  ALPRAZolam 0.25 milliGRAM(s) Oral at bedtime  ascorbic acid 500 milliGRAM(s) Oral daily  aspirin enteric coated 81 milliGRAM(s) Oral daily  atorvastatin 20 milliGRAM(s) Oral at bedtime  clopidogrel Tablet 75 milliGRAM(s) Oral daily  enoxaparin Injectable 40 milliGRAM(s) SubCutaneous every 24 hours  labetalol 300 milliGRAM(s) Oral two times a day  melatonin 5 milliGRAM(s) Oral at bedtime  multivitamin 1 Tablet(s) Oral daily  pantoprazole    Tablet 40 milliGRAM(s) Oral before breakfast    MEDICATIONS  (PRN):  lidocaine   4% Patch 1 Patch Transdermal every 24 hours PRN pain  traMADol 50 milliGRAM(s) Oral three times a day PRN Moderate Pain (4 - 6)      REVIEW OF SYSTEMS: 13 systems were reviewed and all negative except for comments above.    Vital Signs Last 24 Hrs  T(C): 36.8 (08 Nov 2022 19:56), Max: 37 (08 Nov 2022 13:00)  T(F): 98.2 (08 Nov 2022 19:56), Max: 98.6 (08 Nov 2022 13:00)  HR: 69 (09 Nov 2022 08:40) (64 - 75)  BP: 138/63 (09 Nov 2022 08:40) (121/61 - 151/74)  BP(mean): --  RR: 16 (09 Nov 2022 08:40) (15 - 18)  SpO2: 97% (09 Nov 2022 08:40) (96% - 98%)    Parameters below as of 09 Nov 2022 08:40  Patient On (Oxygen Delivery Method): room air    Daily     Daily I&O's Summary      PHYSICAL EXAM:  Constitutional: NAD, awake and alert, well-developed  HEENT: PERRLA, EOMI,  No oral cyanosis. Oropharynx Clean and Dry.  Neck:  supple,  No JVD, No Thyroid enlargement. No Carotid Bruits bilaterally.  Respiratory: Breath sounds are clear bilaterally, No wheezing, rales or rhonchi  Cardiovascular: NL S1 and S2, RRR, 1-2/6 rivas, no gallops or rubs  Gastrointestinal: Bowel Sounds present, soft  Extremities: No peripheral edema. No clubbing or cyanosis  Vascular: 2+ peripheral pulses in LE   Musculoskeletal: no calf tenderness  Skin: No rashes    LABS: All Labs Reviewed:                        12.7   10.28 )-----------( 226      ( 06 Nov 2022 17:03 )             36.5     07 Nov 2022 07:34    139    |  109    |  13     ----------------------------<  102    3.5     |  25     |  0.86   06 Nov 2022 17:03    139    |  109    |  16     ----------------------------<  113    3.8     |  26     |  1.04     Ca    8.8        07 Nov 2022 07:34  Ca    8.8        06 Nov 2022 17:03    TPro  8.1    /  Alb  3.5    /  TBili  0.5    /  DBili  x      /  AST  35     /  ALT  50     /  AlkPhos  73     06 Nov 2022 17:03              - Troponin: <-19.80  RADIOLOGY:  < from: MR Angio Head No Cont (11.07.22 @ 14:00) >  IMPRESSION:    1.  RIGHT CAROTID NECK CIRCULATION:   Intact.    2.  LEFT CAROTID NECK CIRCULATION:    Intact.    3.  VERTEBRAL NECK CIRCULATION:   Intact    4.  ANTERIOR INTRACRANIAL CIRCULATION:     Intact.    5.  POSTERIOR INTRACRANIAL CIRCULATION:   Intact.    6.  BRAIN:    Tiny foci of acute infarction including multiple locations   within the left cerebral hemisphere with a parasagittal anterior to   posterior orientation suggesting low-flow/watershed mechanism. An   additional acute infarction is found in the right cerebellar hemisphere.     Ischemic white matter disease and atrophy typical for age    < end of copied text >    EKG:  < from: 12 Lead ECG (09.13.22 @ 05:27) >  Diagnosis Line Normal sinus rhythm  Normal ECG  When compared with ECG of 24-FEB-2021 09:14,  No significant change was found  Confirmed by FLOWER RHOADES MD ([id number]) on 9/13/2022 8:23:07 AM    < end of copied text >    ECHO:  < from: TTE Echo Complete w/o Contrast w/ Doppler (11.07.22 @ 09:29) >   Summary     The left ventricle is normal in size, wall thickness, wall motion and   contractility.   Estimated left ventricular ejection fraction is 65-70 %.   Right ventricle systolic function appears mildly reduced. TAPSE is   measuring 1.4 cm.   The aortic valve is well visualized, appears moderately calcified. Valve   opening is restricted.   There appears to be trivial aortic regurgitation.   The RAOUL is 1.4 cm^2, consistent with mild aortic stenosis.   Thereis thickening of both mitral valve leaflets.   Mild mitral annular calcification is present.   Mild (1+) mitral regurgitation is present.   EA reversal of the mitral inflow consistent with reduced compliance of   the   left ventricle.   Normal appearing tricuspid valve structure.   Mild (1+) tricuspid valve regurgitation is present.    < end of copied text >

## 2022-11-09 ENCOUNTER — TRANSCRIPTION ENCOUNTER (OUTPATIENT)
Age: 82
End: 2022-11-09

## 2022-11-09 VITALS
RESPIRATION RATE: 15 BRPM | DIASTOLIC BLOOD PRESSURE: 60 MMHG | OXYGEN SATURATION: 97 % | SYSTOLIC BLOOD PRESSURE: 132 MMHG | HEART RATE: 64 BPM

## 2022-11-09 PROCEDURE — 99239 HOSP IP/OBS DSCHRG MGMT >30: CPT

## 2022-11-09 RX ORDER — AMLODIPINE BESYLATE 2.5 MG/1
1 TABLET ORAL
Qty: 0 | Refills: 0 | DISCHARGE

## 2022-11-09 RX ORDER — LABETALOL HCL 100 MG
1 TABLET ORAL
Qty: 60 | Refills: 0
Start: 2022-11-09 | End: 2022-12-08

## 2022-11-09 RX ORDER — LIDOCAINE 4 G/100G
1 CREAM TOPICAL
Qty: 0 | Refills: 0 | DISCHARGE

## 2022-11-09 RX ORDER — LABETALOL HCL 100 MG
2 TABLET ORAL
Qty: 0 | Refills: 0 | DISCHARGE

## 2022-11-09 RX ADMIN — Medication 500 MILLIGRAM(S): at 09:14

## 2022-11-09 RX ADMIN — Medication 300 MILLIGRAM(S): at 09:16

## 2022-11-09 RX ADMIN — Medication 81 MILLIGRAM(S): at 09:15

## 2022-11-09 RX ADMIN — CLOPIDOGREL BISULFATE 75 MILLIGRAM(S): 75 TABLET, FILM COATED ORAL at 09:15

## 2022-11-09 RX ADMIN — Medication 1 TABLET(S): at 09:14

## 2022-11-09 NOTE — PROCEDURAL SAFETY CHECKLIST WITH OR WITHOUT SEDATION - NSPRESURGSED_GEN_ALL_CORE
"    Outpatient Physical Therapy Ortho Treatment Note  [facility]     Patient Name: Claudette Ray  : 1965  MRN: [id number]  Today's Date: 2018      Visit Date: 2018    Visit Dx:    ICD-10-CM ICD-9-CM   1. Neck pain M54.2 723.1   2. Weakness of right upper extremity R29.898 729.89   3. Right arm pain M79.601 729.5       Patient Active Problem List   Diagnosis   • Conductive hearing loss in right ear   • Sensorineural hearing loss (SNHL) of both ears   • Tobacco abuse disorder   • Neck pain, chronic   • BMI 20.0-20.9, adult   • Chronic post-traumatic headache, not intractable   • Pain of right upper extremity   • Weakness of right arm        Past Medical History:   Diagnosis Date   • Anxiety    • Depressed    • History of occasional ear infections    • PTSD (post-traumatic stress disorder)         Past Surgical History:   Procedure Laterality Date   • EYE SURGERY     • EYE SURGERY Bilateral     X2   • HERNIA REPAIR     • TYMPANOPLASTY Right     x3 /   • TYMPANOPLASTY Right 2017    Procedure: right tympanoplasty, tympanolysis of adhesions, use of the operative microscope, facial nerve monitoring, tragal cartilaginous grafting;  Surgeon: Delfino Francisco MD;  Location: [facility];  Service:    • WRIST SURGERY                               PT Assessment/Plan       18 0800       PT Assessment    Assessment Comments Pt reports trying her \"Max Climber\" machine at home over the weekend and is having increased pain today. With supine and hooklying position today pt was unable to tolerate due to increased pain. Pt did report relief in pain with cervical traction however unable to tolerate positioning for any length of time. Pt continue to rest in R rotation and L sidebend and was able to correct with V/C's. Pt instrcuted to continue to find cervical midline in front of mirror at home. Also added gentle scapular strengthening for HEP. Pt is making slow progress at this time however " "shows slight improvements in ROM, thoracic joint mobility, and decrease in frequency of pain.   -TR     PT Plan    PT Plan Comments Pt last authorized visit, so if she wishes to continue which is recomended she will need request for more visits.   -TR       User Key  (r) = Recorded By, (t) = Taken By, (c) = Cosigned By    Initials Name Provider Type    JAVIER Radha Knowles ELLEN Silva Physical Therapy Assistant                    Exercises       02/05/18 0800          Subjective Comments    Subjective Comments Pt reports trying the \"maxiclimber\" machine over the weekend and feels as though that could have flared her symptoms  -TR      Subjective Pain    Able to rate subjective pain? yes  -TR      Pre-Treatment Pain Level 7  -TR      Post-Treatment Pain Level 7  -TR      Subjective Pain Comment Nech R>L   -TR      Exercise 1    Exercise Name 1 Seated scapular retractions with towel roll behind   -TR      Cueing 1 Verbal;Tactile  -TR      Sets 1 2  -TR      Reps 1 10  -TR      Exercise 2    Exercise Name 2 Standing scapular retractions with 1/2 foam roll  -TR      Cueing 2 Verbal  -TR      Sets 2 2  -TR      Reps 2 10  -TR      Exercise 3    Exercise Name 3 Reclined thoracic towel roll stretch   -TR      Cueing 3 Verbal  -TR      Time (Minutes) 3 2  -TR      Exercise 4    Exercise Name 4 Reviewed finding cervical midline in seated position   -TR        User Key  (r) = Recorded By, (t) = Taken By, (c) = Cosigned By    Initials Name Provider Type    JAVIER Knowles ELLEN Silva Physical Therapy Assistant                        Manual Rx (last 36 hours)      Manual Treatments       02/05/18 0800          Manual Rx 1    Manual Rx 1 Location Cervical paraspinals and B UT,LS in hooklying  -TR      Manual Rx 1 Type STM  -TR      Manual Rx 1 Grade min-mod  -TR      Manual Rx 1 Duration 5  -TR      Manual Rx 2    Manual Rx 2 Location Suboccipital releases with upper cervical traction  -TR      Manual Rx 2 Type started in hooklying " moved to hooklying position due to pain   -TR      Manual Rx 2 Grade min and grade 1 traction  -TR      Manual Rx 2 Duration 5  -TR      Manual Rx 3    Manual Rx 3 Location lower cervical traction in reclined position 30 degrees   -TR      Manual Rx 3 Grade 1-2 sustained  -TR      Manual Rx 3 Duration 5  -TR      Manual Rx 4    Manual Rx 4 Location Thoracic paraspinals and B UT : in prone with pillow under abdomen  -TR      Manual Rx 4 Type STM in prone and with blue ridged foam foller  -TR      Manual Rx 4 Grade min-mod  -TR      Manual Rx 4 Duration 10  -TR      Manual Rx 5    Manual Rx 5 Location Thoracic  in prone with pillow under abdomen  -TR      Manual Rx 5 Type extension mobilization  -TR      Manual Rx 5 Grade 1-2  -TR      Manual Rx 5 Duration 7  -TR      Manual Rx 6    Manual Rx 6 Location --  -TR      Manual Rx 6 Grade --  -TR      Manual Rx 6 Duration --  -TR        User Key  (r) = Recorded By, (t) = Taken By, (c) = Cosigned By    Initials Name Provider Type    TR Radha Silva, PTA Physical Therapy Assistant                PT OP Goals       02/05/18 0800       PT Short Term Goals    STG Date to Achieve 02/18/18  -TR     STG 1 Pt will demonstrate 45 degress or greater cervical rotation.   -TR     STG 1 Progress Ongoing  -TR     STG 2 Pt will report pain no greater than 4-5/10 with household and community activities.   -TR     STG 2 Progress Ongoing  -TR     STG 2 Progress Comments 8/10 most days (at its worst) and 6/10 all the time   -TR     STG 3 Pt will score 15 or less on the NDI.   -TR     STG 3 Progress Ongoing  -TR     Long Term Goals    LTG Date to Achieve 03/20/18  -TR     LTG 1 Pt will demonstrate 65 degress or greater cervical rotaiton in order to be able to drive car.  -TR     LTG 1 Progress Ongoing  -TR     LTG 2 Pt will report pain no greater than 1-2/10 with household and community activities.   -TR     LTG 2 Progress Ongoing  -TR     LTG 3 Pt will demonstrae 40 degress or greater  cerivcal extension and 30 degress or greater cervical flexion.   -TR     LTG 3 Progress Ongoing  -TR     LTG 4 Pt will score 5 or less on NDI.   -TR     LTG 4 Progress Ongoing  -TR     Time Calculation    PT Goal Re-Cert Due Date 02/18/18  -TR       User Key  (r) = Recorded By, (t) = Taken By, (c) = Cosigned By    Initials Name Provider Type    TR Radha Silva PTA Physical Therapy Assistant          Therapy Education  Education Details: Continue current exercises, added standing scapular squeezes and towel stretch in recline position  Given: HEP  Program: New  How Provided: Verbal, Demonstration  Provided to: Patient  Level of Understanding: Verbalized, Demonstrated              Time Calculation:   Start Time: 0800  Stop Time: 0845  Time Calculation (min): 45 min  Total Timed Code Minutes- PT: 45 minute(s)    Therapy Charges for Today     Code Description Service Date Service Provider Modifiers Qty    15566866484 HC PT MANUAL THERAPY EA 15 MIN 2/5/2018 Radha Silva PTA GP 2    27609886799 HC PT THER PROC EA 15 MIN 2/5/2018 Radha Silva PTA GP 1                    Radha Silva PTA  2/5/2018      Present, accurate, and signed

## 2022-11-09 NOTE — DISCHARGE NOTE PROVIDER - CARE PROVIDER_API CALL
Faraz Almendarez)  Cardiovascular Disease; Internal Medicine  [address] [place] [address]  Phone: [phone]  Fax: [phone]  Established Patient  Follow Up Time: 1 week
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
## 2022-11-09 NOTE — DISCHARGE NOTE PROVIDER - NSDCMRMEDTOKEN_GEN_ALL_CORE_FT
ascorbic acid 500 mg oral tablet: 1 tab(s) orally once a day  Aspirin Enteric Coated 81 mg oral delayed release tablet: 1 tab(s) orally once a day  Biofreeze 4% topical gel: Apply topically to affected area 2 times a day, As Needed  CoQ10 300 mg oral capsule: 1 cap(s) orally once a day  Flonase 50 mcg/inh nasal spray: 1 spray(s) nasal once a day, As Needed  labetalol 300 mg oral tablet: 1 tab(s) orally every 12 hours   lidocaine 5% patch: Apply topically to affected area once a day  Lipitor 10 mg oral tablet: 1 tab(s) orally once a day (in the morning)  Multiple Vitamins oral tablet: 1 tab(s) orally once a day  pantoprazole 40 mg oral delayed release tablet: 1 tab(s) orally once a day (in the morning)  Plavix 75 mg oral tablet: 1 tab(s) orally once a day (in the morning)  potassium gluconate 550 mg oral tablet: 1 tab(s) orally once a day  Stelara 45 mg/0.5 mL subcutaneous solution: Inject every two months  traMADol 50 mg oral tablet: 1 tab(s) orally 3 times a day, As Needed

## 2022-11-09 NOTE — PROCEDURAL SAFETY CHECKLIST WITH OR WITHOUT SEDATION - NSPOSTCOMMENTFT_GEN_ALL_CORE
Time out 0800, Anesthesia 0805, Probe In 0807, Bubbles 0816, Probe out 0818 Time out 0800, Anesthesia 0805, Probe In 0807, Bubbles 0816, Probe out 0818, take over 0828

## 2022-11-09 NOTE — DISCHARGE NOTE PROVIDER - NSDCCPCAREPLAN_GEN_ALL_CORE_FT
PRINCIPAL DISCHARGE DIAGNOSIS  Diagnosis: Stroke  Assessment and Plan of Treatment: You had a new stroke. This is suspected to be secondary to your labile blood pressure (episodes of elevated and low blood pressure). Your blood pressure medications were adjusted. Norvasc (amlodapine) was discontinued because of reports that it drops the blood pressure too low. In addition your labetolol dose was decreased to 300mg every 12 hours. Take medications as prescribed.   Take blood pressure twice daily and maintain a log to be reviewed by your primary medical doctor/cardiologist for further adjustments in your blood pressure medications. If you blood pressure is persistently above 140/90, increased the dose of labetolol back to your previously dosing of 400mg every 12 hours.   Continue to maintain low sodium diet (less than 2000mg = 2 grams of daily salt intake).   Maintain adequate oral hydration.   Sometimes strokes can be caused by an abnormal heart rythym. This was not captured during your hospitalization. Howver outpatient heart monitoring should be arranged. Follow up with your cardiologist within the next week to arrange a continuous heart monitor (example: Zio Patch)  Continue to take aspirin, clopidogrel and atorvastatin as prescribed.

## 2022-11-09 NOTE — DISCHARGE NOTE PROVIDER - CARE PROVIDERS DIRECT ADDRESSES
Quality 130: Documentation Of Current Medications In The Medical Record: Current Medications Documented
Quality 111:Pneumonia Vaccination Status For Older Adults: Pneumococcal Vaccination Previously Received
Quality 110: Preventive Care And Screening: Influenza Immunization: Influenza Immunization Administered during Influenza season
Quality 431: Preventive Care And Screening: Unhealthy Alcohol Use - Screening: Patient screened for unhealthy alcohol use using a single question and scores less than 2 times per year
Quality 226: Preventive Care And Screening: Tobacco Use: Screening And Cessation Intervention: Patient screened for tobacco use and is an ex/non-smoker
Quality 131: Pain Assessment And Follow-Up: Pain assessment using a standardized tool is documented as negative, no follow-up plan required
Detail Level: Detailed
[email or web address]
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
## 2022-11-09 NOTE — PROGRESS NOTE ADULT - SUBJECTIVE AND OBJECTIVE BOX
HPI:  82 year old male w CAD s/p stents, Crohn disease, HTN, chronic low back pain  came to ED ambulatory w wife c/o dizziness while driving out to [facility] system w his wife he c/o dizziness  +lightheadedness not vertigo; increased when he tried to get out of the car and he indicated he did not feel steady on his feet  Wife noticed he was swerving as he was driving. They turned around and wife drove home. He took a brief nap in his chair after eating lunch. When he awakened, he asked her where his wife was; he did not recognize that she was right in front of him. This confusion lasted several minutes and was +associated w a headache graded 2/10 at the vertex. Had admission here  for chest pain and LHC showed patent stents. MRI confirms stroke.  Cardiology consulted to look for cardiac source of embolus.  Denies chest pain, shortness of breath at rest, dyspnea on exertion, orthopnea, paroxysmal nocturnal dyspnea, claudication, pre-syncope or syncope.    11/9/22:  HODA done and shows no PFO.  Only athersclerosis of aorta.  no new issues or symptoms.     PAST MEDICAL HX  CAD coronary artery disease involving native heart without angina pectoris, unspecified vessel or lesion type   Crohn disease  Diverticulitis   Essential hypertension HTN  Gastritis  LBP low back pain : chronic  pt sees pain eugene meds and epidurals have not helped    PAST SURGICAL HX  S/P cholecystectomy   S/P colon resection sigmoidectomy  S/P coronary artery stent placement.     FAMILY HX  Family history of diabetes mellitus (DM), father  FH: coronary artery disease, father      SOCIAL HX    Nonsmoker  No alcohol  Rides bicycle 1 hr qd  works in the yard    Allergies  contrast (Unknown)  IV Contrast (Unknown)  Originally Entered as [Unknown] reaction to [hair dye] (Unknown)    Home Medications:  amLODIPine 5 mg oral tablet: 1 tab(s) orally once a day (07 Nov 2022 10:03)  ascorbic acid 500 mg oral tablet: 1 tab(s) orally once a day (07 Nov 2022 10:03)  Aspirin Enteric Coated 81 mg oral delayed release tablet: 1 tab(s) orally once a day (06 Nov 2022 22:28)  Biofreeze 4% topical gel: Apply topically to affected area 2 times a day, As Needed (07 Nov 2022 10:03)  CoQ10 300 mg oral capsule: 1 cap(s) orally once a day (06 Nov 2022 22:28)  Flonase 50 mcg/inh nasal spray: 1 spray(s) nasal once a day, As Needed (07 Nov 2022 10:03)  labetalol 200 mg oral tablet: 2 tab(s) orally 2 times a day (06 Nov 2022 22:28)  lidocaine 5% patch: Apply topically to affected area as directed (06 Nov 2022 22:28)  Lipitor 10 mg oral tablet: 1 tab(s) orally once a day (in the morning) (07 Nov 2022 10:03)  Multiple Vitamins oral tablet: 1 tab(s) orally once a day (06 Nov 2022 22:28)  pantoprazole 40 mg oral delayed release tablet: 1 tab(s) orally once a day (in the morning) (06 Nov 2022 22:28)  Plavix 75 mg oral tablet: 1 tab(s) orally once a day (in the morning) (07 Nov 2022 10:03)  potassium gluconate 550 mg oral tablet: 1 tab(s) orally once a day (07 Nov 2022 10:03)  Stelara 45 mg/0.5 mL subcutaneous solution: Inject every two months (06 Nov 2022 22:28)  traMADol 50 mg oral tablet: 1 tab(s) orally 3 times a day, As Needed (06 Nov 2022 22:28)    MEDICATIONS  (STANDING):  ALPRAZolam 0.25 milliGRAM(s) Oral at bedtime  ascorbic acid 500 milliGRAM(s) Oral daily  aspirin enteric coated 81 milliGRAM(s) Oral daily  atorvastatin 20 milliGRAM(s) Oral at bedtime  clopidogrel Tablet 75 milliGRAM(s) Oral daily  enoxaparin Injectable 40 milliGRAM(s) SubCutaneous every 24 hours  labetalol 300 milliGRAM(s) Oral two times a day  melatonin 5 milliGRAM(s) Oral at bedtime  multivitamin 1 Tablet(s) Oral daily  pantoprazole    Tablet 40 milliGRAM(s) Oral before breakfast    MEDICATIONS  (PRN):  lidocaine   4% Patch 1 Patch Transdermal every 24 hours PRN pain  traMADol 50 milliGRAM(s) Oral three times a day PRN Moderate Pain (4 - 6)      Vital Signs Last 24 Hrs  T(C): 36.8 (08 Nov 2022 19:56), Max: 37 (08 Nov 2022 13:00)  T(F): 98.2 (08 Nov 2022 19:56), Max: 98.6 (08 Nov 2022 13:00)  HR: 64 (09 Nov 2022 08:50) (64 - 75)  BP: 132/60 (09 Nov 2022 08:50) (121/61 - 151/74)  BP(mean): --  RR: 15 (09 Nov 2022 08:50) (15 - 18)  SpO2: 97% (09 Nov 2022 08:50) (96% - 98%)    Parameters below as of 09 Nov 2022 08:50  Patient On (Oxygen Delivery Method): room air        I&O's Detail      Daily     Daily     PHYSICAL EXAM:  Constitutional: NAD, awake and alert, well-developed  HEENT: PERRLA, EOMI,  No oral cyanosis. Oropharynx Clean and Dry.  Neck:  supple,  No JVD, No Thyroid enlargement. No Carotid Bruits bilaterally.  Respiratory: Breath sounds are clear bilaterally, No wheezing, rales or rhonchi  Cardiovascular: NL S1 and S2, RRR, 1-2/6 rivas, no gallops or rubs  Gastrointestinal: Bowel Sounds present, soft  Extremities: No peripheral edema. No clubbing or cyanosis  Vascular: 2+ peripheral pulses in LE   Musculoskeletal: no calf tenderness  Skin: No rashes    LABS: All Labs Reviewed:              Troponin: <-19.80      11-07 Chol 118 LDL -- HDL 38<L> Trig 159<H>    RADIOLOGY:  < from: MR Angio Head No Cont (11.07.22 @ 14:00) >  IMPRESSION:    1.  RIGHT CAROTID NECK CIRCULATION:   Intact.    2.  LEFT CAROTID NECK CIRCULATION:    Intact.    3.  VERTEBRAL NECK CIRCULATION:   Intact    4.  ANTERIOR INTRACRANIAL CIRCULATION:     Intact.    5.  POSTERIOR INTRACRANIAL CIRCULATION:   Intact.    6.  BRAIN:    Tiny foci of acute infarction including multiple locations   within the left cerebral hemisphere with a parasagittal anterior to   posterior orientation suggesting low-flow/watershed mechanism. An   additional acute infarction is found in the right cerebellar hemisphere.     Ischemic white matter disease and atrophy typical for age    < end of copied text >    EKG:  < from: 12 Lead ECG (09.13.22 @ 05:27) >  Diagnosis Line Normal sinus rhythm  Normal ECG  When compared with ECG of 24-FEB-2021 09:14,  No significant change was found  Confirmed by FLOWER RHOADES MD ([id number]) on 9/13/2022 8:23:07 AM    < end of copied text >    ECHO:  < from: TTE Echo Complete w/o Contrast w/ Doppler (11.07.22 @ 09:29) >   Summary     The left ventricle is normal in size, wall thickness, wall motion and   contractility.   Estimated left ventricular ejection fraction is 65-70 %.   Right ventricle systolic function appears mildly reduced. TAPSE is   measuring 1.4 cm.   The aortic valve is well visualized, appears moderately calcified. Valve   opening is restricted.   There appears to be trivial aortic regurgitation.   The RAOUL is 1.4 cm^2, consistent with mild aortic stenosis.   Thereis thickening of both mitral valve leaflets.   Mild mitral annular calcification is present.   Mild (1+) mitral regurgitation is present.   EA reversal of the mitral inflow consistent with reduced compliance of   the   left ventricle.   Normal appearing tricuspid valve structure.   Mild (1+) tricuspid valve regurgitation is present.    < end of copied text >

## 2022-11-16 DIAGNOSIS — G89.29 OTHER CHRONIC PAIN: ICD-10-CM

## 2022-11-16 DIAGNOSIS — I25.10 ATHEROSCLEROTIC HEART DISEASE OF NATIVE CORONARY ARTERY WITHOUT ANGINA PECTORIS: ICD-10-CM

## 2022-11-16 DIAGNOSIS — R40.2440: ICD-10-CM

## 2022-11-16 DIAGNOSIS — R29.700 NIHSS SCORE 0: ICD-10-CM

## 2022-11-16 DIAGNOSIS — Z95.5 PRESENCE OF CORONARY ANGIOPLASTY IMPLANT AND GRAFT: ICD-10-CM

## 2022-11-16 DIAGNOSIS — I10 ESSENTIAL (PRIMARY) HYPERTENSION: ICD-10-CM

## 2022-11-16 DIAGNOSIS — Z90.49 ACQUIRED ABSENCE OF OTHER SPECIFIED PARTS OF DIGESTIVE TRACT: ICD-10-CM

## 2022-11-16 DIAGNOSIS — I63.441 CEREBRAL INFARCTION DUE TO EMBOLISM OF RIGHT CEREBELLAR ARTERY: ICD-10-CM

## 2022-11-16 DIAGNOSIS — K50.90 CROHN'S DISEASE, UNSPECIFIED, WITHOUT COMPLICATIONS: ICD-10-CM

## 2022-11-16 DIAGNOSIS — Z91.041 RADIOGRAPHIC DYE ALLERGY STATUS: ICD-10-CM

## 2022-11-16 DIAGNOSIS — Z79.82 LONG TERM (CURRENT) USE OF ASPIRIN: ICD-10-CM

## 2022-11-16 DIAGNOSIS — K29.70 GASTRITIS, UNSPECIFIED, WITHOUT BLEEDING: ICD-10-CM

## 2022-11-16 DIAGNOSIS — I35.0 NONRHEUMATIC AORTIC (VALVE) STENOSIS: ICD-10-CM

## 2022-11-16 DIAGNOSIS — I48.0 PAROXYSMAL ATRIAL FIBRILLATION: ICD-10-CM

## 2022-11-16 DIAGNOSIS — G47.00 INSOMNIA, UNSPECIFIED: ICD-10-CM

## 2022-11-16 DIAGNOSIS — M54.50 LOW BACK PAIN, UNSPECIFIED: ICD-10-CM

## 2022-11-16 DIAGNOSIS — Z79.02 LONG TERM (CURRENT) USE OF ANTITHROMBOTICS/ANTIPLATELETS: ICD-10-CM

## 2023-01-19 ENCOUNTER — INPATIENT (INPATIENT)
Facility: HOSPITAL | Age: 83
LOS: 2 days | Discharge: HOME | DRG: 387 | End: 2023-01-22
Attending: SURGERY | Admitting: SURGERY
Payer: MEDICARE

## 2023-01-19 VITALS — WEIGHT: 179.9 LBS | HEIGHT: 68 IN

## 2023-01-19 DIAGNOSIS — Z90.49 ACQUIRED ABSENCE OF OTHER SPECIFIED PARTS OF DIGESTIVE TRACT: Chronic | ICD-10-CM

## 2023-01-19 DIAGNOSIS — Z95.5 PRESENCE OF CORONARY ANGIOPLASTY IMPLANT AND GRAFT: Chronic | ICD-10-CM

## 2023-01-19 DIAGNOSIS — K56.609 UNSPECIFIED INTESTINAL OBSTRUCTION, UNSPECIFIED AS TO PARTIAL VERSUS COMPLETE OBSTRUCTION: ICD-10-CM

## 2023-01-19 LAB
ALBUMIN SERPL ELPH-MCNC: 4.2 G/DL — SIGNIFICANT CHANGE UP (ref 3.3–5)
ALP SERPL-CCNC: 80 U/L — SIGNIFICANT CHANGE UP (ref 40–120)
ALT FLD-CCNC: 46 U/L — SIGNIFICANT CHANGE UP (ref 12–78)
ANION GAP SERPL CALC-SCNC: 7 MMOL/L — SIGNIFICANT CHANGE UP (ref 5–17)
AST SERPL-CCNC: 67 U/L — HIGH (ref 15–37)
BASOPHILS # BLD AUTO: 0.05 K/UL — SIGNIFICANT CHANGE UP (ref 0–0.2)
BASOPHILS NFR BLD AUTO: 0.3 % — SIGNIFICANT CHANGE UP (ref 0–2)
BILIRUB SERPL-MCNC: 0.8 MG/DL — SIGNIFICANT CHANGE UP (ref 0.2–1.2)
BUN SERPL-MCNC: 19 MG/DL — SIGNIFICANT CHANGE UP (ref 7–23)
CALCIUM SERPL-MCNC: 10.3 MG/DL — HIGH (ref 8.5–10.1)
CHLORIDE SERPL-SCNC: 103 MMOL/L — SIGNIFICANT CHANGE UP (ref 96–108)
CO2 SERPL-SCNC: 24 MMOL/L — SIGNIFICANT CHANGE UP (ref 22–31)
CREAT SERPL-MCNC: 1.11 MG/DL — SIGNIFICANT CHANGE UP (ref 0.5–1.3)
EGFR: 66 ML/MIN/1.73M2 — SIGNIFICANT CHANGE UP
EOSINOPHIL # BLD AUTO: 0.03 K/UL — SIGNIFICANT CHANGE UP (ref 0–0.5)
EOSINOPHIL NFR BLD AUTO: 0.2 % — SIGNIFICANT CHANGE UP (ref 0–6)
FLUAV AG NPH QL: SIGNIFICANT CHANGE UP
FLUBV AG NPH QL: SIGNIFICANT CHANGE UP
GLUCOSE SERPL-MCNC: 181 MG/DL — HIGH (ref 70–99)
HCT VFR BLD CALC: 45.3 % — SIGNIFICANT CHANGE UP (ref 39–50)
HGB BLD-MCNC: 15.2 G/DL — SIGNIFICANT CHANGE UP (ref 13–17)
IMM GRANULOCYTES NFR BLD AUTO: 0.3 % — SIGNIFICANT CHANGE UP (ref 0–0.9)
LIDOCAIN IGE QN: 270 U/L — SIGNIFICANT CHANGE UP (ref 73–393)
LYMPHOCYTES # BLD AUTO: 1.44 K/UL — SIGNIFICANT CHANGE UP (ref 1–3.3)
LYMPHOCYTES # BLD AUTO: 10 % — LOW (ref 13–44)
MCHC RBC-ENTMCNC: 31.2 PG — SIGNIFICANT CHANGE UP (ref 27–34)
MCHC RBC-ENTMCNC: 33.6 GM/DL — SIGNIFICANT CHANGE UP (ref 32–36)
MCV RBC AUTO: 93 FL — SIGNIFICANT CHANGE UP (ref 80–100)
MONOCYTES # BLD AUTO: 1.03 K/UL — HIGH (ref 0–0.9)
MONOCYTES NFR BLD AUTO: 7.2 % — SIGNIFICANT CHANGE UP (ref 2–14)
NEUTROPHILS # BLD AUTO: 11.76 K/UL — HIGH (ref 1.8–7.4)
NEUTROPHILS NFR BLD AUTO: 82 % — HIGH (ref 43–77)
PLATELET # BLD AUTO: 307 K/UL — SIGNIFICANT CHANGE UP (ref 150–400)
POTASSIUM SERPL-MCNC: 5.3 MMOL/L — SIGNIFICANT CHANGE UP (ref 3.5–5.3)
POTASSIUM SERPL-SCNC: 5.3 MMOL/L — SIGNIFICANT CHANGE UP (ref 3.5–5.3)
PROT SERPL-MCNC: 10.1 GM/DL — HIGH (ref 6–8.3)
RBC # BLD: 4.87 M/UL — SIGNIFICANT CHANGE UP (ref 4.2–5.8)
RBC # FLD: 13.3 % — SIGNIFICANT CHANGE UP (ref 10.3–14.5)
RSV RNA NPH QL NAA+NON-PROBE: SIGNIFICANT CHANGE UP
SARS-COV-2 RNA SPEC QL NAA+PROBE: SIGNIFICANT CHANGE UP
SODIUM SERPL-SCNC: 134 MMOL/L — LOW (ref 135–145)
WBC # BLD: 14.36 K/UL — HIGH (ref 3.8–10.5)
WBC # FLD AUTO: 14.36 K/UL — HIGH (ref 3.8–10.5)

## 2023-01-19 PROCEDURE — 85027 COMPLETE CBC AUTOMATED: CPT

## 2023-01-19 PROCEDURE — 80048 BASIC METABOLIC PNL TOTAL CA: CPT

## 2023-01-19 PROCEDURE — 74018 RADEX ABDOMEN 1 VIEW: CPT

## 2023-01-19 PROCEDURE — 99285 EMERGENCY DEPT VISIT HI MDM: CPT | Mod: FS

## 2023-01-19 PROCEDURE — 85025 COMPLETE CBC W/AUTO DIFF WBC: CPT

## 2023-01-19 PROCEDURE — 74176 CT ABD & PELVIS W/O CONTRAST: CPT | Mod: 26,MA

## 2023-01-19 PROCEDURE — 71045 X-RAY EXAM CHEST 1 VIEW: CPT

## 2023-01-19 PROCEDURE — 80053 COMPREHEN METABOLIC PANEL: CPT

## 2023-01-19 PROCEDURE — C9113: CPT

## 2023-01-19 PROCEDURE — 36415 COLL VENOUS BLD VENIPUNCTURE: CPT

## 2023-01-19 PROCEDURE — 83735 ASSAY OF MAGNESIUM: CPT

## 2023-01-19 PROCEDURE — 84100 ASSAY OF PHOSPHORUS: CPT

## 2023-01-19 PROCEDURE — 93005 ELECTROCARDIOGRAM TRACING: CPT

## 2023-01-19 RX ORDER — MORPHINE SULFATE 50 MG/1
2 CAPSULE, EXTENDED RELEASE ORAL EVERY 4 HOURS
Refills: 0 | Status: DISCONTINUED | OUTPATIENT
Start: 2023-01-19 | End: 2023-01-20

## 2023-01-19 RX ORDER — UBIDECARENONE 100 MG
1 CAPSULE ORAL
Qty: 0 | Refills: 0 | DISCHARGE

## 2023-01-19 RX ORDER — ASCORBIC ACID 60 MG
1 TABLET,CHEWABLE ORAL
Qty: 0 | Refills: 0 | DISCHARGE

## 2023-01-19 RX ORDER — ONDANSETRON 8 MG/1
4 TABLET, FILM COATED ORAL ONCE
Refills: 0 | Status: COMPLETED | OUTPATIENT
Start: 2023-01-19 | End: 2023-01-19

## 2023-01-19 RX ORDER — PANTOPRAZOLE SODIUM 20 MG/1
40 TABLET, DELAYED RELEASE ORAL DAILY
Refills: 0 | Status: DISCONTINUED | OUTPATIENT
Start: 2023-01-19 | End: 2023-01-22

## 2023-01-19 RX ORDER — FLUTICASONE PROPIONATE 50 MCG
1 SPRAY, SUSPENSION NASAL
Qty: 0 | Refills: 0 | DISCHARGE

## 2023-01-19 RX ORDER — ENOXAPARIN SODIUM 100 MG/ML
40 INJECTION SUBCUTANEOUS EVERY 24 HOURS
Refills: 0 | Status: DISCONTINUED | OUTPATIENT
Start: 2023-01-19 | End: 2023-01-22

## 2023-01-19 RX ORDER — PROCHLORPERAZINE MALEATE 5 MG
10 TABLET ORAL EVERY 8 HOURS
Refills: 0 | Status: DISCONTINUED | OUTPATIENT
Start: 2023-01-19 | End: 2023-01-22

## 2023-01-19 RX ORDER — SODIUM CHLORIDE 9 MG/ML
1000 INJECTION, SOLUTION INTRAVENOUS
Refills: 0 | Status: DISCONTINUED | OUTPATIENT
Start: 2023-01-19 | End: 2023-01-21

## 2023-01-19 RX ORDER — ONDANSETRON 8 MG/1
4 TABLET, FILM COATED ORAL EVERY 6 HOURS
Refills: 0 | Status: DISCONTINUED | OUTPATIENT
Start: 2023-01-19 | End: 2023-01-22

## 2023-01-19 RX ORDER — POTASSIUM GLUCONATE 2.5 MEQ
1 TABLET ORAL
Qty: 0 | Refills: 0 | DISCHARGE

## 2023-01-19 RX ORDER — ACETAMINOPHEN 500 MG
750 TABLET ORAL EVERY 6 HOURS
Refills: 0 | Status: COMPLETED | OUTPATIENT
Start: 2023-01-19 | End: 2023-01-20

## 2023-01-19 RX ORDER — MORPHINE SULFATE 50 MG/1
4 CAPSULE, EXTENDED RELEASE ORAL ONCE
Refills: 0 | Status: DISCONTINUED | OUTPATIENT
Start: 2023-01-19 | End: 2023-01-19

## 2023-01-19 RX ORDER — USTEKINUMAB 45 MG/.5ML
0 INJECTION, SOLUTION SUBCUTANEOUS
Qty: 0 | Refills: 0 | DISCHARGE

## 2023-01-19 RX ORDER — LIDOCAINE 4 G/100G
1 CREAM TOPICAL
Qty: 0 | Refills: 0 | DISCHARGE

## 2023-01-19 RX ORDER — SODIUM CHLORIDE 9 MG/ML
1000 INJECTION INTRAMUSCULAR; INTRAVENOUS; SUBCUTANEOUS ONCE
Refills: 0 | Status: COMPLETED | OUTPATIENT
Start: 2023-01-19 | End: 2023-01-19

## 2023-01-19 RX ADMIN — MORPHINE SULFATE 4 MILLIGRAM(S): 50 CAPSULE, EXTENDED RELEASE ORAL at 19:53

## 2023-01-19 RX ADMIN — SODIUM CHLORIDE 2000 MILLILITER(S): 9 INJECTION INTRAMUSCULAR; INTRAVENOUS; SUBCUTANEOUS at 19:53

## 2023-01-19 RX ADMIN — ONDANSETRON 4 MILLIGRAM(S): 8 TABLET, FILM COATED ORAL at 21:43

## 2023-01-19 RX ADMIN — SODIUM CHLORIDE 110 MILLILITER(S): 9 INJECTION, SOLUTION INTRAVENOUS at 23:47

## 2023-01-19 RX ADMIN — ONDANSETRON 4 MILLIGRAM(S): 8 TABLET, FILM COATED ORAL at 23:10

## 2023-01-19 RX ADMIN — ONDANSETRON 4 MILLIGRAM(S): 8 TABLET, FILM COATED ORAL at 19:53

## 2023-01-19 RX ADMIN — MORPHINE SULFATE 4 MILLIGRAM(S): 50 CAPSULE, EXTENDED RELEASE ORAL at 20:39

## 2023-01-19 NOTE — ED STATDOCS - PROGRESS NOTE DETAILS
83 yo male with a PMH of crohn's s/p colectomy (with Dr. Alberts) presents with mid to lower abd pain and n/v since this morning. Pt states it feels like his crohn's flare up. Pt is supposed to take labetalol for his HTN and has not been able to take his medication in the morning or at night.   Mild ttp to the lower periumbilical region. Will check lab, CT, and reeval. -Aníbal toney Spoke with Dr. Alberts who is not currently here. Will speak with Dr. Alcantara and see if he can admit the pt and he will come tomorrow to see pt. Spoke with Dr. Sanders who is aware with pt and admission. -Aníbal Isabel PA-C

## 2023-01-19 NOTE — H&P ADULT - NSHPPHYSICALEXAM_GEN_ALL_CORE
General: NAD  Neck: Supple  Chest: Equal expansion bilaterally  CV: S1S2 Present  Abdomen: Soft, distended, tender to palpation, tympanic  Extremities: Grossly symmetric

## 2023-01-19 NOTE — H&P ADULT - ASSESSMENT
81 YO M with Small Bowel Obstruction    Admit to Surgery: Dr Alcantara  Serial abdominal exam  NPO  NGT to Low suction; flush Q8 with 20 cc water  Monitor NG output  IV Hydration:  Medicine consult  Cardiology consult  GI ppx: Pepcid  DVT ppx: Hep  Dr Alberts is primary surgeon and will resume care of pt tmr    Case discussed with Dr Alcantara

## 2023-01-19 NOTE — H&P ADULT - HISTORY OF PRESENT ILLNESS
Pt is a 81 YO M that is presenting to the ED with a complaint of nausea, vomiting and abdominal pain.    PmHx: H/O Diverticulitis, CAD, HTN, Gastritis  PsHx: Partial Colectomy, S/P Coronary Artery Stent, Lap Cheryl  All: Contrast Pt is a 81 YO M that is presenting to the ED with a complaint of nausea, vomiting and abdominal pain.    PmHx: Crohns', H/O Diverticulitis, CAD, HTN, Gastritis  PsHx: Partial Colectomy, S/P Coronary Artery Stent, Lap Cheryl  All: Contrast Pt is a 83 YO M that is presenting to the ED with a complaint of nausea, vomiting and abdominal pain that began this am. Pt stated that he had bowel movement this morning when he got to the ED. Pt admitted to multiple vomiting episodes since the abdominal pain started. Pt has had a partial colectomy with Dr Dowling in the past and a lap jessica with Dr Alberts in 2017. Pt has had now multiple episodes of SBO that have resolved conservatively..     PmHx: Crohns', H/O Diverticulitis, CAD, HTN, Gastritis  PsHx: Partial Colectomy, S/P Coronary Artery Stent, Lap Jessica  All: Contrast

## 2023-01-19 NOTE — ED STATDOCS - GASTROINTESTINAL, MLM
abdomen soft, non-tender, and non-distended. Bowel sounds present.  + vomitting abdomen soft, non-tender, and non-distended. Bowel sounds present.  + vomiting

## 2023-01-19 NOTE — ED ADULT NURSE NOTE - SUICIDE SCREENING QUESTION 1
Subjective:      Patient ID: Janusz Montgomery Jr. is a 73 y.o. male.    Chief Complaint: Diabetes Mellitus (ov 2/20 [facility] pcp) and Wound Check (left leg)      Janusz Montgomery Jr. is a 73 y.o. malewho presents to the clinic for evaluation and treatment of high risk feet. Janusz Montgomery Jr.   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  The patient's chief complaint is an anterior left leg wound.  One month duration.  Secondary to mild trauma in the presence of edema.  Has attempted self treatment but wound has not healed.  Non painful. Denies nausea, vomiting, fever, chills. History of left foot and ankle ulcers.  This patient has documented high risk feet requiring routine maintenance secondary to peripheral vascular disease.    12/10/2019  Patient relates he has attempted to care for leg ulcer as instructed but wound dimensions have not improved.    12/19/2019 patient relates that he has kept dressing clean dry and intact without itching or discomfort.  He has begun taking antibiotics as prescribed without nausea, vomiting, diarrhea, fever, chills.  No new pedal complaints.    12/31/19: F/u left leg ulceration. Has been in unna boot without issue.     01/07/2020 Patient relates that he has kept dressing clean dry and intact without itching or discomfort. He relates increased callus to posterior heel.  He denies nausea, vomiting, diarrhea, fever, chills.  No new pedal complaints.    01/14/2020  Patient relates that he has kept dressing clean dry and intact without itching however he relates that dressing was slipping and therefore somewhat uncomfortable this week.   He denies nausea, vomiting, diarrhea, fever, chills.  No new pedal complaints.    01/21/2020 patient relates that he has kept dressing clean, dry, intact.  He has no pedal complaints.  He does eyes nausea, vomiting, fever, chills.  He is anxious to get out of Unna boot.    1/29/20: F/u left leg ulceration. Has been wearing tubi . Pt. Is concerned regarding wound appearance.     02/06/2020 patient relates that he has kept dressing clean, dry, intact.  He has no pedal complaints.  He does eyes nausea, vomiting, fever, chills.  He relates that he has a cardiovascular procedure scheduled next week.     02/13/2020 patient relates that he has kept dressing  clean, dry, intact.  He has no pedal complaints.  He denies nausea, vomiting, fever, chills.      02/24/2020 patient relates that he has kept dressing clean, dry, and intact.  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 He denies nausea, vomiting, fever, chills.      PCP: Miguelina Weathers MD    Date Last Seen by PCP:   Chief Complaint   Patient presents with    Diabetes Mellitus     ov 2/20 [facility] pcp    Wound Check     left leg     Current shoe gear: tennis shoe    Hemoglobin A1C   Date Value Ref Range Status   02/11/2020 5.6 4.0 - 5.6 % Final     Comment:     ADA Screening Guidelines:  5.7-6.4%  Consistent with prediabetes  >or=6.5%  Consistent with diabetes  High levels of fetal hemoglobin interfere with the HbA1C  assay. Heterozygous hemoglobin variants (HbS, HgC, etc)do  not significantly interfere with this assay.   However, presence of multiple variants may affect accuracy.     07/11/2016 5.3 4.5 - 6.2 % Final     Comment:     According to ADA guidelines, hemoglobin A1C <7.0% represents  optimal control in non-pregnant diabetic patients.  Different  metrics may apply to specific populations.   Standards of Medical Care in Diabetes - 2016.  For the purpose of screening for the presence of diabetes:  <5.7%     Consistent with the absence of diabetes  5.7-6.4%  Consistent with increasing risk for diabetes   (prediabetes)  >or=6.5%  Consistent with diabetes  Currently no consensus exists for use of hemoglobin A1C  for diagnosis of diabetes for children.     03/31/2015 5.3 4.5 - 6.2 % Final         Current Outpatient Medications on File Prior to Visit   Medication Sig Dispense Refill    acyclovir (ZOVIRAX) 800 MG Tab Take 1 tablet (800 mg total) by mouth 2 (two) times daily. 180 tablet 6    albuterol  (PROVENTIL/VENTOLIN HFA) 90 mcg/actuation inhaler Inhale 2 puffs into the lungs every 6 (six) hours as needed for Wheezing. Rescue 18 g 0    apixaban (ELIQUIS) 5 mg Tab Take 1 tablet (5 mg total) by mouth 2 (two) times daily. 180 tablet 4    artificial tears (ISOPTO TEARS) 0.5 % ophthalmic solution Place 1 drop into both eyes as needed. (Patient taking differently: Place 1 drop into both eyes as needed (for dry eyes). )      atorvastatin (LIPITOR) 40 MG tablet Take 1 tablet (40 mg total) by mouth once daily. Please make a follow up visit with your doctor for more refills of this medication 90 tablet 0    azelastine (ASTELIN) 137 mcg (0.1 %) nasal spray 1 spray (137 mcg total) by Nasal route 2 (two) times daily. 30 mL 2    clopidogrel (PLAVIX) 75 mg tablet Take 1 tablet (75 mg total) by mouth once daily. 8 pills the first day 38 tablet 11    CYANOCOBALAMIN, VITAMIN B-12, (VITAMIN B-12 ORAL) Take 2,500 mcg by mouth once daily.      fluticasone propionate (FLONASE) 50 mcg/actuation nasal spray 1 spray (50 mcg total) by Each Nostril route once daily. 1 Bottle 0    furosemide (LASIX) 20 MG tablet Take 1 tablet (20 mg total) by mouth once daily. 90 tablet 0    gabapentin (NEURONTIN) 300 MG capsule Take 1 capsule (300 mg total) by mouth 2 (two) times daily. 60 capsule 11    melatonin 5 mg Tab Take 10 mg by mouth nightly.      multivitamin (MULTIPLE VITAMINS) per tablet Take 1 tablet by mouth once daily.      nitroGLYCERIN (NITROSTAT) 0.4 MG SL tablet Place 1 tablet (0.4 mg total) under the tongue every 5 (five) minutes as needed for Chest pain. 25 tablet 11    olmesartan (BENICAR) 20 MG tablet Take 1 tablet (20 mg total) by mouth once daily. (replaces ramipril for blood pressure) 90 tablet 1    oxyCODONE-acetaminophen (PERCOCET) 5-325 mg per tablet Take 1 tablet by mouth every 4 to 6 hours as needed for Pain. 20 tablet 0    tamsulosin (FLOMAX) 0.4 mg Cap Take 1 capsule (0.4 mg total) by mouth once daily. 90  "capsule 3    venlafaxine (EFFEXOR) 75 MG tablet Take 2 tablets (150 mg total) by mouth 2 (two) times daily. 360 tablet 0    vitamin D 1000 units Tab Take 1 tablet (1,000 Units total) by mouth once daily.       No current facility-administered medications on file prior to visit.        Allergies   Allergen Reactions    Ciprofloxacin Rash     Diffuse pruritic morbilliform rash developed 3/15/2017 after dose of cipro; previously in 2/2017 he had rash/fevers after initiation of cipro    Zosyn [Piperacillin-Tazobactam] Anaphylaxis     Diffuse pruritic morbilliform rash developed 3/15/2017.  Then, 430am dose on 3/16 and rash worsened with SOB/tachypnea but no hypoxemia.     Bacitracin Itching and Rash     Violaceous rash in area of topical Tx.        Past Surgical History:   Procedure Laterality Date    Cardiac stenting x2      CATARACT EXTRACTION W/  INTRAOCULAR LENS IMPLANT Right 3/29/2016    Dr. Conteh    CATARACT EXTRACTION W/  INTRAOCULAR LENS IMPLANT Left 4/12/2016        EXTERNAL FIXATION TIBIAL FRACTURE Left 07/01/2016    INSERTION OF IMPLANTABLE LOOP RECORDER  04/07/2017    LEFT HEART CATHETERIZATION Left 1/30/2020    Procedure: Left heart cath;  Surgeon: Benjamin Sandhu MD;  Location: [facility] CATH LAB;  Service: Cardiology;  Laterality: Left;  RN PREOP 1/28/20--Pt starting Plavix loading dose today (8pills)- Dr. Sandhu aware.  Pt has a bandaged "non healing area to LLE"--Dr. Sandhu aware.    LEFT HEART CATHETERIZATION Left 2/14/2020    Procedure: Left heart cath, IVUS guided left main / LAD PCI. Noon start, radial approach;  Surgeon: Miguel Angel Brady MD;  Location: [facility] CATH LAB;  Service: Cardiology;  Laterality: Left;  RN PRE OP 2-7-2020  BMI--45.61    ORIF TIBIA FRACTURE Left 07/15/2016    RADIOACTIVE SEED IMPLANTATION OF PROSTATE N/A 8/8/2018    Procedure: INSERTION, RADIOACTIVE SEED, PROSTATE;  Surgeon: Bipin Thompson MD;  Location: [facility] OR [address] [place] [address];  Service: Urology;  " Laterality: N/A;  1 hour    Squamous cell cancer removal x3 with Mohs surgery      TONSILLECTOMY      TOTAL KNEE ARTHROPLASTY  10/2012    trus/bx      ULTRASOUND GUIDANCE  2/14/2020    Procedure: Ultrasound Guidance;  Surgeon: Miguel Angel Brady MD;  Location: [facility] CATH LAB;  Service: Cardiology;;       Family History   Problem Relation Age of Onset    Skin cancer Father     Lung cancer Father     Cancer Father         smoker,     Alzheimer's disease Mother     Hypertension Mother     Cancer Sister         colon, lung cancer     No Known Problems Sister     Cancer Brother         skin cancer, polypectomy     Peripheral vascular disease Unknown     No Known Problems Maternal Aunt     No Known Problems Maternal Uncle     No Known Problems Paternal Aunt     No Known Problems Paternal Uncle     No Known Problems Maternal Grandmother     No Known Problems Maternal Grandfather     No Known Problems Paternal Grandmother     No Known Problems Paternal Grandfather     Melanoma Neg Hx     Psoriasis Neg Hx     Lupus Neg Hx     Eczema Neg Hx     Amblyopia Neg Hx     Blindness Neg Hx     Cataracts Neg Hx     Diabetes Neg Hx     Glaucoma Neg Hx     Macular degeneration Neg Hx     Retinal detachment Neg Hx     Strabismus Neg Hx     Stroke Neg Hx     Thyroid disease Neg Hx     Acne Neg Hx        Social History     Socioeconomic History    Marital status:      Spouse name: Not on file    Number of children: Not on file    Years of education: Not on file    Highest education level: Not on file   Occupational History    Occupation: Retired    Social Needs    Financial resource strain: Not hard at all    Food insecurity:     Worry: Never true     Inability: Never true    Transportation needs:     Medical: No     Non-medical: No   Tobacco Use    Smoking status: Never Smoker    Smokeless tobacco: Never Used   Substance and Sexual Activity    Alcohol use: Yes     Frequency: 2-4 times a  "month     Drinks per session: 3 or 4     Binge frequency: Less than monthly     Comment: occasionally, beer    Drug use: Never    Sexual activity: Yes     Partners: Female   Lifestyle    Physical activity:     Days per week: Not on file     Minutes per session: Not on file    Stress: Not on file   Relationships    Social connections:     Talks on phone: Three times a week     Gets together: Once a week     Attends Hindu service: More than 4 times per year     Active member of club or organization: Yes     Attends meetings of clubs or organizations: More than 4 times per year     Relationship status:    Other Topics Concern    Not on file   Social History Narrative    Not on file       Review of Systems   Constitution: Negative for chills and fever.   Cardiovascular: Negative for claudication and leg swelling.   Respiratory: Negative for cough and shortness of breath.    Skin: Positive for dry skin and poor wound healing. Negative for itching and rash.   Musculoskeletal: Positive for arthritis, joint pain, joint swelling and myalgias. Negative for falls and muscle weakness.   Gastrointestinal: Negative for diarrhea, nausea and vomiting.   Neurological: Positive for paresthesias. Negative for numbness, tremors and weakness.   Psychiatric/Behavioral: Negative for altered mental status and hallucinations.         Objective:       Vitals:    02/24/20 1718   Weight: (!) 153.4 kg (338 lb 3 oz)   Height: 6' 1" (1.854 m)   PainSc:   2       Physical Exam   Constitutional:  Non-toxic appearance. He does not have a sickly appearance. No distress.   Pt. is well-developed, well-nourished, appears stated age, in no acute distress, alert and oriented x 3. No evidence of depression, anxiety, or agitation. Calm, cooperative, and communicative. Appropriate interactions and affect.   Cardiovascular:   Pulses:       Dorsalis pedis pulses are 2+ on the right side, and 2+ on the left side.        Posterior tibial pulses " are 1+ on the right side, and 1+ on the left side.   There is decreased digital hair. Skin is atrophic, slightly hyperpigmented, and pitting edema nora (L>>R)     Pulmonary/Chest: No respiratory distress.   Musculoskeletal:        Right ankle: He exhibits swelling. No tenderness. No lateral malleolus, no medial malleolus, no AITFL, no CF ligament, no head of 5th metatarsal and no proximal fibula tenderness found. Achilles tendon exhibits no pain, no defect and normal Zabala's test results.        Left ankle: He exhibits swelling. No tenderness. No lateral malleolus, no medial malleolus, no AITFL, no CF ligament and no posterior TFL tenderness found. Achilles tendon exhibits no pain, no defect and normal Zabala's test results.        Right foot: There is no tenderness and no bony tenderness.        Left foot: There is no tenderness and no bony tenderness.   Decreased stride, station of gait.  apropulsive toe off.  Increased angle and base of gait.    Patient has hammertoes of digits 2-5 bilateral partially reducible without symptom today.     There is equinus deformity bilateral. There is limitation of dorsiflexion with knees extended and with knees flexed.    Shoes reveals lateral heel counter wear bilateral in athletic shoes.        Lymphadenopathy:   No lymphatic streaking    Negative lymphadenopathy bilateral popliteal fossa and tarsal tunnel.     Neurological:   Throckmorton-Jenn 5.07 monofilament is intact bilateral feet. Sharp/dull sensation is also intact Bilateral feet. Proprioception is grossly intact. Vibratory sensation intact (pt able to sense vibration stop within 3-5 seconds)     Skin: Skin is warm and dry. Abrasion (left anterior leg as pictured, to level of subQ. local edema.  Granular base.  no fluctuance, no crepitus, no purulence) and lesion noted. No bruising, no burn, no laceration and no rash noted. He is not diaphoretic. No cyanosis or erythema. No pallor. Nails show no clubbing.   Ulcer  location: medial left ankle  Signs of infection: none  Drainage: none  Purulence: no  Crepitus/fluctuance: no  Periwound: Reddened  Base: thin epithelial itssue  Depth: skin  Probe to bone: no      Ulcer location: posterior left heel  Signs of infection:none  Drainage:  None  Periwound: intact  Base: thin epithelial with bleeding within layers     Psychiatric: His mood appears not anxious. His affect is not inappropriate. His speech is not slurred. He is not combative. He is communicative. He is attentive.   Nursing note reviewed.    02/24/20 02/13/2020 02/06/20 1/31/20 01/21/20 01/14/20 01/07/20 12/31/19: Epic unable to load pic.     12/19/2019          12/10/19          11/14/19          Assessment:       Encounter Diagnoses   Name Primary?    PVD (peripheral vascular disease) Yes    Venous stasis ulcer of left lower leg with edema of left lower leg          Plan:       Janusz was seen today for diabetes mellitus and wound check.    Diagnoses and all orders for this visit:    PVD (peripheral vascular disease)    Venous stasis ulcer of left lower leg with edema of left lower leg      I counseled the patient on his conditions, their implications and medical management.    The site is cleansed of foreign material as much as possible. The patient is alerted to watch for any signs of infection (redness, pus, pain, increased swelling or fever) and call if such occurs.    Some Wound regression secondary to increased lower extremity edema, without signs of infection    hydrofera blue and Unna boot applied Detailed home care instructions dispensed.     Patient is to elevate legs. When sleeping, place a pillow under lower extremities. When sitting, support the legs so that they are level with the waist.    Follow-up:Patient is to return to the clinic in 1 weeks for follow-up but should call [facility] immediately if any signs of infection, such as fever,  chills, sweats, increased redness or pain.    Short-term goals include maintaining good offloading and minimizing bioburden, promoting granulation and epithelialization to healing.  Long-term goals include keeping the wound healed by good offloading and medical management under the direction of internist.       No

## 2023-01-19 NOTE — ED STATDOCS - CLINICAL SUMMARY MEDICAL DECISION MAKING FREE TEXT BOX
83 yo pt presents with vomit.  DDX = SBO, colitis, chron' s flair    Plan check labs, ct, pain control

## 2023-01-19 NOTE — ED STATDOCS - ATTENDING APP SHARED VISIT CONTRIBUTION OF CARE
I, Darrell Serrano MD,  performed the initial face to face bedside interview with this patient regarding history of present illness, review of symptoms and relevant past medical, social and family history.  I completed an independent physical examination.  I was the initial provider who evaluated this patient.   I personally saw the patient and performed a substantive portion of the visit including all aspects of the medical decision making.  I have signed out the follow up of any pending tests (i.e. labs, radiological studies) to the [facility].  I have communicated the patient’s plan of care and disposition with the [facility].

## 2023-01-19 NOTE — ED STATDOCS - OBJECTIVE STATEMENT
83 yo pt presents to ED for abdominal pain and vomit.  Pt here with wife and has pmh for chron' s, bowel resection and previous SBO.  Per wife pt started with vomiting today.  + abd pain.  + BM.  States pain feels like chron's flair.  No travel, no sick contacts.

## 2023-01-19 NOTE — ED ADULT TRIAGE NOTE - CHIEF COMPLAINT QUOTE
Hx Chron' s Disease, states he is having a flare up. Abdominal pain, nausea, and vomiting started last night.

## 2023-01-19 NOTE — PROVIDER CONTACT NOTE (OTHER) - SITUATION
Pt has a history of coronary stent placent and is on Plavix. Pt currently admitted with an SBO, service aware spoke with Naila

## 2023-01-19 NOTE — PATIENT PROFILE ADULT - FALL HARM RISK - HARM RISK INTERVENTIONS

## 2023-01-20 LAB
ALBUMIN SERPL ELPH-MCNC: 3.5 G/DL — SIGNIFICANT CHANGE UP (ref 3.3–5)
ALP SERPL-CCNC: 62 U/L — SIGNIFICANT CHANGE UP (ref 40–120)
ALT FLD-CCNC: 33 U/L — SIGNIFICANT CHANGE UP (ref 12–78)
ANION GAP SERPL CALC-SCNC: 7 MMOL/L — SIGNIFICANT CHANGE UP (ref 5–17)
AST SERPL-CCNC: 23 U/L — SIGNIFICANT CHANGE UP (ref 15–37)
BASOPHILS # BLD AUTO: 0.07 K/UL — SIGNIFICANT CHANGE UP (ref 0–0.2)
BASOPHILS NFR BLD AUTO: 1 % — SIGNIFICANT CHANGE UP (ref 0–2)
BILIRUB SERPL-MCNC: 1 MG/DL — SIGNIFICANT CHANGE UP (ref 0.2–1.2)
BUN SERPL-MCNC: 22 MG/DL — SIGNIFICANT CHANGE UP (ref 7–23)
CALCIUM SERPL-MCNC: 8.8 MG/DL — SIGNIFICANT CHANGE UP (ref 8.5–10.1)
CHLORIDE SERPL-SCNC: 106 MMOL/L — SIGNIFICANT CHANGE UP (ref 96–108)
CO2 SERPL-SCNC: 25 MMOL/L — SIGNIFICANT CHANGE UP (ref 22–31)
CREAT SERPL-MCNC: 1.06 MG/DL — SIGNIFICANT CHANGE UP (ref 0.5–1.3)
EGFR: 70 ML/MIN/1.73M2 — SIGNIFICANT CHANGE UP
EOSINOPHIL # BLD AUTO: 0 K/UL — SIGNIFICANT CHANGE UP (ref 0–0.5)
EOSINOPHIL NFR BLD AUTO: 0 % — SIGNIFICANT CHANGE UP (ref 0–6)
GLUCOSE SERPL-MCNC: 138 MG/DL — HIGH (ref 70–99)
HCT VFR BLD CALC: 44 % — SIGNIFICANT CHANGE UP (ref 39–50)
HGB BLD-MCNC: 14.7 G/DL — SIGNIFICANT CHANGE UP (ref 13–17)
LYMPHOCYTES # BLD AUTO: 0.6 K/UL — LOW (ref 1–3.3)
LYMPHOCYTES # BLD AUTO: 9 % — LOW (ref 13–44)
MAGNESIUM SERPL-MCNC: 2 MG/DL — SIGNIFICANT CHANGE UP (ref 1.6–2.6)
MANUAL SMEAR VERIFICATION: SIGNIFICANT CHANGE UP
MCHC RBC-ENTMCNC: 31.1 PG — SIGNIFICANT CHANGE UP (ref 27–34)
MCHC RBC-ENTMCNC: 33.4 GM/DL — SIGNIFICANT CHANGE UP (ref 32–36)
MCV RBC AUTO: 93.2 FL — SIGNIFICANT CHANGE UP (ref 80–100)
MONOCYTES # BLD AUTO: 1.2 K/UL — HIGH (ref 0–0.9)
MONOCYTES NFR BLD AUTO: 18 % — HIGH (ref 2–14)
NEUTROPHILS # BLD AUTO: 4.73 K/UL — SIGNIFICANT CHANGE UP (ref 1.8–7.4)
NEUTROPHILS NFR BLD AUTO: 45 % — SIGNIFICANT CHANGE UP (ref 43–77)
NEUTS BAND # BLD: 26 % — HIGH (ref 0–8)
NRBC # BLD: 0 /100 — SIGNIFICANT CHANGE UP (ref 0–0)
NRBC # BLD: SIGNIFICANT CHANGE UP /100 WBCS (ref 0–0)
PHOSPHATE SERPL-MCNC: 4.2 MG/DL — SIGNIFICANT CHANGE UP (ref 2.5–4.5)
PLAT MORPH BLD: NORMAL — SIGNIFICANT CHANGE UP
PLATELET # BLD AUTO: 262 K/UL — SIGNIFICANT CHANGE UP (ref 150–400)
POTASSIUM SERPL-MCNC: 3.8 MMOL/L — SIGNIFICANT CHANGE UP (ref 3.5–5.3)
POTASSIUM SERPL-SCNC: 3.8 MMOL/L — SIGNIFICANT CHANGE UP (ref 3.5–5.3)
PROT SERPL-MCNC: 8.4 GM/DL — HIGH (ref 6–8.3)
RBC # BLD: 4.72 M/UL — SIGNIFICANT CHANGE UP (ref 4.2–5.8)
RBC # FLD: 13.4 % — SIGNIFICANT CHANGE UP (ref 10.3–14.5)
RBC BLD AUTO: NORMAL — SIGNIFICANT CHANGE UP
SODIUM SERPL-SCNC: 138 MMOL/L — SIGNIFICANT CHANGE UP (ref 135–145)
VARIANT LYMPHS # BLD: 1 % — SIGNIFICANT CHANGE UP (ref 0–6)
WBC # BLD: 6.66 K/UL — SIGNIFICANT CHANGE UP (ref 3.8–10.5)
WBC # FLD AUTO: 6.66 K/UL — SIGNIFICANT CHANGE UP (ref 3.8–10.5)

## 2023-01-20 PROCEDURE — 74018 RADEX ABDOMEN 1 VIEW: CPT | Mod: 26

## 2023-01-20 PROCEDURE — 71045 X-RAY EXAM CHEST 1 VIEW: CPT | Mod: 26

## 2023-01-20 PROCEDURE — 99222 1ST HOSP IP/OBS MODERATE 55: CPT

## 2023-01-20 PROCEDURE — 93010 ELECTROCARDIOGRAM REPORT: CPT

## 2023-01-20 RX ORDER — ACETAMINOPHEN 500 MG
1000 TABLET ORAL ONCE
Refills: 0 | Status: COMPLETED | OUTPATIENT
Start: 2023-01-20 | End: 2023-01-20

## 2023-01-20 RX ORDER — MORPHINE SULFATE 50 MG/1
2 CAPSULE, EXTENDED RELEASE ORAL EVERY 4 HOURS
Refills: 0 | Status: DISCONTINUED | OUTPATIENT
Start: 2023-01-20 | End: 2023-01-22

## 2023-01-20 RX ORDER — METOPROLOL TARTRATE 50 MG
5 TABLET ORAL EVERY 6 HOURS
Refills: 0 | Status: DISCONTINUED | OUTPATIENT
Start: 2023-01-20 | End: 2023-01-22

## 2023-01-20 RX ORDER — LANOLIN ALCOHOL/MO/W.PET/CERES
5 CREAM (GRAM) TOPICAL ONCE
Refills: 0 | Status: COMPLETED | OUTPATIENT
Start: 2023-01-20 | End: 2023-01-20

## 2023-01-20 RX ORDER — ACETAMINOPHEN 500 MG
1000 TABLET ORAL ONCE
Refills: 0 | Status: DISCONTINUED | OUTPATIENT
Start: 2023-01-20 | End: 2023-01-22

## 2023-01-20 RX ADMIN — Medication 400 MILLIGRAM(S): at 21:27

## 2023-01-20 RX ADMIN — Medication 750 MILLIGRAM(S): at 04:13

## 2023-01-20 RX ADMIN — Medication 300 MILLIGRAM(S): at 16:16

## 2023-01-20 RX ADMIN — Medication 110 MILLIGRAM(S): at 17:16

## 2023-01-20 RX ADMIN — Medication 750 MILLIGRAM(S): at 16:18

## 2023-01-20 RX ADMIN — MORPHINE SULFATE 2 MILLIGRAM(S): 50 CAPSULE, EXTENDED RELEASE ORAL at 12:44

## 2023-01-20 RX ADMIN — Medication 1000 MILLIGRAM(S): at 21:50

## 2023-01-20 RX ADMIN — ENOXAPARIN SODIUM 40 MILLIGRAM(S): 100 INJECTION SUBCUTANEOUS at 05:18

## 2023-01-20 RX ADMIN — Medication 10 MILLIGRAM(S): at 00:01

## 2023-01-20 RX ADMIN — Medication 110 MILLIGRAM(S): at 13:02

## 2023-01-20 RX ADMIN — Medication 750 MILLIGRAM(S): at 09:50

## 2023-01-20 RX ADMIN — Medication 5 MILLIGRAM(S): at 23:25

## 2023-01-20 RX ADMIN — PANTOPRAZOLE SODIUM 40 MILLIGRAM(S): 20 TABLET, DELAYED RELEASE ORAL at 09:19

## 2023-01-20 RX ADMIN — Medication 110 MILLIGRAM(S): at 23:26

## 2023-01-20 RX ADMIN — Medication 300 MILLIGRAM(S): at 09:50

## 2023-01-20 RX ADMIN — Medication 300 MILLIGRAM(S): at 04:13

## 2023-01-20 RX ADMIN — SODIUM CHLORIDE 110 MILLILITER(S): 9 INJECTION, SOLUTION INTRAVENOUS at 09:02

## 2023-01-20 RX ADMIN — MORPHINE SULFATE 2 MILLIGRAM(S): 50 CAPSULE, EXTENDED RELEASE ORAL at 12:59

## 2023-01-20 RX ADMIN — SODIUM CHLORIDE 110 MILLILITER(S): 9 INJECTION, SOLUTION INTRAVENOUS at 18:14

## 2023-01-20 NOTE — PROGRESS NOTE ADULT - ATTENDING COMMENTS
Patient was seen and examined, modifications and corrections made  I agree with findings and plan    NG tube to low continuous suction  Upper GI series tomorrow

## 2023-01-20 NOTE — CONSULT NOTE ADULT - SUBJECTIVE AND OBJECTIVE BOX
PCP:  Dr Faraz Almendarez    CHIEF COMPLAINT: abd pain associated  with n/v    HISTORY OF THE PRESENT ILLNESS: this is an 83 yo male with PMH: CAD with stents x 4, embolic CVA, on baby asa and Plavix without residuals, ? PAF but does not appear to be on AC, Crohn's, diverticulitis,  HTN, gastritis who presented to  ER yesterday with cc of abdominal pain assoc with nausea and multiple episodes of vomiting, he had a BM in the ER, h/o colectomy and jessica in the past, adm to surgery with SBO.  PT is seen on 2 north, awake, alert, states he had another bm this am, is passing flatus, abd distended but soft, NGT --->LCWS with minimal drainage.  He denies any present nausea, afebrile.  Imaging + for SBO. recent admission in November, 2022 for CVA, had echo: Mild AS, LVEF 65-70% and HODA: no thrombus noted in apical appendage, mild to mod ASHD. We are consulted for medical management      PAST MEDICAL HISTORY: as above    PAST SURGICAL HISTORY: partial colectomy, Cardiac pci with 4 stets placed, lap jessica    FAMILY HISTORY:   father: dec: CAD, Mother; Dec:DM    SOCIAL HISTORY:  no smoking, no alcohol, no drugs    ALLERGIES: IV contrast    HOME MEDS:see med rec    REVIEW OF SYSTEMS:   All 10 systems reviewed in detailed and found to be negative with the exception of what has already been described above    MEDICATIONS  (STANDING):  acetaminophen   IVPB .. 750 milliGRAM(s) IV Intermittent every 6 hours  enoxaparin Injectable 40 milliGRAM(s) SubCutaneous every 24 hours  lactated ringers. 1000 milliLiter(s) (110 mL/Hr) IV Continuous <Continuous>  metoprolol tartrate IVPB 5 milliGRAM(s) IV Intermittent every 6 hours  pantoprazole  Injectable 40 milliGRAM(s) IV Push daily    MEDICATIONS  (PRN):  morphine  - Injectable 2 milliGRAM(s) IV Push every 4 hours PRN Severe Pain (7 - 10)  ondansetron Injectable 4 milliGRAM(s) IV Push every 6 hours PRN Nausea and/or Vomiting  prochlorperazine   Injectable 10 milliGRAM(s) IntraMuscular every 8 hours PRN nausea      VITALS:  T(F): 98 (01-20-23 @ 08:36), Max: 98 (01-20-23 @ 08:36)  HR: 96 (01-20-23 @ 11:32) (80 - 107)  BP: 128/56 (01-20-23 @ 11:32) (128/56 - 194/92)  RR: 18 (01-20-23 @ 08:36) (18 - 19)  SpO2: 95% (01-20-23 @ 08:36) (95% - 98%)  Wt(kg): --    I&O's Summary    19 Jan 2023 07:01  -  20 Jan 2023 07:00  --------------------------------------------------------  IN: 700 mL / OUT: 0 mL / NET: 700 mL    20 Jan 2023 07:01  -  20 Jan 2023 13:32  --------------------------------------------------------  IN: 325 mL / OUT: 900 mL / NET: -575 mL        CAPILLARY BLOOD GLUCOSE      PHYSICAL EXAM:    GENERAL: Comfortable, no acute distress   HEAD:  Normocephalic, atraumatic  EYES: EOMI, PERRLA  HEENT: Moist mucous membranes  NECK: Supple, No JVD  NERVOUS SYSTEM:  Alert & Oriented X3, Motor Strength 5/5 B/L upper and lower extremities  CHEST/LUNG: Clear to auscultation bilaterally  HEART: Regular rate and rhythm  ABDOMEN: Soft, + distention, Bowel sounds present, NGT--->LCWS with scant amt drainage  GENITOURINARY: Voiding, no palpable bladder  EXTREMITIES:   No clubbing, cyanosis, or edema  MUSCULOSKELETAL- No muscle tenderness, no joint tenderness  SKIN-no rash    LABS:                            14.7   6.66  )-----------( 262      ( 20 Jan 2023 08:04 )             44.0     01-20    138  |  106  |  22  ----------------------------<  138<H>  3.8   |  25  |  1.06    Ca    8.8      20 Jan 2023 08:04  Phos  4.2     01-20  Mg     2.0     01-20    TPro  8.4<H>  /  Alb  3.5  /  TBili  1.0  /  DBili  x   /  AST  23  /  ALT  33  /  AlkPhos  62  01-20        LIVER FUNCTIONS - ( 20 Jan 2023 08:04 )  Alb: 3.5 g/dL / Pro: 8.4 gm/dL / ALK PHOS: 62 U/L / ALT: 33 U/L / AST: 23 U/L / GGT: x                   EKG: pending    RADIOLOGY STUDIES:    < from: Xray Abdomen 1 View PORTABLE -Routine (Xray Abdomen 1 View PORTABLE -Routine .) (01.20.23 @ 12:29) >    ACC: [id number] EXAM:  XR ABDOMEN PORTABLE ROUTINE 1V   ORDERED BY: ANGELA HUITRON     PROCEDURE DATE:  01/20/2023          INTERPRETATION:  Follow-up small bowel obstruction    AP supine portable abdomen. Comparison to CT of 1/19/2023.    IMPRESSION: Compared to prior CT there is no gross change in distended   small bowel with a paucity of colonic gas consistent with a persistent   small bowel obstruction.      < from: CT Abdomen and Pelvis No Cont (01.19.23 @ 19:26) >    ACC: [id number] EXAM:  CT ABDOMEN AND PELVIS   ORDERED BY: GARFIELD ANNE     PROCEDURE DATE:  01/19/2023          INTERPRETATION:  CLINICAL INFORMATION: Vomiting. History of Crohn's.   Evaluate for small bowel obstruction    COMPARISON: CT 10/5/2019.    CONTRAST/COMPLICATIONS:  IV Contrast: NONE  Oral Contrast: NONE  Complications: None reported at time of study completion    PROCEDURE:  CT of the Abdomen and Pelvis was performed.  Sagittal and coronal reformats were performed.    FINDINGS:  LOWER CHEST: Coronary artery calcifications. Mitral annular and aortic   valve calcifications.    LIVER: Within normal limits.  BILE DUCTS: Normal caliber.  GALLBLADDER: Cholecystectomy.  SPLEEN: Within normal limits.  PANCREAS: Within normal limits.  ADRENALS: Within normal limits.  KIDNEYS/URETERS: Bilateral nonobstructive renal calculi, as well as a 5   mm stone within the mid right ureter. No hydronephrosis. Bilateral renal   cysts.    BLADDER: Within normal limits.  REPRODUCTIVE ORGANS: Brachytherapy seeds of the prostate.    BOWEL: Small hiatal hernia. Two small bowel-small bowel anastomoses.   Multiple dilated fluid-filled loops of small bowel with transition point   at the level of the distalmost small bowel anastomosis involving the   distal ileum (2, 88). Rectosigmoid anastomosis.  PERITONEUM: No ascites.  VESSELS: Atherosclerotic changes.  RETROPERITONEUM/LYMPH NODES: No lymphadenopathy.  ABDOMINAL WALL: Within normal limits.  BONES: Within normal limits.    IMPRESSION:  1.  Small bowel obstruction with transition point at the distal ileal   small bowel anastomosis.  2.  Bilateral renal calculi and a 5 mm stone within the mid right ureter.   No hydronephrosis.          IMPRESSION: 83 yo male with above pmh a/w:    #Abd pain N/V  #SBO  admitted to surgery  NPO   NGT--->LCWS  pain control MSprn  zofran PRN  IVF's while NPO  monitor return of bowel function    #CAD with stents x 4  asa and plavix on hold per surgery  lopressor IV  cardiac consult:  recent echo/HODA in Nov: Mild AS, LVEF 65-70% and HODA: no thrombus noted in apical appendage, mild to mod ASHD    #HTN  hold amlodipine and labetolol po  cont lopressor IV    #Crohn's disease  Stelara as out pt    # VTE propylaxis    hold chemical prophylaxis    venodynes    amb    Thank you for the consult, will follow           PCP:  Dr Faraz Almendarez    CHIEF COMPLAINT: abd pain associated  with n/v    HISTORY OF THE PRESENT ILLNESS: this is an 81 yo male with PMH: CAD with stents x 4, embolic CVA, on baby asa and Plavix without residuals, ? PAF but does not appear to be on AC, Crohn's, diverticulitis,  HTN, gastritis who presented to  ER yesterday with cc of abdominal pain assoc with nausea and multiple episodes of vomiting, he had a BM in the ER, h/o colectomy and jessica in the past, adm to surgery with SBO.  PT is seen on 2 north, awake, alert, states he had another bm this am, is passing flatus, abd distended but soft, NGT --->LCWS with minimal drainage.  He denies any present nausea, afebrile.  Imaging + for SBO. recent admission in November, 2022 for CVA, had echo: Mild AS, LVEF 65-70% and HODA: no thrombus noted in apical appendage, mild to mod ASHD. We are consulted for medical management      PAST MEDICAL HISTORY: as above    PAST SURGICAL HISTORY: partial colectomy, Cardiac pci with 4 stets placed, lap jessica    FAMILY HISTORY:   father: dec: CAD, Mother; Dec:DM    SOCIAL HISTORY:  no smoking, no alcohol, no drugs    ALLERGIES: IV contrast    HOME MEDS:see med rec    REVIEW OF SYSTEMS:   All 10 systems reviewed in detailed and found to be negative with the exception of what has already been described above    MEDICATIONS  (STANDING):  acetaminophen   IVPB .. 750 milliGRAM(s) IV Intermittent every 6 hours  enoxaparin Injectable 40 milliGRAM(s) SubCutaneous every 24 hours  lactated ringers. 1000 milliLiter(s) (110 mL/Hr) IV Continuous <Continuous>  metoprolol tartrate IVPB 5 milliGRAM(s) IV Intermittent every 6 hours  pantoprazole  Injectable 40 milliGRAM(s) IV Push daily    MEDICATIONS  (PRN):  morphine  - Injectable 2 milliGRAM(s) IV Push every 4 hours PRN Severe Pain (7 - 10)  ondansetron Injectable 4 milliGRAM(s) IV Push every 6 hours PRN Nausea and/or Vomiting  prochlorperazine   Injectable 10 milliGRAM(s) IntraMuscular every 8 hours PRN nausea      VITALS:  T(F): 98 (01-20-23 @ 08:36), Max: 98 (01-20-23 @ 08:36)  HR: 96 (01-20-23 @ 11:32) (80 - 107)  BP: 128/56 (01-20-23 @ 11:32) (128/56 - 194/92)  RR: 18 (01-20-23 @ 08:36) (18 - 19)  SpO2: 95% (01-20-23 @ 08:36) (95% - 98%)  Wt(kg): --    I&O's Summary    19 Jan 2023 07:01  -  20 Jan 2023 07:00  --------------------------------------------------------  IN: 700 mL / OUT: 0 mL / NET: 700 mL    20 Jan 2023 07:01  -  20 Jan 2023 13:32  --------------------------------------------------------  IN: 325 mL / OUT: 900 mL / NET: -575 mL        CAPILLARY BLOOD GLUCOSE      PHYSICAL EXAM:    GENERAL: Comfortable, no acute distress   HEAD:  Normocephalic, atraumatic  EYES: EOMI, PERRLA  HEENT: Moist mucous membranes  NECK: Supple, No JVD  NERVOUS SYSTEM:  Alert & Oriented X3, Motor Strength 5/5 B/L upper and lower extremities  CHEST/LUNG: Clear to auscultation bilaterally  HEART: Regular rate and rhythm  ABDOMEN: Soft, + distention, Bowel sounds present, NGT--->LCWS with scant amt drainage  GENITOURINARY: Voiding, no palpable bladder  EXTREMITIES:   No clubbing, cyanosis, or edema  MUSCULOSKELETAL- No muscle tenderness, no joint tenderness  SKIN-no rash    LABS:                        14.7   6.66  )-----------( 262      ( 20 Jan 2023 08:04 )             44.0     01-20    138  |  106  |  22  ----------------------------<  138<H>  3.8   |  25  |  1.06    Ca    8.8      20 Jan 2023 08:04  Phos  4.2     01-20  Mg     2.0     01-20    TPro  8.4<H>  /  Alb  3.5  /  TBili  1.0  /  DBili  x   /  AST  23  /  ALT  33  /  AlkPhos  62  01-20    LIVER FUNCTIONS - ( 20 Jan 2023 08:04 )  Alb: 3.5 g/dL / Pro: 8.4 gm/dL / ALK PHOS: 62 U/L / ALT: 33 U/L / AST: 23 U/L / GGT: x           EKG: pending    RADIOLOGY STUDIES:    < from: Xray Abdomen 1 View PORTABLE -Routine (Xray Abdomen 1 View PORTABLE -Routine .) (01.20.23 @ 12:29) >    ACC: [id number] EXAM:  XR ABDOMEN PORTABLE ROUTINE 1V   ORDERED BY: ANGELA HUITRON     PROCEDURE DATE:  01/20/2023          INTERPRETATION:  Follow-up small bowel obstruction    AP supine portable abdomen. Comparison to CT of 1/19/2023.    IMPRESSION: Compared to prior CT there is no gross change in distended   small bowel with a paucity of colonic gas consistent with a persistent   small bowel obstruction.      < from: CT Abdomen and Pelvis No Cont (01.19.23 @ 19:26) >    ACC: [id number] EXAM:  CT ABDOMEN AND PELVIS   ORDERED BY: GARFIELD ANNE     PROCEDURE DATE:  01/19/2023          INTERPRETATION:  CLINICAL INFORMATION: Vomiting. History of Crohn's.   Evaluate for small bowel obstruction    COMPARISON: CT 10/5/2019.    CONTRAST/COMPLICATIONS:  IV Contrast: NONE  Oral Contrast: NONE  Complications: None reported at time of study completion    PROCEDURE:  CT of the Abdomen and Pelvis was performed.  Sagittal and coronal reformats were performed.    FINDINGS:  LOWER CHEST: Coronary artery calcifications. Mitral annular and aortic   valve calcifications.    LIVER: Within normal limits.  BILE DUCTS: Normal caliber.  GALLBLADDER: Cholecystectomy.  SPLEEN: Within normal limits.  PANCREAS: Within normal limits.  ADRENALS: Within normal limits.  KIDNEYS/URETERS: Bilateral nonobstructive renal calculi, as well as a 5   mm stone within the mid right ureter. No hydronephrosis. Bilateral renal   cysts.    BLADDER: Within normal limits.  REPRODUCTIVE ORGANS: Brachytherapy seeds of the prostate.    BOWEL: Small hiatal hernia. Two small bowel-small bowel anastomoses.   Multiple dilated fluid-filled loops of small bowel with transition point   at the level of the distalmost small bowel anastomosis involving the   distal ileum (2, 88). Rectosigmoid anastomosis.  PERITONEUM: No ascites.  VESSELS: Atherosclerotic changes.  RETROPERITONEUM/LYMPH NODES: No lymphadenopathy.  ABDOMINAL WALL: Within normal limits.  BONES: Within normal limits.    IMPRESSION:  1.  Small bowel obstruction with transition point at the distal ileal   small bowel anastomosis.  2.  Bilateral renal calculi and a 5 mm stone within the mid right ureter.   No hydronephrosis.          IMPRESSION: 81 yo male with above pmh a/w:    #Abd pain N/V  #SBO  admitted to surgery  NPO   NGT--->LCWS  pain control MSprn  zofran PRN  IVF's while NPO  monitor return of bowel function    #CAD with stents x 4  asa and plavix on hold per surgery  lopressor IV  cardiac consult:  recent echo/HODA in Nov: Mild AS, LVEF 65-70% and HODA: no thrombus noted in apical appendage, mild to mod ASHD    #HTN  hold amlodipine and labetolol po  cont lopressor IV    #Crohn's disease  Stelara as out pt    # VTE propylaxis    hold chemical prophylaxis    venodynes    amb    Thank you for the consult, will follow

## 2023-01-20 NOTE — CONSULT NOTE ADULT - NS ATTEND AMEND GEN_ALL_CORE FT
Patient is seen and examined at bedside with NP Maria Alejandra Henson  85yo/M with h/o Crohn's and prior SBO's presented with abd pain, N/V and admitted to surg team with SBO  He had large BM earlier today and felt a lot better  NPO/NGT- per surg team  Pain meds prn  OOB to ambulate  Agree with above assessment and plan

## 2023-01-21 LAB
ANION GAP SERPL CALC-SCNC: 5 MMOL/L — SIGNIFICANT CHANGE UP (ref 5–17)
BUN SERPL-MCNC: 19 MG/DL — SIGNIFICANT CHANGE UP (ref 7–23)
CALCIUM SERPL-MCNC: 8.6 MG/DL — SIGNIFICANT CHANGE UP (ref 8.5–10.1)
CHLORIDE SERPL-SCNC: 108 MMOL/L — SIGNIFICANT CHANGE UP (ref 96–108)
CO2 SERPL-SCNC: 26 MMOL/L — SIGNIFICANT CHANGE UP (ref 22–31)
CREAT SERPL-MCNC: 0.88 MG/DL — SIGNIFICANT CHANGE UP (ref 0.5–1.3)
EGFR: 86 ML/MIN/1.73M2 — SIGNIFICANT CHANGE UP
GLUCOSE SERPL-MCNC: 103 MG/DL — HIGH (ref 70–99)
HCT VFR BLD CALC: 39.5 % — SIGNIFICANT CHANGE UP (ref 39–50)
HGB BLD-MCNC: 13.1 G/DL — SIGNIFICANT CHANGE UP (ref 13–17)
MAGNESIUM SERPL-MCNC: 1.9 MG/DL — SIGNIFICANT CHANGE UP (ref 1.6–2.6)
MCHC RBC-ENTMCNC: 31.5 PG — SIGNIFICANT CHANGE UP (ref 27–34)
MCHC RBC-ENTMCNC: 33.2 GM/DL — SIGNIFICANT CHANGE UP (ref 32–36)
MCV RBC AUTO: 95 FL — SIGNIFICANT CHANGE UP (ref 80–100)
PHOSPHATE SERPL-MCNC: 2.2 MG/DL — LOW (ref 2.5–4.5)
PLATELET # BLD AUTO: 225 K/UL — SIGNIFICANT CHANGE UP (ref 150–400)
POTASSIUM SERPL-MCNC: 3.4 MMOL/L — LOW (ref 3.5–5.3)
POTASSIUM SERPL-SCNC: 3.4 MMOL/L — LOW (ref 3.5–5.3)
RBC # BLD: 4.16 M/UL — LOW (ref 4.2–5.8)
RBC # FLD: 13.6 % — SIGNIFICANT CHANGE UP (ref 10.3–14.5)
SODIUM SERPL-SCNC: 139 MMOL/L — SIGNIFICANT CHANGE UP (ref 135–145)
WBC # BLD: 7.51 K/UL — SIGNIFICANT CHANGE UP (ref 3.8–10.5)
WBC # FLD AUTO: 7.51 K/UL — SIGNIFICANT CHANGE UP (ref 3.8–10.5)

## 2023-01-21 PROCEDURE — 99232 SBSQ HOSP IP/OBS MODERATE 35: CPT

## 2023-01-21 PROCEDURE — 74018 RADEX ABDOMEN 1 VIEW: CPT | Mod: 26,76

## 2023-01-21 RX ORDER — ZOLPIDEM TARTRATE 10 MG/1
5 TABLET ORAL AT BEDTIME
Refills: 0 | Status: DISCONTINUED | OUTPATIENT
Start: 2023-01-21 | End: 2023-01-22

## 2023-01-21 RX ORDER — DEXTROSE MONOHYDRATE, SODIUM CHLORIDE, AND POTASSIUM CHLORIDE 50; .745; 4.5 G/1000ML; G/1000ML; G/1000ML
1000 INJECTION, SOLUTION INTRAVENOUS
Refills: 0 | Status: DISCONTINUED | OUTPATIENT
Start: 2023-01-21 | End: 2023-01-22

## 2023-01-21 RX ORDER — ZOLPIDEM TARTRATE 10 MG/1
5 TABLET ORAL ONCE
Refills: 0 | Status: DISCONTINUED | OUTPATIENT
Start: 2023-01-21 | End: 2023-01-21

## 2023-01-21 RX ORDER — ACETAMINOPHEN 500 MG
1000 TABLET ORAL ONCE
Refills: 0 | Status: COMPLETED | OUTPATIENT
Start: 2023-01-21 | End: 2023-01-21

## 2023-01-21 RX ORDER — DIATRIZOATE MEGLUMINE 180 MG/ML
90 INJECTION, SOLUTION INTRAVESICAL ONCE
Refills: 0 | Status: COMPLETED | OUTPATIENT
Start: 2023-01-21 | End: 2023-01-21

## 2023-01-21 RX ADMIN — Medication 1000 MILLIGRAM(S): at 17:00

## 2023-01-21 RX ADMIN — DEXTROSE MONOHYDRATE, SODIUM CHLORIDE, AND POTASSIUM CHLORIDE 75 MILLILITER(S): 50; .745; 4.5 INJECTION, SOLUTION INTRAVENOUS at 23:01

## 2023-01-21 RX ADMIN — PANTOPRAZOLE SODIUM 40 MILLIGRAM(S): 20 TABLET, DELAYED RELEASE ORAL at 09:51

## 2023-01-21 RX ADMIN — Medication 110 MILLIGRAM(S): at 23:04

## 2023-01-21 RX ADMIN — Medication 1000 MILLIGRAM(S): at 05:50

## 2023-01-21 RX ADMIN — ENOXAPARIN SODIUM 40 MILLIGRAM(S): 100 INJECTION SUBCUTANEOUS at 05:17

## 2023-01-21 RX ADMIN — Medication 400 MILLIGRAM(S): at 16:46

## 2023-01-21 RX ADMIN — Medication 110 MILLIGRAM(S): at 11:24

## 2023-01-21 RX ADMIN — ZOLPIDEM TARTRATE 5 MILLIGRAM(S): 10 TABLET ORAL at 23:00

## 2023-01-21 RX ADMIN — DIATRIZOATE MEGLUMINE 90 MILLILITER(S): 180 INJECTION, SOLUTION INTRAVESICAL at 09:51

## 2023-01-21 RX ADMIN — Medication 110 MILLIGRAM(S): at 18:10

## 2023-01-21 RX ADMIN — SODIUM CHLORIDE 110 MILLILITER(S): 9 INJECTION, SOLUTION INTRAVENOUS at 11:24

## 2023-01-21 RX ADMIN — Medication 110 MILLIGRAM(S): at 05:17

## 2023-01-21 RX ADMIN — Medication 400 MILLIGRAM(S): at 05:17

## 2023-01-21 RX ADMIN — SODIUM CHLORIDE 110 MILLILITER(S): 9 INJECTION, SOLUTION INTRAVENOUS at 16:47

## 2023-01-21 NOTE — PROGRESS NOTE ADULT - ATTENDING COMMENTS
Patient was seen and examined, modifications and corrections made  I agree with findings and plan    Having BM  Check A Xray Patient was seen and examined, modifications and corrections made  I agree with findings and plan    adv diet  probable dc

## 2023-01-21 NOTE — PROGRESS NOTE ADULT - ASSESSMENT
83 YO M with Small Bowel Obstruction      Pain control PRN  Serial abdominal exam  NPO  NGT to Low suction; flush Q8 with 20 cc water  Monitor NG output  IV Hydration:  Abdominal xray at noon  F/U Medicine consult  F/U Cardiology consult  GI ppx: Pepcid  DVT ppx: Hep      Case discussed with Dr Alberts
83 YO M with Small Bowel Obstruction    Plan:  Pain control PRN  Serial abdominal exams  NPO  continue with NGT clamped, possible removal today  Monitor NG output  IV Hydration:  f/u AM AXR  Medicine and cardiology on board  GI ppx: Pepcid  DVT ppx: Hep  Encourage OOB/ambulation      Plan discussed with Dr. Alberts

## 2023-01-22 ENCOUNTER — TRANSCRIPTION ENCOUNTER (OUTPATIENT)
Age: 83
End: 2023-01-22

## 2023-01-22 VITALS — HEART RATE: 80 BPM | SYSTOLIC BLOOD PRESSURE: 136 MMHG | DIASTOLIC BLOOD PRESSURE: 67 MMHG

## 2023-01-22 LAB
ALBUMIN SERPL ELPH-MCNC: 3 G/DL — LOW (ref 3.3–5)
ALP SERPL-CCNC: 54 U/L — SIGNIFICANT CHANGE UP (ref 40–120)
ALT FLD-CCNC: 26 U/L — SIGNIFICANT CHANGE UP (ref 12–78)
ANION GAP SERPL CALC-SCNC: 5 MMOL/L — SIGNIFICANT CHANGE UP (ref 5–17)
AST SERPL-CCNC: 21 U/L — SIGNIFICANT CHANGE UP (ref 15–37)
BASOPHILS # BLD AUTO: 0.01 K/UL — SIGNIFICANT CHANGE UP (ref 0–0.2)
BASOPHILS NFR BLD AUTO: 0.2 % — SIGNIFICANT CHANGE UP (ref 0–2)
BILIRUB SERPL-MCNC: 0.5 MG/DL — SIGNIFICANT CHANGE UP (ref 0.2–1.2)
BUN SERPL-MCNC: 12 MG/DL — SIGNIFICANT CHANGE UP (ref 7–23)
CALCIUM SERPL-MCNC: 8.8 MG/DL — SIGNIFICANT CHANGE UP (ref 8.5–10.1)
CHLORIDE SERPL-SCNC: 107 MMOL/L — SIGNIFICANT CHANGE UP (ref 96–108)
CO2 SERPL-SCNC: 27 MMOL/L — SIGNIFICANT CHANGE UP (ref 22–31)
CREAT SERPL-MCNC: 0.87 MG/DL — SIGNIFICANT CHANGE UP (ref 0.5–1.3)
EGFR: 86 ML/MIN/1.73M2 — SIGNIFICANT CHANGE UP
EOSINOPHIL # BLD AUTO: 0.09 K/UL — SIGNIFICANT CHANGE UP (ref 0–0.5)
EOSINOPHIL NFR BLD AUTO: 1.4 % — SIGNIFICANT CHANGE UP (ref 0–6)
GLUCOSE SERPL-MCNC: 118 MG/DL — HIGH (ref 70–99)
HCT VFR BLD CALC: 38.2 % — LOW (ref 39–50)
HGB BLD-MCNC: 12.3 G/DL — LOW (ref 13–17)
IMM GRANULOCYTES NFR BLD AUTO: 0.5 % — SIGNIFICANT CHANGE UP (ref 0–0.9)
LYMPHOCYTES # BLD AUTO: 1.18 K/UL — SIGNIFICANT CHANGE UP (ref 1–3.3)
LYMPHOCYTES # BLD AUTO: 17.8 % — SIGNIFICANT CHANGE UP (ref 13–44)
MAGNESIUM SERPL-MCNC: 2 MG/DL — SIGNIFICANT CHANGE UP (ref 1.6–2.6)
MCHC RBC-ENTMCNC: 31.1 PG — SIGNIFICANT CHANGE UP (ref 27–34)
MCHC RBC-ENTMCNC: 32.2 GM/DL — SIGNIFICANT CHANGE UP (ref 32–36)
MCV RBC AUTO: 96.5 FL — SIGNIFICANT CHANGE UP (ref 80–100)
MONOCYTES # BLD AUTO: 1.33 K/UL — HIGH (ref 0–0.9)
MONOCYTES NFR BLD AUTO: 20 % — HIGH (ref 2–14)
NEUTROPHILS # BLD AUTO: 4 K/UL — SIGNIFICANT CHANGE UP (ref 1.8–7.4)
NEUTROPHILS NFR BLD AUTO: 60.1 % — SIGNIFICANT CHANGE UP (ref 43–77)
PHOSPHATE SERPL-MCNC: 1.9 MG/DL — LOW (ref 2.5–4.5)
PLATELET # BLD AUTO: 229 K/UL — SIGNIFICANT CHANGE UP (ref 150–400)
POTASSIUM SERPL-MCNC: 3.2 MMOL/L — LOW (ref 3.5–5.3)
POTASSIUM SERPL-SCNC: 3.2 MMOL/L — LOW (ref 3.5–5.3)
PROT SERPL-MCNC: 7.5 GM/DL — SIGNIFICANT CHANGE UP (ref 6–8.3)
RBC # BLD: 3.96 M/UL — LOW (ref 4.2–5.8)
RBC # FLD: 13.3 % — SIGNIFICANT CHANGE UP (ref 10.3–14.5)
SODIUM SERPL-SCNC: 139 MMOL/L — SIGNIFICANT CHANGE UP (ref 135–145)
WBC # BLD: 6.64 K/UL — SIGNIFICANT CHANGE UP (ref 3.8–10.5)
WBC # FLD AUTO: 6.64 K/UL — SIGNIFICANT CHANGE UP (ref 3.8–10.5)

## 2023-01-22 PROCEDURE — 99232 SBSQ HOSP IP/OBS MODERATE 35: CPT

## 2023-01-22 PROCEDURE — 74018 RADEX ABDOMEN 1 VIEW: CPT | Mod: 26

## 2023-01-22 RX ORDER — ACETAMINOPHEN 500 MG
1000 TABLET ORAL ONCE
Refills: 0 | Status: COMPLETED | OUTPATIENT
Start: 2023-01-22 | End: 2023-01-22

## 2023-01-22 RX ORDER — POTASSIUM PHOSPHATE, MONOBASIC POTASSIUM PHOSPHATE, DIBASIC 236; 224 MG/ML; MG/ML
15 INJECTION, SOLUTION INTRAVENOUS ONCE
Refills: 0 | Status: DISCONTINUED | OUTPATIENT
Start: 2023-01-22 | End: 2023-01-22

## 2023-01-22 RX ORDER — AMLODIPINE BESYLATE 2.5 MG/1
5 TABLET ORAL DAILY
Refills: 0 | Status: DISCONTINUED | OUTPATIENT
Start: 2023-01-22 | End: 2023-01-22

## 2023-01-22 RX ORDER — PANTOPRAZOLE SODIUM 20 MG/1
40 TABLET, DELAYED RELEASE ORAL
Refills: 0 | Status: DISCONTINUED | OUTPATIENT
Start: 2023-01-22 | End: 2023-01-22

## 2023-01-22 RX ORDER — SODIUM,POTASSIUM PHOSPHATES 278-250MG
2 POWDER IN PACKET (EA) ORAL ONCE
Refills: 0 | Status: COMPLETED | OUTPATIENT
Start: 2023-01-22 | End: 2023-01-22

## 2023-01-22 RX ORDER — LABETALOL HCL 100 MG
400 TABLET ORAL
Refills: 0 | Status: DISCONTINUED | OUTPATIENT
Start: 2023-01-22 | End: 2023-01-22

## 2023-01-22 RX ADMIN — AMLODIPINE BESYLATE 5 MILLIGRAM(S): 2.5 TABLET ORAL at 10:33

## 2023-01-22 RX ADMIN — Medication 1000 MILLIGRAM(S): at 10:07

## 2023-01-22 RX ADMIN — ENOXAPARIN SODIUM 40 MILLIGRAM(S): 100 INJECTION SUBCUTANEOUS at 05:36

## 2023-01-22 RX ADMIN — Medication 110 MILLIGRAM(S): at 05:37

## 2023-01-22 RX ADMIN — PANTOPRAZOLE SODIUM 40 MILLIGRAM(S): 20 TABLET, DELAYED RELEASE ORAL at 10:07

## 2023-01-22 RX ADMIN — Medication 400 MILLIGRAM(S): at 10:07

## 2023-01-22 RX ADMIN — PANTOPRAZOLE SODIUM 40 MILLIGRAM(S): 20 TABLET, DELAYED RELEASE ORAL at 10:50

## 2023-01-22 RX ADMIN — Medication 2 TABLET(S): at 11:26

## 2023-01-22 NOTE — DISCHARGE NOTE PROVIDER - NSDCMRMEDTOKEN_GEN_ALL_CORE_FT
amLODIPine 5 mg oral tablet: 1 tab(s) orally once a day  Aspirin Enteric Coated 81 mg oral delayed release tablet: 1 tab(s) orally once a day  diazePAM 5 mg oral tablet: 1 tab(s) orally every 8 hours, As Needed  labetalol 200 mg oral tablet: 2 tab(s) orally 2 times a day  Lipitor 10 mg oral tablet: 1 tab(s) orally once a day (in the morning)  pantoprazole 40 mg oral delayed release tablet: 1 tab(s) orally once a day (in the morning)  Plavix 75 mg oral tablet: 1 tab(s) orally once a day (in the morning)  traMADol 50 mg oral tablet: 1 tab(s) orally 3 times a day, As Needed

## 2023-01-22 NOTE — DISCHARGE NOTE NURSING/CASE MANAGEMENT/SOCIAL WORK - PATIENT PORTAL LINK FT
You can access the FollowMyHealth Patient Portal offered by [facility] by registering at the following website: [email or web address]. By joining [employer]’s FollowMyHealth portal, you will also be able to view your health information using other applications (apps) compatible with our system.

## 2023-01-22 NOTE — DISCHARGE NOTE NURSING/CASE MANAGEMENT/SOCIAL WORK - NSDCVIVACCINE_GEN_ALL_CORE_FT
influenza, high-dose, quadrivalent; 14-Sep-2022 18:02; Pam Grubbs (RN); [employer]; [id number] (Exp. Date: 30-Jun-2023); IntraMuscular; Deltoid Left.; 0.7 milliLiter(s); VIS (VIS Published: 06-Aug-2021, VIS Presented: 14-Sep-2022);

## 2023-01-22 NOTE — DISCHARGE NOTE NURSING/CASE MANAGEMENT/SOCIAL WORK - NSDCPEFALRISK_GEN_ALL_CORE
For information on Fall & Injury Prevention, visit: [email or web address] OR  [email or web address] OR  https://www.cdc.gov/steadi/patient.html

## 2023-01-22 NOTE — DISCHARGE NOTE PROVIDER - CARE PROVIDER_API CALL
Brendon Alberts)  Surgery; Surgical Critical Care  [address], [address]  [place] [address]  Phone: [phone]  Fax: [phone]  Follow Up Time: 2 weeks

## 2023-01-22 NOTE — DISCHARGE NOTE PROVIDER - HOSPITAL COURSE
81 YO M that is presenting to the ED with a complaint of nausea, vomiting and abdominal pain. CT scan done concerning for partial small bowel obstruction. Patient managed conservatively with bowel rest and NGT placement. Patient had return of bowel function and gastrogaffin challenge showed good transit. Patient tolerated diet after NGT removal and was discharged on low fiber diet.

## 2023-01-22 NOTE — PROGRESS NOTE ADULT - SUBJECTIVE AND OBJECTIVE BOX
Pt was seen and examined this morning at bedside. Pt was less distended. Had multiple bowel movements and was passing flatus yesterday. NGT was clamped overnight. Pt overall feels well, but main complaint is NGT irritation. VS reviewed.    Vital Signs Last 24 Hrs  T(C): 36.8 (21 Jan 2023 00:12), Max: 37.2 (20 Jan 2023 20:28)  T(F): 98.2 (21 Jan 2023 00:12), Max: 98.9 (20 Jan 2023 20:28)  HR: 84 (21 Jan 2023 00:12) (80 - 107)  BP: 118/57 (21 Jan 2023 00:12) (118/57 - 159/85)  BP(mean): 84 (20 Jan 2023 20:28) (84 - 84)  RR: 18 (21 Jan 2023 00:12) (18 - 18)  SpO2: 95% (21 Jan 2023 00:12) (95% - 98%)    Parameters below as of 21 Jan 2023 00:12  Patient On (Oxygen Delivery Method): room air    PE:  General: NAD, NGT in place  Neck: Supple  Chest: Equal expansion bilaterally  CV: S1S2 Present  Abdomen: Soft, non distended, non tender to palpation  Extremities: Grossly symmetric    Labs:                          14.7   6.66  )-----------( 262      ( 20 Jan 2023 08:04 )             44.0   01-20    138  |  106  |  22  ----------------------------<  138<H>  3.8   |  25  |  1.06    Ca    8.8      20 Jan 2023 08:04  Phos  4.2     01-20  Mg     2.0     01-20    TPro  8.4<H>  /  Alb  3.5  /  TBili  1.0  /  DBili  x   /  AST  23  /  ALT  33  /  AlkPhos  62  01-20      Imaging:  < from: Xray Abdomen 1 View PORTABLE -Routine (Xray Abdomen 1 View PORTABLE -Routine .) (01.20.23 @ 12:29) >    ACC: [id number] EXAM:  XR ABDOMEN PORTABLE ROUTINE 1V   ORDERED BY: ANGELA HUTIRON     PROCEDURE DATE:  01/20/2023      < end of copied text >  < from: Xray Abdomen 1 View PORTABLE -Routine (Xray Abdomen 1 View PORTABLE -Routine .) (01.20.23 @ 12:29) >    IMPRESSION:Compared to prior CT there is no gross change in distended   small bowel with a paucity of colonic gas consistent with a persistent   small bowel obstruction.    < end of copied text >    
  PCP:  Dr Faraz Almendarez    CHIEF COMPLAINT: abd pain associated  with n/v    HISTORY OF THE PRESENT ILLNESS: this is an 81 yo male with PMH: CAD with stents x 4, embolic CVA, on baby asa and Plavix without residuals, ? PAF but does not appear to be on AC, Crohn's, diverticulitis,  HTN, gastritis who presented to  ER yesterday with cc of abdominal pain assoc with nausea and multiple episodes of vomiting, he had a BM in the ER, h/o colectomy and jessica in the past, adm to surgery with SBO.  PT is seen on 2 north, awake, alert, states he had another bm this am, is passing flatus, abd distended but soft, NGT --->LCWS with minimal drainage.  He denies any present nausea, afebrile.  Imaging + for SBO. recent admission in November, 2022 for CVA, had echo: Mild AS, LVEF 65-70% and HODA: no thrombus noted in apical appendage, mild to mod ASHD. We are consulted for medical management    PAST MEDICAL HISTORY: as above    PAST SURGICAL HISTORY: partial colectomy, Cardiac pci with 4 stets placed, lap jessica    FAMILY HISTORY:   father: dec: CAD, Mother; Dec:DM    SOCIAL HISTORY:  no smoking, no alcohol, no drugs    ALLERGIES: IV contrast    HOME MEDS: see med rec    1/21/23- NGT clamped, getting gastrografin study. +multiple bowel movements since yesterday. Feels hungry and c/o NGT irritation    REVIEW OF SYSTEMS:   All 10 systems reviewed in detailed and found to be negative with the exception of what has already been described above    Vital Signs Last 24 Hrs  T(C): 36.9 (21 Jan 2023 08:43), Max: 37.2 (20 Jan 2023 20:28)  T(F): 98.4 (21 Jan 2023 08:43), Max: 98.9 (20 Jan 2023 20:28)  HR: 77 (21 Jan 2023 08:43) (77 - 90)  BP: 143/89 (21 Jan 2023 11:15) (118/57 - 150/58)  BP(mean): 84 (20 Jan 2023 20:28) (84 - 84)  RR: 18 (21 Jan 2023 08:43) (18 - 18)  SpO2: 96% (21 Jan 2023 08:43) (94% - 98%)    Parameters below as of 21 Jan 2023 08:43  Patient On (Oxygen Delivery Method): room air      PHYSICAL EXAM:  GENERAL: Comfortable, no acute distress   HEAD:  Normocephalic, atraumatic  EYES: EOMI, PERRLA  HEENT: Moist mucous membranes  NECK: Supple, No JVD  NERVOUS SYSTEM:  Alert & Oriented X3, Motor Strength 5/5 B/L upper and lower extremities  CHEST/LUNG: Clear to auscultation bilaterally  HEART: Regular rate and rhythm  ABDOMEN: Soft, nontender, Bowel sounds present+  GENITOURINARY: Voiding, no palpable bladder  EXTREMITIES:   No clubbing, cyanosis, or edema  MUSCULOSKELETAL- No muscle tenderness, no joint tenderness  SKIN-no rash    LABS:                        13.1   7.51  )-----------( 225      ( 21 Jan 2023 08:08 )             39.5     21 Jan 2023 08:08    139    |  108    |  19     ----------------------------<  103    3.4     |  26     |  0.88     Ca    8.6        21 Jan 2023 08:08  Phos  2.2       21 Jan 2023 08:08  Mg     1.9       21 Jan 2023 08:08    TPro  8.4    /  Alb  3.5    /  TBili  1.0    /  DBili  x      /  AST  23     /  ALT  33     /  AlkPhos  62     20 Jan 2023 08:04    LIVER FUNCTIONS - ( 20 Jan 2023 08:04 )  Alb: 3.5 g/dL / Pro: 8.4 gm/dL / ALK PHOS: 62 U/L / ALT: 33 U/L / AST: 23 U/L / GGT: x           EKG: pending    RADIOLOGY STUDIES:    < from: Xray Abdomen 1 View PORTABLE -Routine (Xray Abdomen 1 View PORTABLE -Routine .) (01.20.23 @ 12:29) >    ACC: [id number] EXAM:  XR ABDOMEN PORTABLE ROUTINE 1V   ORDERED BY: ANGELA HUITRON     PROCEDURE DATE:  01/20/2023      INTERPRETATION:  Follow-up small bowel obstruction    AP supine portable abdomen. Comparison to CT of 1/19/2023.    IMPRESSION: Compared to prior CT there is no gross change in distended   small bowel with a paucity of colonic gas consistent with a persistent   small bowel obstruction.      < from: CT Abdomen and Pelvis No Cont (01.19.23 @ 19:26) >    ACC: [id number] EXAM:  CT ABDOMEN AND PELVIS   ORDERED BY: GARFIELD ANNE     PROCEDURE DATE:  01/19/2023          INTERPRETATION:  CLINICAL INFORMATION: Vomiting. History of Crohn's.   Evaluate for small bowel obstruction    COMPARISON: CT 10/5/2019.    CONTRAST/COMPLICATIONS:  IV Contrast: NONE  Oral Contrast: NONE  Complications: None reported at time of study completion    PROCEDURE:  CT of the Abdomen and Pelvis was performed.  Sagittal and coronal reformats were performed.    FINDINGS:  LOWER CHEST: Coronary artery calcifications. Mitral annular and aortic   valve calcifications.    LIVER: Within normal limits.  BILE DUCTS: Normal caliber.  GALLBLADDER: Cholecystectomy.  SPLEEN: Within normal limits.  PANCREAS: Within normal limits.  ADRENALS: Within normal limits.  KIDNEYS/URETERS: Bilateral nonobstructive renal calculi, as well as a 5   mm stone within the mid right ureter. No hydronephrosis. Bilateral renal   cysts.    BLADDER: Within normal limits.  REPRODUCTIVE ORGANS: Brachytherapy seeds of the prostate.    BOWEL: Small hiatal hernia. Two small bowel-small bowel anastomoses.   Multiple dilated fluid-filled loops of small bowel with transition point   at the level of the distalmost small bowel anastomosis involving the   distal ileum (2, 88). Rectosigmoid anastomosis.  PERITONEUM: No ascites.  VESSELS: Atherosclerotic changes.  RETROPERITONEUM/LYMPH NODES: No lymphadenopathy.  ABDOMINAL WALL: Within normal limits.  BONES: Within normal limits.    IMPRESSION:  1.  Small bowel obstruction with transition point at the distal ileal   small bowel anastomosis.  2.  Bilateral renal calculi and a 5 mm stone within the mid right ureter.   No hydronephrosis.      MEDICATIONS  (STANDING):  acetaminophen   IVPB .. 1000 milliGRAM(s) IV Intermittent once  acetaminophen   IVPB .. 1000 milliGRAM(s) IV Intermittent once  enoxaparin Injectable 40 milliGRAM(s) SubCutaneous every 24 hours  lactated ringers. 1000 milliLiter(s) (110 mL/Hr) IV Continuous <Continuous>  metoprolol tartrate IVPB 5 milliGRAM(s) IV Intermittent every 6 hours  pantoprazole  Injectable 40 milliGRAM(s) IV Push daily    MEDICATIONS  (PRN):  morphine  - Injectable 2 milliGRAM(s) IV Push every 4 hours PRN Severe Pain (7 - 10)  ondansetron Injectable 4 milliGRAM(s) IV Push every 6 hours PRN Nausea and/or Vomiting  prochlorperazine   Injectable 10 milliGRAM(s) IntraMuscular every 8 hours PRN nausea    IMPRESSION: 81 yo male with above pmh a/w:    #SBO  Surg team following  NGT clamped  Having multiple bowel movements  Diet per surg team    #CAD with stents x 4  asa and plavix on hold per surgery  lopressor IV  cardiac consult:  recent echo/HODA in Nov: Mild AS, LVEF 65-70% and HODA: no thrombus noted in apical appendage, mild to mod ASHD    #HTN  hold amlodipine and labetolol po  cont lopressor IV    #Crohn's disease  Stelara as out pt    #VTE propylaxis    hold chemical prophylaxis    venodynes    amb    #Dispo- per surg team        
  PCP:  Dr Faraz Almendarez    CHIEF COMPLAINT: abd pain associated  with n/v    HISTORY OF THE PRESENT ILLNESS: this is an 81 yo male with PMH: CAD with stents x 4, embolic CVA, on baby asa and Plavix without residuals, ? PAF but does not appear to be on AC, Crohn's, diverticulitis,  HTN, gastritis who presented to  ER yesterday with cc of abdominal pain assoc with nausea and multiple episodes of vomiting, he had a BM in the ER, h/o colectomy and jessica in the past, adm to surgery with SBO.  PT is seen on 2 north, awake, alert, states he had another bm this am, is passing flatus, abd distended but soft, NGT --->LCWS with minimal drainage.  He denies any present nausea, afebrile.  Imaging + for SBO. recent admission in November, 2022 for CVA, had echo: Mild AS, LVEF 65-70% and HODA: no thrombus noted in apical appendage, mild to mod ASHD. We are consulted for medical management    PAST MEDICAL HISTORY: as above    PAST SURGICAL HISTORY: partial colectomy, Cardiac pci with 4 stets placed, lap jessica    FAMILY HISTORY:   father: dec: CAD, Mother; Dec:DM    SOCIAL HISTORY:  no smoking, no alcohol, no drugs    ALLERGIES: IV contrast    HOME MEDS: see med rec    1/21/23- NGT clamped, getting gastrografin study. +multiple bowel movements since yesterday. Feels hungry and c/o NGT irritation  1/22/23- NGT removed and diet advanced, tolerated well. Wants to go home    REVIEW OF SYSTEMS:   All 10 systems reviewed in detailed and found to be negative with the exception of what has already been described above    Vital Signs Last 24 Hrs  T(C): 36.6 (22 Jan 2023 08:55), Max: 36.7 (21 Jan 2023 17:00)  T(F): 97.8 (22 Jan 2023 08:55), Max: 98.1 (21 Jan 2023 17:00)  HR: 79 (22 Jan 2023 08:55) (79 - 104)  BP: 154/72 (22 Jan 2023 08:55) (114/55 - 164/85)  BP(mean): --  RR: 18 (22 Jan 2023 08:55) (18 - 18)  SpO2: 99% (22 Jan 2023 08:55) (94% - 99%)    Parameters below as of 22 Jan 2023 08:55  Patient On (Oxygen Delivery Method): room air    PHYSICAL EXAM:  GENERAL: Comfortable, no acute distress   HEAD:  Normocephalic, atraumatic  EYES: EOMI, PERRLA  HEENT: Moist mucous membranes  NECK: Supple, No JVD  NERVOUS SYSTEM:  Alert & Oriented X3, Motor Strength 5/5 B/L upper and lower extremities  CHEST/LUNG: Clear to auscultation bilaterally  HEART: Regular rate and rhythm  ABDOMEN: Soft, nontender, Bowel sounds present+  GENITOURINARY: Voiding, no palpable bladder  EXTREMITIES:   No clubbing, cyanosis, or edema  MUSCULOSKELETAL- No muscle tenderness, no joint tenderness  SKIN-no rash    LABS:                                   12.3   6.64  )-----------( 229      ( 22 Jan 2023 08:06 )             38.2     22 Jan 2023 08:06    139    |  107    |  12     ----------------------------<  118    3.2     |  27     |  0.87     Ca    8.8        22 Jan 2023 08:06  Phos  1.9       22 Jan 2023 08:06  Mg     2.0       22 Jan 2023 08:06    TPro  7.5    /  Alb  3.0    /  TBili  0.5    /  DBili  x      /  AST  21     /  ALT  26     /  AlkPhos  54     22 Jan 2023 08:06    LIVER FUNCTIONS - ( 22 Jan 2023 08:06 )  Alb: 3.0 g/dL / Pro: 7.5 gm/dL / ALK PHOS: 54 U/L / ALT: 26 U/L / AST: 21 U/L / GGT: x           RADIOLOGY STUDIES:    < from: Xray Abdomen 1 View PORTABLE -Routine (Xray Abdomen 1 View PORTABLE -Routine .) (01.20.23 @ 12:29) >    ACC: [id number] EXAM:  XR ABDOMEN PORTABLE ROUTINE 1V   ORDERED BY: ANGELA HUITRON     PROCEDURE DATE:  01/20/2023      INTERPRETATION:  Follow-up small bowel obstruction    AP supine portable abdomen. Comparison to CT of 1/19/2023.    IMPRESSION: Compared to prior CT there is no gross change in distended   small bowel with a paucity of colonic gas consistent with a persistent   small bowel obstruction.      < from: CT Abdomen and Pelvis No Cont (01.19.23 @ 19:26) >    ACC: [id number] EXAM:  CT ABDOMEN AND PELVIS   ORDERED BY: GARFIELD ANNE     PROCEDURE DATE:  01/19/2023          INTERPRETATION:  CLINICAL INFORMATION: Vomiting. History of Crohn's.   Evaluate for small bowel obstruction    COMPARISON: CT 10/5/2019.    CONTRAST/COMPLICATIONS:  IV Contrast: NONE  Oral Contrast: NONE  Complications: None reported at time of study completion    PROCEDURE:  CT of the Abdomen and Pelvis was performed.  Sagittal and coronal reformats were performed.    FINDINGS:  LOWER CHEST: Coronary artery calcifications. Mitral annular and aortic   valve calcifications.    LIVER: Within normal limits.  BILE DUCTS: Normal caliber.  GALLBLADDER: Cholecystectomy.  SPLEEN: Within normal limits.  PANCREAS: Within normal limits.  ADRENALS: Within normal limits.  KIDNEYS/URETERS: Bilateral nonobstructive renal calculi, as well as a 5   mm stone within the mid right ureter. No hydronephrosis. Bilateral renal   cysts.    BLADDER: Within normal limits.  REPRODUCTIVE ORGANS: Brachytherapy seeds of the prostate.    BOWEL: Small hiatal hernia. Two small bowel-small bowel anastomoses.   Multiple dilated fluid-filled loops of small bowel with transition point   at the level of the distalmost small bowel anastomosis involving the   distal ileum (2, 88). Rectosigmoid anastomosis.  PERITONEUM: No ascites.  VESSELS: Atherosclerotic changes.  RETROPERITONEUM/LYMPH NODES: No lymphadenopathy.  ABDOMINAL WALL: Within normal limits.  BONES: Within normal limits.    IMPRESSION:  1.  Small bowel obstruction with transition point at the distal ileal   small bowel anastomosis.  2.  Bilateral renal calculi and a 5 mm stone within the mid right ureter.   No hydronephrosis.      MEDICATIONS  (STANDING):  acetaminophen   IVPB .. 1000 milliGRAM(s) IV Intermittent once  amLODIPine   Tablet 5 milliGRAM(s) Oral daily  enoxaparin Injectable 40 milliGRAM(s) SubCutaneous every 24 hours  labetalol 400 milliGRAM(s) Oral two times a day  pantoprazole    Tablet 40 milliGRAM(s) Oral before breakfast    MEDICATIONS  (PRN):  morphine  - Injectable 2 milliGRAM(s) IV Push every 4 hours PRN Severe Pain (7 - 10)  ondansetron Injectable 4 milliGRAM(s) IV Push every 6 hours PRN Nausea and/or Vomiting  prochlorperazine   Injectable 10 milliGRAM(s) IntraMuscular every 8 hours PRN nausea  zolpidem 5 milliGRAM(s) Oral at bedtime PRN Insomnia      IMPRESSION: 81 yo male with above pmh a/w:    #SBO- resolved  Surg team following  NGT removed  Diet advanced and tolerated well  Having bowel function    #CAD with stents x 4  Resume Aspirin, plavix statin on discharge    #Hypokalemia/ hypophosphatemia  Replete and repeat    #HTN  Resume Labetalol and Norvasc PO    #Crohn's disease  Stelara as out pt    #VTE propylaxis    hold chemical prophylaxis    venodynes    amb    #Dispo- likely home today        
Pt was seen and examined this morning at bedside. Pt was less distended    Vital Signs Last 24 Hrs  T(C): 36.7 (20 Jan 2023 08:36), Max: 36.7 (20 Jan 2023 08:36)  T(F): 98 (20 Jan 2023 08:36), Max: 98 (20 Jan 2023 08:36)  HR: 107 (20 Jan 2023 08:36) (80 - 107)  BP: 159/85 (20 Jan 2023 08:36) (157/86 - 194/92)  BP(mean): 101 (19 Jan 2023 23:20) (101 - 122)  ABP: --  ABP(mean): --  RR: 18 (20 Jan 2023 08:36) (18 - 19)  SpO2: 95% (20 Jan 2023 08:36) (95% - 98%)    O2 Parameters below as of 20 Jan 2023 08:36  Patient On (Oxygen Delivery Method): room air      PE:  General: NAD, NGT in place  Neck: Supple  Chest: Equal expansion bilaterally  CV: S1S2 Present  Abdomen: Soft, less distended, tender to palpation, tympanic  Extremities: Grossly symmetric    Labs:              14.7                 138  | 25   | 22           6.66  >-----------< 262     ------------------------< 138<H>                 44.0                 3.8  | 106  | 1.06                                                                      Ca 8.8   Mg 2.0   Ph 4.2      ,                15.2                   134 | 24   | 19           14.36 >-----------< 307     ------------------------< 181<H>                     45.3                   5.3  | 103  | 1.11                                                                        Ca 10.3<H> Mg x     Ph x

## 2023-01-22 NOTE — DISCHARGE NOTE PROVIDER - DISCHARGE DATE
Detail Level: Detailed Quality 130: Documentation Of Current Medications In The Medical Record: Current Medications Documented 22-Jan-2023

## 2023-01-22 NOTE — DISCHARGE NOTE PROVIDER - NSDCCPCAREPLAN_GEN_ALL_CORE_FT
PRINCIPAL DISCHARGE DIAGNOSIS  Diagnosis: SBO (small bowel obstruction)  Assessment and Plan of Treatment:       SECONDARY DISCHARGE DIAGNOSES  Diagnosis: Abdominal pain  Assessment and Plan of Treatment:

## 2023-01-22 NOTE — DISCHARGE NOTE PROVIDER - NSDCFUADDINST_GEN_ALL_CORE_FT
Please resume regular diet and regular activity as tolerated. You may shower as normal.  Continue Home medications and call for follow up visit in the office as advised.

## 2023-01-23 ENCOUNTER — EMERGENCY (EMERGENCY)
Facility: HOSPITAL | Age: 83
LOS: 0 days | Discharge: HOME | End: 2023-01-23
Attending: EMERGENCY MEDICINE
Payer: MEDICARE

## 2023-01-23 VITALS
DIASTOLIC BLOOD PRESSURE: 73 MMHG | SYSTOLIC BLOOD PRESSURE: 149 MMHG | OXYGEN SATURATION: 98 % | HEART RATE: 85 BPM | RESPIRATION RATE: 18 BRPM

## 2023-01-23 VITALS — WEIGHT: 160.06 LBS | HEIGHT: 68 IN

## 2023-01-23 DIAGNOSIS — Z95.5 PRESENCE OF CORONARY ANGIOPLASTY IMPLANT AND GRAFT: Chronic | ICD-10-CM

## 2023-01-23 DIAGNOSIS — Z90.49 ACQUIRED ABSENCE OF OTHER SPECIFIED PARTS OF DIGESTIVE TRACT: Chronic | ICD-10-CM

## 2023-01-23 LAB
ALBUMIN SERPL ELPH-MCNC: 3.2 G/DL — LOW (ref 3.3–5)
ALP SERPL-CCNC: 58 U/L — SIGNIFICANT CHANGE UP (ref 40–120)
ALT FLD-CCNC: 32 U/L — SIGNIFICANT CHANGE UP (ref 12–78)
ANION GAP SERPL CALC-SCNC: 11 MMOL/L — SIGNIFICANT CHANGE UP (ref 5–17)
ANISOCYTOSIS BLD QL: SLIGHT — SIGNIFICANT CHANGE UP
AST SERPL-CCNC: 34 U/L — SIGNIFICANT CHANGE UP (ref 15–37)
BASOPHILS # BLD AUTO: 0 K/UL — SIGNIFICANT CHANGE UP (ref 0–0.2)
BASOPHILS NFR BLD AUTO: 0 % — SIGNIFICANT CHANGE UP (ref 0–2)
BILIRUB SERPL-MCNC: 0.7 MG/DL — SIGNIFICANT CHANGE UP (ref 0.2–1.2)
BUN SERPL-MCNC: 15 MG/DL — SIGNIFICANT CHANGE UP (ref 7–23)
CALCIUM SERPL-MCNC: 8.9 MG/DL — SIGNIFICANT CHANGE UP (ref 8.5–10.1)
CHLORIDE SERPL-SCNC: 104 MMOL/L — SIGNIFICANT CHANGE UP (ref 96–108)
CO2 SERPL-SCNC: 20 MMOL/L — LOW (ref 22–31)
CREAT SERPL-MCNC: 1.03 MG/DL — SIGNIFICANT CHANGE UP (ref 0.5–1.3)
EGFR: 73 ML/MIN/1.73M2 — SIGNIFICANT CHANGE UP
EOSINOPHIL # BLD AUTO: 0 K/UL — SIGNIFICANT CHANGE UP (ref 0–0.5)
EOSINOPHIL NFR BLD AUTO: 0 % — SIGNIFICANT CHANGE UP (ref 0–6)
GLUCOSE SERPL-MCNC: 146 MG/DL — HIGH (ref 70–99)
HCT VFR BLD CALC: 40.7 % — SIGNIFICANT CHANGE UP (ref 39–50)
HGB BLD-MCNC: 13.7 G/DL — SIGNIFICANT CHANGE UP (ref 13–17)
LACTATE SERPL-SCNC: 1.2 MMOL/L — SIGNIFICANT CHANGE UP (ref 0.7–2)
LIDOCAIN IGE QN: 379 U/L — SIGNIFICANT CHANGE UP (ref 73–393)
LYMPHOCYTES # BLD AUTO: 1.49 K/UL — SIGNIFICANT CHANGE UP (ref 1–3.3)
LYMPHOCYTES # BLD AUTO: 15 % — SIGNIFICANT CHANGE UP (ref 13–44)
MAGNESIUM SERPL-MCNC: 1.7 MG/DL — SIGNIFICANT CHANGE UP (ref 1.6–2.6)
MANUAL SMEAR VERIFICATION: SIGNIFICANT CHANGE UP
MCHC RBC-ENTMCNC: 31.1 PG — SIGNIFICANT CHANGE UP (ref 27–34)
MCHC RBC-ENTMCNC: 33.7 GM/DL — SIGNIFICANT CHANGE UP (ref 32–36)
MCV RBC AUTO: 92.5 FL — SIGNIFICANT CHANGE UP (ref 80–100)
METAMYELOCYTES # FLD: 2 % — HIGH (ref 0–0)
MONOCYTES # BLD AUTO: 1.29 K/UL — HIGH (ref 0–0.9)
MONOCYTES NFR BLD AUTO: 13 % — SIGNIFICANT CHANGE UP (ref 2–14)
MYELOCYTES NFR BLD: 2 % — HIGH (ref 0–0)
NEUTROPHILS # BLD AUTO: 6.77 K/UL — SIGNIFICANT CHANGE UP (ref 1.8–7.4)
NEUTROPHILS NFR BLD AUTO: 61 % — SIGNIFICANT CHANGE UP (ref 43–77)
NEUTS BAND # BLD: 7 % — SIGNIFICANT CHANGE UP (ref 0–8)
NRBC # BLD: 1 /100 — HIGH (ref 0–0)
NRBC # BLD: SIGNIFICANT CHANGE UP /100 WBCS (ref 0–0)
PLAT MORPH BLD: NORMAL — SIGNIFICANT CHANGE UP
PLATELET # BLD AUTO: 271 K/UL — SIGNIFICANT CHANGE UP (ref 150–400)
POIKILOCYTOSIS BLD QL AUTO: SLIGHT — SIGNIFICANT CHANGE UP
POTASSIUM SERPL-MCNC: 3.3 MMOL/L — LOW (ref 3.5–5.3)
POTASSIUM SERPL-SCNC: 3.3 MMOL/L — LOW (ref 3.5–5.3)
PROT SERPL-MCNC: 8.5 GM/DL — HIGH (ref 6–8.3)
RBC # BLD: 4.4 M/UL — SIGNIFICANT CHANGE UP (ref 4.2–5.8)
RBC # FLD: 13.1 % — SIGNIFICANT CHANGE UP (ref 10.3–14.5)
RBC BLD AUTO: ABNORMAL
SODIUM SERPL-SCNC: 135 MMOL/L — SIGNIFICANT CHANGE UP (ref 135–145)
WBC # BLD: 9.95 K/UL — SIGNIFICANT CHANGE UP (ref 3.8–10.5)
WBC # FLD AUTO: 9.95 K/UL — SIGNIFICANT CHANGE UP (ref 3.8–10.5)

## 2023-01-23 PROCEDURE — 99284 EMERGENCY DEPT VISIT MOD MDM: CPT | Mod: 25

## 2023-01-23 PROCEDURE — 96374 THER/PROPH/DIAG INJ IV PUSH: CPT

## 2023-01-23 PROCEDURE — 80053 COMPREHEN METABOLIC PANEL: CPT

## 2023-01-23 PROCEDURE — 36415 COLL VENOUS BLD VENIPUNCTURE: CPT

## 2023-01-23 PROCEDURE — 83735 ASSAY OF MAGNESIUM: CPT

## 2023-01-23 PROCEDURE — 74176 CT ABD & PELVIS W/O CONTRAST: CPT | Mod: MA

## 2023-01-23 PROCEDURE — 96376 TX/PRO/DX INJ SAME DRUG ADON: CPT

## 2023-01-23 PROCEDURE — 99285 EMERGENCY DEPT VISIT HI MDM: CPT

## 2023-01-23 PROCEDURE — 85025 COMPLETE CBC W/AUTO DIFF WBC: CPT

## 2023-01-23 PROCEDURE — 83605 ASSAY OF LACTIC ACID: CPT

## 2023-01-23 PROCEDURE — 74176 CT ABD & PELVIS W/O CONTRAST: CPT | Mod: 26,MA

## 2023-01-23 PROCEDURE — 83690 ASSAY OF LIPASE: CPT

## 2023-01-23 RX ORDER — ONDANSETRON 8 MG/1
4 TABLET, FILM COATED ORAL ONCE
Refills: 0 | Status: COMPLETED | OUTPATIENT
Start: 2023-01-23 | End: 2023-01-23

## 2023-01-23 RX ORDER — SODIUM CHLORIDE 9 MG/ML
1000 INJECTION INTRAMUSCULAR; INTRAVENOUS; SUBCUTANEOUS ONCE
Refills: 0 | Status: COMPLETED | OUTPATIENT
Start: 2023-01-23 | End: 2023-01-23

## 2023-01-23 RX ORDER — POTASSIUM CHLORIDE 20 MEQ
10 PACKET (EA) ORAL
Refills: 0 | Status: DISCONTINUED | OUTPATIENT
Start: 2023-01-23 | End: 2023-01-23

## 2023-01-23 RX ADMIN — ONDANSETRON 4 MILLIGRAM(S): 8 TABLET, FILM COATED ORAL at 02:01

## 2023-01-23 RX ADMIN — SODIUM CHLORIDE 1000 MILLILITER(S): 9 INJECTION INTRAMUSCULAR; INTRAVENOUS; SUBCUTANEOUS at 05:17

## 2023-01-23 RX ADMIN — ONDANSETRON 4 MILLIGRAM(S): 8 TABLET, FILM COATED ORAL at 05:51

## 2023-01-23 RX ADMIN — SODIUM CHLORIDE 2000 MILLILITER(S): 9 INJECTION INTRAMUSCULAR; INTRAVENOUS; SUBCUTANEOUS at 02:01

## 2023-01-23 NOTE — ED PROVIDER NOTE - OBJECTIVE STATEMENT
82-year-old male with history of diverticulitis status post perforation with sigmoidectomy, cholecystectomy and recent small bowel obstruction treated with NG tube discharged less than 24 hours ago presents with recurrent vomiting.  Patient notes that he had a normal bowel movement this morning prior to discharge.  Patient states that he started vomiting several times shortly after he got home and that the vomit was brown in color.  The patient was given IV fluids and Zofran that I ordered prior to my assessment of him and states that his nausea has improved.  I contacted his surgeon Dr. Alberts Who states that we should repeat the scan with oral contrast which I will order at this time.

## 2023-01-23 NOTE — ED ADULT NURSE REASSESSMENT NOTE - NS ED NURSE REASSESS COMMENT FT1
Report received from Dariel BEE. Pt resting on stretcher in room, no complaints at this time. Wife at bedside. Pt is alert and awake, answering questions appropriately.

## 2023-01-23 NOTE — ED ADULT NURSE REASSESSMENT NOTE - NS ED NURSE REASSESS COMMENT FT1
As per Dr Hoskins potassium IV that has been ordered for patient can be held at this time. Pt to be discharge, instructions to take PO potassium at home. Pt verbalizes understanding.

## 2023-01-23 NOTE — ED ADULT TRIAGE NOTE - CHIEF COMPLAINT QUOTE
Patient presents to the ED s/p D/C'd this morning 12 hrs ago for a blockage in stomach. Had an NG tube that was taken out yesterday. Endorses N/V/D. Unable to hold fluids down. States he had Czech toast this morning, and threw it up.

## 2023-01-23 NOTE — ED ADULT NURSE NOTE - OBJECTIVE STATEMENT
81 y/o male awake alert and oriented x4 presents to ED c/o vomiting, nausea and abd pain. Pt was discharged from  after being admitted for bowel obstruction. pt tolerated liquid only well prior to discharge today. pt went home and had a toast and vomited right after. NG tube placed in hospital and removed prior to discharge. hx multiple bowel obstruction.

## 2023-01-23 NOTE — ED PROVIDER NOTE - NSFOLLOWUPINSTRUCTIONS_ED_ALL_ED_FT
Abdominal Pain    Many things can cause abdominal pain. Many times, abdominal pain is not caused by a disease and will improve without treatment. Your health care provider will do a physical exam to determine if there is a dangerous cause of your pain; blood tests and imaging may help determine the cause of your pain. However, in many cases, no cause may be found and you may need further testing as an outpatient. Monitor your abdominal pain for any changes.     SEEK IMMEDIATE MEDICAL CARE IF YOU HAVE ANY OF THE FOLLOWING SYMPTOMS: worsening abdominal pain, uncontrollable vomiting, profuse diarrhea, inability to have bowel movements or pass gas, black or bloody stools, fever accompanying chest pain or back pain, or fainting. These symptoms may represent a serious problem that is an emergency. Do not wait to see if the symptoms will go away. Get medical help right away. Call 911 and do not drive yourself to the hospital.      Follow-up with Dr. Alberts  in the next 1 to 3 days

## 2023-01-23 NOTE — ED PROVIDER NOTE - DISPOSITION TYPE
Show Applicator Variable?: Yes Render Note In Bullet Format When Appropriate: No Aperture Size (Optional): C Post-Care Instructions: I reviewed with the patient in detail post-care instructions. Patient is to wear sunprotection, and avoid picking at any of the treated lesions. Pt may apply Vaseline to crusted or scabbing areas. Duration Of Freeze Thaw-Cycle (Seconds): 5 Number Of Freeze-Thaw Cycles: 2 freeze-thaw cycles Detail Level: Detailed Consent: The patient's consent was obtained including but not limited to risks of crusting, scabbing, blistering, scarring, darker or lighter pigmentary change, recurrence, incomplete removal and infection. Medical Necessity Information: It is in your best interest to select a reason for this procedure from the list below. All of these items fulfill various CMS LCD requirements except the new and changing color options. Medical Necessity Clause: This procedure was medically necessary because the lesions that were treated were: Duration Of Freeze Thaw-Cycle (Seconds): 5-10 DISCHARGE

## 2023-01-23 NOTE — ED PROVIDER NOTE - CLINICAL SUMMARY MEDICAL DECISION MAKING FREE TEXT BOX
patient with history of recent small bowel obstruction just discharged after NG tube removed presents with nausea and vomiting.  The patient did have a normal bowel movement prior to discharge.  Patient's abdomen is soft and minimally tender.  We will give the patient IV fluids, check CBC, CMP.  Consulted with the patient's surgeon who recommends repeat CT scan with oral contrast although  previous follow-up imaging during the admission showed that contrast had reached the  rectum.

## 2023-01-23 NOTE — DOWNTIME INTERRUPTION NOTE - WHICH MANUAL FORMS INITIATED?
Discharge initiated paper form by Dr Hoskins. Pt discharge papers signed and completed. VS charted electronically. Pt verbalize discharge instructions.

## 2023-01-23 NOTE — ED PROVIDER NOTE - PATIENT PORTAL LINK FT
You can access the FollowMyHealth Patient Portal offered by [facility] by registering at the following website: [email or web address]. By joining [facility]’s FollowMyHealth portal, you will also be able to view your health information using other applications (apps) compatible with our system.

## 2023-01-23 NOTE — ED ADULT NURSE NOTE - CHIEF COMPLAINT QUOTE
Patient presents to the ED s/p D/C'd this morning 12 hrs ago for a blockage in stomach. Had an NG tube that was taken out yesterday. Endorses N/V/D. Unable to hold fluids down. States he had Indonesian toast this morning, and threw it up.

## 2023-01-23 NOTE — ED PROVIDER NOTE - PROGRESS NOTE DETAILS
Patient has had normal bowel movements in the emergency department and is feeling better.  The patient's CT scan shows contrast throughout the colon  on my wet read.  Due to technical difficulties I am unable to obtain the formal radiology read but the patient is feeling much better and has close follow-up with his surgeon Dr. Alberts.   At this point I feel it is safe to send the patient home because clinically he does not have any features of small bowel obstruction.

## 2023-01-24 DIAGNOSIS — I25.10 ATHEROSCLEROTIC HEART DISEASE OF NATIVE CORONARY ARTERY WITHOUT ANGINA PECTORIS: ICD-10-CM

## 2023-01-24 DIAGNOSIS — Z79.82 LONG TERM (CURRENT) USE OF ASPIRIN: ICD-10-CM

## 2023-01-24 DIAGNOSIS — R11.2 NAUSEA WITH VOMITING, UNSPECIFIED: ICD-10-CM

## 2023-01-24 DIAGNOSIS — Z95.5 PRESENCE OF CORONARY ANGIOPLASTY IMPLANT AND GRAFT: ICD-10-CM

## 2023-01-24 DIAGNOSIS — Z79.02 LONG TERM (CURRENT) USE OF ANTITHROMBOTICS/ANTIPLATELETS: ICD-10-CM

## 2023-01-24 DIAGNOSIS — Z91.041 RADIOGRAPHIC DYE ALLERGY STATUS: ICD-10-CM

## 2023-01-24 DIAGNOSIS — Z87.19 PERSONAL HISTORY OF OTHER DISEASES OF THE DIGESTIVE SYSTEM: ICD-10-CM

## 2023-01-24 DIAGNOSIS — Z90.49 ACQUIRED ABSENCE OF OTHER SPECIFIED PARTS OF DIGESTIVE TRACT: ICD-10-CM

## 2023-01-24 DIAGNOSIS — I10 ESSENTIAL (PRIMARY) HYPERTENSION: ICD-10-CM

## 2023-01-27 DIAGNOSIS — I10 ESSENTIAL (PRIMARY) HYPERTENSION: ICD-10-CM

## 2023-01-27 DIAGNOSIS — Z79.01 LONG TERM (CURRENT) USE OF ANTICOAGULANTS: ICD-10-CM

## 2023-01-27 DIAGNOSIS — K50.012 CROHN'S DISEASE OF SMALL INTESTINE WITH INTESTINAL OBSTRUCTION: ICD-10-CM

## 2023-01-27 DIAGNOSIS — Z91.041 RADIOGRAPHIC DYE ALLERGY STATUS: ICD-10-CM

## 2023-01-27 DIAGNOSIS — Z86.73 PERSONAL HISTORY OF TRANSIENT ISCHEMIC ATTACK (TIA), AND CEREBRAL INFARCTION WITHOUT RESIDUAL DEFICITS: ICD-10-CM

## 2023-01-27 DIAGNOSIS — Z79.82 LONG TERM (CURRENT) USE OF ASPIRIN: ICD-10-CM

## 2023-01-27 DIAGNOSIS — I35.0 NONRHEUMATIC AORTIC (VALVE) STENOSIS: ICD-10-CM

## 2023-01-27 DIAGNOSIS — K29.70 GASTRITIS, UNSPECIFIED, WITHOUT BLEEDING: ICD-10-CM

## 2023-01-27 DIAGNOSIS — Z90.49 ACQUIRED ABSENCE OF OTHER SPECIFIED PARTS OF DIGESTIVE TRACT: ICD-10-CM

## 2023-01-27 DIAGNOSIS — I25.10 ATHEROSCLEROTIC HEART DISEASE OF NATIVE CORONARY ARTERY WITHOUT ANGINA PECTORIS: ICD-10-CM

## 2023-01-27 DIAGNOSIS — E83.39 OTHER DISORDERS OF PHOSPHORUS METABOLISM: ICD-10-CM

## 2023-01-27 DIAGNOSIS — Z95.5 PRESENCE OF CORONARY ANGIOPLASTY IMPLANT AND GRAFT: ICD-10-CM

## 2023-01-27 DIAGNOSIS — Z20.822 CONTACT WITH AND (SUSPECTED) EXPOSURE TO COVID-19: ICD-10-CM

## 2023-01-27 DIAGNOSIS — Z79.899 OTHER LONG TERM (CURRENT) DRUG THERAPY: ICD-10-CM

## 2023-01-27 DIAGNOSIS — E87.6 HYPOKALEMIA: ICD-10-CM

## 2023-05-15 ENCOUNTER — EMERGENCY (EMERGENCY)
Facility: HOSPITAL | Age: 83
LOS: 0 days | Discharge: HOME | End: 2023-05-15
Attending: EMERGENCY MEDICINE
Payer: MEDICARE

## 2023-05-15 VITALS — DIASTOLIC BLOOD PRESSURE: 75 MMHG | SYSTOLIC BLOOD PRESSURE: 155 MMHG

## 2023-05-15 VITALS — WEIGHT: 164.91 LBS

## 2023-05-15 DIAGNOSIS — I44.7 LEFT BUNDLE-BRANCH BLOCK, UNSPECIFIED: ICD-10-CM

## 2023-05-15 DIAGNOSIS — Z90.49 ACQUIRED ABSENCE OF OTHER SPECIFIED PARTS OF DIGESTIVE TRACT: Chronic | ICD-10-CM

## 2023-05-15 DIAGNOSIS — I10 ESSENTIAL (PRIMARY) HYPERTENSION: ICD-10-CM

## 2023-05-15 DIAGNOSIS — Z79.02 LONG TERM (CURRENT) USE OF ANTITHROMBOTICS/ANTIPLATELETS: ICD-10-CM

## 2023-05-15 DIAGNOSIS — Z91.041 RADIOGRAPHIC DYE ALLERGY STATUS: ICD-10-CM

## 2023-05-15 DIAGNOSIS — Z95.5 PRESENCE OF CORONARY ANGIOPLASTY IMPLANT AND GRAFT: Chronic | ICD-10-CM

## 2023-05-15 DIAGNOSIS — Z86.73 PERSONAL HISTORY OF TRANSIENT ISCHEMIC ATTACK (TIA), AND CEREBRAL INFARCTION WITHOUT RESIDUAL DEFICITS: ICD-10-CM

## 2023-05-15 DIAGNOSIS — R51.9 HEADACHE, UNSPECIFIED: ICD-10-CM

## 2023-05-15 DIAGNOSIS — Z95.5 PRESENCE OF CORONARY ANGIOPLASTY IMPLANT AND GRAFT: ICD-10-CM

## 2023-05-15 DIAGNOSIS — Z90.49 ACQUIRED ABSENCE OF OTHER SPECIFIED PARTS OF DIGESTIVE TRACT: ICD-10-CM

## 2023-05-15 DIAGNOSIS — K29.70 GASTRITIS, UNSPECIFIED, WITHOUT BLEEDING: ICD-10-CM

## 2023-05-15 DIAGNOSIS — I25.10 ATHEROSCLEROTIC HEART DISEASE OF NATIVE CORONARY ARTERY WITHOUT ANGINA PECTORIS: ICD-10-CM

## 2023-05-15 DIAGNOSIS — R53.83 OTHER FATIGUE: ICD-10-CM

## 2023-05-15 DIAGNOSIS — Z79.82 LONG TERM (CURRENT) USE OF ASPIRIN: ICD-10-CM

## 2023-05-15 LAB
ALBUMIN SERPL ELPH-MCNC: 4 G/DL — SIGNIFICANT CHANGE UP (ref 3.3–5)
ALP SERPL-CCNC: 70 U/L — SIGNIFICANT CHANGE UP (ref 40–120)
ALT FLD-CCNC: 45 U/L — SIGNIFICANT CHANGE UP (ref 12–78)
ANION GAP SERPL CALC-SCNC: 4 MMOL/L — LOW (ref 5–17)
AST SERPL-CCNC: 34 U/L — SIGNIFICANT CHANGE UP (ref 15–37)
BASOPHILS # BLD AUTO: 0.05 K/UL — SIGNIFICANT CHANGE UP (ref 0–0.2)
BASOPHILS NFR BLD AUTO: 0.5 % — SIGNIFICANT CHANGE UP (ref 0–2)
BILIRUB SERPL-MCNC: 0.4 MG/DL — SIGNIFICANT CHANGE UP (ref 0.2–1.2)
BUN SERPL-MCNC: 16 MG/DL — SIGNIFICANT CHANGE UP (ref 7–23)
CALCIUM SERPL-MCNC: 9.4 MG/DL — SIGNIFICANT CHANGE UP (ref 8.5–10.1)
CHLORIDE SERPL-SCNC: 109 MMOL/L — HIGH (ref 96–108)
CO2 SERPL-SCNC: 28 MMOL/L — SIGNIFICANT CHANGE UP (ref 22–31)
CREAT SERPL-MCNC: 0.97 MG/DL — SIGNIFICANT CHANGE UP (ref 0.5–1.3)
EGFR: 77 ML/MIN/1.73M2 — SIGNIFICANT CHANGE UP
EOSINOPHIL # BLD AUTO: 0.14 K/UL — SIGNIFICANT CHANGE UP (ref 0–0.5)
EOSINOPHIL NFR BLD AUTO: 1.4 % — SIGNIFICANT CHANGE UP (ref 0–6)
GLUCOSE SERPL-MCNC: 105 MG/DL — HIGH (ref 70–99)
HCT VFR BLD CALC: 38.6 % — LOW (ref 39–50)
HGB BLD-MCNC: 12.8 G/DL — LOW (ref 13–17)
IMM GRANULOCYTES NFR BLD AUTO: 0.4 % — SIGNIFICANT CHANGE UP (ref 0–0.9)
LYMPHOCYTES # BLD AUTO: 1.95 K/UL — SIGNIFICANT CHANGE UP (ref 1–3.3)
LYMPHOCYTES # BLD AUTO: 19.3 % — SIGNIFICANT CHANGE UP (ref 13–44)
MCHC RBC-ENTMCNC: 31.6 PG — SIGNIFICANT CHANGE UP (ref 27–34)
MCHC RBC-ENTMCNC: 33.2 GM/DL — SIGNIFICANT CHANGE UP (ref 32–36)
MCV RBC AUTO: 95.3 FL — SIGNIFICANT CHANGE UP (ref 80–100)
MONOCYTES # BLD AUTO: 0.99 K/UL — HIGH (ref 0–0.9)
MONOCYTES NFR BLD AUTO: 9.8 % — SIGNIFICANT CHANGE UP (ref 2–14)
NEUTROPHILS # BLD AUTO: 6.95 K/UL — SIGNIFICANT CHANGE UP (ref 1.8–7.4)
NEUTROPHILS NFR BLD AUTO: 68.6 % — SIGNIFICANT CHANGE UP (ref 43–77)
NT-PROBNP SERPL-SCNC: 120 PG/ML — SIGNIFICANT CHANGE UP (ref 0–450)
PLATELET # BLD AUTO: 274 K/UL — SIGNIFICANT CHANGE UP (ref 150–400)
POTASSIUM SERPL-MCNC: 4.2 MMOL/L — SIGNIFICANT CHANGE UP (ref 3.5–5.3)
POTASSIUM SERPL-SCNC: 4.2 MMOL/L — SIGNIFICANT CHANGE UP (ref 3.5–5.3)
PROT SERPL-MCNC: 9.2 GM/DL — HIGH (ref 6–8.3)
RBC # BLD: 4.05 M/UL — LOW (ref 4.2–5.8)
RBC # FLD: 13.7 % — SIGNIFICANT CHANGE UP (ref 10.3–14.5)
SODIUM SERPL-SCNC: 141 MMOL/L — SIGNIFICANT CHANGE UP (ref 135–145)
TROPONIN I, HIGH SENSITIVITY RESULT: 4.11 NG/L — SIGNIFICANT CHANGE UP
WBC # BLD: 10.12 K/UL — SIGNIFICANT CHANGE UP (ref 3.8–10.5)
WBC # FLD AUTO: 10.12 K/UL — SIGNIFICANT CHANGE UP (ref 3.8–10.5)

## 2023-05-15 PROCEDURE — 93010 ELECTROCARDIOGRAM REPORT: CPT

## 2023-05-15 PROCEDURE — G1004: CPT

## 2023-05-15 PROCEDURE — 71045 X-RAY EXAM CHEST 1 VIEW: CPT | Mod: 26

## 2023-05-15 PROCEDURE — 83880 ASSAY OF NATRIURETIC PEPTIDE: CPT

## 2023-05-15 PROCEDURE — 70450 CT HEAD/BRAIN W/O DYE: CPT | Mod: 26,MG

## 2023-05-15 PROCEDURE — 80053 COMPREHEN METABOLIC PANEL: CPT

## 2023-05-15 PROCEDURE — 36415 COLL VENOUS BLD VENIPUNCTURE: CPT

## 2023-05-15 PROCEDURE — 84484 ASSAY OF TROPONIN QUANT: CPT

## 2023-05-15 PROCEDURE — 99285 EMERGENCY DEPT VISIT HI MDM: CPT | Mod: 25

## 2023-05-15 PROCEDURE — 85025 COMPLETE CBC W/AUTO DIFF WBC: CPT

## 2023-05-15 PROCEDURE — 93005 ELECTROCARDIOGRAM TRACING: CPT

## 2023-05-15 PROCEDURE — 99285 EMERGENCY DEPT VISIT HI MDM: CPT

## 2023-05-15 PROCEDURE — 70450 CT HEAD/BRAIN W/O DYE: CPT | Mod: MG

## 2023-05-15 PROCEDURE — 71045 X-RAY EXAM CHEST 1 VIEW: CPT

## 2023-05-15 NOTE — ED ADULT NURSE NOTE - OBJECTIVE STATEMENT
Patient with complaints of hypertension and dizziness; patient states he was checking his blood pressure at home and he noticed that it was high (170s systolic); patient also reports episodes of feeling dizzy while he's standing; denies any chest pain, visual disturbances, headache- PARMJIT Salinas RN

## 2023-05-15 NOTE — ED PROVIDER NOTE - PATIENT PORTAL LINK FT
You can access the FollowMyHealth Patient Portal offered by [facility] by registering at the following website: [email or web address]. By joining [employer]’s FollowMyHealth portal, you will also be able to view your health information using other applications (apps) compatible with our system.

## 2023-05-15 NOTE — ED ADULT NURSE NOTE - NSFALLUNIVINTERV_ED_ALL_ED
Bed/Stretcher in lowest position, wheels locked, appropriate side rails in place/Call bell, personal items and telephone in reach/Instruct patient to call for assistance before getting out of bed/chair/stretcher/Non-slip footwear applied when patient is off stretcher/[place] to call system/Physically safe environment - no spills, clutter or unnecessary equipment/Purposeful proactive rounding/Room/bathroom lighting operational, light cord in reach

## 2023-05-15 NOTE — ED STATDOCS - PROGRESS NOTE DETAILS
Kamila Mosley for Dr. Newton:  83 year old male with PMHx of HTN, stroke, CAD s/p stents presents to the ED c/o hypertension for the past few days. Wife states pt has had high bp over the past few days with some relief with medications, but pt has now developed intermittent dizziness. /88 in supertrack. Will send to main for further eval.

## 2023-05-15 NOTE — ED PROVIDER NOTE - OBJECTIVE STATEMENT
83 year old male with PMHx of HTN, stroke, CAD s/p stents presents to the ED c/o HTN for the past few days with mild HA and fatigue today. /88 in supertrack. States he feels great at this time. No other complaints at this time.

## 2023-05-15 NOTE — ED ADULT TRIAGE NOTE - CHIEF COMPLAINT QUOTE
pt presents to ED due to elevated BP x a few days worsening pt states he doesn't take both of his BP meds because one of them makes him lightheaded

## 2023-05-15 NOTE — ED PROVIDER NOTE - CLINICAL SUMMARY MEDICAL DECISION MAKING FREE TEXT BOX
Labs WNL.  CT WNL.  Patient well on exam.  BP improving on own.  D/c home in good condition, f/u with his doctor.

## 2023-05-15 NOTE — ED ADULT NURSE NOTE - NS ED NURSE DISCH DISPOSITION
AIC added onto previous lab work.  Pt has been advised & verbalized understanding.  Lab released.     Discharged Acitretin Pregnancy And Lactation Text: This medication is Pregnancy Category X and should not be given to women who are pregnant or may become pregnant in the future. This medication is excreted in breast milk.

## 2023-06-25 ENCOUNTER — EMERGENCY (EMERGENCY)
Facility: HOSPITAL | Age: 83
LOS: 0 days | Discharge: HOME | End: 2023-06-25
Attending: EMERGENCY MEDICINE
Payer: MEDICARE

## 2023-06-25 VITALS
TEMPERATURE: 100 F | SYSTOLIC BLOOD PRESSURE: 174 MMHG | HEART RATE: 80 BPM | DIASTOLIC BLOOD PRESSURE: 89 MMHG | RESPIRATION RATE: 19 BRPM | OXYGEN SATURATION: 99 %

## 2023-06-25 VITALS
TEMPERATURE: 99 F | OXYGEN SATURATION: 98 % | HEART RATE: 77 BPM | RESPIRATION RATE: 17 BRPM | SYSTOLIC BLOOD PRESSURE: 155 MMHG | DIASTOLIC BLOOD PRESSURE: 79 MMHG

## 2023-06-25 DIAGNOSIS — Z95.5 PRESENCE OF CORONARY ANGIOPLASTY IMPLANT AND GRAFT: ICD-10-CM

## 2023-06-25 DIAGNOSIS — Z87.442 PERSONAL HISTORY OF URINARY CALCULI: ICD-10-CM

## 2023-06-25 DIAGNOSIS — D72.829 ELEVATED WHITE BLOOD CELL COUNT, UNSPECIFIED: ICD-10-CM

## 2023-06-25 DIAGNOSIS — Z95.5 PRESENCE OF CORONARY ANGIOPLASTY IMPLANT AND GRAFT: Chronic | ICD-10-CM

## 2023-06-25 DIAGNOSIS — Z79.02 LONG TERM (CURRENT) USE OF ANTITHROMBOTICS/ANTIPLATELETS: ICD-10-CM

## 2023-06-25 DIAGNOSIS — Z87.19 PERSONAL HISTORY OF OTHER DISEASES OF THE DIGESTIVE SYSTEM: ICD-10-CM

## 2023-06-25 DIAGNOSIS — N20.1 CALCULUS OF URETER: ICD-10-CM

## 2023-06-25 DIAGNOSIS — Z90.49 ACQUIRED ABSENCE OF OTHER SPECIFIED PARTS OF DIGESTIVE TRACT: Chronic | ICD-10-CM

## 2023-06-25 DIAGNOSIS — K50.90 CROHN'S DISEASE, UNSPECIFIED, WITHOUT COMPLICATIONS: ICD-10-CM

## 2023-06-25 DIAGNOSIS — Z91.041 RADIOGRAPHIC DYE ALLERGY STATUS: ICD-10-CM

## 2023-06-25 DIAGNOSIS — R10.9 UNSPECIFIED ABDOMINAL PAIN: ICD-10-CM

## 2023-06-25 DIAGNOSIS — I10 ESSENTIAL (PRIMARY) HYPERTENSION: ICD-10-CM

## 2023-06-25 DIAGNOSIS — Z90.49 ACQUIRED ABSENCE OF OTHER SPECIFIED PARTS OF DIGESTIVE TRACT: ICD-10-CM

## 2023-06-25 DIAGNOSIS — Z79.82 LONG TERM (CURRENT) USE OF ASPIRIN: ICD-10-CM

## 2023-06-25 DIAGNOSIS — R11.0 NAUSEA: ICD-10-CM

## 2023-06-25 DIAGNOSIS — I25.10 ATHEROSCLEROTIC HEART DISEASE OF NATIVE CORONARY ARTERY WITHOUT ANGINA PECTORIS: ICD-10-CM

## 2023-06-25 DIAGNOSIS — M54.50 LOW BACK PAIN, UNSPECIFIED: ICD-10-CM

## 2023-06-25 LAB
ALBUMIN SERPL ELPH-MCNC: 3.4 G/DL — SIGNIFICANT CHANGE UP (ref 3.3–5)
ALP SERPL-CCNC: 95 U/L — SIGNIFICANT CHANGE UP (ref 40–120)
ALT FLD-CCNC: 38 U/L — SIGNIFICANT CHANGE UP (ref 12–78)
ANION GAP SERPL CALC-SCNC: 7 MMOL/L — SIGNIFICANT CHANGE UP (ref 5–17)
APPEARANCE UR: CLEAR — SIGNIFICANT CHANGE UP
APTT BLD: 29.2 SEC — SIGNIFICANT CHANGE UP (ref 27.5–35.5)
AST SERPL-CCNC: 22 U/L — SIGNIFICANT CHANGE UP (ref 15–37)
BACTERIA # UR AUTO: ABNORMAL
BASOPHILS # BLD AUTO: 0.05 K/UL — SIGNIFICANT CHANGE UP (ref 0–0.2)
BASOPHILS NFR BLD AUTO: 0.4 % — SIGNIFICANT CHANGE UP (ref 0–2)
BILIRUB SERPL-MCNC: 0.6 MG/DL — SIGNIFICANT CHANGE UP (ref 0.2–1.2)
BILIRUB UR-MCNC: NEGATIVE — SIGNIFICANT CHANGE UP
BLD GP AB SCN SERPL QL: SIGNIFICANT CHANGE UP
BUN SERPL-MCNC: 18 MG/DL — SIGNIFICANT CHANGE UP (ref 7–23)
CALCIUM SERPL-MCNC: 9.5 MG/DL — SIGNIFICANT CHANGE UP (ref 8.5–10.1)
CHLORIDE SERPL-SCNC: 106 MMOL/L — SIGNIFICANT CHANGE UP (ref 96–108)
CO2 SERPL-SCNC: 24 MMOL/L — SIGNIFICANT CHANGE UP (ref 22–31)
COLOR SPEC: YELLOW — SIGNIFICANT CHANGE UP
CREAT SERPL-MCNC: 1.21 MG/DL — SIGNIFICANT CHANGE UP (ref 0.5–1.3)
DIFF PNL FLD: ABNORMAL
EGFR: 59 ML/MIN/1.73M2 — LOW
EOSINOPHIL # BLD AUTO: 0.08 K/UL — SIGNIFICANT CHANGE UP (ref 0–0.5)
EOSINOPHIL NFR BLD AUTO: 0.7 % — SIGNIFICANT CHANGE UP (ref 0–6)
EPI CELLS # UR: SIGNIFICANT CHANGE UP
GLUCOSE SERPL-MCNC: 121 MG/DL — HIGH (ref 70–99)
GLUCOSE UR QL: NEGATIVE — SIGNIFICANT CHANGE UP
HCT VFR BLD CALC: 38.1 % — LOW (ref 39–50)
HGB BLD-MCNC: 12.5 G/DL — LOW (ref 13–17)
IMM GRANULOCYTES NFR BLD AUTO: 0.4 % — SIGNIFICANT CHANGE UP (ref 0–0.9)
INR BLD: 1.2 RATIO — HIGH (ref 0.88–1.16)
KETONES UR-MCNC: NEGATIVE — SIGNIFICANT CHANGE UP
LEUKOCYTE ESTERASE UR-ACNC: NEGATIVE — SIGNIFICANT CHANGE UP
LIDOCAIN IGE QN: 113 U/L — SIGNIFICANT CHANGE UP (ref 73–393)
LYMPHOCYTES # BLD AUTO: 1.51 K/UL — SIGNIFICANT CHANGE UP (ref 1–3.3)
LYMPHOCYTES # BLD AUTO: 12.4 % — LOW (ref 13–44)
MCHC RBC-ENTMCNC: 30.7 PG — SIGNIFICANT CHANGE UP (ref 27–34)
MCHC RBC-ENTMCNC: 32.8 GM/DL — SIGNIFICANT CHANGE UP (ref 32–36)
MCV RBC AUTO: 93.6 FL — SIGNIFICANT CHANGE UP (ref 80–100)
MONOCYTES # BLD AUTO: 1.04 K/UL — HIGH (ref 0–0.9)
MONOCYTES NFR BLD AUTO: 8.5 % — SIGNIFICANT CHANGE UP (ref 2–14)
NEUTROPHILS # BLD AUTO: 9.47 K/UL — HIGH (ref 1.8–7.4)
NEUTROPHILS NFR BLD AUTO: 77.6 % — HIGH (ref 43–77)
NITRITE UR-MCNC: NEGATIVE — SIGNIFICANT CHANGE UP
PH UR: 5 — SIGNIFICANT CHANGE UP (ref 5–8)
PLATELET # BLD AUTO: 371 K/UL — SIGNIFICANT CHANGE UP (ref 150–400)
POTASSIUM SERPL-MCNC: 4 MMOL/L — SIGNIFICANT CHANGE UP (ref 3.5–5.3)
POTASSIUM SERPL-SCNC: 4 MMOL/L — SIGNIFICANT CHANGE UP (ref 3.5–5.3)
PROT SERPL-MCNC: 9.2 GM/DL — HIGH (ref 6–8.3)
PROT UR-MCNC: 15
PROTHROM AB SERPL-ACNC: 13.9 SEC — HIGH (ref 10.5–13.4)
RBC # BLD: 4.07 M/UL — LOW (ref 4.2–5.8)
RBC # FLD: 12.8 % — SIGNIFICANT CHANGE UP (ref 10.3–14.5)
RBC CASTS # UR COMP ASSIST: >50 /HPF (ref 0–4)
SODIUM SERPL-SCNC: 137 MMOL/L — SIGNIFICANT CHANGE UP (ref 135–145)
SP GR SPEC: 1.01 — SIGNIFICANT CHANGE UP (ref 1.01–1.02)
UROBILINOGEN FLD QL: NEGATIVE — SIGNIFICANT CHANGE UP
WBC # BLD: 12.2 K/UL — HIGH (ref 3.8–10.5)
WBC # FLD AUTO: 12.2 K/UL — HIGH (ref 3.8–10.5)
WBC UR QL: ABNORMAL /HPF (ref 0–5)

## 2023-06-25 PROCEDURE — 85025 COMPLETE CBC W/AUTO DIFF WBC: CPT

## 2023-06-25 PROCEDURE — 87086 URINE CULTURE/COLONY COUNT: CPT

## 2023-06-25 PROCEDURE — 85610 PROTHROMBIN TIME: CPT

## 2023-06-25 PROCEDURE — 81001 URINALYSIS AUTO W/SCOPE: CPT

## 2023-06-25 PROCEDURE — 83690 ASSAY OF LIPASE: CPT

## 2023-06-25 PROCEDURE — 36415 COLL VENOUS BLD VENIPUNCTURE: CPT

## 2023-06-25 PROCEDURE — 96374 THER/PROPH/DIAG INJ IV PUSH: CPT

## 2023-06-25 PROCEDURE — 86850 RBC ANTIBODY SCREEN: CPT

## 2023-06-25 PROCEDURE — 80053 COMPREHEN METABOLIC PANEL: CPT

## 2023-06-25 PROCEDURE — G1004: CPT

## 2023-06-25 PROCEDURE — 86901 BLOOD TYPING SEROLOGIC RH(D): CPT

## 2023-06-25 PROCEDURE — 99284 EMERGENCY DEPT VISIT MOD MDM: CPT | Mod: 25

## 2023-06-25 PROCEDURE — 99285 EMERGENCY DEPT VISIT HI MDM: CPT | Mod: FS

## 2023-06-25 PROCEDURE — 74176 CT ABD & PELVIS W/O CONTRAST: CPT | Mod: 26,ME

## 2023-06-25 PROCEDURE — 86900 BLOOD TYPING SEROLOGIC ABO: CPT

## 2023-06-25 PROCEDURE — 96375 TX/PRO/DX INJ NEW DRUG ADDON: CPT

## 2023-06-25 PROCEDURE — 74176 CT ABD & PELVIS W/O CONTRAST: CPT | Mod: ME

## 2023-06-25 PROCEDURE — 85730 THROMBOPLASTIN TIME PARTIAL: CPT

## 2023-06-25 RX ORDER — MORPHINE SULFATE 50 MG/1
4 CAPSULE, EXTENDED RELEASE ORAL ONCE
Refills: 0 | Status: DISCONTINUED | OUTPATIENT
Start: 2023-06-25 | End: 2023-06-25

## 2023-06-25 RX ORDER — ONDANSETRON 8 MG/1
4 TABLET, FILM COATED ORAL ONCE
Refills: 0 | Status: COMPLETED | OUTPATIENT
Start: 2023-06-25 | End: 2023-06-25

## 2023-06-25 RX ORDER — SODIUM CHLORIDE 9 MG/ML
1000 INJECTION INTRAMUSCULAR; INTRAVENOUS; SUBCUTANEOUS ONCE
Refills: 0 | Status: COMPLETED | OUTPATIENT
Start: 2023-06-25 | End: 2023-06-25

## 2023-06-25 RX ORDER — OXYCODONE HYDROCHLORIDE 5 MG/1
5 TABLET ORAL ONCE
Refills: 0 | Status: DISCONTINUED | OUTPATIENT
Start: 2023-06-25 | End: 2023-06-25

## 2023-06-25 RX ORDER — OXYCODONE HYDROCHLORIDE 5 MG/1
1 TABLET ORAL
Qty: 8 | Refills: 0
Start: 2023-06-25

## 2023-06-25 RX ADMIN — OXYCODONE HYDROCHLORIDE 5 MILLIGRAM(S): 5 TABLET ORAL at 19:37

## 2023-06-25 RX ADMIN — ONDANSETRON 4 MILLIGRAM(S): 8 TABLET, FILM COATED ORAL at 17:48

## 2023-06-25 RX ADMIN — MORPHINE SULFATE 4 MILLIGRAM(S): 50 CAPSULE, EXTENDED RELEASE ORAL at 17:48

## 2023-06-25 RX ADMIN — SODIUM CHLORIDE 666.67 MILLILITER(S): 9 INJECTION INTRAMUSCULAR; INTRAVENOUS; SUBCUTANEOUS at 17:48

## 2023-06-25 NOTE — ED STATDOCS - PATIENT PORTAL LINK FT
You can access the FollowMyHealth Patient Portal offered by [facility] by registering at the following website: [email or web address]. By joining [employer]’s FollowMyHealth portal, you will also be able to view your health information using other applications (apps) compatible with our system.

## 2023-06-25 NOTE — ED STATDOCS - CONSTITUTIONAL, MLM
normal... older white male, no [id number] distress, mild distress due to pain, nontoxic older white male, no respiratory distress, mild distress due to pain, nontoxic

## 2023-06-25 NOTE — ED STATDOCS - CLINICAL SUMMARY MEDICAL DECISION MAKING FREE TEXT BOX
84 yo male w/PMHx of HTN, CAD s/p cardiac stents on aspirin and Plavix, Crohn's, kidney stones ambulating to the ED c/o left flank pain. Pt had multiple episodes of diarrhea lat night and felt dehydrated. Pt was given Pedialyte this morning and noted that he had burning sensation in his abd. Pt also had burning with urination, left low back/flank pain and dysuria. +nausea, no vomiting, no fevers or chills. Pain was severe at home, moderate here in ED. Pt has left flank and CVA ttp. Abd benign. High clinical suspicion for left renal colic. Urine, IV fluid hydration, CTA/p, IV zofran. Observe and reassess. 84 yo male w/PMHx of HTN, CAD s/p cardiac stents on aspirin and Plavix, Crohn's, kidney stones ambulating to the ED c/o left flank pain. Pt had multiple episodes of diarrhea lat night and felt dehydrated. Pt was given Pedialyte this morning and noted that he had burning sensation in his abd. Pt also had burning with urination, left low back/flank pain and dysuria. +nausea, no vomiting, no fevers or chills. Pain was severe at home, moderate here in ED. Pt has left flank and CVA ttp. Abd benign. High clinical suspicion for left renal colic. Plan: labs including Urine, IV fluid hydration, CTA/p, IV zofran. Observe and reassess. 84 yo male w/PMHx of HTN, CAD s/p cardiac stents on aspirin and Plavix, Crohn's, kidney stones ambulating to the ED c/o left flank pain. Pt had multiple episodes of diarrhea lat night and felt dehydrated.  +Pedialyte this morning and noted that he had burning sensation in his upper abd. Pt also had burning with urination, left low back/flank pain and dysuria. +nausea, no vomiting, no fevers or chills. Pain was severe at home, moderate here in ED. Pt has left flank and CVAT. Abd benign. High clinical suspicion for left renal colic.   Plan: labs including Urine, IV fluid hydration, CTA/P, IV zofran. Observe and reassess.    Addendum, TITO Dumont MD:  U/A + large blood.  CBC mild elev. WBC, stable Hgb, CMP essentially normal, lipase normal.    19:30:   patient seen s/p workup.  +3mm stone at uvj on left.  +8mm nonobstructing stone on R.  No pain on R.  Has required lithotripsy in past.  Advised close follow up with urology and strict return precautions -Wen Gagnon PA-C

## 2023-06-25 NOTE — ED ADULT NURSE NOTE - NSFALLRISKINTERV_ED_ALL_ED

## 2023-06-25 NOTE — ED ADULT NURSE NOTE - OBJECTIVE STATEMENT
Patient presents to the ER with complaints of left lower flank pain associated with nausea. Patient reports hx of chrons, was constipated, took dulcolax and had diarrhea all night last night. Patient states he has burning with urination. Patient denies CP, SOB, fever, syncope, vomiting or any other urinary symptoms. Patient reports hx of kidney stone.

## 2023-06-25 NOTE — ED STATDOCS - ATTENDING APP SHARED VISIT CONTRIBUTION OF CARE
ED Attending Contribution to Care: MITESH RASHID MD,  performed the initial face to face bedside interview with this patient regarding history of present illness, review of symptoms and relevant past medical, social and family history.  I completed an independent physical examination.  I was the initial provider who evaluated this patient. I have signed out the follow up of any pending tests (i.e. labs, radiological studies) to the ALANIS.  I have communicated the patient’s plan of care and disposition with the ALANIS.  The history, relevant review of systems, past medical and surgical history, medical decision making, and physical examination was documented by the scribe in my presence and I attest to the accuracy of the documentation.

## 2023-06-25 NOTE — ED STATDOCS - PROGRESS NOTE DETAILS
patient seen s/p workup.  +3mm stone at uvj on left.  +8mm nonobstructing stone on R.  No pain on R.  Has required lithotripsy in past.  Advised close follow up with urology and strict return precautions -Wen Gagnon PA-C

## 2023-06-25 NOTE — ED ADULT TRIAGE NOTE - CHIEF COMPLAINT QUOTE
pt ambulatory to triage c/o L flank pain x2 hours pta. -allergies. pmh cardiac stents (plavix, baby asa), HTN, chrons

## 2023-06-25 NOTE — ED STATDOCS - OBJECTIVE STATEMENT
82 yo male w/PMHx of HTN, CAD s/p cardiac stents on aspirin and Plavix, Crohn's, kidney stones ambulating to the ED c/o left flank pain. Pt was at a party last night and was constipated. Pt took Dulcolax, pt had multiple episodes of diarrhea lat night and felt dehydrated. Pt was given Pedialyte this morning and noted that he had burning sensation in his abd. Pt also had burning with urination and dysuria. Pt began to have lower back pain that started as well. Pt states that the pain is constant but has waves on severe pain. +nausea, no vomiting, no fevers. Pain was severe when leaving the house but is now 5/10.  PCP/cardiologist: EFE Kebede 84 yo male w/PMHx of HTN, CAD s/p cardiac stents on aspirin and Plavix, Crohn's, kidney stones ambulating to the ED c/o left flank pain. Pt was at a party last night and was constipated. Pt took Dulcolax, pt had multiple episodes of diarrhea overnight and subsequently felt dehydrated. Pt drank Pedialyte this morning and noted some burning sensation in his upper abd. Pt also had burning with urination and dysuria. Pt began to have lower back pain that started as well. Pt states that the pain is waxing/waning, moderate - severe intensity. +nausea, no vomiting, no fevers. Pain was severe when leaving the house but is now 5/10.  PCP/cardiologist: EFE Almendarez

## 2023-06-25 NOTE — ED STATDOCS - CARE PROVIDER_API CALL
Torsten Cowart [place]  Urology  [address] [place] [address], [address]  [place] [address]  Phone: [phone]  Fax: [phone]  Follow Up Time:

## 2023-06-26 LAB
CULTURE RESULTS: SIGNIFICANT CHANGE UP
SPECIMEN SOURCE: SIGNIFICANT CHANGE UP

## 2023-07-05 ENCOUNTER — EMERGENCY (EMERGENCY)
Facility: HOSPITAL | Age: 83
LOS: 0 days | Discharge: HOME | End: 2023-07-05
Attending: EMERGENCY MEDICINE
Payer: MEDICARE

## 2023-07-05 VITALS
SYSTOLIC BLOOD PRESSURE: 140 MMHG | HEART RATE: 80 BPM | TEMPERATURE: 98 F | DIASTOLIC BLOOD PRESSURE: 78 MMHG | OXYGEN SATURATION: 97 %

## 2023-07-05 VITALS — WEIGHT: 164.91 LBS

## 2023-07-05 DIAGNOSIS — I25.10 ATHEROSCLEROTIC HEART DISEASE OF NATIVE CORONARY ARTERY WITHOUT ANGINA PECTORIS: ICD-10-CM

## 2023-07-05 DIAGNOSIS — Z91.09 OTHER ALLERGY STATUS, OTHER THAN TO DRUGS AND BIOLOGICAL SUBSTANCES: ICD-10-CM

## 2023-07-05 DIAGNOSIS — I10 ESSENTIAL (PRIMARY) HYPERTENSION: ICD-10-CM

## 2023-07-05 DIAGNOSIS — Z91.041 RADIOGRAPHIC DYE ALLERGY STATUS: ICD-10-CM

## 2023-07-05 DIAGNOSIS — M25.512 PAIN IN LEFT SHOULDER: ICD-10-CM

## 2023-07-05 DIAGNOSIS — Z79.02 LONG TERM (CURRENT) USE OF ANTITHROMBOTICS/ANTIPLATELETS: ICD-10-CM

## 2023-07-05 DIAGNOSIS — Z90.49 ACQUIRED ABSENCE OF OTHER SPECIFIED PARTS OF DIGESTIVE TRACT: ICD-10-CM

## 2023-07-05 DIAGNOSIS — K50.90 CROHN'S DISEASE, UNSPECIFIED, WITHOUT COMPLICATIONS: ICD-10-CM

## 2023-07-05 DIAGNOSIS — Z87.442 PERSONAL HISTORY OF URINARY CALCULI: ICD-10-CM

## 2023-07-05 DIAGNOSIS — Z79.82 LONG TERM (CURRENT) USE OF ASPIRIN: ICD-10-CM

## 2023-07-05 DIAGNOSIS — Z90.49 ACQUIRED ABSENCE OF OTHER SPECIFIED PARTS OF DIGESTIVE TRACT: Chronic | ICD-10-CM

## 2023-07-05 DIAGNOSIS — M25.511 PAIN IN RIGHT SHOULDER: ICD-10-CM

## 2023-07-05 DIAGNOSIS — Z95.5 PRESENCE OF CORONARY ANGIOPLASTY IMPLANT AND GRAFT: ICD-10-CM

## 2023-07-05 DIAGNOSIS — Z95.5 PRESENCE OF CORONARY ANGIOPLASTY IMPLANT AND GRAFT: Chronic | ICD-10-CM

## 2023-07-05 PROCEDURE — 93005 ELECTROCARDIOGRAM TRACING: CPT

## 2023-07-05 PROCEDURE — 99283 EMERGENCY DEPT VISIT LOW MDM: CPT | Mod: 25

## 2023-07-05 PROCEDURE — 99284 EMERGENCY DEPT VISIT MOD MDM: CPT

## 2023-07-05 PROCEDURE — 93010 ELECTROCARDIOGRAM REPORT: CPT

## 2023-07-05 RX ORDER — CYCLOBENZAPRINE HYDROCHLORIDE 10 MG/1
1 TABLET, FILM COATED ORAL
Qty: 15 | Refills: 0
Start: 2023-07-05 | End: 2023-07-09

## 2023-07-05 RX ORDER — OXYCODONE HYDROCHLORIDE 5 MG/1
1 TABLET ORAL
Qty: 20 | Refills: 0
Start: 2023-07-05 | End: 2023-07-09

## 2023-07-05 NOTE — ED PROVIDER NOTE - PHYSICAL EXAMINATION
Gen:  Well appearing in distress with shoulder pain bilaterally  Head:  NC/AT  HEENT: pupils perrl,no pharyngeal erythema, uvula midline  Cardiac: S1S2, RRR  Abd: Soft, non tender  Resp: No distress, CTA   musculoskeletal:: no deformities, no swelling, strength +5/+5, bilateral shoulder spasm, unable to range shoulders due to pain, +2 radial pulse bilaterally, no ttp over deltoid  Skin: warm and dry as visualized, no rashes  Neuro: ELBA, kirano x 4  Psych:alert, cooperative, appropriate mood and affect for situation

## 2023-07-05 NOTE — ED PROVIDER NOTE - OBJECTIVE STATEMENT
84 yo male w/PMHx of HTN, CAD s/p cardiac stents on aspirin and Plavix, Crohn's, kidney stones ambulating to the ED c/o bilateral sholder pain that radiates to his left ear. 82 yo male w/PMHx of HTN, CAD s/p cardiac stents on aspirin and Plavix, Crohn's, kidney stones ambulating to the ED c/o bilateral shoulder and trapezious pain. no chest pain , no difficulty breathing, no sob. pt states he has not been able to turn on his side to sleep for 4 nights

## 2023-07-05 NOTE — ED ADULT TRIAGE NOTE - CHIEF COMPLAINT QUOTE
pt c/o b/l shoulder pain x 3days in travels up to the left ear. HX HTN, on blood thinners. Pt denies any falls. was recently here for kidney stones which have passed. - allergies. pt was told to see an orthopedist but has not been able to find one. Pt A&ox4, ambulatory.

## 2023-07-05 NOTE — ED PROVIDER NOTE - NSFOLLOWUPINSTRUCTIONS_ED_ALL_ED_FT
Shoulder Pain  Many things can cause shoulder pain, including:  An injury.  Moving the shoulder in the same way again and again (overuse).  Joint pain (arthritis).  Pain can come from:  Swelling and irritation (inflammation) of any part of the shoulder.  An injury to the shoulder joint.  An injury to:  Tissues that connect muscle to bone (tendons).  Tissues that connect bones to each other (ligaments).  Bones.  Follow these instructions at home:  Watch for changes in your symptoms. Let your doctor know about them. Follow these instructions to help with your pain.    If you have a sling:    Wear the sling as told by your doctor. Remove it only as told by your doctor.  Loosen the sling if your fingers:  Tingle.  Become numb.  Turn cold and blue.  Keep the sling clean.  If the sling is not waterproof:  Do not let it get wet.  Take the sling off when you shower or bathe.  Managing pain, stiffness, and swelling    Bag of ice on a towel on the skin.  If told, put ice on the painful area:  Put ice in a plastic bag.  Place a towel between your skin and the bag.  Leave the ice on for 20 minutes, 2–3 times a day. Stop putting ice on if it does not help with the pain.  Squeeze a soft ball or a foam pad as much as possible. This prevents swelling in the shoulder. It also helps to strengthen the arm.  General instructions    Take over-the-counter and prescription medicines only as told by your doctor.  Keep all follow-up visits as told by your doctor. This is important.  Contact a doctor if:  Your pain gets worse.  Medicine does not help your pain.  You have new pain in your arm, hand, or fingers.  Get help right away if:  Your arm, hand, or fingers:  Tingle.  Are numb.  Are swollen.  Are painful.  Turn white or blue.  Summary  Shoulder pain can be caused by many things. These include injury, moving the shoulder in the same away again and again, and joint pain.  Watch for changes in your symptoms. Let your doctor know about them.  This condition may be treated with a sling, ice, and pain medicine.  Contact your doctor if the pain gets worse or you have new pain. Get help right away if your arm, hand, or fingers tingle or get numb, swollen, or painful.  Keep all follow-up visits as told by your doctor. This is important.  This information is not intended to replace advice given to you by your health care provider. Make sure you discuss any questions you have with your health care provider.    Document Revised: 09/02/2022 Document Reviewed: 09/02/2022

## 2023-07-05 NOTE — ED PROVIDER NOTE - PATIENT PORTAL LINK FT
You can access the FollowMyHealth Patient Portal offered by [facility] by registering at the following website: [email or web address]. By joining [employer]’s FollowMyHealth portal, you will also be able to view your health information using other applications (apps) compatible with our system.

## 2023-07-05 NOTE — ED PROVIDER NOTE - PROVIDER TOKENS
PROVIDER:[TOKEN:[[id number]]],PROVIDER:[TOKEN:[[id number]]],PROVIDER:[TOKEN:[[id number]]]

## 2023-07-05 NOTE — ED PROVIDER NOTE - CARE PROVIDER_API CALL
Nadeem Flores  Orthopaedic Surgery  [address] [place] [address]  Phone: [phone]  Fax: [phone]  Follow Up Time:     Faraz Meyers  Orthopaedic Surgery  [address] [place] [address], [address]  [place] [address]  Phone: [phone]  Fax: [phone]  Follow Up Time:     Gopal Berry  Orthopaedic Surgery  [address] [place] [address], [place] [address]  Phone: [phone]  Fax: [phone]  Follow Up Time:

## 2023-07-06 ENCOUNTER — NON-APPOINTMENT (OUTPATIENT)
Age: 83
End: 2023-07-06

## 2023-07-07 ENCOUNTER — INPATIENT (INPATIENT)
Facility: HOSPITAL | Age: 83
LOS: 3 days | Discharge: HOME | DRG: 867 | End: 2023-07-11
Attending: HOSPITALIST | Admitting: INTERNAL MEDICINE
Payer: MEDICARE

## 2023-07-07 VITALS
OXYGEN SATURATION: 100 % | DIASTOLIC BLOOD PRESSURE: 64 MMHG | HEART RATE: 85 BPM | SYSTOLIC BLOOD PRESSURE: 149 MMHG | TEMPERATURE: 98 F | RESPIRATION RATE: 20 BRPM

## 2023-07-07 DIAGNOSIS — Z90.49 ACQUIRED ABSENCE OF OTHER SPECIFIED PARTS OF DIGESTIVE TRACT: Chronic | ICD-10-CM

## 2023-07-07 DIAGNOSIS — I63.9 CEREBRAL INFARCTION, UNSPECIFIED: ICD-10-CM

## 2023-07-07 DIAGNOSIS — Z95.5 PRESENCE OF CORONARY ANGIOPLASTY IMPLANT AND GRAFT: Chronic | ICD-10-CM

## 2023-07-07 LAB
ALBUMIN SERPL ELPH-MCNC: 2.9 G/DL — LOW (ref 3.3–5)
ALP SERPL-CCNC: 87 U/L — SIGNIFICANT CHANGE UP (ref 40–120)
ALT FLD-CCNC: 27 U/L — SIGNIFICANT CHANGE UP (ref 12–78)
ANION GAP SERPL CALC-SCNC: 6 MMOL/L — SIGNIFICANT CHANGE UP (ref 5–17)
APTT BLD: 28.2 SEC — SIGNIFICANT CHANGE UP (ref 27.5–35.5)
AST SERPL-CCNC: 19 U/L — SIGNIFICANT CHANGE UP (ref 15–37)
BASOPHILS # BLD AUTO: 0.05 K/UL — SIGNIFICANT CHANGE UP (ref 0–0.2)
BASOPHILS NFR BLD AUTO: 0.6 % — SIGNIFICANT CHANGE UP (ref 0–2)
BILIRUB SERPL-MCNC: 0.3 MG/DL — SIGNIFICANT CHANGE UP (ref 0.2–1.2)
BUN SERPL-MCNC: 19 MG/DL — SIGNIFICANT CHANGE UP (ref 7–23)
CALCIUM SERPL-MCNC: 8.8 MG/DL — SIGNIFICANT CHANGE UP (ref 8.5–10.1)
CHLORIDE SERPL-SCNC: 105 MMOL/L — SIGNIFICANT CHANGE UP (ref 96–108)
CO2 SERPL-SCNC: 25 MMOL/L — SIGNIFICANT CHANGE UP (ref 22–31)
CREAT SERPL-MCNC: 1.09 MG/DL — SIGNIFICANT CHANGE UP (ref 0.5–1.3)
EGFR: 67 ML/MIN/1.73M2 — SIGNIFICANT CHANGE UP
EOSINOPHIL # BLD AUTO: 0.07 K/UL — SIGNIFICANT CHANGE UP (ref 0–0.5)
EOSINOPHIL NFR BLD AUTO: 0.9 % — SIGNIFICANT CHANGE UP (ref 0–6)
ETHANOL SERPL-MCNC: <10 MG/DL — SIGNIFICANT CHANGE UP (ref 0–10)
GLUCOSE SERPL-MCNC: 128 MG/DL — HIGH (ref 70–99)
HCT VFR BLD CALC: 32.2 % — LOW (ref 39–50)
HGB BLD-MCNC: 10.7 G/DL — LOW (ref 13–17)
IMM GRANULOCYTES NFR BLD AUTO: 0.4 % — SIGNIFICANT CHANGE UP (ref 0–0.9)
INR BLD: 1.26 RATIO — HIGH (ref 0.88–1.16)
LACTATE SERPL-SCNC: 1.2 MMOL/L — SIGNIFICANT CHANGE UP (ref 0.7–2)
LYMPHOCYTES # BLD AUTO: 1.15 K/UL — SIGNIFICANT CHANGE UP (ref 1–3.3)
LYMPHOCYTES # BLD AUTO: 14.6 % — SIGNIFICANT CHANGE UP (ref 13–44)
MCHC RBC-ENTMCNC: 31.1 PG — SIGNIFICANT CHANGE UP (ref 27–34)
MCHC RBC-ENTMCNC: 33.2 GM/DL — SIGNIFICANT CHANGE UP (ref 32–36)
MCV RBC AUTO: 93.6 FL — SIGNIFICANT CHANGE UP (ref 80–100)
MONOCYTES # BLD AUTO: 1.17 K/UL — HIGH (ref 0–0.9)
MONOCYTES NFR BLD AUTO: 14.8 % — HIGH (ref 2–14)
NEUTROPHILS # BLD AUTO: 5.42 K/UL — SIGNIFICANT CHANGE UP (ref 1.8–7.4)
NEUTROPHILS NFR BLD AUTO: 68.7 % — SIGNIFICANT CHANGE UP (ref 43–77)
PLATELET # BLD AUTO: 283 K/UL — SIGNIFICANT CHANGE UP (ref 150–400)
POTASSIUM SERPL-MCNC: 3.7 MMOL/L — SIGNIFICANT CHANGE UP (ref 3.5–5.3)
POTASSIUM SERPL-SCNC: 3.7 MMOL/L — SIGNIFICANT CHANGE UP (ref 3.5–5.3)
PROT SERPL-MCNC: 8.5 GM/DL — HIGH (ref 6–8.3)
PROTHROM AB SERPL-ACNC: 14.6 SEC — HIGH (ref 10.5–13.4)
RBC # BLD: 3.44 M/UL — LOW (ref 4.2–5.8)
RBC # FLD: 13.2 % — SIGNIFICANT CHANGE UP (ref 10.3–14.5)
SODIUM SERPL-SCNC: 136 MMOL/L — SIGNIFICANT CHANGE UP (ref 135–145)
TROPONIN I, HIGH SENSITIVITY RESULT: 4.12 NG/L — SIGNIFICANT CHANGE UP
WBC # BLD: 7.89 K/UL — SIGNIFICANT CHANGE UP (ref 3.8–10.5)
WBC # FLD AUTO: 7.89 K/UL — SIGNIFICANT CHANGE UP (ref 3.8–10.5)

## 2023-07-07 PROCEDURE — 86618 LYME DISEASE ANTIBODY: CPT

## 2023-07-07 PROCEDURE — 92523 SPEECH SOUND LANG COMPREHEN: CPT | Mod: GN

## 2023-07-07 PROCEDURE — 86617 LYME DISEASE ANTIBODY: CPT

## 2023-07-07 PROCEDURE — 99285 EMERGENCY DEPT VISIT HI MDM: CPT

## 2023-07-07 PROCEDURE — 97162 PT EVAL MOD COMPLEX 30 MIN: CPT | Mod: GP

## 2023-07-07 PROCEDURE — 80053 COMPREHEN METABOLIC PANEL: CPT

## 2023-07-07 PROCEDURE — 0042T: CPT | Mod: MA

## 2023-07-07 PROCEDURE — 36415 COLL VENOUS BLD VENIPUNCTURE: CPT

## 2023-07-07 PROCEDURE — 93005 ELECTROCARDIOGRAM TRACING: CPT

## 2023-07-07 PROCEDURE — 70496 CT ANGIOGRAPHY HEAD: CPT | Mod: 26,MA

## 2023-07-07 PROCEDURE — 80048 BASIC METABOLIC PNL TOTAL CA: CPT

## 2023-07-07 PROCEDURE — 87798 DETECT AGENT NOS DNA AMP: CPT

## 2023-07-07 PROCEDURE — 92610 EVALUATE SWALLOWING FUNCTION: CPT | Mod: GN

## 2023-07-07 PROCEDURE — 85025 COMPLETE CBC W/AUTO DIFF WBC: CPT

## 2023-07-07 PROCEDURE — 70551 MRI BRAIN STEM W/O DYE: CPT

## 2023-07-07 PROCEDURE — 97530 THERAPEUTIC ACTIVITIES: CPT | Mod: GP

## 2023-07-07 PROCEDURE — 93010 ELECTROCARDIOGRAM REPORT: CPT

## 2023-07-07 PROCEDURE — 70498 CT ANGIOGRAPHY NECK: CPT | Mod: 26,MA

## 2023-07-07 PROCEDURE — 85027 COMPLETE CBC AUTOMATED: CPT

## 2023-07-07 PROCEDURE — 87468 ANAPLSMA PHGCYTOPHLM AMP PRB: CPT

## 2023-07-07 PROCEDURE — 97116 GAIT TRAINING THERAPY: CPT | Mod: GP

## 2023-07-07 PROCEDURE — 87484 EHRLICHA CHAFFEENSIS AMP PRB: CPT

## 2023-07-07 RX ORDER — ACETAMINOPHEN 500 MG
1000 TABLET ORAL ONCE
Refills: 0 | Status: COMPLETED | OUTPATIENT
Start: 2023-07-07 | End: 2023-07-07

## 2023-07-07 RX ORDER — SODIUM CHLORIDE 9 MG/ML
1000 INJECTION INTRAMUSCULAR; INTRAVENOUS; SUBCUTANEOUS
Refills: 0 | Status: DISCONTINUED | OUTPATIENT
Start: 2023-07-07 | End: 2023-07-09

## 2023-07-07 RX ORDER — ASPIRIN/CALCIUM CARB/MAGNESIUM 324 MG
81 TABLET ORAL ONCE
Refills: 0 | Status: COMPLETED | OUTPATIENT
Start: 2023-07-07 | End: 2023-07-07

## 2023-07-07 RX ADMIN — SODIUM CHLORIDE 125 MILLILITER(S): 9 INJECTION INTRAMUSCULAR; INTRAVENOUS; SUBCUTANEOUS at 17:37

## 2023-07-07 RX ADMIN — Medication 81 MILLIGRAM(S): at 17:48

## 2023-07-07 RX ADMIN — Medication 400 MILLIGRAM(S): at 21:19

## 2023-07-07 NOTE — ED ADULT NURSE NOTE - OBJECTIVE STATEMENT
Pt BIBEMS c/o right sided facial drop. pt states symptoms started at 6am today. Hx previos stroke. pt on ASA and plavix.

## 2023-07-07 NOTE — ED ADULT NURSE NOTE - ED STAT RN HANDOFF DETAILS
Received pt A+Ox3 endorsed from ROHIT Jane, evaluated for right sided facial droop after touching his eye with lidocaine patch, pt in NAD, pending dispo.

## 2023-07-07 NOTE — ED PROVIDER NOTE - NEUROLOGICAL, MLM
Alert and oriented. 5/5 strength on flexion and extension b/l UE and LE. Right sided facial droop. Unable to close right eye completely. NIH 3. GCS 15.

## 2023-07-07 NOTE — ED PROVIDER NOTE - DIFFERENTIAL DIAGNOSIS
Differential Diagnosis Patient with facial anesthesia, and facial drooping differential includes bells palsy vs. CVA, code stroke called, NIH=3, patient is nontoxic appearing, out of the window for tPA, will admit for MR, and further inpatient evaluation to rule out CVA.

## 2023-07-07 NOTE — STROKE CODE NOTE - NSMDCONSULT QTN_Y FT
Patient is a 83y old  Male who presents with a chief complaint of facial droop    HPI: Mr. Delatorre is an 83 year old man who was recently seen in the ED for kidney stones who presents to the ED after being sent from urgent care for right facial weakness. He states that he was up all night with pain in his shoulder. At 6 AM he noticed that he was not able to fully close his right eye and that he had a facial droop. He also says that his mouth is very dry.  He went to urgent care because he was concerned about his eye after accidentally touching it after touching a lidocaine patch.  He denies headache or facial numbness.  He denies recent illness.      PAST MEDICAL & SURGICAL HISTORY:  Diverticulitis    Crohn's disease    Gastritis      Coronary artery disease involving native heart without angina pectoris, unspecified vessel or lesion type      Essential hypertension      S/P colon resection  sigmoidectomy      S/P coronary artery stent placement      S/P cholecystectomy          FAMILY HISTORY:  FH: coronary artery disease  father    Family history of diabetes mellitus (DM)  father        Social Hx:  Nonsmoker, no drug or alcohol use    Home medications:  Aspirin 81 mg/day  Plavix 75 mg/day  labetalol 400 mg BID  Stelara    MEDICATIONS  (STANDING):  sodium chloride 0.9%. 1000 milliLiter(s) (125 mL/Hr) IV Continuous <Continuous>       Allergies    IV Contrast (Unknown)  Originally Entered as [Unknown] reaction to [hair dye] (Unknown)  contrast (Unknown)    Intolerances        ROS: Pertinent positives in HPI, all other ROS were reviewed and are negative.      Vital Signs Last 24 Hrs  T(C): 36.8 (07 Jul 2023 15:49), Max: 36.8 (07 Jul 2023 15:49)  T(F): 98.3 (07 Jul 2023 15:49), Max: 98.3 (07 Jul 2023 15:49)  HR: 85 (07 Jul 2023 15:49) (85 - 85)  BP: 149/64 (07 Jul 2023 15:49) (149/64 - 149/64)  BP(mean): --  RR: 20 (07 Jul 2023 15:49) (20 - 20)  SpO2: 100% (07 Jul 2023 15:49) (100% - 100%)    Parameters below as of 07 Jul 2023 15:49  Patient On (Oxygen Delivery Method): room air            Constitutional: awake and alert.  HEENT: PERRLA, EOMI,   Neck: Supple.  Respiratory: Breath sounds are clear bilaterally  Cardiovascular: S1 and S2, regular / irregular rhythm  Gastrointestinal: soft, nontender  Extremities:  no edema  Musculoskeletal: no joint swelling/tenderness, no abnormal movements  Skin: No rashes    Neurological exam:  HF: A x O x 3. Appropriately interactive, normal affect. Speech fluent, No Aphasia or paraphasic errors. Naming /repetition intact   CN: BÁRBARA, EOMI, VFF, facial sensation normal, Weakness of right face, lower face affected more than upper face, unable to close right eye against force, tongue midline, Palate moves equally, SCM equal bilaterally  Motor: No pronator drift, Strength 5/5 in all 4 ext, normal bulk and tone, no tremor, rigidity or bradykinesia.    Sens: Intact to light touch   Reflexes: Symmetric and normal . BJ 1+, BR 1+, KJ 1+, downgoing toes b/l  Coord:  No FNFA, dysmetria, ELAINE intact   Gait/Balance: Not tested    NIHSS: 2          Labs:                                   10.7   7.89  )-----------( 283      ( 07 Jul 2023 15:52 )             32.2       Radiology:  CT head 7/7/23:  No interval change from 5/15/2023. No acute intracranial hemorrhage,   vasogenic edema or hydrocephalus. Chronic microvascular changes.      CTA head, neck, CT perfusion 7/7/23:  CT PERFUSION: No core infarct or penumbra of ischemic tissue is   identified by CT perfusion.    CT ANGIOGRAPHY NECK: Patent cervical vasculature. Moderate no   hemodynamically significant carotid stenosis or flow-limiting vertebral   artery stenosis.  No evidence of dissection.    CT ANGIOGRAPHY BRAIN: No vessel occlusion, flow-limiting stenosis or   aneurysm.  Congenital variation in anatomy as above.    A/P:   Mr. Delatorre is an 83 year old man who was recently seen in the ED for kidney stones who presents to the ED after being sent from urgent care for right facial weakness. He states that he was up all night with pain in his shoulder. At 6 AM he noticed that he was not able to fully close his right eye and that he had a facial droop. He also says that his mouth is very dry.    -Presentation is most suggestive of right Bell's Palsy.  -MRI brain can be done as outpatient if no other reason for admission.  -Would check lyme antibodies  -Treat with valacyclovir 500 mg q12 x 5 days  -Can use prednisone 60 mg/day x 7 days, then 40 mg x 2 days, then 20 mg x 2 days if no contraindication.   -GI prophylaxis  -Patching of right eye at night  -Use of lubricating eye gel and artificial tears during the day.    Can f/u with neurology and PCP as outpatient.

## 2023-07-07 NOTE — ED ADULT TRIAGE NOTE - CHIEF COMPLAINT QUOTE
Pt arrives to ED with right sided facial droop starting around 0600 today. Pt was seen at urgent care this afternoon after touching his face with lidocaine patch. Pt sent in to ED r/o CVA. Hx CVA 6 months ago. No other neuro deficits. Takes plavix daily.

## 2023-07-07 NOTE — ED PROVIDER NOTE - OBJECTIVE STATEMENT
82 y/o male with a PMHx of CAD s/p stents, diverticulitis, essential HTN, gastritis presents to the ED BIBA for evaluation. Pt noticed right facial droop around 6am today. Pt was seen at urgent care and sent to the ED for evaluation. No other complaints at this time. 84 y/o male with a PMHx of CAD s/p stents, CVA in November 2022, diverticulitis, essential HTN, gastritis presents to the ED BIBA for right facial droop since 6am today. Pt was seen at urgent care this afternoon after touching his face with lidocaine patch and was sent to the ED to r/o CVA. No other complaints at this time. 82 y/o male with a PMHx of CAD s/p stents, CVA in November 2022, diverticulitis, essential HTN, gastritis presents to the ED BIBA for right facial droop since 6am today. Pt was seen at urgent care this afternoon after feeling anesthesia in his face and was sent to the ED to r/o CVA. No other complaints at this time.

## 2023-07-07 NOTE — ED PROVIDER NOTE - NS_ ATTENDINGSCRIBEDETAILS _ED_A_ED_FT
I Carlitos Saucedo MD saw and examined the patient. Scribe documented for me and under my supervision. I have modified the scribe's documentation where necessary to reflect my history, physical exam and other relevant documentations pertinent to the care of the patient.

## 2023-07-07 NOTE — ED PROVIDER NOTE - PROGRESS NOTE DETAILS
Kamila Acosta for attending Dr. Saucedo: Spoke with pt. States he is not allergic to IV contrast and has received it before.

## 2023-07-07 NOTE — ED PROVIDER NOTE - CLINICAL SUMMARY MEDICAL DECISION MAKING FREE TEXT BOX
Plan: Code stroke called. Onset of symptoms 6am. Pt outside of window for thrombolytics. Plan: labs, CT Plan: Code stroke called. Onset of symptoms 6am. Pt outside of window for thrombolytics. Plan: labs, CT.    Patient with facial anesthesia, and facial drooping differential includes bells palsy vs. CVA, code stroke called, NIH=3, patient is nontoxic appearing, out of the window for tPA, will admit for MR, and further inpatient evaluation to rule out CVA.

## 2023-07-08 LAB
ALBUMIN SERPL ELPH-MCNC: 2.7 G/DL — LOW (ref 3.3–5)
ALP SERPL-CCNC: 79 U/L — SIGNIFICANT CHANGE UP (ref 40–120)
ALT FLD-CCNC: 25 U/L — SIGNIFICANT CHANGE UP (ref 12–78)
ANION GAP SERPL CALC-SCNC: 4 MMOL/L — LOW (ref 5–17)
AST SERPL-CCNC: 20 U/L — SIGNIFICANT CHANGE UP (ref 15–37)
B BURGDOR C6 AB SER-ACNC: POSITIVE
B BURGDOR IGG+IGM SER QL IB: SIGNIFICANT CHANGE UP
B BURGDOR IGG+IGM SER-ACNC: 8.07 INDEX — HIGH (ref 0.01–0.89)
BASOPHILS # BLD AUTO: 0.03 K/UL — SIGNIFICANT CHANGE UP (ref 0–0.2)
BASOPHILS NFR BLD AUTO: 0.4 % — SIGNIFICANT CHANGE UP (ref 0–2)
BILIRUB SERPL-MCNC: 0.5 MG/DL — SIGNIFICANT CHANGE UP (ref 0.2–1.2)
BUN SERPL-MCNC: 13 MG/DL — SIGNIFICANT CHANGE UP (ref 7–23)
CALCIUM SERPL-MCNC: 8.6 MG/DL — SIGNIFICANT CHANGE UP (ref 8.5–10.1)
CHLORIDE SERPL-SCNC: 111 MMOL/L — HIGH (ref 96–108)
CO2 SERPL-SCNC: 24 MMOL/L — SIGNIFICANT CHANGE UP (ref 22–31)
CREAT SERPL-MCNC: 0.85 MG/DL — SIGNIFICANT CHANGE UP (ref 0.5–1.3)
EGFR: 86 ML/MIN/1.73M2 — SIGNIFICANT CHANGE UP
EOSINOPHIL # BLD AUTO: 0.01 K/UL — SIGNIFICANT CHANGE UP (ref 0–0.5)
EOSINOPHIL NFR BLD AUTO: 0.1 % — SIGNIFICANT CHANGE UP (ref 0–6)
GLUCOSE SERPL-MCNC: 121 MG/DL — HIGH (ref 70–99)
HCT VFR BLD CALC: 34.6 % — LOW (ref 39–50)
HGB BLD-MCNC: 11.2 G/DL — LOW (ref 13–17)
IMM GRANULOCYTES NFR BLD AUTO: 0.4 % — SIGNIFICANT CHANGE UP (ref 0–0.9)
LYME IGG AB: 36 INDEX — HIGH (ref 0.01–0.9)
LYME IGG INTERP: POSITIVE
LYME IGM AB: >8 INDEX — HIGH (ref 0.01–0.9)
LYME IGM INTERP: POSITIVE
LYMPHOCYTES # BLD AUTO: 0.86 K/UL — LOW (ref 1–3.3)
LYMPHOCYTES # BLD AUTO: 11.7 % — LOW (ref 13–44)
MCHC RBC-ENTMCNC: 30.1 PG — SIGNIFICANT CHANGE UP (ref 27–34)
MCHC RBC-ENTMCNC: 32.4 GM/DL — SIGNIFICANT CHANGE UP (ref 32–36)
MCV RBC AUTO: 93 FL — SIGNIFICANT CHANGE UP (ref 80–100)
MONOCYTES # BLD AUTO: 0.33 K/UL — SIGNIFICANT CHANGE UP (ref 0–0.9)
MONOCYTES NFR BLD AUTO: 4.5 % — SIGNIFICANT CHANGE UP (ref 2–14)
NEUTROPHILS # BLD AUTO: 6.11 K/UL — SIGNIFICANT CHANGE UP (ref 1.8–7.4)
NEUTROPHILS NFR BLD AUTO: 82.9 % — HIGH (ref 43–77)
PLATELET # BLD AUTO: 269 K/UL — SIGNIFICANT CHANGE UP (ref 150–400)
POTASSIUM SERPL-MCNC: 3.9 MMOL/L — SIGNIFICANT CHANGE UP (ref 3.5–5.3)
POTASSIUM SERPL-SCNC: 3.9 MMOL/L — SIGNIFICANT CHANGE UP (ref 3.5–5.3)
PROT SERPL-MCNC: 8 GM/DL — SIGNIFICANT CHANGE UP (ref 6–8.3)
RBC # BLD: 3.72 M/UL — LOW (ref 4.2–5.8)
RBC # FLD: 13.1 % — SIGNIFICANT CHANGE UP (ref 10.3–14.5)
SODIUM SERPL-SCNC: 139 MMOL/L — SIGNIFICANT CHANGE UP (ref 135–145)
WBC # BLD: 7.37 K/UL — SIGNIFICANT CHANGE UP (ref 3.8–10.5)
WBC # FLD AUTO: 7.37 K/UL — SIGNIFICANT CHANGE UP (ref 3.8–10.5)

## 2023-07-08 PROCEDURE — 99232 SBSQ HOSP IP/OBS MODERATE 35: CPT

## 2023-07-08 PROCEDURE — 99222 1ST HOSP IP/OBS MODERATE 55: CPT

## 2023-07-08 RX ORDER — ASPIRIN/CALCIUM CARB/MAGNESIUM 324 MG
81 TABLET ORAL DAILY
Refills: 0 | Status: DISCONTINUED | OUTPATIENT
Start: 2023-07-08 | End: 2023-07-11

## 2023-07-08 RX ORDER — AMLODIPINE BESYLATE 2.5 MG/1
1 TABLET ORAL
Qty: 0 | Refills: 0 | DISCHARGE

## 2023-07-08 RX ORDER — TRAMADOL HYDROCHLORIDE 50 MG/1
1 TABLET ORAL
Qty: 0 | Refills: 0 | DISCHARGE

## 2023-07-08 RX ORDER — LABETALOL HCL 100 MG
400 TABLET ORAL
Refills: 0 | Status: DISCONTINUED | OUTPATIENT
Start: 2023-07-08 | End: 2023-07-11

## 2023-07-08 RX ORDER — MORPHINE SULFATE 50 MG/1
2 CAPSULE, EXTENDED RELEASE ORAL ONCE
Refills: 0 | Status: DISCONTINUED | OUTPATIENT
Start: 2023-07-08 | End: 2023-07-08

## 2023-07-08 RX ORDER — ASPIRIN/CALCIUM CARB/MAGNESIUM 324 MG
1 TABLET ORAL
Qty: 0 | Refills: 0 | DISCHARGE

## 2023-07-08 RX ORDER — CYCLOBENZAPRINE HYDROCHLORIDE 10 MG/1
5 TABLET, FILM COATED ORAL THREE TIMES A DAY
Refills: 0 | Status: DISCONTINUED | OUTPATIENT
Start: 2023-07-08 | End: 2023-07-11

## 2023-07-08 RX ORDER — USTEKINUMAB 45 MG/.5ML
45 INJECTION, SOLUTION SUBCUTANEOUS
Refills: 0 | DISCHARGE

## 2023-07-08 RX ORDER — LABETALOL HCL 100 MG
2 TABLET ORAL
Qty: 0 | Refills: 0 | DISCHARGE

## 2023-07-08 RX ORDER — VALACYCLOVIR 500 MG/1
500 TABLET, FILM COATED ORAL
Refills: 0 | Status: DISCONTINUED | OUTPATIENT
Start: 2023-07-08 | End: 2023-07-09

## 2023-07-08 RX ORDER — ACETAMINOPHEN 500 MG
650 TABLET ORAL EVERY 6 HOURS
Refills: 0 | Status: DISCONTINUED | OUTPATIENT
Start: 2023-07-08 | End: 2023-07-11

## 2023-07-08 RX ORDER — ATORVASTATIN CALCIUM 80 MG/1
1 TABLET, FILM COATED ORAL
Qty: 0 | Refills: 0 | DISCHARGE

## 2023-07-08 RX ORDER — PANTOPRAZOLE SODIUM 20 MG/1
40 TABLET, DELAYED RELEASE ORAL
Refills: 0 | Status: DISCONTINUED | OUTPATIENT
Start: 2023-07-08 | End: 2023-07-11

## 2023-07-08 RX ORDER — ATORVASTATIN CALCIUM 80 MG/1
10 TABLET, FILM COATED ORAL AT BEDTIME
Refills: 0 | Status: DISCONTINUED | OUTPATIENT
Start: 2023-07-08 | End: 2023-07-10

## 2023-07-08 RX ORDER — DIAZEPAM 5 MG
1 TABLET ORAL
Qty: 0 | Refills: 0 | DISCHARGE

## 2023-07-08 RX ORDER — CLOPIDOGREL BISULFATE 75 MG/1
75 TABLET, FILM COATED ORAL DAILY
Refills: 0 | Status: DISCONTINUED | OUTPATIENT
Start: 2023-07-08 | End: 2023-07-10

## 2023-07-08 RX ORDER — AMLODIPINE BESYLATE 2.5 MG/1
5 TABLET ORAL DAILY
Refills: 0 | Status: DISCONTINUED | OUTPATIENT
Start: 2023-07-08 | End: 2023-07-11

## 2023-07-08 RX ORDER — OXYCODONE HYDROCHLORIDE 5 MG/1
10 TABLET ORAL EVERY 4 HOURS
Refills: 0 | Status: DISCONTINUED | OUTPATIENT
Start: 2023-07-08 | End: 2023-07-11

## 2023-07-08 RX ORDER — PANTOPRAZOLE SODIUM 20 MG/1
1 TABLET, DELAYED RELEASE ORAL
Qty: 0 | Refills: 0 | DISCHARGE

## 2023-07-08 RX ADMIN — ATORVASTATIN CALCIUM 10 MILLIGRAM(S): 80 TABLET, FILM COATED ORAL at 21:57

## 2023-07-08 RX ADMIN — Medication 400 MILLIGRAM(S): at 21:56

## 2023-07-08 RX ADMIN — MORPHINE SULFATE 2 MILLIGRAM(S): 50 CAPSULE, EXTENDED RELEASE ORAL at 03:36

## 2023-07-08 RX ADMIN — Medication 81 MILLIGRAM(S): at 09:47

## 2023-07-08 RX ADMIN — Medication 1 DROP(S): at 13:55

## 2023-07-08 RX ADMIN — PANTOPRAZOLE SODIUM 40 MILLIGRAM(S): 20 TABLET, DELAYED RELEASE ORAL at 06:23

## 2023-07-08 RX ADMIN — CYCLOBENZAPRINE HYDROCHLORIDE 5 MILLIGRAM(S): 10 TABLET, FILM COATED ORAL at 23:14

## 2023-07-08 RX ADMIN — CLOPIDOGREL BISULFATE 75 MILLIGRAM(S): 75 TABLET, FILM COATED ORAL at 09:48

## 2023-07-08 RX ADMIN — VALACYCLOVIR 500 MILLIGRAM(S): 500 TABLET, FILM COATED ORAL at 03:36

## 2023-07-08 RX ADMIN — Medication 400 MILLIGRAM(S): at 09:48

## 2023-07-08 RX ADMIN — Medication 60 MILLIGRAM(S): at 09:49

## 2023-07-08 RX ADMIN — VALACYCLOVIR 500 MILLIGRAM(S): 500 TABLET, FILM COATED ORAL at 09:48

## 2023-07-08 RX ADMIN — Medication 1 DROP(S): at 06:23

## 2023-07-08 RX ADMIN — Medication 1 DROP(S): at 22:01

## 2023-07-08 RX ADMIN — VALACYCLOVIR 500 MILLIGRAM(S): 500 TABLET, FILM COATED ORAL at 21:56

## 2023-07-08 RX ADMIN — SODIUM CHLORIDE 125 MILLILITER(S): 9 INJECTION INTRAMUSCULAR; INTRAVENOUS; SUBCUTANEOUS at 16:22

## 2023-07-08 RX ADMIN — Medication 60 MILLIGRAM(S): at 03:36

## 2023-07-08 RX ADMIN — AMLODIPINE BESYLATE 5 MILLIGRAM(S): 2.5 TABLET ORAL at 09:47

## 2023-07-08 NOTE — SWALLOW BEDSIDE ASSESSMENT ADULT - SWALLOW EVAL: RECOMMENDED FEEDING/EATING TECHNIQUES
PRESENT PO TO LEFT OF ORAL CAVITY AND EMPLOY TONGUE SWEEP TO CLEAR ANY RESIDUE IN MOUTH, BOTH STRATEGIES OF WHICH HE IS ABLE TO EMPLOY INDEPENDENTLY./position upright (90 degrees)

## 2023-07-08 NOTE — SWALLOW BEDSIDE ASSESSMENT ADULT - ASR SWALLOW LABIAL MOBILITY
A right facial/labial droop were evident on right in setting of Bell's Palsy. Pt with appreciable lip strength, despite right facial/lip droop.

## 2023-07-08 NOTE — H&P ADULT - HISTORY OF PRESENT ILLNESS
82 y/o M w/ PMH of diverticulitis, crohn's disease, gastritis, CAD s/p PCI, HTN, kidney stones, p/w R facial  droop. Patient was up at night to B/L shoulder pain, and then his wife noticed R sided facial weakness. States that he himself did notice it. Denies weakness in arms / legs, dysphagia / vision changes . Patient states that he had lidocaine patch on for shoulder pain, when he removed it, he accidentally touched his eye after touching lidocaine patch, and was concerned about it. Denies CP, LH, cough, runny nose, sore throat, nausea, vomiting, abdominal pain     PSH: Colon resection, PCI, cholecystectomy     Family Hx: father - CAD / DM     Social Hx: Denies etoh / drugs / tobacco

## 2023-07-08 NOTE — PROGRESS NOTE ADULT - SUBJECTIVE AND OBJECTIVE BOX
Reason for Admission: R facial droop  History of Present Illness:   82 y/o M w/ PMH of diverticulitis, crohn's disease, gastritis, CAD s/p PCI, HTN, kidney stones, p/w R facial  droop. Patient was up at night to B/L shoulder pain, and then his wife noticed R sided facial weakness. States that he himself did notice it. Denies weakness in arms / legs, dysphagia / vision changes . Patient states that he had lidocaine patch on for shoulder pain, when he removed it, he accidentally touched his eye after touching lidocaine patch, and was concerned about it. Denies CP, LH, cough, runny nose, sore throat, nausea, vomiting, abdominal pain     REVIEW OF SYSTEMS:  General: NAD, hemodynamically stable, (-)  fever, (-) chills, (-) weakness  HEENT:  Eyes:  No visual loss, blurred vision, double vision or yellow sclerae. Ears, Nose, Throat:  No hearing loss, sneezing, congestion, runny nose or sore throat.  SKIN:  No rash or itching.  CARDIOVASCULAR:  No chest pain, chest pressure or chest discomfort. No palpitations or edema.  RESPIRATORY:  No shortness of breath, cough or sputum.  GASTROINTESTINAL:  No anorexia, nausea, vomiting or diarrhea. No abdominal pain or blood.  NEUROLOGICAL:  No headache, dizziness, syncope, paralysis, ataxia, numbness or tingling in the extremities. No change in bowel or bladder control.  MUSCULOSKELETAL:  No muscle, back pain, joint pain or stiffness.  HEMATOLOGIC:  No anemia, bleeding or bruising.  LYMPHATICS:  No enlarged nodes. No history of splenectomy.  ENDOCRINOLOGIC:  No reports of sweating, cold or heat intolerance. No polyuria or polydipsia.  ALLERGIES:  No history of asthma, hives, eczema or rhinitis.    Physical Exam:   GENERAL APPEARANCE:  NAD, hemodynamically stable  T(C): 36.4 (07-08-23 @ 08:44), Max: 36.8 (07-07-23 @ 15:49)  HR: 94 (07-08-23 @ 09:47) (54 - 94)  BP: 129/78 (07-08-23 @ 08:44) (124/49 - 160/64)  RR: 18 (07-08-23 @ 08:44) (18 - 20)  SpO2: 97% (07-08-23 @ 08:44) (97% - 100%)  Wt(kg): --  HEENT:  Head is normocephalic    Skin:  Warm and dry without any rash   NECK:  Supple without lymphadenopathy.   HEART:  Regular rate and rhythm. normal S1 and S2, No M/R/G  LUNGS:  Good ins/exp effort, no W/R/R/C  ABDOMEN:  Soft, nontender, nondistended with good bowel sounds heard  EXTREMITIES:  Without cyanosis, clubbing or edema.   NEUROLOGICAL:  Gross nonfocal       CBC Full  -  ( 08 Jul 2023 07:46 )  WBC Count : 7.37 K/uL  RBC Count : 3.72 M/uL  Hemoglobin : 11.2 g/dL  Hematocrit : 34.6 %  Platelet Count - Automated : 269 K/uL  Mean Cell Volume : 93.0 fl  Mean Cell Hemoglobin : 30.1 pg  Mean Cell Hemoglobin Concentration : 32.4 gm/dL  Auto Neutrophil # : 6.11 K/uL  Auto Lymphocyte # : 0.86 K/uL  Auto Monocyte # : 0.33 K/uL  Auto Eosinophil # : 0.01 K/uL  Auto Basophil # : 0.03 K/uL  Auto Neutrophil % : 82.9 %  Auto Lymphocyte % : 11.7 %  Auto Monocyte % : 4.5 %  Auto Eosinophil % : 0.1 %  Auto Basophil % : 0.4 %    PT/INR - ( 07 Jul 2023 15:52 )   PT: 14.6 sec;   INR: 1.26 ratio         PTT - ( 07 Jul 2023 15:52 )  PTT:28.2 sec  Urinalysis Basic - ( 08 Jul 2023 07:46 )    Color: x / Appearance: x / SG: x / pH: x  Gluc: 121 mg/dL / Ketone: x  / Bili: x / Urobili: x   Blood: x / Protein: x / Nitrite: x   Leuk Esterase: x / RBC: x / WBC x   Sq Epi: x / Non Sq Epi: x / Bacteria: x      07-08    139  |  111<H>  |  13  ----------------------------<  121<H>  3.9   |  24  |  0.85    Ca    8.6      08 Jul 2023 07:46    TPro  8.0  /  Alb  2.7<L>  /  TBili  0.5  /  DBili  x   /  AST  20  /  ALT  25  /  AlkPhos  79  07-08      82 y/o M w/ PMH of diverticulitis, crohn's disease, gastritis, CAD s/p PCI, HTN, kidney stones, p/w R facial  droop    *R Facial droop   - pending MRI of the brain  -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 Middleton, who suspects bells palsy, and recommends: MRI brain + lyme antibodies + valacyclovir + prednisone + Patching of R eye + lubricating eye gel and artificial tears during the day + recommends to f/u neuro & pcp outpatient   -EKGL sinus 90 bpm, 1st deg AV block, incomplete LBBB. Denies CP / SOB -> f/u outpatient cardio     *B/L shoulder pain  -Patient states she follows up outpatient w/ an orthopedic doctor, and was referred for x-rays, which patient already had outpatient. Will need to obtain records   -Pain control     *H/o diverticulitis / crohn's disease / gastritis / CAD / HTN  -C/w home meds and f/u outpatient for further management    *DVT ppx  -SCDs

## 2023-07-08 NOTE — PATIENT PROFILE ADULT - FUNCTIONAL ASSESSMENT - BASIC MOBILITY 6.
4-calculated by average/Not able to assess (calculate score using [facility] averaging method)

## 2023-07-08 NOTE — SWALLOW BEDSIDE ASSESSMENT ADULT - SWALLOW EVAL: PROGNOSIS
2) Pt exhibits functional Oropharyngeal Swallowing abilities for age, despite right facial droop. Pt tended to accept PO to left side of mouth, bolus formation/transfer were mechanically functional despite right facial droop and laryngeal lift on palpation during swallowing trials was functional as well. NO behavioral aspiration signs exhibited. Odynophagia denied. No change in O2 sats noted on exam. Note that pt spontaneously presented PO to left side of oral cavity and employed a tongue sweep to clear any oral debris as a means of spontaneously compensating for his Bell's palsy sequel.

## 2023-07-08 NOTE — SWALLOW BEDSIDE ASSESSMENT ADULT - ORAL PHASE
none
Bolus formation/transfer were mechanically functional for age, and he cleared oral debris after age acceptable piecemeal deglutition, despite right facial droop./Within functional limits

## 2023-07-08 NOTE — SWALLOW BEDSIDE ASSESSMENT ADULT - SLP GENERAL OBSERVATIONS
On encounter a right facial droop was apparent which did not seem to hinder communicative effectiveness. The pt is alert and interactive. His receptive language abilities were relatively preserved and he was able to verbalize during communicative probes without evidence of a primary motor speech or primary linguistic pathology. In conversation, pt's verbalizations were intelligible, fluent, linguistically intact and contextually appropriate. He was oriented x3+.  Pt was able to verbalize needs on exam and feels that communicative integrity is at usual state.

## 2023-07-08 NOTE — SWALLOW BEDSIDE ASSESSMENT ADULT - SWALLOW EVAL: DIAGNOSIS
1) On encounter a right facial droop was apparent which did not seem to hinder communicative effectiveness. The pt is alert and interactive. His receptive language abilities were relatively preserved and he was able to verbalize during communicative probes without evidence of a primary motor speech or primary linguistic pathology. In conversation, pt's verbalizations were intelligible, fluent, linguistically intact and contextually appropriate. He was oriented x3+.  Pt was able to verbalize needs on exam and feels that communicative integrity is at usual state.

## 2023-07-08 NOTE — SWALLOW BEDSIDE ASSESSMENT ADULT - NS SPL SWALLOW CLINIC TRIAL FT
Pt exhibited functional Oropharyngeal Swallowing abilities for age, despite right facial droop. Pt tended to accept PO to left side of mouth, bolus formation/transfer were mechanically functional despite right facial droop and laryngeal lift on palpation during swallowing trials was functional as well. NO behavioral aspiration signs exhibited. Odynophagia denied. No change in O2 sats noted on exam. Note that pt spontaneously presented PO to left side of oral cavity and employed a tongue sweep to clear any oral debris as a means of spontaneously compensating for his Bell's palsy sequel.

## 2023-07-08 NOTE — SWALLOW BEDSIDE ASSESSMENT ADULT - COMMENTS
Pt was admitted to  with acute onset of a right facial droop without any other neuro deficits. Pt seen by neuro who feels that he has Bell's Palsy. Pt prescribed anti viral/steroids. 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 Pt also c/o bilateral shoulder pain in hospital which is pre-existing. Note that pt was previously hospitalized at this facility in 11/22 with complaint feeling lightheaded and complaining of a HA. Additionally, pt's wife reported transient confusion at that time and she noticed the patient was swerving while driving as well as demonstrated a temporary inability to recognize her. All deficits resolved during hospitalization in 11/22.  Question if pt was s/p a TIA. This profile is superimposed upon a prior history of CAD status post stent placements, HTN, Crohn's Dz, diverticulosis s/p partial sigmoid colectomy, gastritis, prior lower back pain for which he received epidural injections, and past choley.

## 2023-07-08 NOTE — SWALLOW BEDSIDE ASSESSMENT ADULT - ASR SWALLOW DENTITION
Pt with essentially adequate dentition but he does have 1 single exposed dental implant post in place.

## 2023-07-08 NOTE — PATIENT PROFILE ADULT - NSPROPOAURINARYCATHETER_GEN_A_NUR
Therapy change made today includes:    Writer will discuss GLP1 medication with Dr. Chaves as pt is interested for support with blood sugar and weight loss goals, this would replace Januvia.    Pt has increased activity since initial visit but has not tried Sit and Be Fit classes yet.  Reviewed how to access classes, watched a portion of one class with pt and spouse.  Pt would like to try at least one class weekly to add to her exercise plan.    Provided more resources for increasing vegetable intake as this is another goal pt is working toward    Pt will consider more testing after meals to assess blood sugar effects from meals    Pt has lost 6 lb since May- congratulated pt on maintaining this weight loss for 3 months and reviewed slow and sustainable weight loss plan    See Angie in 3 months or sooner is medication is changed  ------------------------------------------------------------------------------------------  The patient was seen for Diabetes Self-Management Education in an individual setting for a diagnosis of diabetes. Time spent with patient was 30 minutes.  Pt referred by Dr. Dinorah Chaves.  Order located in the patient's computer chart.  Reason for Visit: Post Program Diabetes Education .  Relevant medical history reviewed.    Pt is at office visit today with spouse, Chai and gives permission to discuss personal information during appointment.    Preferences for learning: Visual, repetition, verbal, observation, reading, and demonstration are acceptable to the patient.   Material was presented using verbal, written, demonstration and return demonstration.      Verify assessment form is up to date: not addressed    Latex allergy: No known latex allergy  ------------------------------------------------------------------------------------------------------------  Labs    Hemoglobin A1C: target value and patient current value reviewed   Pt's A1c has improved since LOV with educator, discussed strategies  that led to this and maintaining A1c goals   Component GLYCOHEMOGLOBIN A1C   Latest Ref Rng & Units 4.5 - 5.6 %   3/2/2020 6.7 (H)   12/8/2020 6.9 (H)   3/23/2021 7.3 (H)   7/22/2021 6.9 (H)     Weight  Discussed importance of weight loss with patient for Diabetes management  Pt states she feels like she keeps \"losing a pound, then gaining 2 more back\" and is frustrated.  Would like to explore ideas to help with sustainable weight loss  States she has not had success in the past with weight loss  Pt has lost 6 lb since May 2021  [facility] Extended Vitals - Weight in Kg/Lb 5/26/2021   Weight lb 263 lb 5.4 oz     [facility] Extended Vitals - Weight in Kg/Lb 8/24/2021   Weight lb 257 lb 15 oz       Blood Glucose Monitoring   Pt is using One Touch Ultra Mini but received new One touch Ultra 2 meter, has not started using yet.  Pt testing in morning, worried to test at other times as she may see a higher reading.  Discussed using this information to learn and also affirm changes that pt is working on such as more exercise and vegetables, eliminating soda.  Pt's meter had wrong date and time as shown but data is current from past 14 days        Diabetes Medications  verified  Oral Medications are metformin 1000 mg bid and Januvia 100 mg daily taken as instructed.  Pt reports tolerance to increased dose of metformin (increased 3 months ago by Dr. Chaves)  Reviewed Non-insulin injectables: The dose, schedule, action, preparation, storage, administration technique, delivery devices, and side effects as this may be an option for pt to change to instead of Januvia with next visit with Dr. Chaves to aid in weight loss    Physical Activity - Incorporating Activity into Lifestyle   Discussed benefit of physical activity for blood sugar management   Pt states her activity level has decreased in the past year due to pain in legs and laziness  Sitting more by TV and computer, was more active before she retired  Has a seated cross  at home  no that she is trying to get back to using this more, hard to go longer than 5 min at this time (was doing 15 min in past)  Has weights at home, uses stairs at home   Discussed Sit and be fit as an option     Pt has been working on bike at home 5-10 min and walking around every hour at home when using the computer.  Reviewed Sit and Be Fit again and pt is willing to try one class weekly.    Food and Nutrition Related History  Eats three meals a day  Loves vegetables and fruit   Did try spiralizer for zucchini and cauliflower rice- working on increasing vegetable intake   Did not like diet soda options  Is working on stopping soda altogether, will stop once the last cans in her house are gone  Has 1 or less daily now  Meal  Content 6/18/21   1st meal Time: 7-8 am  Food choice: cinnamon bagel 1.5 and orange  Or cereal  Or oatmeal  Or eggs (2) or 2 pieces of toast with butter or jelly or croissant with jo and egg  Beverage: coffee with creamer   2nd Meal Time: 12-1 pm   Food choice: chicken dumpling soup  Leftovers from dinner before   Salad  Beverage: water   3rd Meal Time: 6 pm   Food choice: shrimp scampi   Spaghetti   Pork tenderloin  Beef roast  Fish Bhatti  Potato as side with vegetable  Beverage: water and orange soda (nightly)    Snacks/Desserts Potato chips   Sometimes bakery  Apples and other fruit   Ice cream once in a while   Oral Fluids Tea  ETOH: none      Reviewed effects of food on blood sugars- especially sweetened beverages  Discussed benefits of balancing meals- especially at breakfast  Reviewed plate method  Reviewed basic carbohydrate counting  Reviewed label reading  Reviewed benefits of higher fiber foods, increasing non-starchy vegetable intake    Acute and Chronic Complications  Advised on the potential complications related to uncontrolled diabetes     Other Patient Information  Pt lives with  who is supportive.  Has three sons, one daughter in law has type 1.   was recently  diagnosed with rheumatoid arthritis   Sister lives with pt who can be fussy with food    Discussed readiness to make changes with patient  Patient states:  she/he is ready to make change now   ------------------------------------------------------------------------------------------------------------  Written materials provided were:   After Visit Summary (AVS)    Goal Setting to Promote Health & Problem Solving for Daily Living was discussed.    See DM Care Plan for goals.    Plan:  Report sent to referring provider   Pt to follow up with DM education in a one on one visit.

## 2023-07-08 NOTE — SWALLOW BEDSIDE ASSESSMENT ADULT - SWALLOW EVAL: SECRETION MANAGEMENT
adequate on exam. No drooling noted, despite right facial droop. Further, strength of volitional cough was functional.

## 2023-07-08 NOTE — H&P ADULT - ASSESSMENT
82 y/o M w/ PMH of diverticulitis, crohn's disease, gastritis, CAD s/p PCI, HTN, kidney stones, p/w R facial  droop    *R Facial droop   -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 Middleton, who suspects bells palsy, and recommends: MRI brain + lyme antibodies + valacyclovir + prednisone + Patching of R eye + lubricating eye gell and artificial tears during the day + recommends to f/u neuro & pcp outpatient     *B/L shoulder pain  -Patient states she follows up outpatient w/ an orthopedic doctor, and was referred for x-rays, which patient already had outpatient   -Pain control     *H/o diverticulitis / crohn's disease / gastritis / CAD / HTN  -C/w home meds and f/u outpatient for further management    *DVT ppx  -SCDs            82 y/o M w/ PMH of diverticulitis, crohn's disease, gastritis, CAD s/p PCI, HTN, kidney stones, p/w R facial  droop    *R Facial droop   -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 Middleton, who suspects bells palsy, and recommends: MRI brain + lyme antibodies + valacyclovir + prednisone + Patching of R eye + lubricating eye gel and artificial tears during the day + recommends to f/u neuro & pcp outpatient   -EKGL sinus 90 bpm, 1st deg AV block, incomplete LBBB. Denies CP / SOB -> f/u outpatient cardio     *B/L shoulder pain  -Patient states she follows up outpatient w/ an orthopedic doctor, and was referred for x-rays, which patient already had outpatient. Will need to obtain records   -Pain control     *H/o diverticulitis / crohn's disease / gastritis / CAD / HTN  -C/w home meds and f/u outpatient for further management    *DVT ppx  -SCDs

## 2023-07-08 NOTE — PATIENT PROFILE ADULT - FALL HARM RISK - HARM RISK INTERVENTIONS
Assistance with ambulation/Assistance OOB with selected safe patient handling equipment/Communicate Risk of Fall with Harm to all staff/Discuss with provider need for PT consult/Monitor gait and stability/Provide patient with walking aids - walker, cane, crutches/Reinforce activity limits and safety measures with patient and family/Tailored Fall Risk Interventions/Use of alarms - bed, chair and/or voice tab/Visual Cue: Yellow wristband and red socks/Bed in lowest position, wheels locked, appropriate side rails in place/Call bell, personal items and telephone in reach/Instruct patient to call for assistance before getting out of bed or chair/Non-slip footwear when patient is out of bed/[place] to call system/Physically safe environment - no spills, clutter or unnecessary equipment/Purposeful Proactive Rounding/Room/bathroom lighting operational, light cord in reach

## 2023-07-08 NOTE — SWALLOW BEDSIDE ASSESSMENT ADULT - ORAL PREPARATORY PHASE
Pt spontaneously accepted PO to left side of mouth as a means of compensating for Altamirano's related right facial droop. Labial grading on utensils on left was functional./Within functional limits

## 2023-07-08 NOTE — SWALLOW BEDSIDE ASSESSMENT ADULT - SWALLOW EVAL: CRITERIA FOR SKILLED INTERVENTION MET
DO NOT FEEL THAT ACUTE SPEECH PATHOLOGY FOLLOW UP WOULD CHANGE CLINICAL MANAGEMENT/OUTCOME AT THIS TIME WHILE IN HOSPITAL. PT'S SPEECH-LANGUAGE ABILITIES AND OROPHARYNGEAL SWALLOWING ABILITIES APPEARED TO BE FUNCTIONAL, DESPITE BELL'S PALSY. GIVEN ABOVE, THIS SERVICE WILL NOT ACTIVELY FOLLOW IN HOUSE. RECONSULT PRN SHOULD STATUS CHANGE AND CONDITION WARRANT. NOTE THAT PT/WIFE WERE ADVISED THAT IF RIGHT FACIAL DROOP DOES NOT RESOLVE A FEW WEEKS AFTER COMPLETING ORAL ANTIVIRALS/STEROIDS, AN OUTPATIENT SPEECH PATHOLOGY CONSULT SHOULD BE OBTAINED TO SEE IF HE MAY BENEFIT FROM FACIAL REANIMATION THERAPY. STATISTICALLY, THE CHANCES FOR FULL SPONTANEOUS FACIAL MUSCLE RESTORATION IS RELATIVELY HIGH AS HE RECOVERS.

## 2023-07-09 PROCEDURE — 99232 SBSQ HOSP IP/OBS MODERATE 35: CPT

## 2023-07-09 PROCEDURE — 99233 SBSQ HOSP IP/OBS HIGH 50: CPT

## 2023-07-09 PROCEDURE — 70551 MRI BRAIN STEM W/O DYE: CPT | Mod: 26

## 2023-07-09 PROCEDURE — 93010 ELECTROCARDIOGRAM REPORT: CPT

## 2023-07-09 RX ORDER — ATORVASTATIN CALCIUM 80 MG/1
40 TABLET, FILM COATED ORAL AT BEDTIME
Refills: 0 | Status: DISCONTINUED | OUTPATIENT
Start: 2023-07-09 | End: 2023-07-11

## 2023-07-09 RX ADMIN — Medication 1 DROP(S): at 21:49

## 2023-07-09 RX ADMIN — OXYCODONE HYDROCHLORIDE 10 MILLIGRAM(S): 5 TABLET ORAL at 21:55

## 2023-07-09 RX ADMIN — CLOPIDOGREL BISULFATE 75 MILLIGRAM(S): 75 TABLET, FILM COATED ORAL at 12:16

## 2023-07-09 RX ADMIN — ATORVASTATIN CALCIUM 40 MILLIGRAM(S): 80 TABLET, FILM COATED ORAL at 21:50

## 2023-07-09 RX ADMIN — PANTOPRAZOLE SODIUM 40 MILLIGRAM(S): 20 TABLET, DELAYED RELEASE ORAL at 06:23

## 2023-07-09 RX ADMIN — Medication 1 DROP(S): at 10:07

## 2023-07-09 RX ADMIN — Medication 400 MILLIGRAM(S): at 12:16

## 2023-07-09 RX ADMIN — AMLODIPINE BESYLATE 5 MILLIGRAM(S): 2.5 TABLET ORAL at 12:16

## 2023-07-09 RX ADMIN — Medication 81 MILLIGRAM(S): at 12:17

## 2023-07-09 RX ADMIN — Medication 0.5 MILLIGRAM(S): at 10:06

## 2023-07-09 RX ADMIN — Medication 110 MILLIGRAM(S): at 18:32

## 2023-07-09 RX ADMIN — ATORVASTATIN CALCIUM 10 MILLIGRAM(S): 80 TABLET, FILM COATED ORAL at 21:50

## 2023-07-09 RX ADMIN — Medication 60 MILLIGRAM(S): at 12:16

## 2023-07-09 RX ADMIN — Medication 400 MILLIGRAM(S): at 21:51

## 2023-07-09 RX ADMIN — SODIUM CHLORIDE 125 MILLILITER(S): 9 INJECTION INTRAMUSCULAR; INTRAVENOUS; SUBCUTANEOUS at 00:23

## 2023-07-09 RX ADMIN — OXYCODONE HYDROCHLORIDE 10 MILLIGRAM(S): 5 TABLET ORAL at 12:16

## 2023-07-09 NOTE — DIETITIAN INITIAL EVALUATION ADULT - PERTINENT LABORATORY DATA
07-08    139  |  111<H>  |  13  ----------------------------<  121<H>  3.9   |  24  |  0.85    Ca    8.6      08 Jul 2023 07:46    TPro  8.0  /  Alb  2.7<L>  /  TBili  0.5  /  DBili  x   /  AST  20  /  ALT  25  /  AlkPhos  79  07-08  A1C with Estimated Average Glucose Result: 5.8 % (11-07-22 @ 07:34)

## 2023-07-09 NOTE — DIETITIAN INITIAL EVALUATION ADULT - OTHER INFO
82 y/o M w/ PMH of diverticulitis, crohn's disease, gastritis, CAD s/p PCI, HTN, kidney stones, p/w R facial  droop. Patient was up at night to B/L shoulder pain, and then his wife noticed R sided facial weakness.  Admit for R sided facial droop - could be due to acute neuro manifestation of lymes disease     Seen by SLP on 7/8 - rec'd regular diet w/ thin liquids; no dysphagia noted. Reports good appetite, consuming 50-75% of trays since admit (x 2 days). Reports UBW of 165# x ? time frame; pt doesn't weigh self often. Bed scale wt of 145# taken by RD on 7/9/23. Unintentional wt loss of 25# x ? time frame; unable to determine clinical significance of weight loss due to unknown time frame. NFPE reveals moderate-severe muscle/ fat wasting - pt appears thin, frail, and tereso. Recommend to change diet to low fiber 2/2 w/ Crohn's; pt reports that high fiber diet tends to agitate GI tract. Add ensure + HP shake BID (350kcal, 20g protein); pt denies banatrol. RD provided brief nutrition education on possible food triggers to be aware of in regards to dietary fiber, fat, dairy and carbonated beverages - pt is receptive. See below for other recommendations.

## 2023-07-09 NOTE — PROGRESS NOTE ADULT - ASSESSMENT
82 y/o man w/ PMH of diverticulitis, crohn's disease, gastritis, CAD s/p PCI, HTN, kidney stones, p/w R facial  droop, + Lymes abs. MR head showed no acue changes.  Suggest:  started on doxycyline  IV doxy 100 mg po q12h   finish  valacyclovir + prednisone  lubricating eye gel and artificial tears during the day  Will sign off case, reconsult if necessary  can f/u with Dr. lawrence as OPT after d/c

## 2023-07-09 NOTE — DIETITIAN NUTRITION RISK NOTIFICATION - PHYSICAL ASSESSMENT THIGH
moderate

## 2023-07-09 NOTE — DIETITIAN INITIAL EVALUATION ADULT - ADD RECOMMEND
1) Suggest to initiate low fiber diet as soon as medically feasible  2) Add ensure + HP shake BID (350kcal, 20g protein)  3) Monitor bowel movements, if no BM for >3 days, consider implementing bowel regimen.  4) MVI w/ minerals daily to ensure 100% RDA met  5) Consider adding thiamine 100 mg daily 2/2 poor PO intake/ malnutrition  6) Encourage protein-rich foods, maximize food preferences  7) Monitor lytes/ min and replete prn - w/ chronic diarrhea  8) Check serum zinc level for suspected deficiency. Consider adding 220 mg of zinc sulfate daily 2/2 persistent diarrhea PTA  9) Continue to provide nutrition education/counseling on trigger foods 2/2 crohn's disease   10) Encourage adequate hydration & repletion of electrolytes 2/2 persistent diarrhea  11) Confirm goals of care regarding nutrition support  RD will continue to monitor PO intake, labs, hydration, and wt prn.

## 2023-07-09 NOTE — DIETITIAN NUTRITION RISK NOTIFICATION - TREATMENT: THE FOLLOWING DIET HAS BEEN RECOMMENDED
Diet, Regular:   DASH/TLC {Sodium & Cholesterol Restricted} (DASH) (07-08-23 @ 03:23) [Active]

## 2023-07-09 NOTE — PROGRESS NOTE ADULT - SUBJECTIVE AND OBJECTIVE BOX
HPI:  82 y/o M w/ PMH of diverticulitis, crohn's disease, gastritis, CAD s/p PCI, HTN, kidney stones, p/w R facial  droop.     MEDICATIONS  (STANDING):  amLODIPine   Tablet 5 milliGRAM(s) Oral daily  artificial  tears Solution 1 Drop(s) Both EYES every 8 hours  aspirin enteric coated 81 milliGRAM(s) Oral daily  atorvastatin 10 milliGRAM(s) Oral at bedtime  atorvastatin 40 milliGRAM(s) Oral at bedtime  clopidogrel Tablet 75 milliGRAM(s) Oral daily  labetalol 400 milliGRAM(s) Oral two times a day  pantoprazole    Tablet 40 milliGRAM(s) Oral before breakfast  predniSONE   Tablet 60 milliGRAM(s) Oral daily    MEDICATIONS  (PRN):  acetaminophen     Tablet .. 650 milliGRAM(s) Oral every 6 hours PRN Temp greater or equal to 38C (100.4F), Mild Pain (1 - 3)  cyclobenzaprine 5 milliGRAM(s) Oral three times a day PRN Muscle Spasm  oxyCODONE    IR 10 milliGRAM(s) Oral every 4 hours PRN Moderate Pain (4 - 6)    Vital Signs Last 24 Hrs  T(C): 36.8 (09 Jul 2023 07:31), Max: 36.9 (08 Jul 2023 21:53)  T(F): 98.2 (09 Jul 2023 07:31), Max: 98.4 (08 Jul 2023 21:53)  HR: 76 (09 Jul 2023 12:31) (74 - 82)  BP: 142/71 (09 Jul 2023 12:31) (117/54 - 142/71)  RR: 18 (09 Jul 2023 12:31) (18 - 18)  SpO2: 98% (09 Jul 2023 12:31) (94% - 98%)    Parameters below as of 09 Jul 2023 12:31  Patient On (Oxygen Delivery Method): room air      Neurological Exam:    HF: Patient is alert and oriented x 3. There is no aphasia or dysarthria. Follows  commands.   CN:  Pupils are equal and reactive. Extra ocular muscles are intact. There is right facial droop peripheral. Tongue is midline. Sensation is intact in the face. Other CN II-XII are intact.   Motor: motor examination all muscles are 5-/5 and there is no pronator drift.   Sensory: intact to pinprick and touch.   DTR: 0-1/4 all 4 extremities. Babinski is negative bilateral.  Co-ord:  Finger to finger to nose is intact. Heel to shin is intact bilaterally.       LYME IgG/IgM Antibodies Result: 8.07 Index (07.08.23 @ 07:46) Lyme IgG Interp: Positive:Lyme IgM Ab: >8.000 Index (07.08.23 @ 07:46) yme IgM Interp: Positive:       < from: MR Head No Cont (07.09.23 @ 11:14) >  FINDINGS:  There is no diffusion restriction to indicate acute or recent subacute   infarct.    There is no T1 signal hyperintensity or signal loss on T2* GRE images   suspicious for intracranial blood products.    There is no subdural collection, vasogenic edema, mass effect or   hydrocephalus.    There is mild generalized cerebral volume loss.  Mild patchy T2 FLAIR   signal hyperintensity in the periventricular white matter is compatible  with chronic microvascular ischemic disease.    Midline sagittal structures appear within normal limits.    The paranasal sinuses and mastoids are grossly clear.    The patient is status post intraocular lens replacement bilaterally.  The   calvarium and skull base appear within normal limits.    IMPRESSION:  No MRI evidence of acute intracranial pathology.    Mild generalized cerebral volume loss.  Mild patchy chronic microvascular   ischemic disease.    < end of copied text >

## 2023-07-09 NOTE — PROGRESS NOTE ADULT - SUBJECTIVE AND OBJECTIVE BOX
Reason for Admission: R facial droop     Patient is a 84 y/o M w/ PMH of diverticulitis, crohn's disease, gastritis, CAD s/p PCI, HTN, kidney stones, p/w R facial  droop. Patient was up at night to B/L shoulder pain, and then his wife noticed R sided facial weakness. States that he himself did notice it. Denies weakness in arms / legs, dysphagia / vision changes . Patient states that he had lidocaine patch on for shoulder pain, when he removed it, he accidentally touched his eye after touching lidocaine patch, and was concerned about it. Denies CP, LH, cough, runny nose, sore throat, nausea, vomiting, abdominal pain.     Patient continues to have a facial droop that could be related acute neurologic manifestation of lyme disease.     REVIEW OF SYSTEMS:  General: NAD, hemodynamically stable   HEENT:  Eyes:  No visual loss, blurred vision, double vision or yellow sclerae. Ears, Nose, Throat:  No hearing loss, sneezing, congestion, runny nose or sore throat.  SKIN:  No rash or itching.  CARDIOVASCULAR:  No chest pain, chest pressure or chest discomfort. No palpitations or edema.  RESPIRATORY:  No shortness of breath, cough or sputum.  GASTROINTESTINAL:  No anorexia, nausea, vomiting or diarrhea. No abdominal pain or blood.  NEUROLOGICAL:  No headache, dizziness, syncope, paralysis, ataxia, numbness or tingling in the extremities. No change in bowel or bladder control.  MUSCULOSKELETAL:  No muscle, back pain, joint pain or stiffness.  HEMATOLOGIC:  No anemia, bleeding or bruising.  LYMPHATICS:  No enlarged nodes. No history of splenectomy.  ENDOCRINOLOGIC:  No reports of sweating, cold or heat intolerance. No polyuria or polydipsia.  ALLERGIES:  No history of asthma, hives, eczema or rhinitis.    Physical Exam:   GENERAL APPEARANCE:  NAD, hemodynamically stable  T(C): 36.4 (07-08-23 @ 08:44), Max: 36.8 (07-07-23 @ 15:49)  HR: 94 (07-08-23 @ 09:47) (54 - 94)  BP: 129/78 (07-08-23 @ 08:44) (124/49 - 160/64)  RR: 18 (07-08-23 @ 08:44) (18 - 20)  SpO2: 97% (07-08-23 @ 08:44) (97% - 100%)  HEENT:  Head is normocephalic    Skin:  Warm and dry without any rash   NECK:  Supple without lymphadenopathy.   HEART:  Regular rate and rhythm. normal S1 and S2, No M/R/G  LUNGS:  Good ins/exp effort, no W/R/R/C  ABDOMEN:  Soft, nontender, nondistended with good bowel sounds heard  EXTREMITIES:  Without cyanosis, clubbing or edema.   NEUROLOGICAL:  Gross nonfocal     Labs:   CBC Full  -  ( 08 Jul 2023 07:46 )  WBC Count : 7.37 K/uL  RBC Count : 3.72 M/uL  Hemoglobin : 11.2 g/dL  Hematocrit : 34.6 %  Platelet Count - Automated : 269 K/uL    PT/INR - ( 07 Jul 2023 15:52 )   PT: 14.6 sec;   INR: 1.26 ratio       PTT - ( 07 Jul 2023 15:52 )  PTT:28.2 sec  Urinalysis Basic - ( 08 Jul 2023 07:46 )    Color: x / Appearance: x / SG: x / pH: x  Gluc: 121 mg/dL / Ketone: x  / Bili: x / Urobili: x   Blood: x / Protein: x / Nitrite: x   Leuk Esterase: x / RBC: x / WBC x   Sq Epi: x / Non Sq Epi: x / Bacteria: x    139  |  111<H>  |  13  ----------------------------<  121<H>  3.9   |  24  |  0.85    Ca    8.6      08 Jul 2023 07:46    TPro  8.0  /  Alb  2.7<L>  /  TBili  0.5  /  DBili  x   /  AST  20  /  ALT  25  /  AlkPhos  79  07-08      84 y/o M w/ PMH of diverticulitis, crohn's disease, gastritis, CAD s/p PCI, HTN, kidney stones, p/w R facial  droop    * R Facial droop r/o CVA /  Acute tatyana manifestation of lymes dz  - IV doxy 100 mg po q12h -  care discussed with ID  - pending MRI of the brain  - CTH: 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 Middleton, who suspects bells palsy, and recommends: MRI brain + lyme antibodies + valacyclovir + prednisone + Patching of R eye + lubricating eye gel and artificial tears during the day + recommends to f/u neuro & pcp outpatient   - EKGL sinus 90 bpm, 1st deg AV block, incomplete LBBB. Denies CP / SOB -> f/u outpatient cardio     * B/L shoulder pain  - Patient states she follows up outpatient w/ an orthopedic doctor, and was referred for x-rays, which patient already had outpatient. Will need to obtain records   - Pain control     * H/o diverticulitis / crohn's disease / gastritis / CAD / HTN  -C/w home meds and f/u outpatient for further management    * Hematuria   - outpatient follow up with urology    * DVT ppx  - SCDs

## 2023-07-09 NOTE — DIETITIAN INITIAL EVALUATION ADULT - ORAL INTAKE PTA/DIET HISTORY
YAMEL Pt lives at home w/ wife; wife cooks/grocery shops w/o difficulty. Reports very good appetite, 24 hr-recall reveals likely consuming <75% of ENN chronically 2/2 w/ persistent diarrhea; w/ Crohn's. Attempts to follow low fiber, lactose-free diet at home as these foods tend to agitate GI tract.

## 2023-07-09 NOTE — DIETITIAN INITIAL EVALUATION ADULT - PERTINENT MEDS FT
MEDICATIONS  (STANDING):  amLODIPine   Tablet 5 milliGRAM(s) Oral daily  artificial  tears Solution 1 Drop(s) Both EYES every 8 hours  aspirin enteric coated 81 milliGRAM(s) Oral daily  atorvastatin 10 milliGRAM(s) Oral at bedtime  atorvastatin 40 milliGRAM(s) Oral at bedtime  clopidogrel Tablet 75 milliGRAM(s) Oral daily  labetalol 400 milliGRAM(s) Oral two times a day  pantoprazole    Tablet 40 milliGRAM(s) Oral before breakfast  predniSONE   Tablet 60 milliGRAM(s) Oral daily    MEDICATIONS  (PRN):  acetaminophen     Tablet .. 650 milliGRAM(s) Oral every 6 hours PRN Temp greater or equal to 38C (100.4F), Mild Pain (1 - 3)  cyclobenzaprine 5 milliGRAM(s) Oral three times a day PRN Muscle Spasm  oxyCODONE    IR 10 milliGRAM(s) Oral every 4 hours PRN Moderate Pain (4 - 6)

## 2023-07-10 LAB
ANION GAP SERPL CALC-SCNC: 6 MMOL/L — SIGNIFICANT CHANGE UP (ref 5–17)
BABESIA MICROTI PCR, BLD RESULT: DETECTED
BUN SERPL-MCNC: 21 MG/DL — SIGNIFICANT CHANGE UP (ref 7–23)
CALCIUM SERPL-MCNC: 8.1 MG/DL — LOW (ref 8.5–10.1)
CHLORIDE SERPL-SCNC: 113 MMOL/L — HIGH (ref 96–108)
CO2 SERPL-SCNC: 23 MMOL/L — SIGNIFICANT CHANGE UP (ref 22–31)
CREAT SERPL-MCNC: 1.01 MG/DL — SIGNIFICANT CHANGE UP (ref 0.5–1.3)
EGFR: 74 ML/MIN/1.73M2 — SIGNIFICANT CHANGE UP
GLUCOSE SERPL-MCNC: 110 MG/DL — HIGH (ref 70–99)
HCT VFR BLD CALC: 31.4 % — LOW (ref 39–50)
HGB BLD-MCNC: 10.1 G/DL — LOW (ref 13–17)
MCHC RBC-ENTMCNC: 30.2 PG — SIGNIFICANT CHANGE UP (ref 27–34)
MCHC RBC-ENTMCNC: 32.2 GM/DL — SIGNIFICANT CHANGE UP (ref 32–36)
MCV RBC AUTO: 94 FL — SIGNIFICANT CHANGE UP (ref 80–100)
PLATELET # BLD AUTO: 293 K/UL — SIGNIFICANT CHANGE UP (ref 150–400)
POTASSIUM SERPL-MCNC: 3.5 MMOL/L — SIGNIFICANT CHANGE UP (ref 3.5–5.3)
POTASSIUM SERPL-SCNC: 3.5 MMOL/L — SIGNIFICANT CHANGE UP (ref 3.5–5.3)
RBC # BLD: 3.34 M/UL — LOW (ref 4.2–5.8)
RBC # FLD: 13.2 % — SIGNIFICANT CHANGE UP (ref 10.3–14.5)
SODIUM SERPL-SCNC: 142 MMOL/L — SIGNIFICANT CHANGE UP (ref 135–145)
WBC # BLD: 11.95 K/UL — HIGH (ref 3.8–10.5)
WBC # FLD AUTO: 11.95 K/UL — HIGH (ref 3.8–10.5)

## 2023-07-10 PROCEDURE — 99232 SBSQ HOSP IP/OBS MODERATE 35: CPT

## 2023-07-10 RX ORDER — AZITHROMYCIN 500 MG/1
500 TABLET, FILM COATED ORAL ONCE
Refills: 0 | Status: COMPLETED | OUTPATIENT
Start: 2023-07-10 | End: 2023-07-10

## 2023-07-10 RX ORDER — AZITHROMYCIN 500 MG/1
500 TABLET, FILM COATED ORAL ONCE
Refills: 0 | Status: DISCONTINUED | OUTPATIENT
Start: 2023-07-10 | End: 2023-07-10

## 2023-07-10 RX ORDER — APIXABAN 2.5 MG/1
5 TABLET, FILM COATED ORAL EVERY 12 HOURS
Refills: 0 | Status: DISCONTINUED | OUTPATIENT
Start: 2023-07-10 | End: 2023-07-11

## 2023-07-10 RX ORDER — ATOVAQUONE 750 MG/5ML
750 SUSPENSION ORAL EVERY 12 HOURS
Refills: 0 | Status: DISCONTINUED | OUTPATIENT
Start: 2023-07-10 | End: 2023-07-11

## 2023-07-10 RX ORDER — AZITHROMYCIN 500 MG/1
TABLET, FILM COATED ORAL
Refills: 0 | Status: DISCONTINUED | OUTPATIENT
Start: 2023-07-10 | End: 2023-07-10

## 2023-07-10 RX ORDER — AZITHROMYCIN 500 MG/1
250 TABLET, FILM COATED ORAL EVERY 24 HOURS
Refills: 0 | Status: DISCONTINUED | OUTPATIENT
Start: 2023-07-11 | End: 2023-07-11

## 2023-07-10 RX ADMIN — Medication 400 MILLIGRAM(S): at 21:51

## 2023-07-10 RX ADMIN — Medication 1 DROP(S): at 21:53

## 2023-07-10 RX ADMIN — Medication 1 DROP(S): at 14:35

## 2023-07-10 RX ADMIN — ATORVASTATIN CALCIUM 40 MILLIGRAM(S): 80 TABLET, FILM COATED ORAL at 21:50

## 2023-07-10 RX ADMIN — APIXABAN 5 MILLIGRAM(S): 2.5 TABLET, FILM COATED ORAL at 21:50

## 2023-07-10 RX ADMIN — Medication 60 MILLIGRAM(S): at 10:27

## 2023-07-10 RX ADMIN — OXYCODONE HYDROCHLORIDE 10 MILLIGRAM(S): 5 TABLET ORAL at 11:06

## 2023-07-10 RX ADMIN — Medication 400 MILLIGRAM(S): at 10:27

## 2023-07-10 RX ADMIN — AZITHROMYCIN 255 MILLIGRAM(S): 500 TABLET, FILM COATED ORAL at 16:25

## 2023-07-10 RX ADMIN — Medication 81 MILLIGRAM(S): at 10:27

## 2023-07-10 RX ADMIN — Medication 110 MILLIGRAM(S): at 05:42

## 2023-07-10 RX ADMIN — Medication 110 MILLIGRAM(S): at 17:36

## 2023-07-10 RX ADMIN — APIXABAN 5 MILLIGRAM(S): 2.5 TABLET, FILM COATED ORAL at 10:27

## 2023-07-10 RX ADMIN — Medication 1 DROP(S): at 05:41

## 2023-07-10 RX ADMIN — ATOVAQUONE 750 MILLIGRAM(S): 750 SUSPENSION ORAL at 21:50

## 2023-07-10 RX ADMIN — OXYCODONE HYDROCHLORIDE 10 MILLIGRAM(S): 5 TABLET ORAL at 21:52

## 2023-07-10 RX ADMIN — OXYCODONE HYDROCHLORIDE 10 MILLIGRAM(S): 5 TABLET ORAL at 10:26

## 2023-07-10 RX ADMIN — AMLODIPINE BESYLATE 5 MILLIGRAM(S): 2.5 TABLET ORAL at 10:27

## 2023-07-10 NOTE — PHYSICAL THERAPY INITIAL EVALUATION ADULT - MODALITIES TREATMENT COMMENTS
Patient  left in chair with CBIR and chair alarm active. Patient instructed to call for assistance back to bed or the bathroom, understands same. Patient appears to be at baseline level of function. No skilled need for PT in this setting. Will discharge from services to nursing care. RN, CM informed.

## 2023-07-10 NOTE — PHYSICAL THERAPY INITIAL EVALUATION ADULT - GENERAL OBSERVATIONS, REHAB EVAL
Patient received in bed on 3N, +HM, +IV, +mild R facial droop.  No speech slurring.  Patient denied pain.

## 2023-07-10 NOTE — CONSULT NOTE ADULT - SUBJECTIVE AND OBJECTIVE BOX
Patient is a 83y old  Male who presents with a chief complaint of R facial droop (09 Jul 2023 12:51)    HPI:  82 y/o M w/ PMH of diverticulitis, crohn's disease, gastritis, CAD s/p PCI, HTN, kidney stones, p/w R facial  droop. Patient was up at night to B/L shoulder pain, and then his wife noticed R sided facial weakness. States that he himself did notice it. Denies weakness in arms / legs, dysphagia / vision changes . Patient states that he had lidocaine patch on for shoulder pain, when he removed it, he accidentally touched his eye after touching lidocaine patch, and was concerned about it. Denies CP, LH, cough, runny nose, sore throat, nausea, vomiting, abdominal pain. Here noted with positive lyme testing.     PSH: Colon resection, PCI, cholecystectomy   PMH: as above      Meds: per reconciliation sheet, noted below  MEDICATIONS  (STANDING):  amLODIPine   Tablet 5 milliGRAM(s) Oral daily  artificial  tears Solution 1 Drop(s) Both EYES every 8 hours  aspirin enteric coated 81 milliGRAM(s) Oral daily  atorvastatin 10 milliGRAM(s) Oral at bedtime  atorvastatin 40 milliGRAM(s) Oral at bedtime  clopidogrel Tablet 75 milliGRAM(s) Oral daily  doxycycline IVPB      labetalol 400 milliGRAM(s) Oral two times a day  pantoprazole    Tablet 40 milliGRAM(s) Oral before breakfast  predniSONE   Tablet 60 milliGRAM(s) Oral daily    MEDICATIONS  (PRN):  acetaminophen     Tablet .. 650 milliGRAM(s) Oral every 6 hours PRN Temp greater or equal to 38C (100.4F), Mild Pain (1 - 3)  cyclobenzaprine 5 milliGRAM(s) Oral three times a day PRN Muscle Spasm  oxyCODONE    IR 10 milliGRAM(s) Oral every 4 hours PRN Moderate Pain (4 - 6)    Allergies    IV Contrast (Unknown)  Originally Entered as [Unknown] reaction to [hair dye] (Unknown)  contrast (Unknown)    Intolerances      Social: no smoking, no alcohol, no illegal drugs; no recent travel, no exposure to TB  FAMILY HISTORY:  FH: coronary artery disease  father    Family history of diabetes mellitus (DM)  father       no history of premature cardiovascular disease in first degree relatives    ROS: the patient denies fever, no chills, no HA, no dizziness, no sore throat, no blurry vision, no CP, no palpitations, no SOB, no cough, no abdominal pain, no diarrhea, no N/V, no dysuria, no leg pain, no claudication, no rash, no joint aches, no rectal pain or bleeding, no night sweats    All other systems reviewed and are negative    Vital Signs Last 24 Hrs  T(C): 36.7 (09 Jul 2023 20:50), Max: 36.9 (08 Jul 2023 21:53)  T(F): 98 (09 Jul 2023 20:50), Max: 98.4 (08 Jul 2023 21:53)  HR: 115 (09 Jul 2023 20:50) (73 - 115)  BP: 109/59 (09 Jul 2023 20:50) (109/53 - 142/71)  BP(mean): --  RR: 18 (09 Jul 2023 20:50) (18 - 19)  SpO2: 97% (09 Jul 2023 20:50) (94% - 98%)    Parameters below as of 09 Jul 2023 20:50  Patient On (Oxygen Delivery Method): room air      Daily     Daily     PE:  Constitutional: NAD  HEENT: NC/AT, EOMI, PERRLA, conjunctivae clear; ears and nose atraumatic; pharynx benign  Neck: supple; thyroid not palpable  Back: no tenderness  Respiratory: respiratory effort normal; clear to auscultation  Cardiovascular: S1S2 regular, no murmurs  Abdomen: soft, not tender, not distended, positive BS; liver and spleen WNL  Genitourinary: no suprapubic tenderness  Lymphatic: no LN palpable  Musculoskeletal: no muscle tenderness, no joint swelling or tenderness  Extremities: no pedal edema  Neurological/ Psychiatric: AxOx3, Judgement and insight normal;  R sided facial weakness/droop  Skin: no rashes; no palpable lesions    Labs: all available labs reviewed                        11.2   7.37  )-----------( 269      ( 08 Jul 2023 07:46 )             34.6     07-08    139  |  111<H>  |  13  ----------------------------<  121<H>  3.9   |  24  |  0.85    Ca    8.6      08 Jul 2023 07:46    TPro  8.0  /  Alb  2.7<L>  /  TBili  0.5  /  DBili  x   /  AST  20  /  ALT  25  /  AlkPhos  79  07-08     LIVER FUNCTIONS - ( 08 Jul 2023 07:46 )  Alb: 2.7 g/dL / Pro: 8.0 gm/dL / ALK PHOS: 79 U/L / ALT: 25 U/L / AST: 20 U/L / GGT: x           Urinalysis Basic - ( 08 Jul 2023 07:46 )    Color: x / Appearance: x / SG: x / pH: x  Gluc: 121 mg/dL / Ketone: x  / Bili: x / Urobili: x   Blood: x / Protein: x / Nitrite: x   Leuk Esterase: x / RBC: x / WBC x   Sq Epi: x / Non Sq Epi: x / Bacteria: x          Radiology: all available radiological tests reviewed  < from: MR Head No Cont (07.09.23 @ 11:14) >  ACC: [id number] EXAM:  MR BRAIN   ORDERED BY: THAI GALDAMEZ     PROCEDURE DATE:  07/09/2023          INTERPRETATION:  CLINICAL INFORMATION: Right facial droop.    COMPARISON: CT head stroke protocol with CT perfusion and CT angiography   neck and brain from 07/07/2023.    CONTRAST/COMPLICATIONS:  IV Contrast: NONE  Complications: None reported at time of study completion    TECHNIQUE: MRI of the brain without intravenous contrast was performed   the following sequences: Axial DWI, sagittal T1, axial T2 FLAIR, axial   T1, axial T2 FLAIR, axial T2* GRE    FINDINGS:  There is no diffusion restriction to indicate acute or recent subacute   infarct.    There is no T1 signal hyperintensity or signal loss on T2* GRE images   suspicious for intracranial blood products.    There is no subdural collection, vasogenic edema, mass effect or   hydrocephalus.    There is mild generalized cerebral volume loss.  Mild patchy T2 FLAIR   signal hyperintensity in the periventricular white matter is compatible  with chronic microvascular ischemic disease.    Midline sagittal structures appear within normal limits.    The paranasal sinuses and mastoids are grossly clear.    The patient is status post intraocular lens replacement bilaterally.  The   calvarium and skull base appear within normal limits.    IMPRESSION:  No MRI evidence of acute intracranial pathology.    Mild generalized cerebral volume loss.  Mild patchy chronic microvascular   ischemic disease.    --- End of Report ---    < end of copied text >    Advanced directives addressed: full resuscitation
Neurology Consult requested by:   Patient is a 83y old  Male who presents with a chief complaint of R facial droop (08 Jul 2023 11:22)     HPI:  84 y/o M w/ PMH of diverticulitis, crohn's disease, gastritis, CAD s/p PCI, HTN, kidney stones, p/w R facial  droop. Patient was up at night to B/L shoulder pain, and then his wife noticed R sided facial weakness. States that he himself did notice it. Denies weakness in arms / legs, dysphagia / vision changes . Patient states that he had lidocaine patch on for shoulder pain, when he removed it, he accidentally touched his eye after touching lidocaine patch, and was concerned about it. Denies CP, LH, cough, runny nose, sore throat, nausea, vomiting, abdominal pain     PSH: Colon resection, PCI, cholecystectomy     Family Hx: father - CAD / DM     Social Hx: Denies etoh / drugs / tobacco    (08 Jul 2023 03:07)      PAST MEDICAL & SURGICAL HISTORY:  Diverticulitis      Gastritis      Coronary artery disease involving native heart without angina pectoris, unspecified vessel or lesion type      Essential hypertension      S/P colon resection  sigmoidectomy      S/P coronary artery stent placement      S/P cholecystectomy        FAMILY HISTORY:  FH: coronary artery disease  father    Family history of diabetes mellitus (DM)  father      Social Hx:  Nonsmoker, no drug or alcohol use  Medications and Allergies ReviewedMEDICATIONS  (STANDING):  amLODIPine   Tablet 5 milliGRAM(s) Oral daily  artificial  tears Solution 1 Drop(s) Both EYES every 8 hours  aspirin enteric coated 81 milliGRAM(s) Oral daily  atorvastatin 10 milliGRAM(s) Oral at bedtime  clopidogrel Tablet 75 milliGRAM(s) Oral daily  labetalol 400 milliGRAM(s) Oral two times a day  pantoprazole    Tablet 40 milliGRAM(s) Oral before breakfast  predniSONE   Tablet 60 milliGRAM(s) Oral daily  sodium chloride 0.9%. 1000 milliLiter(s) (125 mL/Hr) IV Continuous <Continuous>  valACYclovir 500 milliGRAM(s) Oral two times a day     ROS: Pertinent positives in HPI, all other ROS were reviewed and are negative.      Examination:   Vital Signs Last 24 Hrs  T(C): 36.4 (08 Jul 2023 08:44), Max: 36.8 (07 Jul 2023 15:49)  T(F): 97.5 (08 Jul 2023 08:44), Max: 98.3 (07 Jul 2023 15:49)  HR: 94 (08 Jul 2023 09:47) (54 - 94)  BP: 129/78 (08 Jul 2023 08:44) (124/49 - 160/64)  BP(mean): --  RR: 18 (08 Jul 2023 08:44) (18 - 20)  SpO2: 97% (08 Jul 2023 08:44) (97% - 100%)    Parameters below as of 08 Jul 2023 08:44  Patient On (Oxygen Delivery Method): room air      General: Cooperative, NAD   NECK: supple, no masses  ENT: Normal hearing   Vascular : no carotid bruits,   Lungs: CTAB  Chest: RRR, no murmurs  Extremities: nontender, no edema  Musculoskeletal: limited ROM shoulders. + joint stiffness  Skin: no rash    Neurological Examination:  NIHSS:1  MS: AOx3. Appropriately interactive, normal affect. Speech fluent w/o paraphasic error, repetition, naming intact   CN: VFFTC, PERLL, EOMI, V1-3 sensation intact, right facial weakness,  hearing reduced, palate elevates symmetrically, tongue midline, SCM equal bilaterally  Motor: normal bulk and tone, + UE kinetic tremors, no rigidity or bradykinesia.  4+/5 all over   Sens: Intact to light touch.    Reflexes: 0-1/4 all over, downgoing toes b/l  Coord:  No dysmetria, ELAINE intact   Gait: Cannot test    Labs: Reviewed  Complete Blood Count + Automated Diff (07.08.23 @ 07:46)   WBC Count: 7.37 K/uL  RBC Count: 3.72 M/uL  Hemoglobin: 11.2 g/dL  Hematocrit: 34.6 %  Mean Cell Volume: 93.0 fl  Mean Cell Hemoglobin: 30.1 pg  Mean Cell Hemoglobin Conc: 32.4 gm/dL  Red Cell Distrib Width: 13.1 %  Platelet Count - Automated: 269 K/uL    Comprehensive Metabolic Panel (07.08.23 @ 07:46)   Sodium: 139 mmol/L  Potassium: 3.9 mmol/L  Chloride: 111 mmol/L  Carbon Dioxide: 24 mmol/L  Anion Gap: 4 mmol/L  Blood Urea Nitrogen: 13 mg/dL  Creatinine: 0.85 mg/dL  Glucose: 121 mg/dL  Calcium: 8.6 mg/dL  Protein Total: 8.0 gm/dL  Albumin: 2.7 g/dL  Bilirubin Total: 0.5 mg/dL  Alkaline Phosphatase: 79 U/L  Aspartate Aminotransferase (AST/SGOT): 20 U/L  Alanine Aminotransferase (ALT/SGPT): 25 U/L  eGFR: 86:           Imaging:   < from: CT Brain Perfusion Maps Stroke (07.07.23 @ 16:05) >  IMPRESSION:    CT PERFUSION: No core infarct or penumbra of ischemic tissue is   identified by CT perfusion.    CT ANGIOGRAPHY NECK: Patent cervical vasculature. Moderate no   hemodynamically significant carotid stenosis or flow-limiting vertebral   artery stenosis.  No evidence of dissection.    CT ANGIOGRAPHY BRAIN: No vessel occlusion, flow-limiting stenosis or   aneurysm.  Congenital variation in anatomy as above.    < end of copied text >    < from: CT Brain Stroke Protocol (07.07.23 @ 16:00) >    IMPRESSION:  No interval change from 5/15/2023. No acute intracranial hemorrhage,   vasogenic edema or hydrocephalus. Chronic microvascular changes.    < end of copied text >  
Patient is a 83y old  Male who presents with a chief complaint of R facial droop (10 Jul 2023 08:11)      HPI:  84 y/o M w/ PMH of diverticulitis, crohn's disease, gastritis, CAD s/p PCI, HTN, kidney stones, p/w R facial  droop. Patient was up at night to B/L shoulder pain, and then his wife noticed R sided facial weakness. States that he himself did notice it. Denies weakness in arms / legs, dysphagia / vision changes . Patient states that he had lidocaine patch on for shoulder pain, when he removed it, he accidentally touched his eye after touching lidocaine patch, and was concerned about it. Denies CP, LH, cough, runny nose, sore throat, nausea, vomiting, abdominal pain   7/10 Cardiology. 83 year old male history of CAD with multiple PCI in past, HTN, crohns disease,AS (not severe) prior CVA admitted with right facial droop. positive lymes noted. imaging neg for acute CVA. Had prior TIA with neg HODA for thrombus and neg long term holter. on Tele, noted to have paf with reasonable VR when in afib.         PSH: Colon resection, PCI, cholecystectomy     Family Hx: father - CAD / DM     Social Hx: Denies etoh / drugs / tobacco    (08 Jul 2023 03:07)      PAST MEDICAL & SURGICAL HISTORY:  Diverticulitis      Gastritis      Coronary artery disease involving native heart without angina pectoris, unspecified vessel or lesion type      Essential hypertension      S/P colon resection  sigmoidectomy      S/P coronary artery stent placement      S/P cholecystectomy          PREVIOUS DIAGNOSTIC TESTING:      ECHO  FINDINGS:    STRESS  FINDINGS:    CATHETERIZATION  FINDINGS:    MEDICATIONS  (STANDING):  amLODIPine   Tablet 5 milliGRAM(s) Oral daily  apixaban 5 milliGRAM(s) Oral every 12 hours  artificial  tears Solution 1 Drop(s) Both EYES every 8 hours  aspirin enteric coated 81 milliGRAM(s) Oral daily  atorvastatin 10 milliGRAM(s) Oral at bedtime  atorvastatin 40 milliGRAM(s) Oral at bedtime  doxycycline IVPB      doxycycline IVPB 100 milliGRAM(s) IV Intermittent every 12 hours  labetalol 400 milliGRAM(s) Oral two times a day  pantoprazole    Tablet 40 milliGRAM(s) Oral before breakfast  predniSONE   Tablet 60 milliGRAM(s) Oral daily    MEDICATIONS  (PRN):  acetaminophen     Tablet .. 650 milliGRAM(s) Oral every 6 hours PRN Temp greater or equal to 38C (100.4F), Mild Pain (1 - 3)  cyclobenzaprine 5 milliGRAM(s) Oral three times a day PRN Muscle Spasm  oxyCODONE    IR 10 milliGRAM(s) Oral every 4 hours PRN Moderate Pain (4 - 6)      FAMILY HISTORY:  FH: coronary artery disease  father    Family history of diabetes mellitus (DM)  father        SOCIAL HISTORY:  ***    REVIEW OF SYSTEM:  Pertinent items are noted in HPI.  Constitutional  Eyes:    Ears, nose, mouth,   throat, and face:    Neck:   Respiratory:   and wheezing  Cardiovascular:    Gastrointestinal:  Genitourinary:     Hematologic/lymphatic:   Musculoskeletal:   Neurological:   Behavioral/Psych:        Vital Signs Last 24 Hrs  T(C): 36.6 (10 Jul 2023 08:30), Max: 36.8 (09 Jul 2023 16:30)  T(F): 97.9 (10 Jul 2023 08:30), Max: 98.2 (09 Jul 2023 16:30)  HR: 80 (10 Jul 2023 08:30) (73 - 115)  BP: 143/75 (10 Jul 2023 08:30) (109/53 - 145/67)  BP(mean): --  RR: 18 (10 Jul 2023 08:30) (18 - 19)  SpO2: 93% (10 Jul 2023 08:30) (93% - 98%)    Parameters below as of 10 Jul 2023 08:30  Patient On (Oxygen Delivery Method): room air        I&O's Summary    09 Jul 2023 07:01  -  10 Jul 2023 07:00  --------------------------------------------------------  IN: 360 mL / OUT: 1 mL / NET: 359 mL      PHYSICAL EXAM  General Appearance: cooperative, no acute distress,   HEENT: PERRL, conjunctiva clear, EOM's intact, non injected pharynx, no exudate    Neck: Supple, , no adenopathy, thyroid: not enlarged, no carotid bruit or JVD  Back: Symmetric, no  tenderness,no soft tissue tenderness  Lungs: Clear to auscultation bilaterally,no adventitious breath sounds, normal   expiratory phase  Heart: Regular rate and rhythm, S1, S2 normal, no murmur,   Abdomen: Soft, non-tender, bowel sounds active , no hepatosplenomegaly  Extremities: no cyanosis or edema, no joint swelling  Skin: Skin color, texture normal, no rashes   Neurologic: Alert and oriented X3 , cranial nerves intact, sensory and motor normal,        INTERPRETATION OF TELEMETRY:    ECG:        LABS:                          10.1   11.95 )-----------( 293      ( 10 Jul 2023 07:11 )             31.4     07-10    142  |  113<H>  |  21  ----------------------------<  110<H>  3.5   |  23  |  1.01    Ca    8.1<L>      10 Jul 2023 07:11                Urinalysis Basic - ( 10 Jul 2023 07:11 )    Color: x / Appearance: x / SG: x / pH: x  Gluc: 110 mg/dL / Ketone: x  / Bili: x / Urobili: x   Blood: x / Protein: x / Nitrite: x   Leuk Esterase: x / RBC: x / WBC x   Sq Epi: x / Non Sq Epi: x / Bacteria: x            RADIOLOGY & ADDITIONAL STUDIES:    IMPRESSION:    PLAN:

## 2023-07-10 NOTE — CONSULT NOTE ADULT - ASSESSMENT
83 year old man  presented to the ED after being sent from urgent care for right facial weakness. exam suggestive of right Bell's Palsy. CT head, CT angios showed no acute changes, LVO.  Suggest:  Continue with valacyclovir 500 mg q12 x 5-7 days  -Can use prednisone taper ie, 60 mg/day x 7 days, then 40 mg x 2 days, then 20 mg x 2 days  -Patch right eye at night  -Use of lubricating eye gel and artificial tears during the day as needed.  -MR head pending
IMP:  1. acute lyme with right facial droop  2. PAF, new diagnosis with prior history of CVA]  3. CAD stable  4. HTN stable  5. IBD    Plan: PAF with high chads vasc. needs AC with eliquis or xarelto, also asa. can stop plavix  cont labetolol and amlodipine
84 y/o M w/ PMH of diverticulitis, crohn's disease, gastritis, CAD s/p PCI, HTN, kidney stones, p/w R facial  droop. Patient was up at night to B/L shoulder pain, and then his wife noticed R sided facial weakness. States that he himself did notice it. Denies weakness in arms / legs, dysphagia / vision changes . Patient states that he had lidocaine patch on for shoulder pain, when he removed it, he accidentally touched his eye after touching lidocaine patch, and was concerned about it. Denies CP, LH, cough, runny nose, sore throat, nausea, vomiting, abdominal pain. Here noted with positive lyme testing.     1. R facial droop. Benton palsy. Lyme disease   - start doxycycline 100mg BID x 21 day course  - check for other tick borne diseases babesia pcr, ehrlichia/anaplasma pcr  - monitor temps  - tolerating abx well so far; no side effects noted  - reason for abx use and side effects reviewed with patient  - supportive care  - fu cbc  - neurology fu noted    2 other issues - care per medicine

## 2023-07-10 NOTE — PHYSICAL THERAPY INITIAL EVALUATION ADULT - PERTINENT HX OF CURRENT PROBLEM, REHAB EVAL
Results for orders placed or performed in visit on 01/17/23   Cardiac EP device report    Narrative    MDT DUAL PPM - NOT MRI CONDITIONAL  CARELINK TRANSMISSION: BATTERY VOLTAGE ADEQUATE  (6 YRS) AP 98%  95%  ALL AVAILABLE LEAD PARAMETERS WITHIN NORMAL LIMITS  NO SIGNIFICANT HIGH RATE EPISODES  NORMAL DEVICE FUNCTION  ---CELIS
84 yo M admitted c/o  R facial  droop. Patient was up at night to B/L shoulder pain, and then his wife noticed R sided facial weakness.  CTH: No acute findings, +congenital malformation.  Patient evaluated by Neurologist, Dr. Middleton, who suspects bells palsy, and recommends: MRI brain + lyme antibodies + valacyclovir + prednisone + Patching of R eye + lubricating eye gel and artificial tears during the day

## 2023-07-10 NOTE — PROGRESS NOTE ADULT - NUTRITIONAL ASSESSMENT
This patient has been assessed with a concern for Malnutrition and has been determined to have a diagnosis/diagnoses of Severe protein-calorie malnutrition.    This patient is being managed with:   Diet NPO after Midnight-     NPO Start Date: 09-Jul-2023   NPO Start Time: 23:59  Entered: Jul 9 2023  8:22PM    Diet Regular-  DASH/TLC {Sodium & Cholesterol Restricted} (DASH)  Entered: Jul 8 2023  3:19AM

## 2023-07-10 NOTE — PROGRESS NOTE ADULT - SUBJECTIVE AND OBJECTIVE BOX
Reason for Admission: R facial droop     Patient is a 84 y/o M w/ PMH of diverticulitis, crohn's disease, gastritis, CAD s/p PCI, HTN, kidney stones, p/w R facial  droop. Patient was up at night to B/L shoulder pain, and then his wife noticed R sided facial weakness. States that he himself did notice it. Denies weakness in arms / legs, dysphagia / vision changes . Patient states that he had lidocaine patch on for shoulder pain, when he removed it, he accidentally touched his eye after touching lidocaine patch, and was concerned about it. Denies CP, LH, cough, runny nose, sore throat, nausea, vomiting, abdominal pain.       Medical progress: Patient in the hospital now doing well. Denies any HA, Cp, SOB.   Complaints: no new complaints  State of mind: normal  /  forgetful  Tele: Atrial fibrillation (new - in the past when patient had a TIA it was not captured).       REVIEW OF SYSTEMS:  General: NAD, hemodynamically stable   HEENT:  Eyes:  No visual loss, blurred vision, double vision or yellow sclerae. Ears, Nose, Throat:  No hearing loss, sneezing, congestion, runny nose or sore throat.  SKIN:  No rash or itching.  CARDIOVASCULAR:  No chest pain, chest pressure or chest discomfort. No palpitations or edema.  RESPIRATORY:  No shortness of breath, cough or sputum.  GASTROINTESTINAL:  No anorexia, nausea, vomiting or diarrhea. No abdominal pain or blood.  NEUROLOGICAL:  No headache, dizziness, syncope, paralysis, ataxia, numbness or tingling in the extremities. No change in bowel or bladder control.  MUSCULOSKELETAL:  No muscle, back pain, joint pain or stiffness.  HEMATOLOGIC:  No anemia, bleeding or bruising.  LYMPHATICS:  No enlarged nodes. No history of splenectomy.  ENDOCRINOLOGIC:  No reports of sweating, cold or heat intolerance. No polyuria or polydipsia.  ALLERGIES:  No history of asthma, hives, eczema or rhinitis.    Physical Exam:   GENERAL APPEARANCE:  NAD, hemodynamically stable  T(C): 36.6 (10 Jul 2023 07:01), Max: 36.8 (09 Jul 2023 16:30)  T(F): 97.8 (10 Jul 2023 07:01), Max: 98.2 (09 Jul 2023 16:30)  HR: 79 (10 Jul 2023 07:01) (73 - 115)  BP: 145/67 (10 Jul 2023 07:01) (109/53 - 145/67)  RR: 18 (10 Jul 2023 07:01) (18 - 19)  SpO2: 97% (10 Jul 2023 07:01) (94% - 98%)  HEENT:  Head is normocephalic    Skin:  Warm and dry without any rash   NECK:  Supple without lymphadenopathy.   HEART:  Regular rate and rhythm. normal S1 and S2, No M/R/G  LUNGS:  Good ins/exp effort, no W/R/R/C  ABDOMEN:  Soft, nontender, nondistended with good bowel sounds heard  EXTREMITIES:  Without cyanosis, clubbing or edema.   NEUROLOGICAL:  Gross nonfocal     Labs:       CBC Full  -  ( 10 Jul 2023 07:11 )  WBC Count : 11.95 K/uL  RBC Count : 3.34 M/uL  Hemoglobin : 10.1 g/dL  Hematocrit : 31.4 %  Platelet Count - Automated : 293 K/uL  Mean Cell Volume : 94.0 fl  Mean Cell Hemoglobin : 30.2 pg  Mean Cell Hemoglobin Concentration : 32.2 gm/dL  Auto Neutrophil # : x  Auto Lymphocyte # : x  Auto Monocyte # : x  Auto Eosinophil # : x  Auto Basophil # : x  Auto Neutrophil % : x  Auto Lymphocyte % : x  Auto Monocyte % : x  Auto Eosinophil % : x  Auto Basophil % : x      84 y/o M w/ PMH of diverticulitis, crohn's disease, gastritis, CAD s/p PCI, HTN, kidney stones, p/w R facial  droop    * CVA has been ruled out  * Neuro lymes - Bell's palsy  * Silent paroxysmal atrial fibrillation  - IV doxy 100 mg po q12h x 21 days  - MRI: No stroke  - start doxycycline 100mg BID x 21 day course  - check for other tick borne diseases babesia pcr, ehrlichia/anaplasma pcr  - CTH: 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 Middleton, who suspects bells palsy, and recommends: MRI brain + lyme antibodies + valacyclovir + prednisone + Patching of R eye + lubricating eye gel and artificial tears during the day + recommends to f/u neuro & pcp outpatient   - patient in the past on ASA /  Plavix, switch to PO eliquis / ASA    * B/L shoulder pain  - Patient states she follows up outpatient w/ an orthopedic doctor, and was referred for x-rays, which patient already had outpatient. Will need to obtain records   - Pain control     * H/o diverticulitis / crohn's disease / gastritis / CAD / HTN  -C/w home meds and f/u outpatient for further management    * Hematuria   - outpatient follow up with urology    * DVT ppx  - SCDs      Reason for Admission: R facial droop     Patient is a 84 y/o M w/ PMH of diverticulitis, crohn's disease, gastritis, CAD s/p PCI, HTN, kidney stones, p/w R facial  droop. Patient was up at night to B/L shoulder pain, and then his wife noticed R sided facial weakness. States that he himself did notice it. Denies weakness in arms / legs, dysphagia / vision changes . Patient states that he had lidocaine patch on for shoulder pain, when he removed it, he accidentally touched his eye after touching lidocaine patch, and was concerned about it. Denies CP, LH, cough, runny nose, sore throat, nausea, vomiting, abdominal pain.       Medical progress: Patient in the hospital now doing well. Denies any HA, CP, SOB. Still with (R) sided facial droop  Complaints: no new complaints  State of mind: normal  /  forgetful  Tele: Atrial fibrillation (new - in the past when patient had a TIA it was not capture)  - Care discussed with Dr. Almendarez      REVIEW OF SYSTEMS:  General: NAD, hemodynamically stable   HEENT:  Eyes:  No visual loss, blurred vision, double vision or yellow sclerae. Ears, Nose, Throat:  No hearing loss, sneezing, congestion, runny nose or sore throat.  SKIN:  No rash or itching.  CARDIOVASCULAR:  No chest pain, chest pressure or chest discomfort. No palpitations or edema.  RESPIRATORY:  No shortness of breath, cough or sputum.  GASTROINTESTINAL:  No anorexia, nausea, vomiting or diarrhea. No abdominal pain or blood.  NEUROLOGICAL:  No headache, dizziness, syncope, paralysis, ataxia, numbness or tingling in the extremities. No change in bowel or bladder control.  MUSCULOSKELETAL:  No muscle, back pain, joint pain or stiffness.  HEMATOLOGIC:  No anemia, bleeding or bruising.  LYMPHATICS:  No enlarged nodes. No history of splenectomy.  ENDOCRINOLOGIC:  No reports of sweating, cold or heat intolerance. No polyuria or polydipsia.  ALLERGIES:  No history of asthma, hives, eczema or rhinitis.    Physical Exam:   GENERAL APPEARANCE:  NAD, hemodynamically stable  T(C): 36.6 (10 Jul 2023 07:01), Max: 36.8 (09 Jul 2023 16:30)  T(F): 97.8 (10 Jul 2023 07:01), Max: 98.2 (09 Jul 2023 16:30)  HR: 79 (10 Jul 2023 07:01) (73 - 115)  BP: 145/67 (10 Jul 2023 07:01) (109/53 - 145/67)  RR: 18 (10 Jul 2023 07:01) (18 - 19)  SpO2: 97% (10 Jul 2023 07:01) (94% - 98%)  HEENT:  Head is normocephalic    Skin:  Warm and dry without any rash   NECK:  Supple without lymphadenopathy.   HEART:  Regular rate and rhythm. normal S1 and S2, No M/R/G  LUNGS:  Good ins/exp effort, no W/R/R/C  ABDOMEN:  Soft, nontender, nondistended with good bowel sounds heard  EXTREMITIES:  Without cyanosis, clubbing or edema.   NEUROLOGICAL:  Gross nonfocal     Labs:       CBC Full  -  ( 10 Jul 2023 07:11 )  WBC Count : 11.95 K/uL  RBC Count : 3.34 M/uL  Hemoglobin : 10.1 g/dL  Hematocrit : 31.4 %  Platelet Count - Automated : 293 K/uL  Mean Cell Volume : 94.0 fl  Mean Cell Hemoglobin : 30.2 pg  Mean Cell Hemoglobin Concentration : 32.2 gm/dL  Auto Neutrophil # : x  Auto Lymphocyte # : x  Auto Monocyte # : x  Auto Eosinophil # : x  Auto Basophil # : x  Auto Neutrophil % : x  Auto Lymphocyte % : x  Auto Monocyte % : x  Auto Eosinophil % : x  Auto Basophil % : x      84 y/o M w/ PMH of diverticulitis, crohn's disease, gastritis, CAD s/p PCI, HTN, kidney stones, p/w R facial  droop    * CVA has been ruled out  * Neuro lymes - Bell's palsy  * Silent paroxysmal atrial fibrillation  - IV doxy 100 mg po q12h x 21 days  - MRI: No stroke  - check for other tick borne diseases babesia pcr, ehrlichia/anaplasma pcr  - CTH: 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 Middleton, who suspects bells palsy, and recommends: MRI brain + lyme antibodies + valacyclovir + prednisone + Patching of R eye + lubricating eye gel and artificial tears during the day + recommends to f/u neuro & pcp outpatient   - patient in the past on ASA /  Plavix, switch to PO eliquis / ASA    * B/L shoulder pain  - Patient states she follows up outpatient w/ an orthopedic doctor, and was referred for x-rays, which patient already had outpatient. Will need to obtain records   - Pain control     * H/o diverticulitis / crohn's disease / gastritis / CAD / HTN  -C/w home meds and f/u outpatient for further management    * Hematuria   - outpatient follow up with urology    * DVT ppx  - SCDs

## 2023-07-10 NOTE — PROGRESS NOTE ADULT - ASSESSMENT
82 y/o M w/ PMH of diverticulitis, crohn's disease, gastritis, CAD s/p PCI, HTN, kidney stones, p/w R facial  droop. Patient was up at night to B/L shoulder pain, and then his wife noticed R sided facial weakness. States that he himself did notice it. Denies weakness in arms / legs, dysphagia / vision changes . Patient states that he had lidocaine patch on for shoulder pain, when he removed it, he accidentally touched his eye after touching lidocaine patch, and was concerned about it. Denies CP, LH, cough, runny nose, sore throat, nausea, vomiting, abdominal pain. Here noted with positive lyme testing.     1. R facial droop. [place] palsy. Lyme disease   - on doxycycline 100mg BID #2 x 21 day course  - continue with abx coverage   - r/o tick borne diseases - babesia pcr, ehrlichia/anaplasma pcr pending   - monitor temps  - tolerating abx well so far; no side effects noted  - reason for abx use and side effects reviewed with patient  - supportive care  - fu cbc  - neurology fu noted    2 other issues - care per medicine

## 2023-07-10 NOTE — PROGRESS NOTE ADULT - SUBJECTIVE AND OBJECTIVE BOX
Date of service: 07-10-23 @ 11:56    pt seen and examined  feels better this am  has R facial weakness still  no blurry vision    ROS: no fever or chills; denies dizziness, no HA, no SOB or cough, no abdominal pain, no diarrhea or constipation; no dysuria, no urinary frequency, no legs pain, no rashes    MEDICATIONS  (STANDING):  amLODIPine   Tablet 5 milliGRAM(s) Oral daily  apixaban 5 milliGRAM(s) Oral every 12 hours  artificial  tears Solution 1 Drop(s) Both EYES every 8 hours  aspirin enteric coated 81 milliGRAM(s) Oral daily  atorvastatin 10 milliGRAM(s) Oral at bedtime  atorvastatin 40 milliGRAM(s) Oral at bedtime  doxycycline IVPB      doxycycline IVPB 100 milliGRAM(s) IV Intermittent every 12 hours  labetalol 400 milliGRAM(s) Oral two times a day  pantoprazole    Tablet 40 milliGRAM(s) Oral before breakfast  predniSONE   Tablet 60 milliGRAM(s) Oral daily    Vital Signs Last 24 Hrs  T(C): 36.6 (10 Jul 2023 08:30), Max: 36.8 (09 Jul 2023 16:30)  T(F): 97.9 (10 Jul 2023 08:30), Max: 98.2 (09 Jul 2023 16:30)  HR: 80 (10 Jul 2023 08:30) (73 - 115)  BP: 143/75 (10 Jul 2023 08:30) (109/53 - 145/67)  BP(mean): --  RR: 18 (10 Jul 2023 08:30) (18 - 19)  SpO2: 93% (10 Jul 2023 08:30) (93% - 98%)    Parameters below as of 10 Jul 2023 08:30  Patient On (Oxygen Delivery Method): room air      PE:  Constitutional: NAD  HEENT: NC/AT, EOMI, PERRLA, conjunctivae clear; ears and nose atraumatic; pharynx benign  Neck: supple; thyroid not palpable  Back: no tenderness  Respiratory: respiratory effort normal; clear to auscultation  Cardiovascular: S1S2 regular, no murmurs  Abdomen: soft, not tender, not distended, positive BS; liver and spleen WNL  Genitourinary: no suprapubic tenderness  Lymphatic: no LN palpable  Musculoskeletal: no muscle tenderness, no joint swelling or tenderness  Extremities: no pedal edema  Neurological/ Psychiatric: AxOx3, Judgement and insight normal;  R sided facial weakness/droop  Skin: no rashes; no palpable lesions    Labs: all available labs reviewed                                   10.1   11.95 )-----------( 293      ( 10 Jul 2023 07:11 )             31.4     07-10    142  |  113<H>  |  21  ----------------------------<  110<H>  3.5   |  23  |  1.01    Ca    8.1<L>      10 Jul 2023 07:11        Urinalysis Basic - ( 08 Jul 2023 07:46 )    Color: x / Appearance: x / SG: x / pH: x  Gluc: 121 mg/dL / Ketone: x  / Bili: x / Urobili: x   Blood: x / Protein: x / Nitrite: x   Leuk Esterase: x / RBC: x / WBC x   Sq Epi: x / Non Sq Epi: x / Bacteria: x      Radiology: all available radiological tests reviewed  < from: MR Head No Cont (07.09.23 @ 11:14) >  ACC: [id number] EXAM:  MR BRAIN   ORDERED BY: THAI GALDAMEZ     PROCEDURE DATE:  07/09/2023          INTERPRETATION:  CLINICAL INFORMATION: Right facial droop.    COMPARISON: CT head stroke protocol with CT perfusion and CT angiography   neck and brain from 07/07/2023.    CONTRAST/COMPLICATIONS:  IV Contrast: NONE  Complications: None reported at time of study completion    TECHNIQUE: MRI of the brain without intravenous contrast was performed   the following sequences: Axial DWI, sagittal T1, axial T2 FLAIR, axial   T1, axial T2 FLAIR, axial T2* GRE    FINDINGS:  There is no diffusion restriction to indicate acute or recent subacute   infarct.    There is no T1 signal hyperintensity or signal loss on T2* GRE images   suspicious for intracranial blood products.    There is no subdural collection, vasogenic edema, mass effect or   hydrocephalus.    There is mild generalized cerebral volume loss.  Mild patchy T2 FLAIR   signal hyperintensity in the periventricular white matter is compatible  with chronic microvascular ischemic disease.    Midline sagittal structures appear within normal limits.    The paranasal sinuses and mastoids are grossly clear.    The patient is status post intraocular lens replacement bilaterally.  The   calvarium and skull base appear within normal limits.    IMPRESSION:  No MRI evidence of acute intracranial pathology.    Mild generalized cerebral volume loss.  Mild patchy chronic microvascular   ischemic disease.    --- End of Report ---    < end of copied text >    Advanced directives addressed: full resuscitation

## 2023-07-10 NOTE — PHYSICAL THERAPY INITIAL EVALUATION ADULT - ADDITIONAL COMMENTS
(+) , [facility]. Patient reported no AD in the home, but uses shopping carts outside due to chronic LBP.  H/O B shoulder pain, seen by ortho and pain management.

## 2023-07-11 ENCOUNTER — TRANSCRIPTION ENCOUNTER (OUTPATIENT)
Age: 83
End: 2023-07-11

## 2023-07-11 ENCOUNTER — APPOINTMENT (OUTPATIENT)
Dept: UROLOGY | Facility: CLINIC | Age: 83
End: 2023-07-11

## 2023-07-11 VITALS
OXYGEN SATURATION: 97 % | HEART RATE: 63 BPM | SYSTOLIC BLOOD PRESSURE: 154 MMHG | TEMPERATURE: 98 F | DIASTOLIC BLOOD PRESSURE: 79 MMHG | RESPIRATION RATE: 18 BRPM

## 2023-07-11 LAB
ANION GAP SERPL CALC-SCNC: 6 MMOL/L — SIGNIFICANT CHANGE UP (ref 5–17)
BUN SERPL-MCNC: 20 MG/DL — SIGNIFICANT CHANGE UP (ref 7–23)
CALCIUM SERPL-MCNC: 8.7 MG/DL — SIGNIFICANT CHANGE UP (ref 8.5–10.1)
CHLORIDE SERPL-SCNC: 111 MMOL/L — HIGH (ref 96–108)
CO2 SERPL-SCNC: 25 MMOL/L — SIGNIFICANT CHANGE UP (ref 22–31)
CREAT SERPL-MCNC: 0.88 MG/DL — SIGNIFICANT CHANGE UP (ref 0.5–1.3)
EGFR: 85 ML/MIN/1.73M2 — SIGNIFICANT CHANGE UP
GLUCOSE SERPL-MCNC: 102 MG/DL — HIGH (ref 70–99)
HCT VFR BLD CALC: 31.5 % — LOW (ref 39–50)
HGB BLD-MCNC: 10.3 G/DL — LOW (ref 13–17)
MCHC RBC-ENTMCNC: 30.5 PG — SIGNIFICANT CHANGE UP (ref 27–34)
MCHC RBC-ENTMCNC: 32.7 GM/DL — SIGNIFICANT CHANGE UP (ref 32–36)
MCV RBC AUTO: 93.2 FL — SIGNIFICANT CHANGE UP (ref 80–100)
PLATELET # BLD AUTO: 283 K/UL — SIGNIFICANT CHANGE UP (ref 150–400)
POTASSIUM SERPL-MCNC: 3.6 MMOL/L — SIGNIFICANT CHANGE UP (ref 3.5–5.3)
POTASSIUM SERPL-SCNC: 3.6 MMOL/L — SIGNIFICANT CHANGE UP (ref 3.5–5.3)
RBC # BLD: 3.38 M/UL — LOW (ref 4.2–5.8)
RBC # FLD: 13.1 % — SIGNIFICANT CHANGE UP (ref 10.3–14.5)
SODIUM SERPL-SCNC: 142 MMOL/L — SIGNIFICANT CHANGE UP (ref 135–145)
WBC # BLD: 8.82 K/UL — SIGNIFICANT CHANGE UP (ref 3.8–10.5)
WBC # FLD AUTO: 8.82 K/UL — SIGNIFICANT CHANGE UP (ref 3.8–10.5)

## 2023-07-11 PROCEDURE — 99239 HOSP IP/OBS DSCHRG MGMT >30: CPT

## 2023-07-11 RX ORDER — APIXABAN 2.5 MG/1
1 TABLET, FILM COATED ORAL
Qty: 60 | Refills: 0
Start: 2023-07-11 | End: 2023-08-09

## 2023-07-11 RX ORDER — CLOPIDOGREL BISULFATE 75 MG/1
1 TABLET, FILM COATED ORAL
Qty: 0 | Refills: 0 | DISCHARGE

## 2023-07-11 RX ORDER — AZITHROMYCIN 500 MG/1
1 TABLET, FILM COATED ORAL
Qty: 8 | Refills: 0
Start: 2023-07-11 | End: 2023-07-18

## 2023-07-11 RX ADMIN — AMLODIPINE BESYLATE 5 MILLIGRAM(S): 2.5 TABLET ORAL at 09:47

## 2023-07-11 RX ADMIN — Medication 400 MILLIGRAM(S): at 09:46

## 2023-07-11 RX ADMIN — Medication 60 MILLIGRAM(S): at 09:52

## 2023-07-11 RX ADMIN — APIXABAN 5 MILLIGRAM(S): 2.5 TABLET, FILM COATED ORAL at 09:47

## 2023-07-11 RX ADMIN — Medication 1 DROP(S): at 05:46

## 2023-07-11 RX ADMIN — PANTOPRAZOLE SODIUM 40 MILLIGRAM(S): 20 TABLET, DELAYED RELEASE ORAL at 06:01

## 2023-07-11 RX ADMIN — Medication 110 MILLIGRAM(S): at 05:46

## 2023-07-11 RX ADMIN — Medication 81 MILLIGRAM(S): at 09:47

## 2023-07-11 RX ADMIN — ATOVAQUONE 750 MILLIGRAM(S): 750 SUSPENSION ORAL at 09:47

## 2023-07-11 NOTE — PROGRESS NOTE ADULT - SUBJECTIVE AND OBJECTIVE BOX
Date of service: 07-11-23 @ 10:59    pt seen and examined  less R facial droop/weakness  feels better   no blurry vision  positive as well for babesia     ROS: no fever or chills; denies dizziness, no HA, no SOB or cough, no abdominal pain, no diarrhea or constipation; no dysuria, no urinary frequency, no legs pain, no rashes      MEDICATIONS  (STANDING):  amLODIPine   Tablet 5 milliGRAM(s) Oral daily  apixaban 5 milliGRAM(s) Oral every 12 hours  artificial  tears Solution 1 Drop(s) Both EYES every 8 hours  aspirin enteric coated 81 milliGRAM(s) Oral daily  atorvastatin 40 milliGRAM(s) Oral at bedtime  atovaquone  Suspension 750 milliGRAM(s) Oral every 12 hours  azithromycin  IVPB 250 milliGRAM(s) IV Intermittent every 24 hours  doxycycline IVPB      doxycycline IVPB 100 milliGRAM(s) IV Intermittent every 12 hours  labetalol 400 milliGRAM(s) Oral two times a day  pantoprazole    Tablet 40 milliGRAM(s) Oral before breakfast  predniSONE   Tablet 60 milliGRAM(s) Oral daily    Vital Signs Last 24 Hrs  T(C): 36.4 (11 Jul 2023 08:34), Max: 36.7 (10 Jul 2023 16:30)  T(F): 97.5 (11 Jul 2023 08:34), Max: 98 (10 Jul 2023 16:30)  HR: 63 (11 Jul 2023 08:34) (63 - 78)  BP: 154/79 (11 Jul 2023 08:34) (133/62 - 154/79)  BP(mean): --  RR: 18 (11 Jul 2023 08:34) (18 - 18)  SpO2: 97% (11 Jul 2023 08:34) (95% - 97%)    Parameters below as of 11 Jul 2023 08:34  Patient On (Oxygen Delivery Method): room air      PE:  Constitutional: NAD  HEENT: NC/AT, EOMI, PERRLA, conjunctivae clear; ears and nose atraumatic; pharynx benign  Neck: supple; thyroid not palpable  Back: no tenderness  Respiratory: respiratory effort normal; clear to auscultation  Cardiovascular: S1S2 regular, no murmurs  Abdomen: soft, not tender, not distended, positive BS; liver and spleen WNL  Genitourinary: no suprapubic tenderness  Lymphatic: no LN palpable  Musculoskeletal: no muscle tenderness, no joint swelling or tenderness  Extremities: no pedal edema  Neurological/ Psychiatric: AxOx3, Judgement and insight normal;  R sided facial weakness/droop  Skin: no rashes; no palpable lesions    Labs: all available labs reviewed                                   10.3   8.82  )-----------( 283      ( 11 Jul 2023 06:55 )             31.5     07-11    142  |  111<H>  |  20  ----------------------------<  102<H>  3.6   |  25  |  0.88    Ca    8.7      11 Jul 2023 06:55          Urinalysis Basic - ( 08 Jul 2023 07:46 )    Color: x / Appearance: x / SG: x / pH: x  Gluc: 121 mg/dL / Ketone: x  / Bili: x / Urobili: x   Blood: x / Protein: x / Nitrite: x   Leuk Esterase: x / RBC: x / WBC x   Sq Epi: x / Non Sq Epi: x / Bacteria: x          Radiology: all available radiological tests reviewed  < from: MR Head No Cont (07.09.23 @ 11:14) >  ACC: [id number] EXAM:  MR BRAIN   ORDERED BY: THAI GALDAMEZ     PROCEDURE DATE:  07/09/2023          INTERPRETATION:  CLINICAL INFORMATION: Right facial droop.    COMPARISON: CT head stroke protocol with CT perfusion and CT angiography   neck and brain from 07/07/2023.    CONTRAST/COMPLICATIONS:  IV Contrast: NONE  Complications: None reported at time of study completion    TECHNIQUE: MRI of the brain without intravenous contrast was performed   the following sequences: Axial DWI, sagittal T1, axial T2 FLAIR, axial   T1, axial T2 FLAIR, axial T2* GRE    FINDINGS:  There is no diffusion restriction to indicate acute or recent subacute   infarct.    There is no T1 signal hyperintensity or signal loss on T2* GRE images   suspicious for intracranial blood products.    There is no subdural collection, vasogenic edema, mass effect or   hydrocephalus.    There is mild generalized cerebral volume loss.  Mild patchy T2 FLAIR   signal hyperintensity in the periventricular white matter is compatible  with chronic microvascular ischemic disease.    Midline sagittal structures appear within normal limits.    The paranasal sinuses and mastoids are grossly clear.    The patient is status post intraocular lens replacement bilaterally.  The   calvarium and skull base appear within normal limits.    IMPRESSION:  No MRI evidence of acute intracranial pathology.    Mild generalized cerebral volume loss.  Mild patchy chronic microvascular   ischemic disease.    --- End of Report ---    < end of copied text >    Advanced directives addressed: full resuscitation

## 2023-07-11 NOTE — DISCHARGE NOTE PROVIDER - CARE PROVIDER_API CALL
Faraz Almendarez  Cardiovascular Disease  [address] [place] [address]  Phone: [phone]  Fax: [phone]  Established Patient  Follow Up Time: 2 weeks

## 2023-07-11 NOTE — DISCHARGE NOTE PROVIDER - HOSPITAL COURSE
FROM H&P:    "82 y/o M w/ PMH of diverticulitis, crohn's disease, gastritis, CAD s/p PCI, HTN, kidney stones, p/w R facial  droop. Patient was up at night to B/L shoulder pain, and then his wife noticed R sided facial weakness. States that he himself did notice it. Denies weakness in arms / legs, dysphagia / vision changes . Patient states that he had lidocaine patch on for shoulder pain, when he removed it, he accidentally touched his eye after touching lidocaine patch, and was concerned about it. Denies CP, LH, cough, runny nose, sore throat, nausea, vomiting, abdominal pain "    82 y/o M w/ PMH of diverticulitis, crohn's disease, gastritis, CAD s/p PCI, HTN, kidney stones, p/w R facial  droop    * CVA has been ruled out  * Neuro lymes - Bell's palsy  * Silent paroxysmal atrial fibrillation (new onset)  - IV doxy 100 mg po q12h x 21 days  - MRI: No stroke  - Lyme Disease +; Babesia +. ID consult-> Doxy x 21 day and Azithromycin x 10 days Mepron bid x 10 days.   - PO prednisone x 7 days.   - CTH: 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 Middleton, who suspects bells palsy, and recommends: MRI brain + lyme antibodies + valacyclovir + prednisone + Patching of R eye + lubricating eye gel and artificial tears during the day + recommends to f/u neuro & pcp outpatient   - patient in the past on ASA /  Plavix, switch to PO eliquis / AS. tolerating  -Cardiology consult/recs    * B/L shoulder pain  - Patient states she follows up outpatient w/ an orthopedic doctor, and was referred for x-rays, which patient already had outpatient. Will need to obtain records. Outpatient follow up.   - Pain control     * H/o diverticulitis / crohn's disease / gastritis / CAD / HTN  -C/w home meds and f/u outpatient for further management    * Hematuria   - outpatient follow up with urology    Medically stable. To continue doxy/Mepron/azithromycin for course outlines above. Discharge home in stable condition and close outpatient follow up.    T(C): 36.4 (07-11-23 @ 08:34), Max: 36.7 (07-10-23 @ 16:30)  HR: 63 (07-11-23 @ 08:34) (63 - 78)  BP: 154/79 (07-11-23 @ 08:34) (133/62 - 154/79)  RR: 18 (07-11-23 @ 08:34) (18 - 18)  SpO2: 97% (07-11-23 @ 08:34) (95% - 97%)  AAOx3; NAD  RRR:   Lungs CTA bilateally  Abdomen Benign  Neuro: AAOx3; Mild right facial droop; Otherwise non focal  Discharge Management: 38 minutes  Date of Discharge /Service: 7/11/2023

## 2023-07-11 NOTE — DISCHARGE NOTE NURSING/CASE MANAGEMENT/SOCIAL WORK - PATIENT PORTAL LINK FT
You can access the FollowMyHealth Patient Portal offered by [facility] by registering at the following website: [email or web address]. By joining [employer]’s FollowMyHealth portal, you will also be able to view your health information using other applications (apps) compatible with our system.

## 2023-07-11 NOTE — DISCHARGE NOTE PROVIDER - NSDCMRMEDTOKEN_GEN_ALL_CORE_FT
amLODIPine 5 mg oral tablet: 1 tab(s) orally once a day  apixaban 5 mg oral tablet: 1 tab(s) orally every 12 hours  Aspirin Enteric Coated 81 mg oral delayed release tablet: 1 tab(s) orally once a day  azithromycin 250 mg oral tablet: 1 tab(s) orally once a day  cyclobenzaprine 5 mg oral tablet: 1 tab(s) orally every 8 hours as needed for  muscle spasm  doxycycline hyclate 100 mg oral capsule: 1 cap(s) orally every 12 hours  labetalol 200 mg oral tablet: 2 tab(s) orally 2 times a day  Lipitor 10 mg oral tablet: 1 tab(s) orally once a day (in the morning)  Mepron 750 mg/5 mL oral suspension: 5 milliliter(s) orally every 12 hours  oxyCODONE 5 mg oral tablet: 1 tab(s) orally every 6 hours as needed for  severe pain MDD: 4  pantoprazole 40 mg oral delayed release tablet: 1 tab(s) orally once a day (in the morning)  predniSONE 20 mg oral tablet: 3 tab(s) orally once a day  ustekinumab 45 mg/0.5 mL subcutaneous solution: 45 subcutaneously

## 2023-07-11 NOTE — DISCHARGE NOTE PROVIDER - NSDCCPCAREPLAN_GEN_ALL_CORE_FT
PRINCIPAL DISCHARGE DIAGNOSIS  Diagnosis: Bell's palsy  Assessment and Plan of Treatment: Likely due to tick borne infections. test positive for lyme disease and babesia. Prescribed course of antibiotics. Take as prescribed. Follow up outpatient with your primary medical provider.      SECONDARY DISCHARGE DIAGNOSES  Diagnosis: Paroxysmal atrial fibrillation  Assessment and Plan of Treatment: Abnormal Heart Rythym leading to more inefficient blood flow at higher heart rates. Medications adjusted to control heart rate. Take medications as prescribed. In addition, with atrial fibrillation, increased risk of developing clots in the heart which can travel to the rest of the body and cause trouble (example is stroke). In order to prevent blood clots, you are on a blood thinner that helps prevent blood clot formation. Take medication as prescribed.   Being on a blood thinner will make cuts bleeding quicker/easier/longer and can cause bruising. Maintain prolonged pressure on any bleeding cuts. Caution and avoid trauma as major and minor bleeding can be potentiated by being on a blood thinner. If you notice any prolonged/persistent bleeding, seek medical attention.

## 2023-07-11 NOTE — PROGRESS NOTE ADULT - ASSESSMENT
82 y/o M w/ PMH of diverticulitis, crohn's disease, gastritis, CAD s/p PCI, HTN, kidney stones, p/w R facial  droop. Patient was up at night to B/L shoulder pain, and then his wife noticed R sided facial weakness. States that he himself did notice it. Denies weakness in arms / legs, dysphagia / vision changes . Patient states that he had lidocaine patch on for shoulder pain, when he removed it, he accidentally touched his eye after touching lidocaine patch, and was concerned about it. Denies CP, LH, cough, runny nose, sore throat, nausea, vomiting, abdominal pain. Here noted with positive lyme testing.     1. R facial droop. [place] palsy. Lyme disease   - positive for babesia and lyme disease   - on doxycycline 100mg BID #3 x 21 day course  - on azithromycin 250mg daily/mepron 750mg BID - day #2 , complete 10 day course   - continue with abx coverage   - f/u ehrlichia/anaplasma pcr pending   - monitor temps  - tolerating abx well so far; no side effects noted  - reason for abx use and side effects reviewed with patient  - supportive care  - fu cbc  - neurology fu noted    2 other issues - care per medicine  82 y/o M w/ PMH of diverticulitis, crohn's disease, gastritis, CAD s/p PCI, HTN, kidney stones, p/w R facial  droop. Patient was up at night to B/L shoulder pain, and then his wife noticed R sided facial weakness. States that he himself did notice it. Denies weakness in arms / legs, dysphagia / vision changes . Patient states that he had lidocaine patch on for shoulder pain, when he removed it, he accidentally touched his eye after touching lidocaine patch, and was concerned about it. Denies CP, LH, cough, runny nose, sore throat, nausea, vomiting, abdominal pain. Here noted with positive lyme testing.     1. R facial droop. [place] palsy. Lyme disease. Babesiosis   - positive for babesia and lyme disease   - on doxycycline 100mg BID #3 x 21 day course  - on azithromycin 250mg daily/mepron 750mg BID - day #2 , complete 10 day course   - continue with abx coverage   - f/u ehrlichia/anaplasma pcr pending   - monitor temps  - tolerating abx well so far; no side effects noted  - reason for abx use and side effects reviewed with patient  - supportive care  - fu cbc  - neurology fu noted    2 other issues - care per medicine

## 2023-07-11 NOTE — PHARMACOTHERAPY INTERVENTION NOTE - COMMENTS
new medications reviewed w/ patient and patient's wife, all questions answered 
as per [facility] pharmacy copay for one month supply of Eliquis $30

## 2023-07-11 NOTE — DISCHARGE NOTE PROVIDER - DETAILS OF MALNUTRITION DIAGNOSIS/DIAGNOSES
This patient has been assessed with a concern for Malnutrition and was treated during this hospitalization for the following Nutrition diagnosis/diagnoses:     -  07/09/2023: Severe protein-calorie malnutrition

## 2023-07-11 NOTE — DISCHARGE NOTE NURSING/CASE MANAGEMENT/SOCIAL WORK - NSDCVIVACCINE_GEN_ALL_CORE_FT
influenza, high-dose, quadrivalent; 14-Sep-2022 18:02; Pam Grubbs (RN); [employer]; [id number] (Exp. Date: 30-Jun-2023); IntraMuscular; Deltoid Left.; 0.7 milliLiter(s); VIS (VIS Published: 06-Aug-2021, VIS Presented: 14-Sep-2022);

## 2023-07-12 RX ORDER — ATOVAQUONE 750 MG/5ML
5 SUSPENSION ORAL
Qty: 80 | Refills: 0
Start: 2023-07-12 | End: 2023-07-19

## 2023-07-13 LAB
A PHAGOCYTOPH DNA BLD QL NAA+PROBE: NEGATIVE — SIGNIFICANT CHANGE UP
E CHAFFEENSIS DNA BLD QL NAA+PROBE: NEGATIVE — SIGNIFICANT CHANGE UP
E EWINGII DNA SPEC QL NAA+PROBE: NEGATIVE — SIGNIFICANT CHANGE UP
EHRLICHIA DNA SPEC QL NAA+PROBE: NEGATIVE — SIGNIFICANT CHANGE UP

## 2023-07-18 DIAGNOSIS — G51.0 BELL'S PALSY: ICD-10-CM

## 2023-07-18 DIAGNOSIS — Z86.73 PERSONAL HISTORY OF TRANSIENT ISCHEMIC ATTACK (TIA), AND CEREBRAL INFARCTION WITHOUT RESIDUAL DEFICITS: ICD-10-CM

## 2023-07-18 DIAGNOSIS — R31.9 HEMATURIA, UNSPECIFIED: ICD-10-CM

## 2023-07-18 DIAGNOSIS — K52.9 NONINFECTIVE GASTROENTERITIS AND COLITIS, UNSPECIFIED: ICD-10-CM

## 2023-07-18 DIAGNOSIS — M25.512 PAIN IN LEFT SHOULDER: ICD-10-CM

## 2023-07-18 DIAGNOSIS — I48.0 PAROXYSMAL ATRIAL FIBRILLATION: ICD-10-CM

## 2023-07-18 DIAGNOSIS — E43 UNSPECIFIED SEVERE PROTEIN-CALORIE MALNUTRITION: ICD-10-CM

## 2023-07-18 DIAGNOSIS — I44.0 ATRIOVENTRICULAR BLOCK, FIRST DEGREE: ICD-10-CM

## 2023-07-18 DIAGNOSIS — Z90.49 ACQUIRED ABSENCE OF OTHER SPECIFIED PARTS OF DIGESTIVE TRACT: ICD-10-CM

## 2023-07-18 DIAGNOSIS — I10 ESSENTIAL (PRIMARY) HYPERTENSION: ICD-10-CM

## 2023-07-18 DIAGNOSIS — I25.10 ATHEROSCLEROTIC HEART DISEASE OF NATIVE CORONARY ARTERY WITHOUT ANGINA PECTORIS: ICD-10-CM

## 2023-07-18 DIAGNOSIS — K50.90 CROHN'S DISEASE, UNSPECIFIED, WITHOUT COMPLICATIONS: ICD-10-CM

## 2023-07-18 DIAGNOSIS — I35.0 NONRHEUMATIC AORTIC (VALVE) STENOSIS: ICD-10-CM

## 2023-07-18 DIAGNOSIS — Z95.5 PRESENCE OF CORONARY ANGIOPLASTY IMPLANT AND GRAFT: ICD-10-CM

## 2023-07-18 DIAGNOSIS — A69.20 LYME DISEASE, UNSPECIFIED: ICD-10-CM

## 2023-07-18 DIAGNOSIS — K29.70 GASTRITIS, UNSPECIFIED, WITHOUT BLEEDING: ICD-10-CM

## 2023-07-18 DIAGNOSIS — B60.00 BABESIOSIS, UNSPECIFIED: ICD-10-CM

## 2023-07-18 DIAGNOSIS — M25.511 PAIN IN RIGHT SHOULDER: ICD-10-CM

## 2023-07-18 DIAGNOSIS — I44.7 LEFT BUNDLE-BRANCH BLOCK, UNSPECIFIED: ICD-10-CM

## 2023-08-03 ENCOUNTER — NON-APPOINTMENT (OUTPATIENT)
Age: 83
End: 2023-08-03

## 2023-08-18 ENCOUNTER — NON-APPOINTMENT (OUTPATIENT)
Age: 83
End: 2023-08-18

## 2023-08-18 ENCOUNTER — LABORATORY RESULT (OUTPATIENT)
Age: 83
End: 2023-08-18

## 2023-08-18 ENCOUNTER — APPOINTMENT (OUTPATIENT)
Dept: INFECTIOUS DISEASE | Facility: CLINIC | Age: 83
End: 2023-08-18
Payer: MEDICARE

## 2023-08-18 VITALS
HEART RATE: 80 BPM | SYSTOLIC BLOOD PRESSURE: 136 MMHG | RESPIRATION RATE: 15 BRPM | DIASTOLIC BLOOD PRESSURE: 78 MMHG | OXYGEN SATURATION: 100 % | TEMPERATURE: 97.7 F

## 2023-08-18 DIAGNOSIS — K50.90 CROHN'S DISEASE, UNSPECIFIED, W/OUT COMPLICATIONS: ICD-10-CM

## 2023-08-18 DIAGNOSIS — I10 ESSENTIAL (PRIMARY) HYPERTENSION: ICD-10-CM

## 2023-08-18 DIAGNOSIS — Z85.46 PERSONAL HISTORY OF MALIGNANT NEOPLASM OF PROSTATE: ICD-10-CM

## 2023-08-18 DIAGNOSIS — A69.20 LYME DISEASE, UNSPECIFIED: ICD-10-CM

## 2023-08-18 DIAGNOSIS — B60.00 BABESIOSIS, UNSPECIFIED: ICD-10-CM

## 2023-08-18 PROCEDURE — 99205 OFFICE O/P NEW HI 60 MIN: CPT

## 2023-08-18 RX ORDER — ATORVASTATIN CALCIUM 10 MG/1
10 TABLET, FILM COATED ORAL
Refills: 0 | Status: ACTIVE | COMMUNITY

## 2023-08-18 RX ORDER — OMEPRAZOLE MAGNESIUM 10 MG/1
GRANULE, DELAYED RELEASE ORAL
Refills: 0 | Status: ACTIVE | COMMUNITY

## 2023-08-18 RX ORDER — USTEKINUMAB 130 MG/26ML
SOLUTION INTRAVENOUS
Refills: 0 | Status: ACTIVE | COMMUNITY

## 2023-08-18 RX ORDER — APIXABAN 5 MG/1
TABLET, FILM COATED ORAL
Refills: 0 | Status: ACTIVE | COMMUNITY

## 2023-08-18 RX ORDER — LABETALOL HYDROCHLORIDE 200 MG/1
200 TABLET, FILM COATED ORAL
Refills: 0 | Status: ACTIVE | COMMUNITY

## 2023-08-18 NOTE — HISTORY OF PRESENT ILLNESS
[FreeTextEntry1] : 83 male with Acute Tick Borne Zoonosis this summer.  In early July had Left bell's Palsy, New AFib, and Acute babesiosis.  Pt was treated at [facility], cardioverted and D/C on Azithromycin, Doxycycline, and Mepron.  Pt recovered and is complaining of distl finger tingling R > L PMH:  CAD tents, HTN, Crohn's Disease, Nephrolithiasis

## 2023-08-18 NOTE — ASSESSMENT
[FreeTextEntry1] : 83 Male S/P successful treatment of complicated Lyme Disease with Left Bell's Palsy and babesiosis S/P New AF S/P Therapy with Azithromycin, Doxycycline, and Mepron No fever, sole C/O left fingertip numbness and tingling  REC:  1.  Check labs CBC, CMP, LDH, Tick panel            2.  No additional AB            3.  Phone F/U Tu  8/22 [Treatment Education] : treatment education [Medical Care Issues] : medical care issues

## 2023-08-18 NOTE — REVIEW OF SYSTEMS
[Fever] : no fever [Chills] : no chills [Body Aches] : no body aches [Difficulty Sleeping] : no difficulty sleeping [Feeling Sick] : not feeling sick [Feeling Tired] : not feeling tired [Normal Appetite] : appetite not normal  [Eye Pain] : no eye pain [Red Eyes] : eyes not red [Discharge From Eyes] : no purulent discharge from the eyes [Earache] : no earache [Loss Of Hearing] : no hearing loss [Nasal Discharge] : no nasal discharge [Sore Throat] : no sore throat [Hoarseness] : no hoarseness [Chest Pain] : no chest pain [Palpitations] : no palpitations [Leg Claudication] : no intermittent leg claudication [Lower Ext Edema] : no extremity edema [Shortness Of Breath] : no shortness of breath [Wheezing] : no wheezing [Cough] : no cough [SOB on Exertion] : no shortness of breath during exertion [Sputum] : not coughing up ~M sputum [Pleuritic Chest Pain] : no pleuritic chest pain [Abdominal Pain] : no abdominal pain [Vomiting] : no vomiting [Constipation] : no constipation [Diarrhea] : no diarrhea [Dysuria] : no dysuria [Joint Pain] : no joint pain [Joint Swelling] : no joint swelling [Joint Stiffness] : no joint stiffness [Limb Pain] : no limb pain [Limb Swelling] : no limb swelling [Negative] : Heme/Lymph [FreeTextEntry5] : New AF [FreeTextEntry7] : Crohn's Disease [de-identified] : madisyntoos [de-identified] : Resolved Left Bell's Palsy

## 2023-08-18 NOTE — PHYSICAL EXAM
[General Appearance - Alert] : alert [General Appearance - In No Acute Distress] : in no acute distress [General Appearance - Well Nourished] : well nourished [General Appearance - Well-Appearing] : healthy appearing [PERRL With Normal Accommodation] : pupils were equal in size, round, reactive to light [Sclera] : the sclera and conjunctiva were normal [Extraocular Movements] : extraocular movements were intact [Outer Ear] : the ears and nose were normal in appearance [Hearing Threshold Finger Rub Not [place]] : hearing was normal [Examination Of The Oral Cavity] : the lips and gums were normal [Oropharynx] : the oropharynx was normal with no thrush [Neck Appearance] : the appearance of the neck was normal [Neck Cervical Mass (___cm)] : no neck mass was observed [Jugular Venous Distention Increased] : there was no jugular-venous distention [Thyroid Diffuse Enlargement] : the thyroid was not enlarged [Respiration, Rhythm And Depth] : normal respiratory rhythm and effort [Exaggerated Use Of Accessory Muscles For Inspiration] : no accessory muscle use [Auscultation Breath Sounds / Voice Sounds] : lungs were clear to auscultation bilaterally [Heart Rate And Rhythm] : heart rate was normal and rhythm regular [Heart Sounds] : normal S1 and S2 [Heart Sounds Gallop] : no gallops [Murmurs] : no murmurs [Heart Sounds Pericardial Friction Rub] : no pericardial rub [Full Pulse] : the pedal pulses are present [Edema] : there was no peripheral edema [Bowel Sounds] : normal bowel sounds [Abdomen Soft] : soft [Abdomen Tenderness] : non-tender [Abdomen Mass (___ Cm)] : no abdominal mass palpated [Costovertebral Angle Tenderness] : no CVA tenderness [No Palpable Adenopathy] : no palpable adenopathy [Cervical Lymph Nodes Enlarged Posterior Bilaterally] : posterior cervical [Cervical Lymph Nodes Enlarged Anterior Bilaterally] : anterior cervical [Musculoskeletal - Swelling] : no joint swelling [Range of Motion to Joints] : range of motion to joints [Nail Clubbing] : no clubbing  or cyanosis of the fingernails [Motor Tone] : muscle strength and tone were normal [Skin Color & Pigmentation] : normal skin color and pigmentation [] : no rash [Skin Lesions] : no skin lesions [Cranial Nerves] : cranial nerves 2-12 were intact [Sensation] : the sensory exam was normal to light touch and pinprick [Motor Exam] : the motor exam was normal [No Focal Deficits] : no focal deficits [FreeTextEntry1] : resolved  left Bell's Palsy, tingling distal left fingertips [Oriented To Time, Place, And Person] : oriented to person, place, and time [Affect] : the affect was normal

## 2023-08-18 NOTE — REASON FOR VISIT
[Consultation] : a consultation visit [Spouse] : spouse [FreeTextEntry1] : New pt referred by [facility] for Babesia & Lyme Azithromycin, Atovaquone, and Doxycycline completed c/o numbness in fingers only

## 2023-08-20 LAB
ALBUMIN SERPL ELPH-MCNC: 4.4 G/DL
ALP BLD-CCNC: 65 U/L
ALT SERPL-CCNC: 30 U/L
ANION GAP SERPL CALC-SCNC: 18 MMOL/L
AST SERPL-CCNC: 25 U/L
B BURGDOR AB SER-IMP: POSITIVE
B BURGDOR IGG+IGM SER QL: 4.63 INDEX
BILIRUB SERPL-MCNC: 0.4 MG/DL
BUN SERPL-MCNC: 21 MG/DL
CALCIUM SERPL-MCNC: 9.6 MG/DL
CHLORIDE SERPL-SCNC: 99 MMOL/L
CO2 SERPL-SCNC: 18 MMOL/L
CREAT SERPL-MCNC: 0.88 MG/DL
EGFR: 85 ML/MIN/1.73M2
GLUCOSE SERPL-MCNC: 88 MG/DL
LDH SERPL-CCNC: 198 U/L
POTASSIUM SERPL-SCNC: 4.6 MMOL/L
PROT SERPL-MCNC: 8.1 G/DL
SODIUM SERPL-SCNC: 135 MMOL/L

## 2023-08-26 LAB
BABESIA ANTIBODIES, IGG: NORMAL
BABESIA ANTIBODIES, IGM: NORMAL
BABESIA RESULT COMMENT: NORMAL

## 2023-09-21 NOTE — PATIENT PROFILE ADULT - FUNCTIONAL ASSESSMENT - BASIC MOBILITY 2.
Diagnosis/Prognosis/MOLST Discussed/Hospice Referral Diagnosis/Prognosis 4 = No assist / stand by assistance

## 2023-11-17 NOTE — PHARMACOTHERAPY INTERVENTION NOTE - INTERVENTION TYPE MED REC
Med Rec - Admission Gentle Skin Care Counseling: I recommended use a gentle skin cleanser when washing the skin. I also recommended application of a moisturizer twice daily. Products with fragrances, preservatives and dyes should be avoided. Detail Level: Detailed

## 2023-11-30 NOTE — ASU PREOP CHECKLIST - BP NONINVASIVE DIASTOLIC (MM HG)
[facility].  Surgery  [address] [place] [id number]  Phone: [phone]  Fax: [phone]  Scheduled Appointment: 12/08/2023 09:00 AM  
80

## 2023-12-13 ENCOUNTER — EMERGENCY (EMERGENCY)
Facility: HOSPITAL | Age: 83
LOS: 0 days | Discharge: HOME | End: 2023-12-13
Attending: EMERGENCY MEDICINE
Payer: MEDICARE

## 2023-12-13 VITALS
OXYGEN SATURATION: 98 % | TEMPERATURE: 98 F | SYSTOLIC BLOOD PRESSURE: 157 MMHG | DIASTOLIC BLOOD PRESSURE: 68 MMHG | HEART RATE: 63 BPM | RESPIRATION RATE: 18 BRPM

## 2023-12-13 VITALS — HEIGHT: 67 IN | WEIGHT: 169.98 LBS

## 2023-12-13 DIAGNOSIS — Z90.49 ACQUIRED ABSENCE OF OTHER SPECIFIED PARTS OF DIGESTIVE TRACT: Chronic | ICD-10-CM

## 2023-12-13 DIAGNOSIS — E78.5 HYPERLIPIDEMIA, UNSPECIFIED: ICD-10-CM

## 2023-12-13 DIAGNOSIS — K50.90 CROHN'S DISEASE, UNSPECIFIED, WITHOUT COMPLICATIONS: ICD-10-CM

## 2023-12-13 DIAGNOSIS — I44.7 LEFT BUNDLE-BRANCH BLOCK, UNSPECIFIED: ICD-10-CM

## 2023-12-13 DIAGNOSIS — I35.0 NONRHEUMATIC AORTIC (VALVE) STENOSIS: ICD-10-CM

## 2023-12-13 DIAGNOSIS — Z86.2 PERSONAL HISTORY OF DISEASES OF THE BLOOD AND BLOOD-FORMING ORGANS AND CERTAIN DISORDERS INVOLVING THE IMMUNE MECHANISM: ICD-10-CM

## 2023-12-13 DIAGNOSIS — I10 ESSENTIAL (PRIMARY) HYPERTENSION: ICD-10-CM

## 2023-12-13 DIAGNOSIS — Z95.5 PRESENCE OF CORONARY ANGIOPLASTY IMPLANT AND GRAFT: ICD-10-CM

## 2023-12-13 DIAGNOSIS — Z95.1 PRESENCE OF AORTOCORONARY BYPASS GRAFT: ICD-10-CM

## 2023-12-13 DIAGNOSIS — Z91.041 RADIOGRAPHIC DYE ALLERGY STATUS: ICD-10-CM

## 2023-12-13 DIAGNOSIS — I48.0 PAROXYSMAL ATRIAL FIBRILLATION: ICD-10-CM

## 2023-12-13 DIAGNOSIS — Z79.01 LONG TERM (CURRENT) USE OF ANTICOAGULANTS: ICD-10-CM

## 2023-12-13 DIAGNOSIS — Z95.5 PRESENCE OF CORONARY ANGIOPLASTY IMPLANT AND GRAFT: Chronic | ICD-10-CM

## 2023-12-13 DIAGNOSIS — I25.10 ATHEROSCLEROTIC HEART DISEASE OF NATIVE CORONARY ARTERY WITHOUT ANGINA PECTORIS: ICD-10-CM

## 2023-12-13 LAB
ALBUMIN SERPL ELPH-MCNC: 3.5 G/DL — SIGNIFICANT CHANGE UP (ref 3.3–5)
ALBUMIN SERPL ELPH-MCNC: 3.5 G/DL — SIGNIFICANT CHANGE UP (ref 3.3–5)
ALP SERPL-CCNC: 71 U/L — SIGNIFICANT CHANGE UP (ref 40–120)
ALP SERPL-CCNC: 71 U/L — SIGNIFICANT CHANGE UP (ref 40–120)
ALT FLD-CCNC: 48 U/L — SIGNIFICANT CHANGE UP (ref 12–78)
ALT FLD-CCNC: 48 U/L — SIGNIFICANT CHANGE UP (ref 12–78)
ANION GAP SERPL CALC-SCNC: 3 MMOL/L — LOW (ref 5–17)
ANION GAP SERPL CALC-SCNC: 3 MMOL/L — LOW (ref 5–17)
AST SERPL-CCNC: 32 U/L — SIGNIFICANT CHANGE UP (ref 15–37)
AST SERPL-CCNC: 32 U/L — SIGNIFICANT CHANGE UP (ref 15–37)
BASOPHILS # BLD AUTO: 0.04 K/UL — SIGNIFICANT CHANGE UP (ref 0–0.2)
BASOPHILS # BLD AUTO: 0.04 K/UL — SIGNIFICANT CHANGE UP (ref 0–0.2)
BASOPHILS NFR BLD AUTO: 0.5 % — SIGNIFICANT CHANGE UP (ref 0–2)
BASOPHILS NFR BLD AUTO: 0.5 % — SIGNIFICANT CHANGE UP (ref 0–2)
BILIRUB SERPL-MCNC: 0.5 MG/DL — SIGNIFICANT CHANGE UP (ref 0.2–1.2)
BILIRUB SERPL-MCNC: 0.5 MG/DL — SIGNIFICANT CHANGE UP (ref 0.2–1.2)
BUN SERPL-MCNC: 21 MG/DL — SIGNIFICANT CHANGE UP (ref 7–23)
BUN SERPL-MCNC: 21 MG/DL — SIGNIFICANT CHANGE UP (ref 7–23)
CALCIUM SERPL-MCNC: 9.3 MG/DL — SIGNIFICANT CHANGE UP (ref 8.5–10.1)
CALCIUM SERPL-MCNC: 9.3 MG/DL — SIGNIFICANT CHANGE UP (ref 8.5–10.1)
CHLORIDE SERPL-SCNC: 109 MMOL/L — HIGH (ref 96–108)
CHLORIDE SERPL-SCNC: 109 MMOL/L — HIGH (ref 96–108)
CO2 SERPL-SCNC: 26 MMOL/L — SIGNIFICANT CHANGE UP (ref 22–31)
CO2 SERPL-SCNC: 26 MMOL/L — SIGNIFICANT CHANGE UP (ref 22–31)
CREAT SERPL-MCNC: 0.93 MG/DL — SIGNIFICANT CHANGE UP (ref 0.5–1.3)
CREAT SERPL-MCNC: 0.93 MG/DL — SIGNIFICANT CHANGE UP (ref 0.5–1.3)
EGFR: 81 ML/MIN/1.73M2 — SIGNIFICANT CHANGE UP
EGFR: 81 ML/MIN/1.73M2 — SIGNIFICANT CHANGE UP
EOSINOPHIL # BLD AUTO: 0.11 K/UL — SIGNIFICANT CHANGE UP (ref 0–0.5)
EOSINOPHIL # BLD AUTO: 0.11 K/UL — SIGNIFICANT CHANGE UP (ref 0–0.5)
EOSINOPHIL NFR BLD AUTO: 1.4 % — SIGNIFICANT CHANGE UP (ref 0–6)
EOSINOPHIL NFR BLD AUTO: 1.4 % — SIGNIFICANT CHANGE UP (ref 0–6)
GLUCOSE SERPL-MCNC: 110 MG/DL — HIGH (ref 70–99)
GLUCOSE SERPL-MCNC: 110 MG/DL — HIGH (ref 70–99)
HCT VFR BLD CALC: 36.7 % — LOW (ref 39–50)
HCT VFR BLD CALC: 36.7 % — LOW (ref 39–50)
HGB BLD-MCNC: 12.4 G/DL — LOW (ref 13–17)
HGB BLD-MCNC: 12.4 G/DL — LOW (ref 13–17)
IMM GRANULOCYTES NFR BLD AUTO: 0.3 % — SIGNIFICANT CHANGE UP (ref 0–0.9)
IMM GRANULOCYTES NFR BLD AUTO: 0.3 % — SIGNIFICANT CHANGE UP (ref 0–0.9)
LYMPHOCYTES # BLD AUTO: 1.99 K/UL — SIGNIFICANT CHANGE UP (ref 1–3.3)
LYMPHOCYTES # BLD AUTO: 1.99 K/UL — SIGNIFICANT CHANGE UP (ref 1–3.3)
LYMPHOCYTES # BLD AUTO: 25.3 % — SIGNIFICANT CHANGE UP (ref 13–44)
LYMPHOCYTES # BLD AUTO: 25.3 % — SIGNIFICANT CHANGE UP (ref 13–44)
MCHC RBC-ENTMCNC: 31.6 PG — SIGNIFICANT CHANGE UP (ref 27–34)
MCHC RBC-ENTMCNC: 31.6 PG — SIGNIFICANT CHANGE UP (ref 27–34)
MCHC RBC-ENTMCNC: 33.8 GM/DL — SIGNIFICANT CHANGE UP (ref 32–36)
MCHC RBC-ENTMCNC: 33.8 GM/DL — SIGNIFICANT CHANGE UP (ref 32–36)
MCV RBC AUTO: 93.4 FL — SIGNIFICANT CHANGE UP (ref 80–100)
MCV RBC AUTO: 93.4 FL — SIGNIFICANT CHANGE UP (ref 80–100)
MONOCYTES # BLD AUTO: 0.75 K/UL — SIGNIFICANT CHANGE UP (ref 0–0.9)
MONOCYTES # BLD AUTO: 0.75 K/UL — SIGNIFICANT CHANGE UP (ref 0–0.9)
MONOCYTES NFR BLD AUTO: 9.5 % — SIGNIFICANT CHANGE UP (ref 2–14)
MONOCYTES NFR BLD AUTO: 9.5 % — SIGNIFICANT CHANGE UP (ref 2–14)
NEUTROPHILS # BLD AUTO: 4.97 K/UL — SIGNIFICANT CHANGE UP (ref 1.8–7.4)
NEUTROPHILS # BLD AUTO: 4.97 K/UL — SIGNIFICANT CHANGE UP (ref 1.8–7.4)
NEUTROPHILS NFR BLD AUTO: 63 % — SIGNIFICANT CHANGE UP (ref 43–77)
NEUTROPHILS NFR BLD AUTO: 63 % — SIGNIFICANT CHANGE UP (ref 43–77)
PLATELET # BLD AUTO: 233 K/UL — SIGNIFICANT CHANGE UP (ref 150–400)
PLATELET # BLD AUTO: 233 K/UL — SIGNIFICANT CHANGE UP (ref 150–400)
POTASSIUM SERPL-MCNC: 4 MMOL/L — SIGNIFICANT CHANGE UP (ref 3.5–5.3)
POTASSIUM SERPL-MCNC: 4 MMOL/L — SIGNIFICANT CHANGE UP (ref 3.5–5.3)
POTASSIUM SERPL-SCNC: 4 MMOL/L — SIGNIFICANT CHANGE UP (ref 3.5–5.3)
POTASSIUM SERPL-SCNC: 4 MMOL/L — SIGNIFICANT CHANGE UP (ref 3.5–5.3)
PROT SERPL-MCNC: 8.9 GM/DL — HIGH (ref 6–8.3)
PROT SERPL-MCNC: 8.9 GM/DL — HIGH (ref 6–8.3)
RBC # BLD: 3.93 M/UL — LOW (ref 4.2–5.8)
RBC # BLD: 3.93 M/UL — LOW (ref 4.2–5.8)
RBC # FLD: 13.2 % — SIGNIFICANT CHANGE UP (ref 10.3–14.5)
RBC # FLD: 13.2 % — SIGNIFICANT CHANGE UP (ref 10.3–14.5)
SODIUM SERPL-SCNC: 138 MMOL/L — SIGNIFICANT CHANGE UP (ref 135–145)
SODIUM SERPL-SCNC: 138 MMOL/L — SIGNIFICANT CHANGE UP (ref 135–145)
WBC # BLD: 7.88 K/UL — SIGNIFICANT CHANGE UP (ref 3.8–10.5)
WBC # BLD: 7.88 K/UL — SIGNIFICANT CHANGE UP (ref 3.8–10.5)
WBC # FLD AUTO: 7.88 K/UL — SIGNIFICANT CHANGE UP (ref 3.8–10.5)
WBC # FLD AUTO: 7.88 K/UL — SIGNIFICANT CHANGE UP (ref 3.8–10.5)

## 2023-12-13 PROCEDURE — 80053 COMPREHEN METABOLIC PANEL: CPT

## 2023-12-13 PROCEDURE — 85025 COMPLETE CBC W/AUTO DIFF WBC: CPT

## 2023-12-13 PROCEDURE — 93005 ELECTROCARDIOGRAM TRACING: CPT

## 2023-12-13 PROCEDURE — 93010 ELECTROCARDIOGRAM REPORT: CPT

## 2023-12-13 PROCEDURE — 99283 EMERGENCY DEPT VISIT LOW MDM: CPT

## 2023-12-13 PROCEDURE — 99284 EMERGENCY DEPT VISIT MOD MDM: CPT

## 2023-12-13 PROCEDURE — 36415 COLL VENOUS BLD VENIPUNCTURE: CPT

## 2023-12-13 NOTE — ED PROVIDER NOTE - PHYSICAL EXAMINATION
GEN - NAD; well appearing; A+O x3  HEAD - NC/AT    EYES - EOMI, no conjunctival pallor, no scleral icterus  ENT -   mucous membranes  moist , no discharge  PULM - CTA b/l,  symmetric breath sounds  COR -  RRR, S1 S2, systolic murmur  ABD - ND, NT, soft, no guarding, no rebound, no masses    EXTREMS -no edema, no deformity, warm and well perfused   SKIN - no rash or bruising      NEUROLOGIC - alert, sensation nl, motor 5/5 RUE/LUE/RLE/LLE

## 2023-12-13 NOTE — ED PROVIDER NOTE - NSICDXPASTMEDICALHX_GEN_ALL_CORE_FT
PAST MEDICAL HISTORY:  Coronary artery disease involving native heart without angina pectoris, unspecified vessel or lesion type     Diverticulitis     Essential hypertension     Gastritis       Anemia     Aortic valve stenosis     Crohn's disease     HLD (hyperlipidemia)     LBBB (left bundle branch block)     Lyme disease     PAF (paroxysmal atrial fibrillation)

## 2023-12-13 NOTE — ED ADULT NURSE NOTE - OBJECTIVE STATEMENT
Pt is AOx4 from home c/o HTN up to 200s/100s at home. Pt took night time dose of 5mg amlodipine and 400mg losartan 1830 and waited 1 hr and re-tok bp and was unchanged. Pt denies headache, nvd, fevers, chills, chest pain or sob. PMHx CVA 2 years ago, Crohns, HTN, 2 stents on Eliquis, prostate ca 15 years ago, and colon resection.

## 2023-12-13 NOTE — ED PROVIDER NOTE - OBJECTIVE STATEMENT
82 y/o male with PMHx of PAF on Eliquis, CAD s/p CABG s/p stents, LBBB, HTN on labetalol 400mg BID and amlodipine PRN, HLD, Crohn's, Lyme disease, aortic valve stenosis, and anemia presents to the ED c/o elevated blood pressure. Pt reports he checks his blood pressure a few times a day, today when he checked his blood pressure was elevated to 190/100. Pt took labetalol as directed today, last dose at 19:30. Denies headache, chest pain, shortness of breath.

## 2023-12-13 NOTE — ED PROVIDER NOTE - NSFOLLOWUPINSTRUCTIONS_ED_ALL_ED_FT
PLease follow up with your primary. Return to ED if worsening headache , chest pain, other concerning symptoms.

## 2023-12-13 NOTE — ED PROVIDER NOTE - CLINICAL SUMMARY MEDICAL DECISION MAKING FREE TEXT BOX
Patient with asymptomatic hypertension patient also with questionable med noncompliance recommend taking amlodipine daily in addition to labetalol may double dose of amlodipine from 5 mg to 10 mg if blood pressure still elevated, recommend follow-up with PCP in 1 to 2 weeks.  Strict return precautions given.

## 2023-12-13 NOTE — ED ADULT NURSE NOTE - NSFALLHARMRISKINTERV_ED_ALL_ED
Communicate risk of Fall with Harm to all staff, patient, and family/Provide visual cue: red socks, yellow wristband, yellow gown, etc/Reinforce activity limits and safety measures with patient and family/Bed in lowest position, wheels locked, appropriate side rails in place/Call bell, personal items and telephone in reach/Instruct patient to call for assistance before getting out of bed/chair/stretcher/Non-slip footwear applied when patient is off stretcher/[place] to call system/Physically safe environment - no spills, clutter or unnecessary equipment/Purposeful Proactive Rounding/Room/bathroom lighting operational, light cord in reach Communicate risk of Fall with Harm to all staff, patient, and family/Provide visual cue: red socks, yellow wristband, yellow gown, etc/Reinforce activity limits and safety measures with patient and family/Bed in lowest position, wheels locked, appropriate side rails in place/Call bell, personal items and telephone in reach/Instruct patient to call for assistance before getting out of bed/chair/stretcher/Non-slip footwear applied when patient is off stretcher/[place] to call system/Physically safe environment - no spills, clutter or unnecessary equipment/Purposeful Proactive Rounding/Room/bathroom lighting operational, light cord in reach

## 2023-12-13 NOTE — ED PROVIDER NOTE - PATIENT PORTAL LINK FT
You can access the FollowMyHealth Patient Portal offered by [facility] by registering at the following website: [email or web address]. By joining [employer]’s FollowMyHealth portal, you will also be able to view your health information using other applications (apps) compatible with our system. You can access the FollowMyHealth Patient Portal offered by [facility] by registering at the following website: [email or web address]. By joining [employer]’s FollowMyHealth portal, you will also be able to view your health information using other applications (apps) compatible with our system.

## 2023-12-13 NOTE — ED ADULT TRIAGE NOTE - CHIEF COMPLAINT QUOTE
Pt to ED c/o HTN and mild headache x1 day. BP at home 190/99. PMH Hypertension, CAD with stents, CVA, took labetalol 400 mg po 1.5h prior to arrival without relief. BEFAST negative.

## 2024-01-31 NOTE — ED STATDOCS - SKIN, MLM
Show Aperture Variable?: Yes Spray Paint Text: The liquid nitrogen was applied to the skin utilizing a spray paint frosting technique. Detail Level: Detailed Render Note In Bullet Format When Appropriate: No Medical Necessity Information: It is in your best interest to select a reason for this procedure from the list below. All of these items fulfill various CMS LCD requirements except the new and changing color options. Number Of Freeze-Thaw Cycles: 1 freeze-thaw cycle Post-Care Instructions: I reviewed with the patient in detail post-care instructions. Patient is to wear sunprotection, and avoid picking at any of the treated lesions. Pt may apply Vaseline to crusted or scabbing areas. Consent: The patient's consent was obtained including but not limited to risks of crusting, scabbing, blistering, scarring, darker or lighter pigmentary change, recurrence, incomplete removal and infection. Medical Necessity Clause: This procedure was medically necessary because the lesions that were treated were: Duration Of Freeze Thaw-Cycle (Seconds): 3 skin normal color for race, warm, dry and intact. no tactile warmth, no rash.

## 2024-02-14 NOTE — ED ADULT NURSE NOTE - IS THE PATIENT ABLE TO BE SCREENED?
Subjective     Alberto Gerard is a 33 y.o. female who presents for the following: Acne.     New patient in virtually for acne has tried tretinoin 0.1% cream, BPO 5% Wash and clindamycin 1% solution.     Review of Systems:  No other skin or systemic complaints other than what is documented elsewhere in the note.    The following portions of the chart were reviewed this encounter and updated as appropriate:       Skin Cancer History  No skin cancer on file.    Specialty Problems    None    Past Medical History:  Alberto Gerard  has a past medical history of COVID-19 (07/22/2022), High risk heterosexual behavior (09/18/2015), Other specified cough (08/15/2022), Other specified disorders of nose and nasal sinuses (12/04/2020), Personal history of other mental and behavioral disorders (11/06/2019), Personal history of other specified conditions (05/24/2022), and Personal history of pneumonia (recurrent) (08/05/2022).    Past Surgical History:  Alberto Gerard  has a past surgical history that includes Other surgical history (09/29/2015).    Family History:  Patient family history includes Diabetes in her mother; cardiac disorder in an other family member.    Social History:  Alberto Gerard  reports that she has never smoked. She has never used smokeless tobacco. She reports that she does not currently use alcohol after a past usage of about 1.0 standard drink of alcohol per week. She reports that she does not use drugs.    Allergies:  Morphine and Codeine    Current Medications / CAM's:    Current Outpatient Medications:     acetaminophen (Tylenol) 500 mg tablet, Take 1 tablet (500 mg) by mouth every 6 hours if needed., Disp: , Rfl:     albuterol 90 mcg/actuation inhaler, Inhale 2 puffs every 4 hours if needed (asthma)., Disp: , Rfl:     benzoyl peroxide 5 % external wash, WASH FACE DAILY, Disp: , Rfl:     budesonide-formoteroL (Symbicort) 160-4.5 mcg/actuation inhaler, Inhale 1 puff 2 times a day., Disp:  , Rfl:     clindamycin (Cleocin T) 1 % external solution, SONNY TO FACE ONCE D IN THE MORNING, Disp: , Rfl:     clobetasol (Temovate) 0.05 % ointment, APPLY TO SCALP DAILY MONDAY THROUGH FRIDAY, Disp: , Rfl:     dulaglutide (Trulicity) 1.5 mg/0.5 mL pen injector, Inject 1.5 mg under the skin 1 (one) time per week., Disp: , Rfl:     fluticasone (Flonase) 50 mcg/actuation nasal spray, Administer 1 spray into each nostril once daily. shake liquid, Disp: 16 g, Rfl: 1    fluticasone propion-salmeteroL (Advair Diskus) 250-50 mcg/dose diskus inhaler, Inhale 1 puff 2 times a day., Disp: , Rfl:     glucose 4 gram chewable tablet, CHEW AND SWALLOW 4 TABLETS BY MOUTH ONCE AS NEEDED FOR DIZZINESS, Disp: , Rfl:     hydrocortisone 2.5 % ointment, APPLY TO FACE BID MONDAY THROUGH FRIDAY PRN, Disp: , Rfl:     ipratropium (Atrovent HFA) 17 mcg/actuation inhaler, Inhale 2 puffs 4 times a day., Disp: , Rfl:     ipratropium-albuteroL (Duo-Neb) 0.5-2.5 mg/3 mL nebulizer solution, Take 3 mL by nebulization 3 times a day., Disp: , Rfl:     ketoconazole (NIZOral) 2 % shampoo, APPLY TO SCALP ONCE EVERY OTHER WEEK - LEAVE ON FOR 10 TO 15 MINUTES THEN RINSE OUT, Disp: , Rfl:     meclizine (Antivert) 12.5 mg tablet, Take 1 tablet (12.5 mg) by mouth every 6 hours during the day., Disp: , Rfl:     metoprolol tartrate (Lopressor) 25 mg tablet, Take 1-2 tablets (25-50 mg) by mouth every 6 hours if needed (for break through palpitations)., Disp: , Rfl:     Poly-Iron 150 Forte 150-25-1 mg-mcg-mg capsule, TAKE 1 CAPSULE BY MOUTH DAILY FOR ANEMIA, Disp: , Rfl:     rizatriptan MLT (Maxalt-MLT) 10 mg disintegrating tablet, Take 1 tablet (10 mg) by mouth if needed (headache). At onset. May repeat every 2 hours as needed. Maximum 3 tablets in 24 hours, Disp: , Rfl:     tranexamic acid (Lysteda) 650 mg tablet tablet, Take 2 tablets (1,300 mg) by mouth 3 times a day., Disp: , Rfl:     tretinoin (Retin-A) 0.1 % cream, Apply 1 Application topically once daily  at bedtime., Disp: , Rfl:      Objective   Well appearing patient in no apparent distress; mood and affect are within normal limits.    Assessment/Plan   1. Acne vulgaris  Head - Anterior (Face)  Scattered comedones and cystic lesions to lower jawline. Patient has used topical retinoids, topical antibiotics, oral antibiotics, and washes without improvement. Patient registered to be in the [employer] system in May of 2023 with outside dermatology, but did not start due to scheduling conflicts.  She would like to consider starting now.    -Given recalcitrant nature of acne, the patient wishes to initiate/continues on course of Rx Accutane/Isotretinoin.   -Previous labs reviewed  -Will check CBC, CMP, Lipid panel and B-HCG prior to monthly prescription dispensing  -Potential side effects reviewed with the patient today including teratogenicity and birth defects (and the need for 2 forms of contraception), lipid abnormalities (hyperTGL which may result in pancreatitis), liver or kidney failure, risk of depression or suicide, risk of potential inflammatory bowel disease  -Common and expected side effects include xerosis (dryness) of the lips and skin, nose bleeds due to dryness of the nasal mucosa, and redness/rash of the skin (retinoid dermatitis). -Recommend liberal emollients (Vaseline to the lips, Aveeno Eczema Therapy to the body) to be used daily.  -Discussed with the patient that they should not get pregnant while on therapy, do not donate blood, and do not give their medication to anyone. The patient verbalizes understanding and agreement.  -For patients of child bearing potential, discussed the need for monthly pregnancy tests within a window determined by the [employer] program to continue medication. Monthly questions must be answered by the patient in the [employer] system prior to being able to  their prescription.  -Signed [employer] consent form is on file    -If labs permissible, plan to continue rx  Accutane/Isotretinoin as tolerated until a cumulative dose of 150-220 mg/kg is obtained.   -Patient is to take the medication with a fatty food/meal to aid in absorption of the medication (if not taking branded Absorica)    -Pt weight (kg): 85kg  Method of Birth Control: Paragard and condoms     Return to clinic in 30 days      Related Procedures  Comprehensive metabolic panel  Lipid panel  HUMAN CHORIONIC GONADOTROPIN, SERUM QUANTITATIVE            Yes

## 2024-03-01 NOTE — PATIENT PROFILE ADULT - LEGAL HELP
sSubjective:      Patient ID: Dejan Sweet is a 5 y.o. female.    Chief Complaint: Foot Pain    HPI  Follow up for skew feet. Saw Mateusz and treated for Skew feet previously with bracing. Main issue now is hallux valgus with big toe under-riding other toes.       Review of patient's allergies indicates:  No Known Allergies    No past medical history on file.  No past surgical history on file.  No family history on file.    No current outpatient medications on file prior to visit.     No current facility-administered medications on file prior to visit.       Social History     Social History Narrative    Not on file       ROS      Objective:      Pediatric Orthopedic Exam     Pediatric Orthopedic Exam                 Alert  All ext pink and warm  Sclera normal  Dentition normal  Bilat hips not tender normal rom 90 internal and 30 external rotation.   Left knee not tender normal rom  Right knee Non tender normal rom  Gait normal for age  Right foot and ankle nontender full rom juvenile hallux valugs right worse than left with both big toe beneath lessor toes.  Left foot and ankle nontender full rom  Hindfeet look good.   Motor and DTR lower ext intact    Xrays  Xrays bilateral foot weightbearing performed today and by my read, feet and ankles normal besides hallux valgus bilat severe.      Assessment:       1. Valgus deformity of both great toes             Plan:     Juvenile hallux valgus  Fem torision  May discontinue brace  Will need surgery in the future.  Discussed osteotomies vs growth modulation.  Follow up 1 year with new ap and lateral standing foot xrays.      No follow-ups on file.    I, Alaina Fraga, acted as a scribe for Lewis Ram MD for the duration of this office visit.    Patient Exam and history performed by me but partially scribed by Alaina Fraga [facility].       no

## 2024-03-05 NOTE — ED PROVIDER NOTE - TOBACCO USE
4mg Zofran IVP, 5mg Metoprolol IVP, place NGT, Xray of abdomen. NP Reyes Monegro,E made aware. Pt also c/o pain to left leg. Pain medication recommended. 14-Sep-2020 12:38 Notify provider Notify provider. NP Reyes Monegro,E made aware Notify provider. Unknown if ever smoked

## 2024-06-05 NOTE — PHYSICAL THERAPY INITIAL EVALUATION ADULT - PERTINENT HX OF CURRENT PROBLEM, REHAB EVAL
[FreeTextEntry1] : Guru Skelton 60 year old man with history of htn is here in the sleep center to address excessive snoring.  Patient is not sleepy with Empire sleepiness score of 7.  Patient has very loud snoring, does not have any witnessed apneas.  Patient's bedtime is around 8 PM wakes up in the morning around 3 AM.   He feels rested when he wakes up.  Patient drinks 3 cups of coffee during the daytime. Patient does not have any headaches or nocturia. He is not sleepy while driving. STOPBANG score - 4 neck size - 18 and half inches He has tried several mouth gaurds without success, aveo tsd - without any improvement. He has bought a chin strap as well.  symptoms on the cpap - Patient is not sleepy with Empire sleepiness score of 4.  Patient does not snore with cpap.  Patient's bedtime is around 8 PM wakes up in the morning around 3 AM.   He feels rested when he wakes up.  Patient drinks 3 cups of coffee during the daytime. Patient does not have any headaches or nocturia. He is not sleepy while driving.  on resmed machine usage 7 hrs average pressure 10 cm (5-20 cm) ahi 5.7 uses nasal pillows dme apria Pt admitted to  secondary to AMS, dizziness, HA, and unsteadiness on feet. CT head: neg. MRI of head, MRA of head and neck ordered.

## 2024-07-10 NOTE — ED ADULT NURSE NOTE - DOES PATIENT HAVE ADVANCE DIRECTIVE
07/10/2024  Job Hancock is a 80 y.o., female.    Surgical History    Procedure Laterality Date Comment Source   BACK SURGERY       BUNIONECTOMY Right      CARPAL TUNNEL RELEASE Right 6/28/2023 Procedure: RELEASE, CARPAL TUNNEL;  Surgeon: Bayron Cordero MD;  Location: [facility];  Service: Orthopedics;  Laterality: Right;    CORONARY ANGIOPLASTY WITH STENT PLACEMENT       HYSTERECTOMY       INTERPOSITION ARTHROPLASTY OF CARPOMETACARPAL JOINTS Left 6/23/2022 Procedure: INTERPOSITION ARTHROPLASTY, CMC JOINT / Ex. of trapezium with FCR inter postion graft & tight rope;  Surgeon: Bayron Cordero MD;  Location: [facility];  Service: Orthopedics;  Laterality: Left;      Medical History    Diagnosis Date Comment Source   Arthritis      GERD (gastroesophageal reflux disease)      Heart disease      High cholesterol      HTN (hypertension)      Primary osteoarthritis of first carpometacarpal joint of left hand 6/23/2022       Pre-op Assessment    I have reviewed the Patient Summary Reports.     I have reviewed the Nursing Notes. I have reviewed the NPO Status.   I have reviewed the Medications.     Review of Systems  Anesthesia Hx:  No problems with previous Anesthesia             Denies Family Hx of Anesthesia complications.    Denies Personal Hx of Anesthesia complications.                    Hematology/Oncology:    Oncology Normal    -- Anemia:                                  EENT/Dental:  EENT/Dental Normal           Cardiovascular:  Exercise tolerance: poor   Hypertension   CAD           hyperlipidemia                             Pulmonary:  Pulmonary Normal                       Renal/:  Renal/ Normal                 Hepatic/GI:     GERD             Musculoskeletal:  Arthritis               Neurological:    Neuromuscular Disease,                                   Endocrine:  Endocrine Normal             Dermatological:  Skin Normal    Psych:  Psychiatric Normal                    Physical Exam  General: Well nourished, Cooperative, Alert and Oriented    Airway:  Mallampati: II / II  Mouth Opening: Normal  TM Distance: Normal  Tongue: Normal  Neck ROM: Normal ROM    Dental:  Intact        Anesthesia Plan  Type of Anesthesia, risks & benefits discussed:    Anesthesia Type: MAC  Intra-op Monitoring Plan: Standard ASA Monitors  Post Op Pain Control Plan: multimodal analgesia  Induction:  IV  Airway Plan: Direct  Informed Consent: Informed consent signed with the Patient and all parties understand the risks and agree with anesthesia plan.  All questions answered. Patient consented to blood products? Yes  ASA Score: 3  Day of Surgery Review of History & Physical: H&P Update referred to the surgeon/provider.I have interviewed and examined the patient. I have reviewed the patient's H&P dated: There are no significant changes.     Ready For Surgery From Anesthesia Perspective.     .       No

## 2024-07-17 NOTE — DISCHARGE NOTE PROVIDER - TIME SPENT: (MINUTES SPENT ON THE DISCHARGE SERVICE)
A Healthy Heart: Care Instructions  Overview     Coronary artery disease, also called heart disease, occurs when a substance called plaque builds up in the vessels that supply oxygen-rich blood to your heart muscle. This can narrow the blood vessels and reduce blood flow. A heart attack happens when blood flow is completely blocked. A high-fat diet, smoking, and other factors increase the risk of heart disease.  Your doctor has found that you have a chance of having heart disease. A heart-healthy lifestyle can help keep your heart healthy and prevent heart disease. This lifestyle includes eating healthy, being active, staying at a weight that's healthy for you, and not smoking or using tobacco. It also includes taking medicines as directed, managing other health conditions, and trying to get a healthy amount of sleep.  Follow-up care is a key part of your treatment and safety. Be sure to make and go to all appointments, and call your doctor if you are having problems. It's also a good idea to know your test results and keep a list of the medicines you take.  How can you care for yourself at home?  Diet    Use less salt when you cook and eat. This helps lower your blood pressure. Taste food before salting. Add only a little salt when you think you need it. With time, your taste buds will adjust to less salt.     Eat fewer snack items, fast foods, canned soups, and other high-salt, high-fat, processed foods.     Read food labels and try to avoid saturated and trans fats. They increase your risk of heart disease by raising cholesterol levels.     Limit the amount of solid fat--butter, margarine, and shortening--you eat. Use olive, peanut, or canola oil when you cook. Bake, broil, and steam foods instead of frying them.     Eat a variety of fruit and vegetables every day. Dark green, deep orange, red, or yellow fruits and vegetables are especially good for you. Examples include spinach, carrots, peaches, and 
38

## 2024-07-29 NOTE — ED STATDOCS - DISPOSITION TYPE
[Individual Psychotherapy for 38-52 minutes] : Individual Psychotherapy for 38 - 52 minutes [Licensed Clinician] : Licensed Clinician [Duration of Psychotherapy Visit (minutes spent in synchronous communication): ____] : Duration of Psychotherapy Visit (minutes spent in synchronous communication): [unfilled] DISCHARGE

## 2024-12-22 ENCOUNTER — EMERGENCY (EMERGENCY)
Facility: HOSPITAL | Age: 84
LOS: 0 days | Discharge: LEFT BEFORE DISCHARGE | End: 2024-12-23
Attending: STUDENT IN AN ORGANIZED HEALTH CARE EDUCATION/TRAINING PROGRAM
Payer: MEDICARE

## 2024-12-22 VITALS — HEIGHT: 66 IN | WEIGHT: 169.98 LBS

## 2024-12-22 DIAGNOSIS — I25.10 ATHEROSCLEROTIC HEART DISEASE OF NATIVE CORONARY ARTERY WITHOUT ANGINA PECTORIS: ICD-10-CM

## 2024-12-22 DIAGNOSIS — E78.5 HYPERLIPIDEMIA, UNSPECIFIED: ICD-10-CM

## 2024-12-22 DIAGNOSIS — R11.10 VOMITING, UNSPECIFIED: ICD-10-CM

## 2024-12-22 DIAGNOSIS — Z91.041 RADIOGRAPHIC DYE ALLERGY STATUS: ICD-10-CM

## 2024-12-22 DIAGNOSIS — I10 ESSENTIAL (PRIMARY) HYPERTENSION: ICD-10-CM

## 2024-12-22 DIAGNOSIS — K50.90 CROHN'S DISEASE, UNSPECIFIED, WITHOUT COMPLICATIONS: ICD-10-CM

## 2024-12-22 DIAGNOSIS — Z90.49 ACQUIRED ABSENCE OF OTHER SPECIFIED PARTS OF DIGESTIVE TRACT: Chronic | ICD-10-CM

## 2024-12-22 DIAGNOSIS — Z91.040 LATEX ALLERGY STATUS: ICD-10-CM

## 2024-12-22 DIAGNOSIS — K59.00 CONSTIPATION, UNSPECIFIED: ICD-10-CM

## 2024-12-22 DIAGNOSIS — Z53.29 PROCEDURE AND TREATMENT NOT CARRIED OUT BECAUSE OF PATIENT'S DECISION FOR OTHER REASONS: ICD-10-CM

## 2024-12-22 DIAGNOSIS — Z95.5 PRESENCE OF CORONARY ANGIOPLASTY IMPLANT AND GRAFT: Chronic | ICD-10-CM

## 2024-12-22 DIAGNOSIS — I48.0 PAROXYSMAL ATRIAL FIBRILLATION: ICD-10-CM

## 2024-12-22 DIAGNOSIS — I35.0 NONRHEUMATIC AORTIC (VALVE) STENOSIS: ICD-10-CM

## 2024-12-22 DIAGNOSIS — K57.92 DIVERTICULITIS OF INTESTINE, PART UNSPECIFIED, WITHOUT PERFORATION OR ABSCESS WITHOUT BLEEDING: ICD-10-CM

## 2024-12-22 DIAGNOSIS — K56.609 UNSPECIFIED INTESTINAL OBSTRUCTION, UNSPECIFIED AS TO PARTIAL VERSUS COMPLETE OBSTRUCTION: ICD-10-CM

## 2024-12-22 LAB
ALBUMIN SERPL ELPH-MCNC: 3.4 G/DL — SIGNIFICANT CHANGE UP (ref 3.3–5)
ALP SERPL-CCNC: 79 U/L — SIGNIFICANT CHANGE UP (ref 40–120)
ALT FLD-CCNC: 35 U/L — SIGNIFICANT CHANGE UP (ref 12–78)
ANION GAP SERPL CALC-SCNC: 5 MMOL/L — SIGNIFICANT CHANGE UP (ref 5–17)
AST SERPL-CCNC: 27 U/L — SIGNIFICANT CHANGE UP (ref 15–37)
BASOPHILS # BLD AUTO: 0.05 K/UL — SIGNIFICANT CHANGE UP (ref 0–0.2)
BASOPHILS NFR BLD AUTO: 0.3 % — SIGNIFICANT CHANGE UP (ref 0–2)
BILIRUB SERPL-MCNC: 0.8 MG/DL — SIGNIFICANT CHANGE UP (ref 0.2–1.2)
BUN SERPL-MCNC: 18 MG/DL — SIGNIFICANT CHANGE UP (ref 7–23)
CALCIUM SERPL-MCNC: 9.9 MG/DL — SIGNIFICANT CHANGE UP (ref 8.5–10.1)
CHLORIDE SERPL-SCNC: 103 MMOL/L — SIGNIFICANT CHANGE UP (ref 96–108)
CO2 SERPL-SCNC: 27 MMOL/L — SIGNIFICANT CHANGE UP (ref 22–31)
CREAT SERPL-MCNC: 0.93 MG/DL — SIGNIFICANT CHANGE UP (ref 0.5–1.3)
EGFR: 81 ML/MIN/1.73M2 — SIGNIFICANT CHANGE UP
EOSINOPHIL # BLD AUTO: 0.05 K/UL — SIGNIFICANT CHANGE UP (ref 0–0.5)
EOSINOPHIL NFR BLD AUTO: 0.3 % — SIGNIFICANT CHANGE UP (ref 0–6)
GLUCOSE SERPL-MCNC: 166 MG/DL — HIGH (ref 70–99)
HCT VFR BLD CALC: 37.7 % — LOW (ref 39–50)
HGB BLD-MCNC: 12.5 G/DL — LOW (ref 13–17)
IMM GRANULOCYTES NFR BLD AUTO: 0.5 % — SIGNIFICANT CHANGE UP (ref 0–0.9)
LIDOCAIN IGE QN: 27 U/L — SIGNIFICANT CHANGE UP (ref 13–75)
LYMPHOCYTES # BLD AUTO: 1.51 K/UL — SIGNIFICANT CHANGE UP (ref 1–3.3)
LYMPHOCYTES # BLD AUTO: 8.7 % — LOW (ref 13–44)
MCHC RBC-ENTMCNC: 32.5 PG — SIGNIFICANT CHANGE UP (ref 27–34)
MCHC RBC-ENTMCNC: 33.2 G/DL — SIGNIFICANT CHANGE UP (ref 32–36)
MCV RBC AUTO: 97.9 FL — SIGNIFICANT CHANGE UP (ref 80–100)
MONOCYTES # BLD AUTO: 1.25 K/UL — HIGH (ref 0–0.9)
MONOCYTES NFR BLD AUTO: 7.2 % — SIGNIFICANT CHANGE UP (ref 2–14)
NEUTROPHILS # BLD AUTO: 14.31 K/UL — HIGH (ref 1.8–7.4)
NEUTROPHILS NFR BLD AUTO: 83 % — HIGH (ref 43–77)
PLATELET # BLD AUTO: 324 K/UL — SIGNIFICANT CHANGE UP (ref 150–400)
POTASSIUM SERPL-MCNC: 3.5 MMOL/L — SIGNIFICANT CHANGE UP (ref 3.5–5.3)
POTASSIUM SERPL-SCNC: 3.5 MMOL/L — SIGNIFICANT CHANGE UP (ref 3.5–5.3)
PROT SERPL-MCNC: 9 GM/DL — HIGH (ref 6–8.3)
RBC # BLD: 3.85 M/UL — LOW (ref 4.2–5.8)
RBC # FLD: 13.7 % — SIGNIFICANT CHANGE UP (ref 10.3–14.5)
SODIUM SERPL-SCNC: 135 MMOL/L — SIGNIFICANT CHANGE UP (ref 135–145)
WBC # BLD: 17.26 K/UL — HIGH (ref 3.8–10.5)
WBC # FLD AUTO: 17.26 K/UL — HIGH (ref 3.8–10.5)

## 2024-12-22 PROCEDURE — 99285 EMERGENCY DEPT VISIT HI MDM: CPT | Mod: FS,GC

## 2024-12-22 PROCEDURE — 96374 THER/PROPH/DIAG INJ IV PUSH: CPT | Mod: XU

## 2024-12-22 PROCEDURE — 80053 COMPREHEN METABOLIC PANEL: CPT

## 2024-12-22 PROCEDURE — 85025 COMPLETE CBC W/AUTO DIFF WBC: CPT

## 2024-12-22 PROCEDURE — 99284 EMERGENCY DEPT VISIT MOD MDM: CPT | Mod: 25

## 2024-12-22 PROCEDURE — 74177 CT ABD & PELVIS W/CONTRAST: CPT | Mod: MC

## 2024-12-22 PROCEDURE — 36415 COLL VENOUS BLD VENIPUNCTURE: CPT

## 2024-12-22 PROCEDURE — 83690 ASSAY OF LIPASE: CPT

## 2024-12-22 PROCEDURE — 96376 TX/PRO/DX INJ SAME DRUG ADON: CPT

## 2024-12-22 PROCEDURE — 96375 TX/PRO/DX INJ NEW DRUG ADDON: CPT

## 2024-12-22 PROCEDURE — 74177 CT ABD & PELVIS W/CONTRAST: CPT | Mod: 26,MC

## 2024-12-22 RX ORDER — SODIUM CHLORIDE 9 MG/ML
1000 INJECTION, SOLUTION INTRAMUSCULAR; INTRAVENOUS; SUBCUTANEOUS ONCE
Refills: 0 | Status: COMPLETED | OUTPATIENT
Start: 2024-12-22 | End: 2024-12-22

## 2024-12-22 RX ORDER — ONDANSETRON HYDROCHLORIDE 4 MG/1
4 TABLET, FILM COATED ORAL ONCE
Refills: 0 | Status: COMPLETED | OUTPATIENT
Start: 2024-12-22 | End: 2024-12-22

## 2024-12-22 RX ADMIN — Medication 4 MILLIGRAM(S): at 20:50

## 2024-12-22 RX ADMIN — SODIUM CHLORIDE 1000 MILLILITER(S): 9 INJECTION, SOLUTION INTRAMUSCULAR; INTRAVENOUS; SUBCUTANEOUS at 20:49

## 2024-12-22 RX ADMIN — ONDANSETRON HYDROCHLORIDE 4 MILLIGRAM(S): 4 TABLET, FILM COATED ORAL at 20:51

## 2024-12-22 RX ADMIN — Medication 4 MILLIGRAM(S): at 22:48

## 2024-12-22 NOTE — ED STATDOCS - NSFOLLOWUPINSTRUCTIONS_ED_ALL_ED_FT
Bowel Obstruction    WHAT YOU NEED TO KNOW:    A bowel obstruction is a partial or complete blockage of your intestine. Your small or large intestine may be affected. The blockage prevents food and waste from passing through normally.  Digestive Tract    DISCHARGE INSTRUCTIONS:    Seek care immediately if:    You have severe abdominal pain that does not get better.    Your heart is beating faster than normal for you.    You have a fever.  Call your doctor if:    You have nausea and are vomiting.    Your abdomen is enlarged.    You cannot pass a bowel movement or gas.    You lose weight without trying.    You have blood in your bowel movement.    You have questions or concerns about your condition or care.  Follow up with your doctor as directed: Write down your questions so you remember to ask them during your visits.

## 2024-12-22 NOTE — ED STATDOCS - CLINICAL SUMMARY MEDICAL DECISION MAKING FREE TEXT BOX
Patient with N/V, abd pain, Vitally stable. Found to have SBO. Gen surg consulted, However patient decided to leave against medical advice prior to surgical evaluation. Patient has decision making capacity and stable. Verbalizes understanding of return precautions and f/u.

## 2024-12-22 NOTE — ED STATDOCS - OBJECTIVE STATEMENT
83 y/o M with PMHx of anemia, aortic valve stenosis, Lyme disease, Crohn's disease, HLD, LBBB, PAF, HTN, coronary artery disease, gastritis, diverticulitis, sigmoidectomy, cholecystectomy presents to the ED c/o abd pain, constipation, and vomiting since this morning. Pt has not passed any gas today. Last bm this morning, nonbloody. Zofran taken at home but he vomited it up.

## 2024-12-22 NOTE — ED ADULT TRIAGE NOTE - CHIEF COMPLAINT QUOTE
Patient ambulatory to the ER c/o constant abdominal pain and vomiting since this morning. Denies diarrhea, fevers. PMH of Crohn's disease. Took PO zofran at home but vomiting immediately after

## 2024-12-22 NOTE — ED STATDOCS - NSICDXPASTMEDICALHX_GEN_ALL_CORE_FT
PAST MEDICAL HISTORY:  Anemia     Aortic valve stenosis     Coronary artery disease involving native heart without angina pectoris, unspecified vessel or lesion type     Crohn's disease     Diverticulitis     Essential hypertension     Gastritis     HLD (hyperlipidemia)     LBBB (left bundle branch block)     Lyme disease     PAF (paroxysmal atrial fibrillation)

## 2024-12-22 NOTE — ED STATDOCS - CARE PROVIDER_API CALL
Aníbal Mckenzie  Surgery  [address] [place] [address], [address]  [place] [address]  Phone: [phone]  Fax: [phone]  Follow Up Time:

## 2024-12-22 NOTE — ED ADULT NURSE NOTE - NSFALLRISKINTERV_ED_ALL_ED

## 2024-12-22 NOTE — ED STATDOCS - PROGRESS NOTE DETAILS
83 y/o M with PMHx of anemia, aortic valve stenosis, Lyme disease, Crohn's disease, HLD, LBBB, PAF, HTN, coronary artery disease, gastritis, diverticulitis, sigmoidectomy, cholecystectomy presents to the ED c/o abd pain, constipation, and vomiting since this morning. Pt has not passed any gas today. Last bm this morning, nonbloody. Zofran taken at home but he vomited it up.    Plan: lans, urine, CT abd/pelvis ro SBO  Tri Sneed, DNP [place]: patient with an SBO, surgery was consulted called at 11:50pm and 1am.  states that he had a large bowel movement and now feels better and wants dc. I recommended against leaving. Instructed patient that he should wait for surgical evaluation. Patient adamant about leaving AMA. Patient is alert and oriented times three. No acute distress. Discussed patient's current condition and need for further diagnostic evaluation. Patient wants to leave and understands leaving against medical advise. Risks, benefits and alternatives explained. Advised to follow up with physician tomorrow or return immediately for any change in symptoms. Patient understand that they can return to the ER at any time to continue evaluation. Patient verbalizes understanding to the above instructions.

## 2024-12-23 VITALS
TEMPERATURE: 98 F | SYSTOLIC BLOOD PRESSURE: 133 MMHG | RESPIRATION RATE: 18 BRPM | OXYGEN SATURATION: 96 % | DIASTOLIC BLOOD PRESSURE: 70 MMHG | HEART RATE: 74 BPM

## 2024-12-23 PROBLEM — D64.9 ANEMIA, UNSPECIFIED: Chronic | Status: ACTIVE | Noted: 2023-12-13

## 2024-12-23 PROBLEM — E78.5 HYPERLIPIDEMIA, UNSPECIFIED: Chronic | Status: ACTIVE | Noted: 2023-12-13

## 2024-12-23 PROBLEM — K50.90 CROHN'S DISEASE, UNSPECIFIED, WITHOUT COMPLICATIONS: Chronic | Status: ACTIVE | Noted: 2023-12-13

## 2024-12-23 PROBLEM — A69.20 LYME DISEASE, UNSPECIFIED: Chronic | Status: ACTIVE | Noted: 2023-12-13

## 2024-12-23 PROBLEM — I44.7 LEFT BUNDLE-BRANCH BLOCK, UNSPECIFIED: Chronic | Status: ACTIVE | Noted: 2023-12-13

## 2024-12-23 PROBLEM — I35.0 NONRHEUMATIC AORTIC (VALVE) STENOSIS: Chronic | Status: ACTIVE | Noted: 2023-12-13

## 2024-12-23 PROBLEM — I48.0 PAROXYSMAL ATRIAL FIBRILLATION: Chronic | Status: ACTIVE | Noted: 2023-12-13

## 2024-12-23 NOTE — ED ADULT NURSE REASSESSMENT NOTE - NS ED NURSE REASSESS COMMENT FT1
Pt resting in stretcher with safety maintained. Respirations even and unlabored, no distress noted. Pt reports that he had a large bowel movement. Wife at bedside.

## 2025-01-20 NOTE — DISCHARGE NOTE PROVIDER - CARE PROVIDERS DIRECT ADDRESSES
----- Message from Sha Richards sent at 1/19/2025 11:30 PM EST -----    ----- Message -----  From: Nahid Rosario MD  Sent: 1/16/2025   6:52 PM EST  To: JESS Henderson    Holter Monitor - 72 Hour Up To 15 Days  Order: [id number]  Status: Final result       Visible to patient: Yes (not seen)       Dx: Dyspnea on exertion; Longstanding per...    0 Result Notes  Details    Reading Physician Reading Date Result Priority   Nahid Rosario MD  [phone] 1/16/2025 Routine     Result Text  2 Ventricular Tachycardia runs occurred, the run with the fastest interval lasting 12 beats with a max rate of 158 bpm (avg 125 bpm); the run with the fastest interval was also the longest.  There appeared to be fusion beats at times.  Atrial Fibrillation occurred continuously (100% burden), ranging from  bpm (avg of 61 bpm). Isolated VEs were rare (<1.0%, 3238), VE Couplets were rare (<1.0%, 38), and VE Triplets were rare (<1.0%, 7). Ventricular Bigeminy and Trigeminy were present with bigeminy persisting for 5.7 seconds and trigeminy for 11.7 seconds.. MD notification criteria for Ventricular Tachycardia met - report posted prior to notification per account request ([place]).  Recurrent slow ventricular response rates during presumed sleep hours.  Consider evaluation of sleep apnea if not already performed.  No other arrhythmia, ectopy, or block of significance was noted.  No symptoms were reported.        [facility]       Called patient with result's, asked patient about symptoms of sleep apnea, states doesn't feel the need for evaluation for sleep apnea. He was instructed to contact our office if he changes his mind for referral for pulmonology to be evaluated for sleep apnea.   
,DirectAddress_Unknown

## 2025-07-15 NOTE — ASU PREOP CHECKLIST - NOTHING BY MOUTH SINCE
Patient Information from Today's Visit    Diagnosis: TIA - hypercoagulability work-up      Follow Up Instructions:   - Discussed need for hypercoagulability and risk versus benefit of testing in detail  - Will draw labs today for this  - Follow up with primary care regarding elevated liver function tests (LFTs); may need to discuss alternate cholesterol medications    Follow up in 4 weeks    Treatment Summary has been discussed and given to patient: N/A      Current Labs:   No visits with results within 3 Day(s) from this visit.   Latest known visit with results is:   Ancillary Procedure on 06/26/2025   Component Date Value Ref Range Status    Body Surface Area 06/26/2025 2.16  m2 Final    Stress Target HR 06/26/2025 167  bpm Final    Baseline HR 06/26/2025 81  bpm Final    Baseline Systolic BP 06/26/2025 135  mmHg Final    Baseline Diastolic BP 06/26/2025 90  mmHg Final    Stress Peak HR 06/26/2025 170  bpm Final    Stress Systolic BP 06/26/2025 152  mmHg Final    Stress Diastolic BP 06/26/2025 84  mmHg Final    Stress Rate Pressure Product 06/26/2025 25,840  bpm*mmHg Final    Stress Percent HR Achieved 06/26/2025 102  % Final    Exercise Duration Time 06/26/2025 10  min Final    Exercise Duration Seconds 06/26/2025 11  sec Final    Stress Estimated Workload 06/26/2025 13.4  METS Final    Angina Index 06/26/2025 0   Final                 Please refer to After Visit Summary or [employer] for upcoming appointment information. If you have any questions regarding your upcoming schedule please reach out to your care team through [employer] or call [phone].    Please notify your assigned Nurse Navigator of any unplanned hospital admissions or Emergency Room visits within 24 hours of discharge.    -------------------------------------------------------------------------------------------------------------------  Please call our office at [phone] if you have any  of the following symptoms:   Fever of 100.5 or 
21-May-2019 21:00